# Patient Record
Sex: FEMALE | Race: WHITE | NOT HISPANIC OR LATINO | Employment: FULL TIME | URBAN - METROPOLITAN AREA
[De-identification: names, ages, dates, MRNs, and addresses within clinical notes are randomized per-mention and may not be internally consistent; named-entity substitution may affect disease eponyms.]

---

## 2020-08-29 RX ORDER — METFORMIN HYDROCHLORIDE 500 MG/1
500 TABLET, EXTENDED RELEASE ORAL 3 TIMES DAILY
COMMUNITY
Start: 2020-05-04

## 2020-08-29 RX ORDER — NORETHINDRONE ACETATE AND ETHINYL ESTRADIOL 1MG-20(21)
1 KIT ORAL
COMMUNITY
Start: 2020-05-08 | End: 2021-02-15 | Stop reason: SDUPTHER

## 2020-09-01 ENCOUNTER — TELEPHONE (OUTPATIENT)
Dept: INTERNAL MEDICINE CLINIC | Facility: CLINIC | Age: 30
End: 2020-09-01

## 2020-09-01 ENCOUNTER — OFFICE VISIT (OUTPATIENT)
Dept: INTERNAL MEDICINE CLINIC | Facility: CLINIC | Age: 30
End: 2020-09-01
Payer: COMMERCIAL

## 2020-09-01 VITALS
WEIGHT: 246 LBS | HEIGHT: 70 IN | TEMPERATURE: 98.5 F | HEART RATE: 72 BPM | OXYGEN SATURATION: 98 % | BODY MASS INDEX: 35.22 KG/M2 | DIASTOLIC BLOOD PRESSURE: 70 MMHG | SYSTOLIC BLOOD PRESSURE: 110 MMHG

## 2020-09-01 DIAGNOSIS — E28.2 PCOS (POLYCYSTIC OVARIAN SYNDROME): Primary | ICD-10-CM

## 2020-09-01 DIAGNOSIS — Z13.6 ENCOUNTER FOR LIPID SCREENING FOR CARDIOVASCULAR DISEASE: ICD-10-CM

## 2020-09-01 DIAGNOSIS — Z13.220 ENCOUNTER FOR LIPID SCREENING FOR CARDIOVASCULAR DISEASE: ICD-10-CM

## 2020-09-01 DIAGNOSIS — Z11.4 SCREENING FOR HIV WITHOUT PRESENCE OF RISK FACTORS: ICD-10-CM

## 2020-09-01 DIAGNOSIS — N89.8 VAGINAL SORE: ICD-10-CM

## 2020-09-01 PROCEDURE — 3725F SCREEN DEPRESSION PERFORMED: CPT | Performed by: INTERNAL MEDICINE

## 2020-09-01 PROCEDURE — 99203 OFFICE O/P NEW LOW 30 MIN: CPT | Performed by: INTERNAL MEDICINE

## 2020-09-01 NOTE — PROGRESS NOTES
BMI Counseling: Body mass index is 35 22 kg/m²  The BMI is above normal  Nutrition recommendations include reducing portion sizes and decreasing overall calorie intake  Exercise recommendations include exercising 3-5 times per week  Assessment/Plan:    Vaginal sore  Unclear etiology of sore  And she has had full testing done including HSV 1 and 2 both of which were negative however they were not scrapings of the lesion  Will use Silvadene topical cream   Follow-up with gyn    BMI 35 0-35 9,adult  Follow-up of physical exam next available              Diagnoses and all orders for this visit:    PCOS (polycystic ovarian syndrome)  -     CBC and differential; Future  -     Comprehensive metabolic panel; Future  -     Ambulatory referral to Weight Management; Future    Screening for HIV without presence of risk factors  -     HIV 1/2 Antigen/Antibody (4th Generation) w Reflex [facility]; Future    Encounter for lipid screening for cardiovascular disease  -     Lipid Panel with Direct LDL reflex; Future    BMI 35 0-35 9,adult  -     Ambulatory referral to Weight Management; Future    Vaginal sore  -     silver sulfadiazine (Silvadene) 1 % cream; Apply topically daily    Other orders  -     aluminum chloride (DRYSOL) 20 % external solution; Dab-O-matic Use as directed  -     metFORMIN (GLUCOPHAGE-XR) 500 mg 24 hr tablet; Start by taking 1 tablet by mouth with dinner  Increase after 1 week to 1 tablet twice daily with meals  -     norethindrone-ethinyl estradiol (JUNEL FE 1/20) 1-20 MG-MCG per tablet; Take 1 tablet by mouth          Subjective:     Chief Complaint   Patient presents with   [facility]      pt is here for establish care   Lesions     Pt has one spot in vagina  boyfried was postive for gential warts  Pt had screening bloodwork done but can't get into gyn for weeks  Results in chart  Lesion itches        Patient ID: Alisia Wilde is a 31 y o  female      Vaginal Itching   The patient's primary symptoms include genital lesions  The current episode started 1 to 4 weeks ago  The problem occurs intermittently  The problem has been unchanged  The patient is experiencing no pain  The problem affects the right side  She is here to establish care since she is new to the area  She has a sore on her labia which she would like to be evaluated  Her only other medical history is that of PCOS for which she has been on Glucophage XR and has lost 10 lb so far  The following portions of the patient's history were reviewed and updated as appropriate: past family history, past medical history, past social history, past surgical history and problem list     Review of Systems   Genitourinary: Positive for genital sores and menstrual problem  All other systems reviewed and are negative  Past Medical History:   Diagnosis Date    Arthritis     PCOS (polycystic ovarian syndrome)          Current Outpatient Medications:     aluminum chloride (DRYSOL) 20 % external solution, Dab-O-matic Use as directed, Disp: , Rfl:     metFORMIN (GLUCOPHAGE-XR) 500 mg 24 hr tablet, Start by taking 1 tablet by mouth with dinner   Increase after 1 week to 1 tablet twice daily with meals, Disp: , Rfl:     norethindrone-ethinyl estradiol (JUNEL FE 1/20) 1-20 MG-MCG per tablet, Take 1 tablet by mouth, Disp: , Rfl:     silver sulfadiazine (Silvadene) 1 % cream, Apply topically daily, Disp: 25 g, Rfl: 0    Allergies   Allergen Reactions    Bee Venom Swelling     Large local reaction    Erigeron Annuus     Nicotine Other (See Comments)     Pt had severe "night terrors" when using nicotine patch    Other      Nicotene patches  Fleas    Clarithromycin Rash       Social History   Past Surgical History:   Procedure Laterality Date    WISDOM TOOTH EXTRACTION  2007     Family History   Problem Relation Age of Onset    Hypertension Mother     Other Mother         vaginal tumors    Skin cancer Father     Heart Valve Disease Father         leaky valve    Diabetes Maternal Grandmother     Kidney failure Maternal Grandmother     Cervical cancer Paternal Grandmother     Lung cancer Paternal Grandfather        Objective:  /70 (BP Location: Left arm, Patient Position: Sitting, Cuff Size: Large)   Pulse 72   Temp 98 5 °F (36 9 °C) (Tympanic)   Ht 5' 10 08" (1 78 m)   Wt 112 kg (246 lb)   SpO2 98% Comment: ra  BMI 35 22 kg/m²     No results found for this or any previous visit (from the past 1344 hour(s))  Physical Exam  Genitourinary:     General: Normal vulva  Labia:         Right: Lesion present  Comments: Small punched-out open lesion on right labia approximately 5 mm in size, Nontender          Gen: healthy appearing, overweight  Heent: atraumatic, normocephalic, sclera is clear and PERRLA  Mouth: is moist  Neck: is supple, no JVD, no carotid bruits  Heart: is regular Normal s1, s2, no murmurs, rubs, clicks or gallops  Lungs: are clear to auscultation and percussion    Ext: no edema, distal pulses normal  Skin: no rashes, ulcers  Mood: normal affect  Neuro: AAOx3

## 2020-09-01 NOTE — ASSESSMENT & PLAN NOTE
Unclear etiology of sore  And she has had full testing done including HSV 1 and 2 both of which were negative however they were not scrapings of the lesion    Will use Silvadene topical cream   Follow-up with gyn

## 2020-09-01 NOTE — TELEPHONE ENCOUNTER
Patient seen today by Dr Jose Roberto Bonilla to establish care  Due for an annual physical   Patient needs to check schedule and will call back to schedule appointment

## 2020-09-21 ENCOUNTER — CONSULT (OUTPATIENT)
Dept: BARIATRICS | Facility: CLINIC | Age: 30
End: 2020-09-21
Payer: COMMERCIAL

## 2020-09-21 VITALS
HEIGHT: 68 IN | WEIGHT: 245.8 LBS | HEART RATE: 74 BPM | SYSTOLIC BLOOD PRESSURE: 102 MMHG | TEMPERATURE: 98 F | BODY MASS INDEX: 37.25 KG/M2 | DIASTOLIC BLOOD PRESSURE: 72 MMHG

## 2020-09-21 DIAGNOSIS — E28.2 PCOS (POLYCYSTIC OVARIAN SYNDROME): ICD-10-CM

## 2020-09-21 DIAGNOSIS — E66.01 SEVERE OBESITY WITH BODY MASS INDEX (BMI) OF 35.0 TO 39.9 WITH SERIOUS COMORBIDITY (HCC): Primary | ICD-10-CM

## 2020-09-21 DIAGNOSIS — F17.200 TOBACCO USE DISORDER: ICD-10-CM

## 2020-09-21 DIAGNOSIS — Z59.41 FOOD INSECURITY: ICD-10-CM

## 2020-09-21 DIAGNOSIS — R87.610 ATYPICAL SQUAMOUS CELL CHANGES OF UNDETERMINED SIGNIFICANCE (ASCUS) ON CERVICAL CYTOLOGY WITH NEGATIVE HIGH RISK HUMAN PAPILLOMA VIRUS (HPV) TEST RESULT: ICD-10-CM

## 2020-09-21 PROCEDURE — 99243 OFF/OP CNSLTJ NEW/EST LOW 30: CPT | Performed by: FAMILY MEDICINE

## 2020-09-21 SDOH — ECONOMIC STABILITY - FOOD INSECURITY: FOOD INSECURITY: Z59.41

## 2020-09-21 NOTE — PROGRESS NOTES
Assessment/Plan:    Diagnoses and all orders for this visit:    Severe obesity with body mass index (BMI) of 35 0 to 39 9 with serious comorbidity (HCC)    PCOS (polycystic ovarian syndrome)  -     Ambulatory referral to Weight Management    BMI 35 0-35 9,adult  -     Ambulatory referral to Weight Management    Atypical squamous cell changes of undetermined significance (ASCUS) on cervical cytology with negative high risk human papilloma virus (HPV) test result    Food insecurity    Tobacco use disorder      -Discussed options of [facility]-Intensive Lifestyle Intervention Program, Very Low Calorie Diet-VLCD and Conservative Program and the role of weight loss medications   -Initial weight loss goal of 5-10% weight loss for improved health  -Screening labs  -Patient is interested in pursuing Conservative Program + body comp  Contrave would be a good option for AOM especially with smoking could help with smoking cessation  No hx of seizures  Goals:  Food log (ie ) www myfitnesspal com,sparkpeople  com,loseit com,calorieking  com,etc  baritastic  No sugary beverages  At least 64oz of water daily  Increase physical activity by 10 minutes daily  Gradually increase physical activity to a goal of 5 days per week for 30 minutes of MODERATE intensity PLUS 2 days per week of FULL BODY resistance training  1500 calories per day    45 minute visit, >50% face-to-face time spent counseling patient on nonsurgical interventions for the treatment of excess weight  Discussed in detail nonsurgical options including intensive lifestyle intervention program, very low-calorie diet program and conservative program   Discussed the role of weight loss medications  Counseled patient on diet behavior and exercise modification for weight loss  Follow up in approximately 2 months with Non-Surgical Physician/Advanced Practitioner      Subjective:   Chief Complaint   Patient presents with    Consult     mwm consult       Patient ID: Alia Botello  is a 31 y o  female with excess weight/obesity here to pursue weight management  Past Medical History:   Diagnosis Date    Arthritis     PCOS (polycystic ovarian syndrome)        HPI:  Obesity/Excess Weight:  Severity: class 2  Onset:  most of her life  Modifiers: Diet and Exercise and Physician Supervised Weight Loss Program, low carb  Contributing factors: Poor Food Choices and PCOS  Associated symptoms: increased joint pain and inability to do certain activities    Nutrition: 1,500cals a day  Goals: 200lbs  Hydration:  Alcohol: every other weekend couple drinks   Smokin/2ppd  Exercise: 30 high intensity work out, 3 miles walks x times a week  Sleep: 7-8hrs  STOP ban/8    Social:  Works as teacher  Lives alone w cat    The following portions of the patient's history were reviewed and updated as appropriate: allergies, current medications, past family history, past medical history, past social history, past surgical history and problem list     Review of Systems   Constitutional: Negative for activity change and appetite change  Respiratory: Negative  Cardiovascular: Negative  Gastrointestinal: Negative  Genitourinary: Negative  Musculoskeletal: Negative for arthralgias  Skin: Negative for rash  Psychiatric/Behavioral: Negative  Objective:    /72 (BP Location: Left arm, Patient Position: Sitting, Cuff Size: Large)   Pulse 74   Temp 98 °F (36 7 °C)   Ht 5' 8" (1 727 m)   Wt 111 kg (245 lb 12 8 oz)   BMI 37 37 kg/m²     Physical Exam     Constitutional   General appearance: Abnormal   well developed and obese  Eyes No conjunctival pallor  Ears, Nose, Mouth, and Throat Oral mucosa moist    Pulmonary   Respiratory effort: No increased work of breathing or signs of respiratory distress  Auscultation of lungs: Clear to auscultation, equal breath sounds bilaterally, no wheezes, no rales, no rhonci      Cardiovascular   Auscultation of heart: Normal rate and rhythm, normal S1 and S2, without murmurs  Examination of extremities for edema and/or varicosities: Normal   no edema  Abdomen   Abdomen: Abnormal   The abdomen was obese  Bowel sounds were normal  The abdomen was soft and nontender     Musculoskeletal   Gait and station: Normal     Psychiatric   Orientation to person, place and time: Normal     Affect: appropriate

## 2020-10-05 ENCOUNTER — OFFICE VISIT (OUTPATIENT)
Dept: BARIATRICS | Facility: CLINIC | Age: 30
End: 2020-10-05

## 2020-10-05 VITALS — BODY MASS INDEX: 37.15 KG/M2 | HEIGHT: 68 IN | WEIGHT: 245.15 LBS

## 2020-10-05 DIAGNOSIS — R63.5 ABNORMAL WEIGHT GAIN: ICD-10-CM

## 2020-10-05 PROCEDURE — RECHECK

## 2020-10-05 PROCEDURE — WEIGHT

## 2020-10-09 ENCOUNTER — TELEMEDICINE (OUTPATIENT)
Dept: BARIATRICS | Facility: CLINIC | Age: 30
End: 2020-10-09
Payer: COMMERCIAL

## 2020-10-09 VITALS — WEIGHT: 245 LBS | HEIGHT: 68 IN | BODY MASS INDEX: 37.13 KG/M2

## 2020-10-09 DIAGNOSIS — F33.1 MODERATE EPISODE OF RECURRENT MAJOR DEPRESSIVE DISORDER (HCC): ICD-10-CM

## 2020-10-09 DIAGNOSIS — F17.200 TOBACCO USE DISORDER: ICD-10-CM

## 2020-10-09 DIAGNOSIS — E66.01 SEVERE OBESITY WITH BODY MASS INDEX (BMI) OF 35.0 TO 39.9 WITH SERIOUS COMORBIDITY (HCC): Primary | ICD-10-CM

## 2020-10-09 DIAGNOSIS — E28.2 PCOS (POLYCYSTIC OVARIAN SYNDROME): ICD-10-CM

## 2020-10-09 PROCEDURE — 99214 OFFICE O/P EST MOD 30 MIN: CPT | Performed by: FAMILY MEDICINE

## 2020-11-12 ENCOUNTER — TELEPHONE (OUTPATIENT)
Dept: BARIATRICS | Facility: CLINIC | Age: 30
End: 2020-11-12

## 2020-11-19 DIAGNOSIS — F33.1 MODERATE EPISODE OF RECURRENT MAJOR DEPRESSIVE DISORDER (HCC): Primary | ICD-10-CM

## 2020-11-19 DIAGNOSIS — E66.01 SEVERE OBESITY WITH BODY MASS INDEX (BMI) OF 35.0 TO 39.9 WITH SERIOUS COMORBIDITY (HCC): ICD-10-CM

## 2020-11-19 DIAGNOSIS — F17.200 TOBACCO USE DISORDER: ICD-10-CM

## 2020-11-19 RX ORDER — BUPROPION HYDROCHLORIDE 150 MG/1
150 TABLET, EXTENDED RELEASE ORAL 2 TIMES DAILY
Qty: 60 TABLET | Refills: 1 | Status: SHIPPED | OUTPATIENT
Start: 2020-11-19 | End: 2021-02-18 | Stop reason: SDUPTHER

## 2020-12-10 ENCOUNTER — HOSPITAL ENCOUNTER (EMERGENCY)
Facility: HOSPITAL | Age: 30
Discharge: HOME | End: 2020-12-10
Attending: EMERGENCY MEDICINE | Admitting: EMERGENCY MEDICINE
Payer: COMMERCIAL

## 2020-12-10 ENCOUNTER — APPOINTMENT (EMERGENCY)
Dept: RADIOLOGY | Facility: HOSPITAL | Age: 30
End: 2020-12-10
Payer: COMMERCIAL

## 2020-12-10 VITALS
WEIGHT: 250.38 LBS | TEMPERATURE: 98.4 F | DIASTOLIC BLOOD PRESSURE: 89 MMHG | HEART RATE: 89 BPM | SYSTOLIC BLOOD PRESSURE: 140 MMHG | OXYGEN SATURATION: 99 % | BODY MASS INDEX: 38.07 KG/M2 | RESPIRATION RATE: 16 BRPM

## 2020-12-10 DIAGNOSIS — R07.89 ATYPICAL CHEST PAIN: Primary | ICD-10-CM

## 2020-12-10 LAB
ALBUMIN SERPL BCP-MCNC: 4.3 G/DL (ref 3–5.2)
ALP SERPL-CCNC: 39 U/L (ref 43–122)
ALT SERPL W P-5'-P-CCNC: 26 U/L (ref 9–52)
ANION GAP SERPL CALCULATED.3IONS-SCNC: 7 MMOL/L (ref 5–14)
AST SERPL W P-5'-P-CCNC: 27 U/L (ref 14–36)
ATRIAL RATE: 62 BPM
ATRIAL RATE: 68 BPM
BASOPHILS # BLD AUTO: 0 THOUSANDS/ΜL (ref 0–0.1)
BASOPHILS NFR BLD AUTO: 0 % (ref 0–1)
BILIRUB SERPL-MCNC: 0.7 MG/DL
BUN SERPL-MCNC: 7 MG/DL (ref 5–25)
CALCIUM SERPL-MCNC: 8.6 MG/DL (ref 8.4–10.2)
CHLORIDE SERPL-SCNC: 107 MMOL/L (ref 97–108)
CO2 SERPL-SCNC: 23 MMOL/L (ref 22–30)
CREAT SERPL-MCNC: 0.57 MG/DL (ref 0.6–1.2)
D DIMER PPP FEU-MCNC: 0.39 UG/ML FEU
EOSINOPHIL # BLD AUTO: 0.1 THOUSAND/ΜL (ref 0–0.4)
EOSINOPHIL NFR BLD AUTO: 2 % (ref 0–6)
ERYTHROCYTE [DISTWIDTH] IN BLOOD BY AUTOMATED COUNT: 13.1 %
EXT PREG TEST URINE: NEGATIVE
EXT. CONTROL ED NAV: NORMAL
GFR SERPL CREATININE-BSD FRML MDRD: 124 ML/MIN/1.73SQ M
GLUCOSE SERPL-MCNC: 81 MG/DL (ref 70–99)
HCT VFR BLD AUTO: 44.3 % (ref 36–46)
HGB BLD-MCNC: 15.3 G/DL (ref 12–16)
LYMPHOCYTES # BLD AUTO: 2.4 THOUSANDS/ΜL (ref 0.5–4)
LYMPHOCYTES NFR BLD AUTO: 25 % (ref 25–45)
MCH RBC QN AUTO: 31.8 PG (ref 26–34)
MCHC RBC AUTO-ENTMCNC: 34.5 G/DL (ref 31–36)
MCV RBC AUTO: 92 FL (ref 80–100)
MONOCYTES # BLD AUTO: 0.7 THOUSAND/ΜL (ref 0.2–0.9)
MONOCYTES NFR BLD AUTO: 7 % (ref 1–10)
NEUTROPHILS # BLD AUTO: 6.4 THOUSANDS/ΜL (ref 1.8–7.8)
NEUTS SEG NFR BLD AUTO: 66 % (ref 45–65)
P AXIS: 39 DEGREES
P AXIS: 41 DEGREES
PLATELET # BLD AUTO: 199 THOUSANDS/UL (ref 150–450)
PMV BLD AUTO: 10 FL (ref 8.9–12.7)
POTASSIUM SERPL-SCNC: 4.1 MMOL/L (ref 3.6–5)
PR INTERVAL: 148 MS
PR INTERVAL: 150 MS
PROT SERPL-MCNC: 7.4 G/DL (ref 5.9–8.4)
QRS AXIS: 24 DEGREES
QRS AXIS: 27 DEGREES
QRSD INTERVAL: 92 MS
QRSD INTERVAL: 92 MS
QT INTERVAL: 434 MS
QT INTERVAL: 438 MS
QTC INTERVAL: 444 MS
QTC INTERVAL: 461 MS
RBC # BLD AUTO: 4.8 MILLION/UL (ref 4–5.2)
SODIUM SERPL-SCNC: 137 MMOL/L (ref 137–147)
T WAVE AXIS: 22 DEGREES
T WAVE AXIS: 24 DEGREES
TROPONIN I SERPL-MCNC: <0.01 NG/ML (ref 0–0.03)
TROPONIN I SERPL-MCNC: <0.01 NG/ML (ref 0–0.03)
VENTRICULAR RATE: 62 BPM
VENTRICULAR RATE: 68 BPM
WBC # BLD AUTO: 9.8 THOUSAND/UL (ref 4.5–11)

## 2020-12-10 PROCEDURE — 71046 X-RAY EXAM CHEST 2 VIEWS: CPT

## 2020-12-10 PROCEDURE — 93005 ELECTROCARDIOGRAM TRACING: CPT

## 2020-12-10 PROCEDURE — 93010 ELECTROCARDIOGRAM REPORT: CPT | Performed by: INTERNAL MEDICINE

## 2020-12-10 PROCEDURE — 96361 HYDRATE IV INFUSION ADD-ON: CPT

## 2020-12-10 PROCEDURE — 96374 THER/PROPH/DIAG INJ IV PUSH: CPT

## 2020-12-10 PROCEDURE — 36415 COLL VENOUS BLD VENIPUNCTURE: CPT | Performed by: EMERGENCY MEDICINE

## 2020-12-10 PROCEDURE — 84484 ASSAY OF TROPONIN QUANT: CPT | Performed by: EMERGENCY MEDICINE

## 2020-12-10 PROCEDURE — 80053 COMPREHEN METABOLIC PANEL: CPT | Performed by: EMERGENCY MEDICINE

## 2020-12-10 PROCEDURE — 81025 URINE PREGNANCY TEST: CPT | Performed by: EMERGENCY MEDICINE

## 2020-12-10 PROCEDURE — 99285 EMERGENCY DEPT VISIT HI MDM: CPT

## 2020-12-10 PROCEDURE — 85025 COMPLETE CBC W/AUTO DIFF WBC: CPT | Performed by: EMERGENCY MEDICINE

## 2020-12-10 PROCEDURE — 85379 FIBRIN DEGRADATION QUANT: CPT | Performed by: EMERGENCY MEDICINE

## 2020-12-10 PROCEDURE — 99285 EMERGENCY DEPT VISIT HI MDM: CPT | Performed by: EMERGENCY MEDICINE

## 2020-12-10 RX ORDER — KETOROLAC TROMETHAMINE 30 MG/ML
30 INJECTION, SOLUTION INTRAMUSCULAR; INTRAVENOUS ONCE
Status: COMPLETED | OUTPATIENT
Start: 2020-12-10 | End: 2020-12-10

## 2020-12-10 RX ADMIN — KETOROLAC TROMETHAMINE 30 MG: 30 INJECTION, SOLUTION INTRAMUSCULAR; INTRAVENOUS at 17:10

## 2020-12-10 RX ADMIN — SODIUM CHLORIDE 1000 ML: 0.9 INJECTION, SOLUTION INTRAVENOUS at 16:17

## 2021-01-04 ENCOUNTER — TELEPHONE (OUTPATIENT)
Dept: OBGYN CLINIC | Facility: CLINIC | Age: 31
End: 2021-01-04

## 2021-01-05 ENCOUNTER — TELEMEDICINE (OUTPATIENT)
Dept: INTERNAL MEDICINE CLINIC | Facility: CLINIC | Age: 31
End: 2021-01-05
Payer: COMMERCIAL

## 2021-01-05 DIAGNOSIS — E28.2 PCOS (POLYCYSTIC OVARIAN SYNDROME): Primary | ICD-10-CM

## 2021-01-05 DIAGNOSIS — F17.200 TOBACCO USE DISORDER: ICD-10-CM

## 2021-01-05 DIAGNOSIS — E66.01 SEVERE OBESITY WITH BODY MASS INDEX (BMI) OF 35.0 TO 39.9 WITH SERIOUS COMORBIDITY (HCC): ICD-10-CM

## 2021-01-05 DIAGNOSIS — K22.4 ESOPHAGEAL SPASM: ICD-10-CM

## 2021-01-05 DIAGNOSIS — L70.0 ACNE VULGARIS: ICD-10-CM

## 2021-01-05 DIAGNOSIS — F33.1 MODERATE EPISODE OF RECURRENT MAJOR DEPRESSIVE DISORDER (HCC): ICD-10-CM

## 2021-01-05 PROBLEM — F06.4 ANXIETY DISORDER DUE TO KNOWN PHYSIOLOGICAL CONDITION: Status: ACTIVE | Noted: 2021-01-05

## 2021-01-05 PROCEDURE — 99213 OFFICE O/P EST LOW 20 MIN: CPT | Performed by: INTERNAL MEDICINE

## 2021-01-05 PROCEDURE — 3725F SCREEN DEPRESSION PERFORMED: CPT | Performed by: INTERNAL MEDICINE

## 2021-01-05 RX ORDER — FAMOTIDINE 40 MG/1
40 TABLET, FILM COATED ORAL DAILY
Qty: 30 TABLET | Refills: 0 | Status: SHIPPED | OUTPATIENT
Start: 2021-01-05 | End: 2021-09-06 | Stop reason: SDUPTHER

## 2021-01-05 NOTE — ASSESSMENT & PLAN NOTE
Possibly secondary to Wellbutrin  However given present BMI possibility of a hiatal hernia    Will referred to GI for evaluation

## 2021-01-05 NOTE — PROGRESS NOTES
Assessment/Plan:    Tobacco use disorder  Continue with efforts for smoking cessation    Esophageal spasm  Possibly secondary to Wellbutrin  However given present BMI possibility of a hiatal hernia  Will referred to GI for evaluation    Anxiety disorder due to known physiological condition  Recommended to increase Wellbutrin to prescribed dosage  Diagnoses and all orders for this visit:    PCOS (polycystic ovarian syndrome)    Acne vulgaris    Moderate episode of recurrent major depressive disorder (HCC)    Severe obesity with body mass index (BMI) of 35 0 to 39 9 with serious comorbidity ([address] )    Tobacco use disorder    Esophageal spasm  -     Ambulatory referral to Gastroenterology; Future  -     famotidine (PEPCID) 40 MG tablet; Take 1 tablet (40 mg total) by mouth daily          Subjective:   Chief Complaint   Patient presents with    Chest Pain     Chest pain since about 3wks, has been improving  Pt went to ER on 12/10/2020  Patient ID: Mike Stone is a 31 y o  female  The patient was identified by name and date of birth  Mike Stone was informed that this is a telemedicine visit and that the visit is being conducted through [facility] and patient was informed that this is a secure, HIPAA-compliant platform  She agrees to proceed     My office door was closed  No one else was in the room  She acknowledged consent and understanding of privacy and security of the video platform  The patient has agreed to participate and understands they can discontinue the visit at any time  Patient is aware this is a billable service  Chest Pain   This is a new problem  The current episode started 1 to 4 weeks ago  The onset quality is gradual  The problem occurs intermittently  The problem has been gradually improving  The pain is present in the substernal region  The pain is severe  The quality of the pain is described as sharp and squeezing  The pain does not radiate  Pertinent negatives include no irregular heartbeat or near-syncope  she was evaluated in the ED with the ECG and blood work  EKG showed normal sinus rhythm blood work was normal and troponin was negative  She was recently started on Wellbutrin to help with smoking cessation  She has only been taking 1 a day in the morning  This helped with cigarette cessation during the day however towards the evening and next morning was when she was noticing the symptoms      The following portions of the patient's history were reviewed and updated as appropriate: past family history, past medical history, past social history, past surgical history and problem list     Review of Systems   Cardiovascular: Positive for chest pain  Negative for near-syncope  Psychiatric/Behavioral: The patient is nervous/anxious            Past Medical History:   Diagnosis Date    Arthritis     PCOS (polycystic ovarian syndrome)          Current Outpatient Medications:     aluminum chloride (DRYSOL) 20 % external solution, Dab-O-matic Use as directed, Disp: , Rfl:     buPROPion (WELLBUTRIN SR) 150 mg 12 hr tablet, Take 1 tablet (150 mg total) by mouth 2 (two) times a day (Patient taking differently: Take 150 mg by mouth daily ), Disp: 60 tablet, Rfl: 1    metFORMIN (GLUCOPHAGE-XR) 500 mg 24 hr tablet, Take 500 mg by mouth 2 (two) times a day with meals , Disp: , Rfl:     norethindrone-ethinyl estradiol (JUNEL FE 1/20) 1-20 MG-MCG per tablet, Take 1 tablet by mouth, Disp: , Rfl:     famotidine (PEPCID) 40 MG tablet, Take 1 tablet (40 mg total) by mouth daily, Disp: 30 tablet, Rfl: 0    Allergies   Allergen Reactions    Bee Venom Swelling     Large local reaction    Erigeron Annuus     Nicotine Other (See Comments)     Pt had severe "night terrors" when using nicotine patch    Other      Nicotene patches  Fleas    Clarithromycin Rash       Social History   Past Surgical History:   Procedure Laterality Date    WISDOM TOOTH EXTRACTION 2007     Family History   Problem Relation Age of Onset    Hypertension Mother     Other Mother         vaginal tumors    Heart disease Mother     Skin cancer Father     Heart Valve Disease Father         leaky valve    Diabetes Maternal Grandmother     Kidney failure Maternal Grandmother     Cervical cancer Paternal Grandmother     Lung cancer Paternal Grandfather        Objective: There were no vitals taken for this visit      Recent Results (from the past 1344 hour(s))   ECG 12 lead    Collection Time: 12/10/20  4:13 PM   Result Value Ref Range    Ventricular Rate 68 BPM    Atrial Rate 68 BPM    UT Interval 148 ms    QRSD Interval 92 ms    QT Interval 434 ms    QTC Interval 461 ms    P Axis 41 degrees    QRS Axis 27 degrees    T Wave Axis 24 degrees   ECG 12 lead    Collection Time: 12/10/20  4:14 PM   Result Value Ref Range    Ventricular Rate 62 BPM    Atrial Rate 62 BPM    UT Interval 150 ms    QRSD Interval 92 ms    QT Interval 438 ms    QTC Interval 444 ms    P Axis 39 degrees    QRS Axis 24 degrees    T Wave Axis 22 degrees   CBC and differential    Collection Time: 12/10/20  4:15 PM   Result Value Ref Range    WBC 9 80 4 50 - 11 00 Thousand/uL    RBC 4 80 4 00 - 5 20 Million/uL    Hemoglobin 15 3 12 0 - 16 0 g/dL    Hematocrit 44 3 36 0 - 46 0 %    MCV 92 80 - 100 fL    MCH 31 8 26 0 - 34 0 pg    MCHC 34 5 31 0 - 36 0 g/dL    RDW 13 1 <15 3 %    MPV 10 0 8 9 - 12 7 fL    Platelets 082 508 - 105 Thousands/uL    Neutrophils Relative 66 (H) 45 - 65 %    Lymphocytes Relative 25 25 - 45 %    Monocytes Relative 7 1 - 10 %    Eosinophils Relative 2 0 - 6 %    Basophils Relative 0 0 - 1 %    Neutrophils Absolute 6 40 1 80 - 7 80 Thousands/µL    Lymphocytes Absolute 2 40 0 50 - 4 00 Thousands/µL    Monocytes Absolute 0 70 0 20 - 0 90 Thousand/µL    Eosinophils Absolute 0 10 0 00 - 0 40 Thousand/µL    Basophils Absolute 0 00 0 00 - 0 10 Thousands/µL   Comprehensive metabolic panel    Collection Time: 12/10/20  4:15 PM   Result Value Ref Range    Sodium 137 137 - 147 mmol/L    Potassium 4 1 3 6 - 5 0 mmol/L    Chloride 107 97 - 108 mmol/L    CO2 23 22 - 30 mmol/L    ANION GAP 7 5 - 14 mmol/L    BUN 7 5 - 25 mg/dL    Creatinine 0 57 (L) 0 60 - 1 20 mg/dL    Glucose 81 70 - 99 mg/dL    Calcium 8 6 8 4 - 10 2 mg/dL    AST 27 14 - 36 U/L    ALT 26 9 - 52 U/L    Alkaline Phosphatase 39 (L) 43 - 122 U/L    Total Protein 7 4 5 9 - 8 4 g/dL    Albumin 4 3 3 0 - 5 2 g/dL    Total Bilirubin 0 70 <1 30 mg/dL    eGFR 124 >60 ml/min/1 73sq m   Troponin I    Collection Time: 12/10/20  4:15 PM   Result Value Ref Range    Troponin I <0 01 0 00 - 0 03 ng/mL   D-Dimer    Collection Time: 12/10/20  4:15 PM   Result Value Ref Range    D-Dimer, Quant 0 39 <0 50 ug/ml FEU   POCT pregnancy, urine    Collection Time: 12/10/20  5:00 PM   Result Value Ref Range    EXT PREG TEST UR (Ref: Negative) negative     Control valid    Troponin I    Collection Time: 12/10/20  7:25 PM   Result Value Ref Range    Troponin I <0 01 0 00 - 0 03 ng/mL        I spent 25 minutes directly with the patient during this visit  today in which greater than 50% of this time was spent in counseling/coordination of care regarding Diagnostic results, Prognosis, Risks and benefits of tx options, Intructions for management, Patient and family education, Importance of tx compliance, Risk factor reductions and Impressions  I did not need to refer her for COVID evaluation P             Physical Exam   Constitutional: She is oriented to person, place, and time  She appears well-developed and well-nourished  No distress  HENT:   Head: Normocephalic and atraumatic  Neck: Normal range of motion  Neck supple  Pulmonary/Chest: Effort normal  No respiratory distress  Musculoskeletal: Normal range of motion  Neurological: She is alert and oriented to person, place, and time  Skin: She is not diaphoretic  Psychiatric: She has a normal mood and affect   Her behavior is normal  Judgment and thought content normal

## 2021-01-07 ENCOUNTER — OFFICE VISIT (OUTPATIENT)
Dept: OBGYN CLINIC | Facility: CLINIC | Age: 31
End: 2021-01-07
Payer: COMMERCIAL

## 2021-01-07 VITALS
SYSTOLIC BLOOD PRESSURE: 136 MMHG | DIASTOLIC BLOOD PRESSURE: 68 MMHG | HEIGHT: 68 IN | WEIGHT: 243 LBS | BODY MASS INDEX: 36.83 KG/M2

## 2021-01-07 DIAGNOSIS — N93.0 POSTCOITAL BLEEDING: Primary | ICD-10-CM

## 2021-01-07 DIAGNOSIS — N93.9 ABNORMAL UTERINE BLEEDING: ICD-10-CM

## 2021-01-07 PROCEDURE — 99203 OFFICE O/P NEW LOW 30 MIN: CPT | Performed by: OBSTETRICS & GYNECOLOGY

## 2021-01-07 PROCEDURE — 87624 HPV HI-RISK TYP POOLED RSLT: CPT | Performed by: OBSTETRICS & GYNECOLOGY

## 2021-01-07 PROCEDURE — G0143 SCR C/V CYTO,THINLAYER,RESCR: HCPCS | Performed by: OBSTETRICS & GYNECOLOGY

## 2021-01-07 NOTE — PROGRESS NOTES
CC:  Abnormal bleeding    HPI: Tony Wallace presents for discussion and evaluation of abnormal uterine bleeding  This patient who states that she has been spotting every so often, despite taking the birth control pill correctly  The patient takes Junel 1/20 and has been on this for approximately 6 months  She has had a normal Pap smear in 2016 but is overdue for this  Apparently she was evaluated for polycystic ovarian disease with a normal ultrasound in 2018  The patient recently had an episode of postcoital spotting  She denies any pelvic pain, fever, discharge and has not missed any of her birth control pills  Past Medical History:  Past Medical History:   Diagnosis Date    Arthritis     PCOS (polycystic ovarian syndrome)        Past Surgical History:  Past Surgical History:   Procedure Laterality Date    WISDOM TOOTH EXTRACTION  2007       Past OB/Gyn History:  Menstrual cycles generally every 28 days lasting for few days in accordance with the birth control pill pack  ALLERGIES:   Allergies   Allergen Reactions    Bee Venom Swelling     Large local reaction    Erigeron Annuus     Nicotine Other (See Comments)     Pt had severe "night terrors" when using nicotine patch    Other      Nicotene patches  Fleas    Clarithromycin Rash       MEDS:   Current Outpatient Medications:     aluminum chloride (DRYSOL) 20 % external solution    buPROPion (WELLBUTRIN SR) 150 mg 12 hr tablet    famotidine (PEPCID) 40 MG tablet    metFORMIN (GLUCOPHAGE-XR) 500 mg 24 hr tablet    norethindrone-ethinyl estradiol (JUNEL FE 1/20) 1-20 MG-MCG per tablet    Review of Systems:  Skin: No rashes or discolorations of any concern  RESP: Denies SOB, no cough  CV: Denies chest pain or palpitations  Breasts: Denies masses, pain, skin changes and nipple discharge  GI: Denies abdominal pain, heartburn, nausea, vomiting, changes in bowel habits     : Denies dysuria, frequency, CVA tenderness, incontinence and hematuria  Genitalia: Denies abnormal vaginal discharge, external lesions, rashes, pelvic pain, pressure, abnormal bleeding  Rectal:  Denies pain, bleeding, hemorrhoids,    Physical Exam:  /68   Ht 5' 8" (1 727 m)   Wt 110 kg (243 lb)   LMP 01/01/2021   BMI 36 95 kg/m²    Gen: The patient was alert and oriented x3, pleasant well-appearing female in no acute distress  Abd:  Soft, nontender, nondistended, no masses or organomegaly  Back:  No CVA tenderness, no tenderness to palpation along spine  Pelvic  Normal appearing external female genitalia, no visible lesions, no rashes  Vagina is free of discharge, normal vaginal epithelium, no abnormal  lesions, no evidence of prolapse anteriorly or posteriorly  Normal appearing cervix, mobile and nontender  Pap smear is obtained at this time  Uterus is normal size, mobile and, nontender  No palpable adnexal masses or tenderness  No anoperineal lesions  Skin:  No concerning lesions  Extremeties: No edema      Assessment & Plan:   1  Postcoital spotting, await results of today's Pap smear result  2  Irregular bleeding on birth control pill, probably as a result of the low dosage  Patient will return for a pelvic ultrasound as a precaution

## 2021-01-09 LAB
HPV HR 12 DNA CVX QL NAA+PROBE: NEGATIVE
HPV16 DNA CVX QL NAA+PROBE: NEGATIVE
HPV18 DNA CVX QL NAA+PROBE: NEGATIVE

## 2021-01-11 LAB
LAB AP GYN PRIMARY INTERPRETATION: NORMAL
LAB AP LMP: NORMAL
Lab: NORMAL
PATH INTERP SPEC-IMP: NORMAL

## 2021-01-19 ENCOUNTER — OFFICE VISIT (OUTPATIENT)
Dept: OBGYN CLINIC | Facility: CLINIC | Age: 31
End: 2021-01-19
Payer: COMMERCIAL

## 2021-01-19 ENCOUNTER — ULTRASOUND (OUTPATIENT)
Dept: OBGYN CLINIC | Facility: CLINIC | Age: 31
End: 2021-01-19
Payer: COMMERCIAL

## 2021-01-19 VITALS
WEIGHT: 242 LBS | HEIGHT: 68 IN | SYSTOLIC BLOOD PRESSURE: 120 MMHG | BODY MASS INDEX: 36.68 KG/M2 | DIASTOLIC BLOOD PRESSURE: 70 MMHG

## 2021-01-19 DIAGNOSIS — N92.6 IRREGULAR BLEEDING: Primary | ICD-10-CM

## 2021-01-19 DIAGNOSIS — N93.9 ABNORMAL UTERINE BLEEDING (AUB): Primary | ICD-10-CM

## 2021-01-19 PROCEDURE — 3008F BODY MASS INDEX DOCD: CPT | Performed by: OBSTETRICS & GYNECOLOGY

## 2021-01-19 PROCEDURE — 99213 OFFICE O/P EST LOW 20 MIN: CPT | Performed by: OBSTETRICS & GYNECOLOGY

## 2021-01-19 PROCEDURE — 76830 TRANSVAGINAL US NON-OB: CPT | Performed by: OBSTETRICS & GYNECOLOGY

## 2021-01-19 NOTE — PROGRESS NOTES
AMB US Pelvic Non OB    Date/Time: 1/19/2021 7:05 AM  Performed by: Roger Gardner  Authorized by: Ksenia Zeng MD   Universal Protocol:  Patient identity confirmed: verbally with patient      Procedure details:     Technique:  Transvaginal US, Non-OB    Position: lithotomy exam    Uterine findings:     Length (cm): 8 29    Height (cm):  4 32    Width (cm):  5 72    Endometrial stripe: identified    Left ovary findings:     Left ovary:  Visualized    Length (cm): 3 7    Height (cm): 2 37    Width (cm): 2 84  Right ovary findings:     Right ovary:  Visualized    Length (cm): 3 29    Height (cm): 2 04    Width (cm): 2 51  Other findings:     Free pelvic fluid: not identified      Free peritoneal fluid: not identified    Post-Procedure Details:     Impression:  Retroverted uterus demonstrates a subseptate appearing uterus without other masses or abnormalities noted  The endometrium on the right measures 6 2mm and the left 5 3mm  The bilateral ovaries appear within normal limits  The left ovary contains a 2 4JE follicle  No free fluid  Tolerance: Tolerated well, no immediate complications    Complications: no complications    Additional Procedure Comments:      Electron Database F8 E8C-RS transvaginal transducer Serial # [id number] was used to perform the examination today and subsequently followed with high level disinfection utilizing Trophon EPR procedure  Ultrasound performed at:     [address] [address]  Phone:  [phone]  Fax:  [phone]

## 2021-01-19 NOTE — PROGRESS NOTES
Crofton Charley is seen today following an ultrasound due to the fact that she was having irregular bleeding  It was felt probably due to her birth control pills  Overall the ultrasound was within normal limits with the exception of noting the patient has a subseptate uterus  This was explained to the patient  She was informed this time nothing further should be done  Should her intermittent spotting and or bleeding worsen then she is to call the office and we will change her birth control pill

## 2021-02-15 DIAGNOSIS — N92.6 IRREGULAR BLEEDING: Primary | ICD-10-CM

## 2021-02-15 RX ORDER — NORETHINDRONE ACETATE AND ETHINYL ESTRADIOL 1MG-20(21)
1 KIT ORAL DAILY
Qty: 28 TABLET | Refills: 11 | Status: SHIPPED | OUTPATIENT
Start: 2021-02-15 | End: 2021-03-03

## 2021-02-18 DIAGNOSIS — E66.01 SEVERE OBESITY WITH BODY MASS INDEX (BMI) OF 35.0 TO 39.9 WITH SERIOUS COMORBIDITY (HCC): ICD-10-CM

## 2021-02-18 DIAGNOSIS — F33.1 MODERATE EPISODE OF RECURRENT MAJOR DEPRESSIVE DISORDER (HCC): ICD-10-CM

## 2021-02-18 DIAGNOSIS — F17.200 TOBACCO USE DISORDER: ICD-10-CM

## 2021-02-19 RX ORDER — BUPROPION HYDROCHLORIDE 150 MG/1
150 TABLET, EXTENDED RELEASE ORAL 2 TIMES DAILY
Qty: 60 TABLET | Refills: 0 | Status: SHIPPED | OUTPATIENT
Start: 2021-02-19 | End: 2021-03-17 | Stop reason: SDUPTHER

## 2021-02-24 ENCOUNTER — PATIENT MESSAGE (OUTPATIENT)
Dept: INTERNAL MEDICINE CLINIC | Facility: CLINIC | Age: 31
End: 2021-02-24

## 2021-03-03 DIAGNOSIS — N92.6 IRREGULAR BLEEDING: ICD-10-CM

## 2021-03-03 RX ORDER — NORETHINDRONE ACETATE AND ETHINYL ESTRADIOL 1MG-20(21)
1 KIT ORAL DAILY
Qty: 28 TABLET | Refills: 0 | Status: SHIPPED | OUTPATIENT
Start: 2021-03-03 | End: 2021-03-11 | Stop reason: SDUPTHER

## 2021-03-07 DIAGNOSIS — N92.6 IRREGULAR BLEEDING: ICD-10-CM

## 2021-03-08 RX ORDER — NORETHINDRONE ACETATE AND ETHINYL ESTRADIOL 1MG-20(21)
1 KIT ORAL DAILY
Qty: 28 TABLET | Refills: 0 | OUTPATIENT
Start: 2021-03-08

## 2021-03-10 DIAGNOSIS — Z23 ENCOUNTER FOR IMMUNIZATION: ICD-10-CM

## 2021-03-11 ENCOUNTER — ANNUAL EXAM (OUTPATIENT)
Dept: OBGYN CLINIC | Facility: CLINIC | Age: 31
End: 2021-03-11
Payer: COMMERCIAL

## 2021-03-11 VITALS
SYSTOLIC BLOOD PRESSURE: 120 MMHG | DIASTOLIC BLOOD PRESSURE: 70 MMHG | WEIGHT: 234 LBS | HEIGHT: 68 IN | BODY MASS INDEX: 35.46 KG/M2

## 2021-03-11 DIAGNOSIS — N92.6 IRREGULAR BLEEDING: ICD-10-CM

## 2021-03-11 DIAGNOSIS — Z30.41 ORAL CONTRACEPTIVE USE: ICD-10-CM

## 2021-03-11 DIAGNOSIS — Z01.419 ENCOUNTER FOR GYNECOLOGICAL EXAMINATION WITHOUT ABNORMAL FINDING: Primary | ICD-10-CM

## 2021-03-11 PROCEDURE — 99395 PREV VISIT EST AGE 18-39: CPT | Performed by: OBSTETRICS & GYNECOLOGY

## 2021-03-11 RX ORDER — NORETHINDRONE ACETATE AND ETHINYL ESTRADIOL 1MG-20(21)
1 KIT ORAL DAILY
Qty: 28 TABLET | Refills: 11 | Status: SHIPPED | OUTPATIENT
Start: 2021-03-11 | End: 2022-04-06 | Stop reason: SDUPTHER

## 2021-03-11 NOTE — PROGRESS NOTES
CC:  Annual exam    HPI: Nelson Marrero presents forRoutine gyn exam   The patient is doing well with no complaints and states that her menstrual cycles have become normal again  She is tolerating the pill well and expresses no effects and denies any adverse reactions  She wishes to continue  Past Medical History:  Past Medical History:   Diagnosis Date    Arthritis     PCOS (polycystic ovarian syndrome)        Past Surgical History:  Past Surgical History:   Procedure Laterality Date    WISDOM TOOTH EXTRACTION  2007       Past OB/Gyn History:  Menstrual cycles every 28 days, with 4 days of normal bleeding  Denies any history of sexually transmitted infection  No history of abnormal pap smears   Her last pap smear was  Normal     ALLERGIES:   Allergies   Allergen Reactions    Bee Venom Swelling     Large local reaction    Erigeron Annuus     Nicotine Other (See Comments)     Pt had severe "night terrors" when using nicotine patch    Other      Nicotene patches  Fleas    Clarithromycin Rash       MEDS:   Current Outpatient Medications:     aluminum chloride (DRYSOL) 20 % external solution    buPROPion (WELLBUTRIN SR) 150 mg 12 hr tablet    calcium carbonate (TUMS) 500 mg chewable tablet    famotidine (PEPCID) 40 MG tablet    metFORMIN (GLUCOPHAGE-XR) 500 mg 24 hr tablet    norethindrone-ethinyl estradiol (JUNEL FE 1/20) 1-20 MG-MCG per tablet    Family History:  Family History   Problem Relation Age of Onset    Hypertension Mother     Other Mother         vaginal tumors    Heart disease Mother     Skin cancer Father     Heart Valve Disease Father         leaky valve    Diabetes Maternal Grandmother     Kidney failure Maternal Grandmother     Ovarian cancer Paternal Grandmother     Lung cancer Paternal Grandfather     Other Paternal Aunt         Ovarian polys       Social History:  Social History     Socioeconomic History    Marital status: Single     Spouse name: Not on file   Bre Smith Number of children: Not on file    Years of education: Not on file    Highest education level: Not on file   Occupational History    Occupation: Teacher   Social Needs    Financial resource strain: Not on file    Food insecurity     Worry: Not on file     Inability: Not on file   [employer] needs     Medical: Not on file     Non-medical: Not on file   Tobacco Use    Smoking status: Current Every Day Smoker     Packs/day: 0 50     Types: Cigarettes    Smokeless tobacco: Never Used   Substance and Sexual Activity    Alcohol use: Never     Frequency: Never    Drug use: Never    Sexual activity: Yes   Lifestyle    Physical activity     Days per week: Not on file     Minutes per session: Not on file    Stress: Not on file   Relationships    Social connections     Talks on phone: Not on file     Gets together: Not on file     Attends Yarsani service: Not on file     Active member of club or organization: Not on file     Attends meetings of clubs or organizations: Not on file     Relationship status: Not on file    Intimate partner violence     Fear of current or ex partner: Not on file     Emotionally abused: Not on file     Physically abused: Not on file     Forced sexual activity: Not on file   Other Topics Concern    Not on file   Social History Narrative    Not on file         Review of Systems:  Gen:   Denies fatigue, chills, nausea, vomiting, fever  Skin: No rashes or discolorations of any concern  RESP: Denies SOB, no cough  CV: Denies chest pain or palpitations  Breasts: Denies masses, pain, skin changes and nipple discharge  GI: Denies abdominal pain, heartburn, nausea, vomiting, changes in bowel habits  : Denies dysuria, frequency, CVA tenderness, incontinence and hematuria  Genitalia: Denies abnormal vaginal discharge, external lesions, rashes, pelvic pain, pressure, abnormal bleeding    Rectal:  Denies pain, bleeding, hemorrhoids,    Physical Exam:  /70 (BP Location: Right arm, Patient Position: Sitting, Cuff Size: Standard)   Ht 5' 8" (1 727 m)   Wt 106 kg (234 lb)   LMP 03/06/2021   BMI 35 58 kg/m²    Gen: The patient was alert and oriented x3, pleasant well-appearing female in no acute distress  Neck:  Unremarkable, no lymphadenopathy, no thyromegaly, or tenderness  CV:  RRR, no murmurs  Resp:  Clear to auscultation bilaterally, no wheezing  Breasts: Symmetric  No dominant, discrete, fixed  or suspicious masses are noted  No skin or nipple changes  No palpable axillary nodes  No supraclavicular adenopathy  Abd:  Soft, nontender, nondistended, no masses or organomegaly  Back:  No CVA tenderness, no tenderness to palpation along spine  Pelvic:  Normal appearing external female genitalia, no visible lesions, no rashes  Vagina is free of discharge, normal vaginal epithelium, no abnormal  lesions, no evidence of prolapse anteriorly or posteriorly  Normal appearing cervix, mobile and nontender  A thin prep pap smear was not obtained  Uterus is normal size, mobile and, nontender  No palpable adnexal masses or tenderness  No anoperineal lesions  Skin:  No concerning lesions  Extremeties: No edema      Assessment & Plan:   1  Routine annual exam      RTO one year orPRN  2  Encounter for oral contraceptive pill screening  Patient doing well and may continue for an additional year

## 2021-03-17 DIAGNOSIS — F33.1 MODERATE EPISODE OF RECURRENT MAJOR DEPRESSIVE DISORDER (HCC): ICD-10-CM

## 2021-03-17 DIAGNOSIS — E66.01 SEVERE OBESITY WITH BODY MASS INDEX (BMI) OF 35.0 TO 39.9 WITH SERIOUS COMORBIDITY (HCC): ICD-10-CM

## 2021-03-17 DIAGNOSIS — F17.200 TOBACCO USE DISORDER: ICD-10-CM

## 2021-03-17 RX ORDER — BUPROPION HYDROCHLORIDE 150 MG/1
150 TABLET, EXTENDED RELEASE ORAL 2 TIMES DAILY
Qty: 60 TABLET | Refills: 0 | Status: SHIPPED | OUTPATIENT
Start: 2021-03-17 | End: 2021-03-23 | Stop reason: SDUPTHER

## 2021-03-22 NOTE — PROGRESS NOTES
Assessment/Plan:    No problem-specific Assessment & Plan notes found for this encounter  Diagnoses and all orders for this visit:    Severe obesity with body mass index (BMI) of 35 0 to 39 9 with serious comorbidity (HCC)  -     buPROPion (WELLBUTRIN SR) 150 mg 12 hr tablet; Take 1 tablet (150 mg total) by mouth 2 (two) times a day    BMI 35 0-35 9,adult    PCOS (polycystic ovarian syndrome)    Moderate episode of recurrent major depressive disorder (HCC)  -     buPROPion (WELLBUTRIN SR) 150 mg 12 hr tablet; Take 1 tablet (150 mg total) by mouth 2 (two) times a day    Tobacco use disorder  -     buPROPion (WELLBUTRIN SR) 150 mg 12 hr tablet; Take 1 tablet (150 mg total) by mouth 2 (two) times a day        -Patient is pursuing Conservative Program  -Initial weight loss goal of 5-10% weight loss for improved health  -Screening labs- 5/2020  Contraception: OCPs   - currently on metformin 500mg TID for PCOS  - on wellbutrin 150mg bid (contrave not covered by insurance)  Tolerating  Lost 13lbs  No hx of seizures       Initial: 245lbs  Current: 235lbs  (245lbs)  Change: -13lbs  Goal: 200lbs     Goals:  Food log (ie ) www myfitnesspal com,sparkpeople  com,loseit com,calorieking  com,etc  baritastic  No sugary beverages  At least 64oz of water daily  Increase physical activity by 10 minutes daily  Gradually increase physical activity to a goal of 5 days per week for 30 minutes of MODERATE intensity PLUS 2 days per week of FULL BODY resistance training  9579-7175 calories per day, 30% carbs     Follow up in approximately 2 months with Non-Surgical Physician/Advanced Practitioner  Subjective:   Chief Complaint   Patient presents with    Follow-up     2 month MWM follow up       Patient ID: Aparna Osorio  is a 31 y o  female with excess weight/obesity here to pursue weight management  Patient is pursuing Conservative Program      HPI    5 months follow up  Lost 13lbs since last visit   She was started on wellbutin 150mg bid, was already taking metformin 500mg bid for PCOS and 3 weeks ago increased to 500mg tid  Tolerating regimen well without side effects  Has noted improvement in portion control       Nutrition: 1300 cals rest days  Exercise: 1hr of cardio x 5 days a week     Hunger: no  Cravings: no      REE 1700 cals a day, high fat mass, high VAT:WC     Smokin/2 ppd still about the same  The following portions of the patient's history were reviewed and updated as appropriate: allergies, current medications, past family history, past medical history, past social history, past surgical history and problem list     Review of Systems   Constitutional: Negative for activity change and appetite change  Respiratory: Negative  Cardiovascular: Negative  Gastrointestinal: Negative  Genitourinary: Negative  Musculoskeletal: Negative for arthralgias  Skin: Negative for rash  Psychiatric/Behavioral: Negative  Objective:    /78 (BP Location: Left arm, Patient Position: Sitting, Cuff Size: Adult)   Pulse 74   Temp 97 5 °F (36 4 °C) (Tympanic)   Resp 14   Ht 5' 8" (1 727 m)   Wt 105 kg (232 lb 1 6 oz)   LMP 2021   BMI 35 29 kg/m²      Physical Exam    Constitutional   General appearance: Abnormal   well developed and obese  Eyes No conjunctival pallor     Ears, Nose, Mouth, and Throat Oral mucosa moist    Musculoskeletal   Gait and station: Normal     Psychiatric   Orientation to person, place and time: Normal     Affect: appropriate

## 2021-03-23 ENCOUNTER — OFFICE VISIT (OUTPATIENT)
Dept: BARIATRICS | Facility: CLINIC | Age: 31
End: 2021-03-23
Payer: COMMERCIAL

## 2021-03-23 VITALS
TEMPERATURE: 97.5 F | BODY MASS INDEX: 35.18 KG/M2 | HEART RATE: 74 BPM | RESPIRATION RATE: 14 BRPM | HEIGHT: 68 IN | DIASTOLIC BLOOD PRESSURE: 78 MMHG | SYSTOLIC BLOOD PRESSURE: 131 MMHG | WEIGHT: 232.1 LBS

## 2021-03-23 DIAGNOSIS — F17.200 TOBACCO USE DISORDER: ICD-10-CM

## 2021-03-23 DIAGNOSIS — E28.2 PCOS (POLYCYSTIC OVARIAN SYNDROME): ICD-10-CM

## 2021-03-23 DIAGNOSIS — F33.1 MODERATE EPISODE OF RECURRENT MAJOR DEPRESSIVE DISORDER (HCC): ICD-10-CM

## 2021-03-23 DIAGNOSIS — E66.01 SEVERE OBESITY WITH BODY MASS INDEX (BMI) OF 35.0 TO 39.9 WITH SERIOUS COMORBIDITY (HCC): Primary | ICD-10-CM

## 2021-03-23 PROCEDURE — 99214 OFFICE O/P EST MOD 30 MIN: CPT | Performed by: FAMILY MEDICINE

## 2021-03-23 PROCEDURE — 3008F BODY MASS INDEX DOCD: CPT | Performed by: FAMILY MEDICINE

## 2021-03-23 RX ORDER — BUPROPION HYDROCHLORIDE 150 MG/1
150 TABLET, EXTENDED RELEASE ORAL 2 TIMES DAILY
Qty: 180 TABLET | Refills: 1 | Status: SHIPPED | OUTPATIENT
Start: 2021-03-23 | End: 2021-04-17 | Stop reason: SDUPTHER

## 2021-04-09 DIAGNOSIS — N92.6 IRREGULAR BLEEDING: ICD-10-CM

## 2021-04-13 ENCOUNTER — TELEPHONE (OUTPATIENT)
Dept: OBGYN CLINIC | Facility: CLINIC | Age: 31
End: 2021-04-13

## 2021-04-13 RX ORDER — NORETHINDRONE ACETATE AND ETHINYL ESTRADIOL 1MG-20(21)
1 KIT ORAL DAILY
Qty: 28 TABLET | Refills: 0 | OUTPATIENT
Start: 2021-04-13

## 2021-04-13 NOTE — TELEPHONE ENCOUNTER
Patient called for BCP refill and instructions since she missed 2 days  Advised call [facility] since Dr Chucho Campbell gave enough for a year  Advised take 2 today and 2 tomorrow, use condoms as back up this cycle, may have some spotting

## 2021-04-17 DIAGNOSIS — E66.01 SEVERE OBESITY WITH BODY MASS INDEX (BMI) OF 35.0 TO 39.9 WITH SERIOUS COMORBIDITY (HCC): ICD-10-CM

## 2021-04-17 DIAGNOSIS — F33.1 MODERATE EPISODE OF RECURRENT MAJOR DEPRESSIVE DISORDER (HCC): ICD-10-CM

## 2021-04-17 DIAGNOSIS — F17.200 TOBACCO USE DISORDER: ICD-10-CM

## 2021-04-19 RX ORDER — BUPROPION HYDROCHLORIDE 150 MG/1
150 TABLET, EXTENDED RELEASE ORAL 2 TIMES DAILY
Qty: 180 TABLET | Refills: 0 | Status: SHIPPED | OUTPATIENT
Start: 2021-04-19 | End: 2021-05-27 | Stop reason: SDUPTHER

## 2021-04-26 ENCOUNTER — APPOINTMENT (OUTPATIENT)
Dept: LAB | Facility: HOSPITAL | Age: 31
End: 2021-04-26
Attending: INTERNAL MEDICINE
Payer: COMMERCIAL

## 2021-04-26 ENCOUNTER — HOSPITAL ENCOUNTER (OUTPATIENT)
Dept: NUCLEAR MEDICINE | Facility: HOSPITAL | Age: 31
Discharge: HOME | End: 2021-04-26
Payer: COMMERCIAL

## 2021-04-26 DIAGNOSIS — K31.84 GASTROPARESIS: ICD-10-CM

## 2021-04-26 DIAGNOSIS — E28.2 PCOS (POLYCYSTIC OVARIAN SYNDROME): ICD-10-CM

## 2021-04-26 DIAGNOSIS — Z13.6 ENCOUNTER FOR LIPID SCREENING FOR CARDIOVASCULAR DISEASE: ICD-10-CM

## 2021-04-26 DIAGNOSIS — R07.89 OTHER CHEST PAIN: ICD-10-CM

## 2021-04-26 DIAGNOSIS — K21.00 GASTROESOPHAGEAL REFLUX DISEASE WITH ESOPHAGITIS WITHOUT HEMORRHAGE: ICD-10-CM

## 2021-04-26 DIAGNOSIS — Z13.220 ENCOUNTER FOR LIPID SCREENING FOR CARDIOVASCULAR DISEASE: ICD-10-CM

## 2021-04-26 LAB
ALBUMIN SERPL BCP-MCNC: 3.5 G/DL (ref 3.5–5)
ALP SERPL-CCNC: 39 U/L (ref 46–116)
ALT SERPL W P-5'-P-CCNC: 33 U/L (ref 12–78)
ANION GAP SERPL CALCULATED.3IONS-SCNC: 7 MMOL/L (ref 4–13)
AST SERPL W P-5'-P-CCNC: 12 U/L (ref 5–45)
BASOPHILS # BLD AUTO: 0.03 THOUSANDS/ΜL (ref 0–0.1)
BASOPHILS NFR BLD AUTO: 1 % (ref 0–1)
BILIRUB SERPL-MCNC: 0.35 MG/DL (ref 0.2–1)
BUN SERPL-MCNC: 10 MG/DL (ref 5–25)
CALCIUM SERPL-MCNC: 7.9 MG/DL (ref 8.3–10.1)
CHLORIDE SERPL-SCNC: 107 MMOL/L (ref 100–108)
CHOLEST SERPL-MCNC: 195 MG/DL (ref 50–200)
CO2 SERPL-SCNC: 26 MMOL/L (ref 21–32)
CREAT SERPL-MCNC: 0.61 MG/DL (ref 0.6–1.3)
EOSINOPHIL # BLD AUTO: 0.09 THOUSAND/ΜL (ref 0–0.61)
EOSINOPHIL NFR BLD AUTO: 1 % (ref 0–6)
ERYTHROCYTE [DISTWIDTH] IN BLOOD BY AUTOMATED COUNT: 12.5 % (ref 11.6–15.1)
GFR SERPL CREATININE-BSD FRML MDRD: 121 ML/MIN/1.73SQ M
GLUCOSE SERPL-MCNC: 88 MG/DL (ref 65–140)
HCT VFR BLD AUTO: 40.9 % (ref 34.8–46.1)
HDLC SERPL-MCNC: 51 MG/DL
HGB BLD-MCNC: 13.8 G/DL (ref 11.5–15.4)
IMM GRANULOCYTES # BLD AUTO: 0.01 THOUSAND/UL (ref 0–0.2)
IMM GRANULOCYTES NFR BLD AUTO: 0 % (ref 0–2)
LDLC SERPL CALC-MCNC: 133 MG/DL (ref 0–100)
LYMPHOCYTES # BLD AUTO: 2.08 THOUSANDS/ΜL (ref 0.6–4.47)
LYMPHOCYTES NFR BLD AUTO: 32 % (ref 14–44)
MCH RBC QN AUTO: 31.6 PG (ref 26.8–34.3)
MCHC RBC AUTO-ENTMCNC: 33.7 G/DL (ref 31.4–37.4)
MCV RBC AUTO: 94 FL (ref 82–98)
MONOCYTES # BLD AUTO: 0.58 THOUSAND/ΜL (ref 0.17–1.22)
MONOCYTES NFR BLD AUTO: 9 % (ref 4–12)
NEUTROPHILS # BLD AUTO: 3.7 THOUSANDS/ΜL (ref 1.85–7.62)
NEUTS SEG NFR BLD AUTO: 57 % (ref 43–75)
NRBC BLD AUTO-RTO: 0 /100 WBCS
PLATELET # BLD AUTO: 189 THOUSANDS/UL (ref 149–390)
PMV BLD AUTO: 11.6 FL (ref 8.9–12.7)
POTASSIUM SERPL-SCNC: 4.1 MMOL/L (ref 3.5–5.3)
PROT SERPL-MCNC: 6.5 G/DL (ref 6.4–8.2)
RBC # BLD AUTO: 4.37 MILLION/UL (ref 3.81–5.12)
SODIUM SERPL-SCNC: 140 MMOL/L (ref 136–145)
TRIGL SERPL-MCNC: 54 MG/DL
WBC # BLD AUTO: 6.49 THOUSAND/UL (ref 4.31–10.16)

## 2021-04-26 PROCEDURE — G1004 CDSM NDSC: HCPCS

## 2021-04-26 PROCEDURE — 85025 COMPLETE CBC W/AUTO DIFF WBC: CPT

## 2021-04-26 PROCEDURE — 80061 LIPID PANEL: CPT

## 2021-04-26 PROCEDURE — 80053 COMPREHEN METABOLIC PANEL: CPT

## 2021-04-26 PROCEDURE — 36415 COLL VENOUS BLD VENIPUNCTURE: CPT

## 2021-04-26 PROCEDURE — A9541 TC99M SULFUR COLLOID: HCPCS

## 2021-04-26 PROCEDURE — 78264 GASTRIC EMPTYING IMG STUDY: CPT

## 2021-05-26 NOTE — PROGRESS NOTES
Assessment/Plan:    No problem-specific Assessment & Plan notes found for this encounter  Diagnoses and all orders for this visit:    PCOS (polycystic ovarian syndrome)    Obesity (BMI 30-39  9)    Moderate episode of recurrent major depressive disorder (HCC)  -     buPROPion (WELLBUTRIN SR) 150 mg 12 hr tablet; Take 1 tablet (150 mg total) by mouth 2 (two) times a day    Tobacco use disorder  -     buPROPion (WELLBUTRIN SR) 150 mg 12 hr tablet; Take 1 tablet (150 mg total) by mouth 2 (two) times a day    Severe obesity with body mass index (BMI) of 35 0 to 39 9 with serious comorbidity (HCC)  -     buPROPion (WELLBUTRIN SR) 150 mg 12 hr tablet; Take 1 tablet (150 mg total) by mouth 2 (two) times a day       -Patient is pursuing Conservative Program  -Initial weight loss goal of 5-10% weight loss for improved health  -Screening labs- 5/2020  Contraception: OCPs   - currently on metformin 500mg TID for PCOS  - on wellbutrin 150mg bid (contrave not covered by insurance)  Tolerating  Lost 13lbs  No hx of seizures      Doing great  Cont with regimen      Initial: 245lbs  Current: 235lbs  (245lbs)  Change: -13lbs  Goal: 200lbs     Goals:  Food log (ie ) www myfitnesspal com,sparkpeople  com,loseit com,calorieking  com,etc  baritastic  No sugary beverages  At least 64oz of water daily  Increase physical activity by 10 minutes daily  Gradually increase physical activity to a goal of 5 days per week for 30 minutes of MODERATE intensity PLUS 2 days per week of FULL BODY resistance training  1280-2326 calories per day, 30% carbs     Follow up in approximately 2 months with Non-Surgical Physician/Advanced Practitioner  Subjective:   Chief Complaint   Patient presents with    Follow-up     mwm 2 month follow up       Patient ID: Rosas Weber  is a 27 y o  female with excess weight/obesity here to pursue weight management    Patient is pursuing Conservative Program      HPI     2 months follow up       Lost -9 1lbs since last visit  Currently on wellbutin 150mg bid, and metformin 500mg tid for PCOS (from PCP)  Tolerating regimen well without side effects  Has noted improvement in portion control        Nutrition: 0524-3112 cals range  Exercise: 1hr of cardio x 5 days a week     Consistent    Hunger: no  Cravings: no     Smoking: decreased from 1/2 ppd to 1/3 ppd  The following portions of the patient's history were reviewed and updated as appropriate: allergies, current medications, past family history, past medical history, past social history, past surgical history and problem list     Review of Systems   Constitutional: Negative for activity change and appetite change  Respiratory: Negative  Cardiovascular: Negative  Gastrointestinal: Negative  Genitourinary: Negative  Musculoskeletal: Negative for arthralgias  Skin: Negative for rash  Psychiatric/Behavioral: Negative  Objective:    /82 (BP Location: Left arm, Patient Position: Sitting, Cuff Size: Large)   Pulse 70   Temp 98 5 °F (36 9 °C)   Ht 5' 8" (1 727 m)   Wt 101 kg (223 lb)   BMI 33 91 kg/m²      Physical Exam     Constitutional   General appearance: Abnormal   well developed and obese  Eyes No conjunctival pallor     Musculoskeletal   Gait and station: Normal     Psychiatric   Orientation to person, place and time: Normal     Affect: appropriate

## 2021-05-27 ENCOUNTER — OFFICE VISIT (OUTPATIENT)
Dept: BARIATRICS | Facility: CLINIC | Age: 31
End: 2021-05-27
Payer: COMMERCIAL

## 2021-05-27 VITALS
HEART RATE: 70 BPM | DIASTOLIC BLOOD PRESSURE: 82 MMHG | SYSTOLIC BLOOD PRESSURE: 110 MMHG | BODY MASS INDEX: 33.8 KG/M2 | TEMPERATURE: 98.5 F | WEIGHT: 223 LBS | HEIGHT: 68 IN

## 2021-05-27 DIAGNOSIS — F17.200 TOBACCO USE DISORDER: ICD-10-CM

## 2021-05-27 DIAGNOSIS — E66.9 OBESITY (BMI 30-39.9): ICD-10-CM

## 2021-05-27 DIAGNOSIS — E66.01 SEVERE OBESITY WITH BODY MASS INDEX (BMI) OF 35.0 TO 39.9 WITH SERIOUS COMORBIDITY (HCC): ICD-10-CM

## 2021-05-27 DIAGNOSIS — E28.2 PCOS (POLYCYSTIC OVARIAN SYNDROME): Primary | ICD-10-CM

## 2021-05-27 DIAGNOSIS — F33.1 MODERATE EPISODE OF RECURRENT MAJOR DEPRESSIVE DISORDER (HCC): ICD-10-CM

## 2021-05-27 PROCEDURE — 99214 OFFICE O/P EST MOD 30 MIN: CPT | Performed by: FAMILY MEDICINE

## 2021-05-27 RX ORDER — BUPROPION HYDROCHLORIDE 150 MG/1
150 TABLET, EXTENDED RELEASE ORAL 2 TIMES DAILY
Qty: 180 TABLET | Refills: 1 | Status: SHIPPED | OUTPATIENT
Start: 2021-05-27 | End: 2021-07-25 | Stop reason: SDUPTHER

## 2021-06-01 ENCOUNTER — PATIENT MESSAGE (OUTPATIENT)
Dept: INTERNAL MEDICINE CLINIC | Facility: CLINIC | Age: 31
End: 2021-06-01

## 2021-06-01 DIAGNOSIS — R21 RASH OF UNKNOWN ETIOLOGY: Primary | ICD-10-CM

## 2021-06-10 ENCOUNTER — RA CDI HCC (OUTPATIENT)
Dept: OTHER | Facility: HOSPITAL | Age: 31
End: 2021-06-10

## 2021-06-10 NOTE — PROGRESS NOTES
[facility]  coding opportunities          Chart reviewed, no opportunity found: CHART REVIEWED, NO OPPORTUNITY FOUND                     Patients insurance company: [employer] (Medicare Advantage and Commercial)

## 2021-06-11 NOTE — TELEPHONE ENCOUNTER
LM for the patient to call with information as to the derm provider that her would like to see  We will make contact next week to discuss

## 2021-06-16 ENCOUNTER — OFFICE VISIT (OUTPATIENT)
Dept: INTERNAL MEDICINE CLINIC | Facility: CLINIC | Age: 31
End: 2021-06-16
Payer: COMMERCIAL

## 2021-06-16 VITALS
WEIGHT: 222.2 LBS | SYSTOLIC BLOOD PRESSURE: 110 MMHG | TEMPERATURE: 98.1 F | HEIGHT: 68 IN | BODY MASS INDEX: 33.68 KG/M2 | HEART RATE: 81 BPM | OXYGEN SATURATION: 98 % | DIASTOLIC BLOOD PRESSURE: 76 MMHG

## 2021-06-16 DIAGNOSIS — E28.2 PCOS (POLYCYSTIC OVARIAN SYNDROME): Primary | ICD-10-CM

## 2021-06-16 DIAGNOSIS — Z13.1 SCREENING FOR DIABETES MELLITUS: ICD-10-CM

## 2021-06-16 DIAGNOSIS — E66.9 OBESITY (BMI 30-39.9): ICD-10-CM

## 2021-06-16 DIAGNOSIS — F06.4 ANXIETY DISORDER DUE TO KNOWN PHYSIOLOGICAL CONDITION: ICD-10-CM

## 2021-06-16 DIAGNOSIS — Z01.84 IMMUNITY STATUS TESTING: ICD-10-CM

## 2021-06-16 DIAGNOSIS — R61 EXCESSIVE SWEATING: ICD-10-CM

## 2021-06-16 DIAGNOSIS — R21 RASH: ICD-10-CM

## 2021-06-16 DIAGNOSIS — E78.5 DYSLIPIDEMIA, GOAL LDL BELOW 160: ICD-10-CM

## 2021-06-16 DIAGNOSIS — F33.1 MODERATE EPISODE OF RECURRENT MAJOR DEPRESSIVE DISORDER (HCC): ICD-10-CM

## 2021-06-16 PROCEDURE — 99214 OFFICE O/P EST MOD 30 MIN: CPT | Performed by: INTERNAL MEDICINE

## 2021-06-16 PROCEDURE — 3008F BODY MASS INDEX DOCD: CPT | Performed by: FAMILY MEDICINE

## 2021-06-16 RX ORDER — CLOTRIMAZOLE 1 G/ML
1 SOLUTION TOPICAL 2 TIMES DAILY
Qty: 60 ML | Refills: 0 | Status: SHIPPED | OUTPATIENT
Start: 2021-06-16 | End: 2021-06-30 | Stop reason: CLARIF

## 2021-06-16 NOTE — PATIENT INSTRUCTIONS
Blood work prior to next visit to look for need for hepatitis-B booster vaccination    Also recheck lipid panel and diabetes screen

## 2021-06-16 NOTE — PROGRESS NOTES
Assessment/Plan:    Tobacco use disorder  Continue Wellbutrin and weight loss efforts    Severe obesity with body mass index (BMI) of 35 0 to 39 9 with serious comorbidity (HCC)  Continue present efforts    PCOS (polycystic ovarian syndrome)  Continue Metformin    Esophageal spasm  Continue Pepcid 40 mg QOD due to persistent symptoms of throat clearing    Rash  Possible superficial fungal infection, continue antifungals                    Subjective:   Chief Complaint   Patient presents with    Follow-up     Follow up chronic conditions  labs done 4/26/21  She has skin rash on abdomen  Derm appt in August [facility]     Hep C screening due, ?#4 Hep B vaccine        Patient ID: Krista Bowman is a 27 y o  female  Past medical history significant for polycystic ovarian syndrome, severe obesity, tobacco use disorder was pursuing a conservative program for weight loss she is currently on metformin 500 mg 3 times a day for PCOS and Wellbutrin 150 mg twice a day which is tolerating well and continues to lose weight she has lost a total of 13 lb in the last 12 months  She is here today for follow-up and has symptoms of a rash on her back and some questions about gastroesophageal reflux symptoms      Heartburn  Throat usually following meals  No dysphagia no choking no chest pain no bit  The symptoms are aggravated by certain foods  She has tried an antacid for the symptoms  The treatment provided significant (Presently has been of the famotidine however review of pathology following endoscopy does show some inflammation of the cells in esophagus ) relief  Past procedures include an EGD  Rash  This is a recurrent problem  The current episode started more than 1 month ago  The problem is unchanged  The affected locations include the back  The rash is characterized by dryness, itchiness, peeling and scaling  It is unknown if there was an exposure to a precipitant   Past treatments include nothing  There is no history of allergies or eczema  The following portions of the patient's history were reviewed and updated as appropriate: past family history, past medical history, past social history, past surgical history and problem list     Review of Systems   Skin: Positive for rash  Allergic/Immunologic: Positive for environmental allergies  Psychiatric/Behavioral: Positive for dysphoric mood  All other systems reviewed and are negative  Past Medical History:   Diagnosis Date    Arthritis     PCOS (polycystic ovarian syndrome)          Current Outpatient Medications:     aluminum chloride (DRYSOL) 20 % external solution, Use daily as needed  , Disp: 35 mL, Rfl: 2    buPROPion (WELLBUTRIN SR) 150 mg 12 hr tablet, Take 1 tablet (150 mg total) by mouth 2 (two) times a day, Disp: 180 tablet, Rfl: 1    calcium carbonate (TUMS) 500 mg chewable tablet, Chew 1 tablet daily, Disp: , Rfl:     metFORMIN (GLUCOPHAGE-XR) 500 mg 24 hr tablet, Take 500 mg by mouth 3 (three) times a day , Disp: , Rfl:     norethindrone-ethinyl estradiol (JUNEL FE 1/20) 1-20 MG-MCG per tablet, Take 1 tablet by mouth daily, Disp: 28 tablet, Rfl: 11    clotrimazole 1 % external solution, Apply 1 application topically 2 (two) times a day, Disp: 60 mL, Rfl: 0    famotidine (PEPCID) 40 MG tablet, Take 1 tablet (40 mg total) by mouth daily (Patient not taking: Reported on 5/27/2021), Disp: 30 tablet, Rfl: 0    Allergies   Allergen Reactions    Bee Venom Swelling     Large local reaction    Nicotine Other (See Comments)     Pt had severe "night terrors" when using nicotine patch    Other      Nicotene patches  Fleas    Clarithromycin Rash    Erigeron Annuus Rash     Fleas   large hives       Social History   Past Surgical History:   Procedure Laterality Date    WISDOM TOOTH EXTRACTION  2007     Family History   Problem Relation Age of Onset    Hypertension Mother    Cyn Le Other Mother         vaginal tumors  Heart disease Mother     Skin cancer Father     Heart Valve Disease Father         leaky valve    Diabetes Maternal Grandmother     Kidney failure Maternal Grandmother     Ovarian cancer Paternal Grandmother     Lung cancer Paternal Grandfather     Other Paternal Aunt         Ovarian polys       Objective:  /76 (BP Location: Left arm, Patient Position: Sitting, Cuff Size: Large)   Pulse 81   Temp 98 1 °F (36 7 °C) (Tympanic)   Ht 5' 7 87" (1 724 m)   Wt 101 kg (222 lb 3 2 oz)   SpO2 98% Comment: Room air  BMI 33 91 kg/m²     Recent Results (from the past 1344 hour(s))   CBC and differential    Collection Time: 04/26/21 11:13 AM   Result Value Ref Range    WBC 6 49 4 31 - 10 16 Thousand/uL    RBC 4 37 3 81 - 5 12 Million/uL    Hemoglobin 13 8 11 5 - 15 4 g/dL    Hematocrit 40 9 34 8 - 46 1 %    MCV 94 82 - 98 fL    MCH 31 6 26 8 - 34 3 pg    MCHC 33 7 31 4 - 37 4 g/dL    RDW 12 5 11 6 - 15 1 %    MPV 11 6 8 9 - 12 7 fL    Platelets 940 361 - 897 Thousands/uL    nRBC 0 /100 WBCs    Neutrophils Relative 57 43 - 75 %    Immat GRANS % 0 0 - 2 %    Lymphocytes Relative 32 14 - 44 %    Monocytes Relative 9 4 - 12 %    Eosinophils Relative 1 0 - 6 %    Basophils Relative 1 0 - 1 %    Neutrophils Absolute 3 70 1 85 - 7 62 Thousands/µL    Immature Grans Absolute 0 01 0 00 - 0 20 Thousand/uL    Lymphocytes Absolute 2 08 0 60 - 4 47 Thousands/µL    Monocytes Absolute 0 58 0 17 - 1 22 Thousand/µL    Eosinophils Absolute 0 09 0 00 - 0 61 Thousand/µL    Basophils Absolute 0 03 0 00 - 0 10 Thousands/µL   Comprehensive metabolic panel    Collection Time: 04/26/21 11:13 AM   Result Value Ref Range    Sodium 140 136 - 145 mmol/L    Potassium 4 1 3 5 - 5 3 mmol/L    Chloride 107 100 - 108 mmol/L    CO2 26 21 - 32 mmol/L    ANION GAP 7 4 - 13 mmol/L    BUN 10 5 - 25 mg/dL    Creatinine 0 61 0 60 - 1 30 mg/dL    Glucose 88 65 - 140 mg/dL    Calcium 7 9 (L) 8 3 - 10 1 mg/dL    AST 12 5 - 45 U/L    ALT 33 12 - 78 U/L Alkaline Phosphatase 39 (L) 46 - 116 U/L    Total Protein 6 5 6 4 - 8 2 g/dL    Albumin 3 5 3 5 - 5 0 g/dL    Total Bilirubin 0 35 0 20 - 1 00 mg/dL    eGFR 121 ml/min/1 73sq m   Lipid Panel with Direct LDL reflex    Collection Time: 04/26/21 11:13 AM   Result Value Ref Range    Cholesterol 195 50 - 200 mg/dL    Triglycerides 54 <=150 mg/dL    HDL, Direct 51 >=40 mg/dL    LDL Calculated 133 (H) 0 - 100 mg/dL            Physical Exam      Gen: NAD  Heent: atraumatic, normocephalic  Mouth: is moist  Neck: is supple, no JVD, no carotid bruits  Heart: is regular Normal s1, s2,  Lungs: are clear to auscultation  Abd: soft, non tender, NABS  Ext: no edema, distal pulses normal  Skin: 3 irregular patches hypopigmented area over upper back    Mood: normal affect  Neuro: AAOx3

## 2021-06-28 ENCOUNTER — TELEPHONE (OUTPATIENT)
Dept: INTERNAL MEDICINE CLINIC | Facility: CLINIC | Age: 31
End: 2021-06-28

## 2021-06-28 DIAGNOSIS — R21 RASH: Primary | ICD-10-CM

## 2021-06-29 NOTE — TELEPHONE ENCOUNTER
[employer] denied coverage of Clotrimazole 1% solution for a rash - fungal skin infection on back  Is there another medication that patient can use?

## 2021-06-30 RX ORDER — CLOTRIMAZOLE 1 %
CREAM (GRAM) TOPICAL 2 TIMES DAILY
Qty: 30 G | Refills: 0 | Status: SHIPPED | OUTPATIENT
Start: 2021-06-30 | End: 2022-02-03 | Stop reason: ALTCHOICE

## 2021-07-21 ENCOUNTER — LAB (OUTPATIENT)
Dept: LAB | Facility: HOSPITAL | Age: 31
End: 2021-07-21
Attending: INTERNAL MEDICINE
Payer: COMMERCIAL

## 2021-07-21 DIAGNOSIS — E88.81 INSULIN RESISTANCE: ICD-10-CM

## 2021-07-21 DIAGNOSIS — E66.9 OBESITY, CLASS II, BMI 35-39.9, ISOLATED: ICD-10-CM

## 2021-07-21 DIAGNOSIS — E28.2 PCOS (POLYCYSTIC OVARIAN SYNDROME): ICD-10-CM

## 2021-07-21 DIAGNOSIS — E78.5 DYSLIPIDEMIA, GOAL LDL BELOW 160: ICD-10-CM

## 2021-07-21 DIAGNOSIS — E28.8 HYPERANDROGENISM: ICD-10-CM

## 2021-07-21 DIAGNOSIS — L68.0 ANDROGEN-DEPENDENT HIRSUTISM: ICD-10-CM

## 2021-07-21 DIAGNOSIS — Z01.84 IMMUNITY STATUS TESTING: ICD-10-CM

## 2021-07-21 DIAGNOSIS — Z13.1 SCREENING FOR DIABETES MELLITUS: ICD-10-CM

## 2021-07-21 LAB
CHOLEST SERPL-MCNC: 209 MG/DL (ref 50–200)
EST. AVERAGE GLUCOSE BLD GHB EST-MCNC: 103 MG/DL
GLUCOSE P FAST SERPL-MCNC: 86 MG/DL (ref 65–99)
HBA1C MFR BLD: 5.2 %
HBV SURFACE AB SER-ACNC: 50.29 MIU/ML
HCV AB SER QL: NORMAL
HDLC SERPL-MCNC: 61 MG/DL
LDLC SERPL CALC-MCNC: 141 MG/DL (ref 0–100)
TRIGL SERPL-MCNC: 33 MG/DL

## 2021-07-21 PROCEDURE — 36415 COLL VENOUS BLD VENIPUNCTURE: CPT

## 2021-07-21 PROCEDURE — 80061 LIPID PANEL: CPT

## 2021-07-21 PROCEDURE — 86803 HEPATITIS C AB TEST: CPT

## 2021-07-21 PROCEDURE — 83036 HEMOGLOBIN GLYCOSYLATED A1C: CPT

## 2021-07-21 PROCEDURE — 86706 HEP B SURFACE ANTIBODY: CPT

## 2021-07-21 PROCEDURE — 82947 ASSAY GLUCOSE BLOOD QUANT: CPT

## 2021-07-23 ENCOUNTER — TELEPHONE (OUTPATIENT)
Dept: INTERNAL MEDICINE CLINIC | Age: 31
End: 2021-07-23

## 2021-07-23 NOTE — TELEPHONE ENCOUNTER
----- Message from Amanda Pugh MD sent at 7/22/2021  9:15 PM EDT -----  LDL slightly elevated   Watch diet, cut saturated fat

## 2021-07-25 DIAGNOSIS — F17.200 TOBACCO USE DISORDER: ICD-10-CM

## 2021-07-25 DIAGNOSIS — F33.1 MODERATE EPISODE OF RECURRENT MAJOR DEPRESSIVE DISORDER (HCC): ICD-10-CM

## 2021-07-25 DIAGNOSIS — E66.01 SEVERE OBESITY WITH BODY MASS INDEX (BMI) OF 35.0 TO 39.9 WITH SERIOUS COMORBIDITY (HCC): ICD-10-CM

## 2021-07-26 RX ORDER — BUPROPION HYDROCHLORIDE 150 MG/1
150 TABLET, EXTENDED RELEASE ORAL 2 TIMES DAILY
Qty: 180 TABLET | Refills: 0 | Status: SHIPPED | OUTPATIENT
Start: 2021-07-26 | End: 2021-09-06 | Stop reason: SDUPTHER

## 2021-07-27 ENCOUNTER — OFFICE VISIT (OUTPATIENT)
Dept: BARIATRICS | Facility: CLINIC | Age: 31
End: 2021-07-27
Payer: COMMERCIAL

## 2021-07-27 VITALS
HEART RATE: 64 BPM | HEIGHT: 68 IN | BODY MASS INDEX: 33.56 KG/M2 | TEMPERATURE: 97.9 F | WEIGHT: 221.4 LBS | DIASTOLIC BLOOD PRESSURE: 68 MMHG | SYSTOLIC BLOOD PRESSURE: 106 MMHG | RESPIRATION RATE: 14 BRPM

## 2021-07-27 DIAGNOSIS — E78.5 DYSLIPIDEMIA, GOAL LDL BELOW 160: ICD-10-CM

## 2021-07-27 DIAGNOSIS — F17.200 TOBACCO USE DISORDER: ICD-10-CM

## 2021-07-27 DIAGNOSIS — F33.1 MODERATE EPISODE OF RECURRENT MAJOR DEPRESSIVE DISORDER (HCC): ICD-10-CM

## 2021-07-27 DIAGNOSIS — E66.01 SEVERE OBESITY WITH BODY MASS INDEX (BMI) OF 35.0 TO 39.9 WITH SERIOUS COMORBIDITY (HCC): Primary | ICD-10-CM

## 2021-07-27 DIAGNOSIS — E28.2 PCOS (POLYCYSTIC OVARIAN SYNDROME): ICD-10-CM

## 2021-07-27 PROCEDURE — 99214 OFFICE O/P EST MOD 30 MIN: CPT | Performed by: FAMILY MEDICINE

## 2021-07-27 PROCEDURE — 3008F BODY MASS INDEX DOCD: CPT | Performed by: FAMILY MEDICINE

## 2021-07-27 NOTE — PROGRESS NOTES
Assessment/Plan:    No problem-specific Assessment & Plan notes found for this encounter  Diagnoses and all orders for this visit:    Severe obesity with body mass index (BMI) of 35 0 to 39 9 with serious comorbidity (HCC)    PCOS (polycystic ovarian syndrome)    Tobacco use disorder    Moderate episode of recurrent major depressive disorder (HCC)    Dyslipidemia, goal LDL below 160        -Patient is pursuing Conservative Program  -Initial weight loss goal of 5-10% weight loss for improved health  -Screening labs- 5/2020  Contraception: OCPs   - currently on metformin 500mg TID for PCOS  - on wellbutrin 150mg bid (contrave not covered by insurance)  Tolerating  Lost 13lbs     No hx of seizures       Doing great  Cont with regimen       Initial: 245lbs  Current: 235lbs  (245lbs)  Change: -13lbs  Goal: 200lbs     Goals:  Food log (ie ) www myfitnesspal com,sparkpeople  com,loseit com,calorieking  com,etc  baritastic  No sugary beverages  At least 64oz of water daily  Increase physical activity by 10 minutes daily  Gradually increase physical activity to a goal of 5 days per week for 30 minutes of MODERATE intensity PLUS 2 days per week of FULL BODY resistance training  8344-2906 calories per day, 30% carbs    Follow up in approximately 2 months with Non-Surgical Physician/Advanced Practitioner  Subjective:   No chief complaint on file  Patient ID: Caty Ramsay  is a 27 y o  female with excess weight/obesity here to pursue weight management  Patient is pursuing Conservative Program      HPI    2 months follow up       Lost -9 1lbs since last visit  Currently on wellbutin 150mg bid, and metformin 500mg tid for PCOS (from PCP)  Tolerating regimen well without side effects     Has noted improvement in portion control        Nutrition: 2161-8694 cals range  Exercise: 1hr of cardio x 5 days a week     Consistent     Hunger: no  Cravings: no      Smoking: decreased from 1/2 ppd to 1/3 ppd      The following portions of the patient's history were reviewed and updated as appropriate: allergies, current medications, past family history, past medical history, past social history, past surgical history and problem list     Review of Systems   Constitutional: Negative for activity change and appetite change  Respiratory: Negative  Cardiovascular: Negative  Gastrointestinal: Negative  Genitourinary: Negative  Musculoskeletal: Negative for arthralgias  Skin: Negative for rash  Psychiatric/Behavioral: Negative  Objective:    /68 (BP Location: Left arm, Patient Position: Sitting, Cuff Size: Adult)   Pulse 64   Temp 97 9 °F (36 6 °C) (Tympanic)   Resp 14   Ht 5' 8" (1 727 m)   Wt 100 kg (221 lb 6 4 oz)   BMI 33 66 kg/m²      Physical Exam    Constitutional   General appearance: Abnormal   well developed and obese  Eyes No conjunctival pallor     Musculoskeletal   Gait and station: Normal     Psychiatric   Orientation to person, place and time: Normal     Affect: appropriate

## 2021-09-02 ENCOUNTER — PATIENT MESSAGE (OUTPATIENT)
Dept: INTERNAL MEDICINE CLINIC | Facility: CLINIC | Age: 31
End: 2021-09-02

## 2021-09-06 DIAGNOSIS — F17.200 TOBACCO USE DISORDER: ICD-10-CM

## 2021-09-06 DIAGNOSIS — E66.01 SEVERE OBESITY WITH BODY MASS INDEX (BMI) OF 35.0 TO 39.9 WITH SERIOUS COMORBIDITY (HCC): ICD-10-CM

## 2021-09-06 DIAGNOSIS — K22.4 ESOPHAGEAL SPASM: ICD-10-CM

## 2021-09-06 DIAGNOSIS — F33.1 MODERATE EPISODE OF RECURRENT MAJOR DEPRESSIVE DISORDER (HCC): ICD-10-CM

## 2021-09-07 ENCOUNTER — TELEPHONE (OUTPATIENT)
Dept: BARIATRICS | Facility: CLINIC | Age: 31
End: 2021-09-07

## 2021-09-07 RX ORDER — BUPROPION HYDROCHLORIDE 150 MG/1
150 TABLET, EXTENDED RELEASE ORAL 2 TIMES DAILY
Qty: 180 TABLET | Refills: 0 | Status: SHIPPED | OUTPATIENT
Start: 2021-09-07 | End: 2022-02-03

## 2021-09-07 RX ORDER — FAMOTIDINE 40 MG/1
40 TABLET, FILM COATED ORAL DAILY
Qty: 30 TABLET | Refills: 2 | Status: SHIPPED | OUTPATIENT
Start: 2021-09-07 | End: 2021-11-19 | Stop reason: SDUPTHER

## 2021-09-13 RX ORDER — BUPROPION HYDROCHLORIDE 150 MG/1
150 TABLET, EXTENDED RELEASE ORAL 2 TIMES DAILY
Qty: 180 TABLET | Refills: 0 | Status: SHIPPED | OUTPATIENT
Start: 2021-09-13 | End: 2022-02-03

## 2021-09-23 ENCOUNTER — TELEMEDICINE (OUTPATIENT)
Dept: INTERNAL MEDICINE CLINIC | Age: 31
End: 2021-09-23
Payer: COMMERCIAL

## 2021-09-23 DIAGNOSIS — Z20.822 EXPOSURE TO COVID-19 VIRUS: Primary | ICD-10-CM

## 2021-09-23 PROCEDURE — 99213 OFFICE O/P EST LOW 20 MIN: CPT | Performed by: STUDENT IN AN ORGANIZED HEALTH CARE EDUCATION/TRAINING PROGRAM

## 2021-09-23 NOTE — PROGRESS NOTES
Virtual Regular Visit    Verification of patient location:    Patient is located in the following state in which I hold an active license [place]      Assessment/Plan:    Problem List Items Addressed This Visit     None      Visit Diagnoses     Exposure to COVID-19 virus    -  Primary      Low concern for infection  No indication for testing at this time  Advised to contact office of develop symptoms or seek medical care in the ED if experience sever SOB          Reason for visit is   Chief Complaint   Patient presents with    COVID-19     exposed of friday friend she was around tested positive on monday want to know if she should be tested    Virtual Regular Visit        Encounter provider Lupe Burleson MD    Provider located at [address] [id number]      Recent Visits  No visits were found meeting these conditions  Showing recent visits within past 7 days and meeting all other requirements  Today's Visits  Date Type Provider Dept   09/23/21 [id number] Maik Bishop MD [facility]   Showing today's visits and meeting all other requirements  Future Appointments  No visits were found meeting these conditions  Showing future appointments within next 150 days and meeting all other requirements       The patient was identified by name and date of birth  Tiffanie Mckeon was informed that this is a telemedicine visit and that the visit is being conducted through [address] [place] [address] Now and patient was informed that this is a secure, HIPAA-compliant platform  She agrees to proceed     My office door was closed  No one else was in the room  She acknowledged consent and understanding of privacy and security of the video platform  The patient has agreed to participate and understands they can discontinue the visit at any time  Patient is aware this is a billable service       Subjective  Bliss Carl Jaramillo is a 27 y o  female     Telemedicine visit due to exposure to [employer]  She is fully vaccinated since May with JJ vaccine  Reports 5 days ago was exposed to coworker who tested positive 3 days ago  Reports she has been having some episodes of soft BM that are not unusual  Denies Fevers/chills, headaches, sore throat, loss of taste/smell, cough, SOB, n/v, abdominal pain  No skin discoloration  Past Medical History:   Diagnosis Date    Arthritis     High cholesterol     PCOS (polycystic ovarian syndrome)        Past Surgical History:   Procedure Laterality Date    WISDOM TOOTH EXTRACTION  2007       Current Outpatient Medications   Medication Sig Dispense Refill    aluminum chloride (DRYSOL) 20 % external solution Use daily as needed  35 mL 2    buPROPion (WELLBUTRIN SR) 150 mg 12 hr tablet Take 1 tablet (150 mg total) by mouth 2 (two) times a day 180 tablet 0    buPROPion (WELLBUTRIN SR) 150 mg 12 hr tablet Take 1 tablet (150 mg total) by mouth 2 (two) times a day 180 tablet 0    calcium carbonate (TUMS) 500 mg chewable tablet Chew 1 tablet daily      clotrimazole (LOTRIMIN) 1 % cream Apply topically 2 (two) times a day 30 g 0    famotidine (PEPCID) 40 MG tablet Take 1 tablet (40 mg total) by mouth daily 30 tablet 2    metFORMIN (GLUCOPHAGE-XR) 500 mg 24 hr tablet Take 500 mg by mouth 3 (three) times a day       norethindrone-ethinyl estradiol (JUNEL FE 1/20) 1-20 MG-MCG per tablet Take 1 tablet by mouth daily 28 tablet 11     No current facility-administered medications for this visit  Allergies   Allergen Reactions    Bee Venom Swelling     Large local reaction    Nicotine Other (See Comments)     Pt had severe "night terrors" when using nicotine patch    Other      Nicotene patches  Fleas    Clarithromycin Rash    Erigeron Annuus Rash     Fleas   large hives       Review of Systems   Constitutional: Negative for appetite change, chills, diaphoresis, fatigue and fever  HENT: Negative for congestion, sore throat, trouble swallowing and voice change  Eyes: Negative for visual disturbance  Respiratory: Negative for cough and shortness of breath  Cardiovascular: Negative for chest pain and palpitations  Gastrointestinal: Negative for abdominal pain, diarrhea, nausea and vomiting  Musculoskeletal: Negative for myalgias  Skin: Negative for rash  Neurological: Negative for dizziness, light-headedness and headaches  Video Exam    There were no vitals filed for this visit  Physical Exam   Physical Exam: limited, performed thorough video  GENERAL: NAD, well-developed, well-nourished  HEENT:  Grossly normal  CARDIAC:  Unable to assess  PULMONARY:  non-labored breathing  ABDOMEN:  Grossly normal  Extremities: Grossly normal, No edema, cyanosis, or clubbing  NEUROLOGIC:  Alert/oriented x3  No motor deficits  SKIN:  No rashes or erythema  I spent 30 minutes directly with the patient during this visit    VIRTUAL VISIT Devante 82 verbally agrees to participate in [employer]  Pt is aware that [employer] could be limited without vital signs or the ability to perform a full hands-on physical Shelba Victorina understands she or the provider may request at any time to terminate the video visit and request the patient to seek care or treatment in person

## 2021-09-29 ENCOUNTER — TELEPHONE (OUTPATIENT)
Dept: BARIATRICS | Facility: CLINIC | Age: 31
End: 2021-09-29

## 2021-09-29 NOTE — TELEPHONE ENCOUNTER
cassy for pt to call back and r/s 10/5/21 [facility]wm appt and to confirm if we are cancelling dr visit for that day as well

## 2021-11-19 ENCOUNTER — TELEMEDICINE (OUTPATIENT)
Dept: INTERNAL MEDICINE CLINIC | Facility: CLINIC | Age: 31
End: 2021-11-19
Payer: COMMERCIAL

## 2021-11-19 DIAGNOSIS — Z20.822 EXPOSURE TO COVID-19 VIRUS: ICD-10-CM

## 2021-11-19 DIAGNOSIS — K22.4 ESOPHAGEAL SPASM: ICD-10-CM

## 2021-11-19 DIAGNOSIS — B34.9 VIRAL INFECTION, UNSPECIFIED: ICD-10-CM

## 2021-11-19 PROCEDURE — U0003 INFECTIOUS AGENT DETECTION BY NUCLEIC ACID (DNA OR RNA); SEVERE ACUTE RESPIRATORY SYNDROME CORONAVIRUS 2 (SARS-COV-2) (CORONAVIRUS DISEASE [COVID-19]), AMPLIFIED PROBE TECHNIQUE, MAKING USE OF HIGH THROUGHPUT TECHNOLOGIES AS DESCRIBED BY CMS-2020-01-R: HCPCS | Performed by: NURSE PRACTITIONER

## 2021-11-19 PROCEDURE — U0005 INFEC AGEN DETEC AMPLI PROBE: HCPCS | Performed by: NURSE PRACTITIONER

## 2021-11-19 PROCEDURE — 99213 OFFICE O/P EST LOW 20 MIN: CPT | Performed by: NURSE PRACTITIONER

## 2021-11-19 RX ORDER — FAMOTIDINE 40 MG/1
40 TABLET, FILM COATED ORAL DAILY
Qty: 30 TABLET | Refills: 2 | Status: SHIPPED | OUTPATIENT
Start: 2021-11-19 | End: 2022-02-02 | Stop reason: SDUPTHER

## 2021-11-20 LAB — SARS-COV-2 RNA RESP QL NAA+PROBE: NEGATIVE

## 2021-11-30 ENCOUNTER — TELEPHONE (OUTPATIENT)
Dept: INTERNAL MEDICINE CLINIC | Facility: CLINIC | Age: 31
End: 2021-11-30

## 2021-11-30 NOTE — TELEPHONE ENCOUNTER
LMOM to patient for her missed appt on 11/30/2021 with Dr Anastasia Mera at [facility]

## 2021-12-04 ENCOUNTER — IMMUNIZATIONS (OUTPATIENT)
Dept: FAMILY MEDICINE CLINIC | Facility: HOSPITAL | Age: 31
End: 2021-12-04

## 2021-12-04 PROCEDURE — 91303 COVID-19 J&J (JANSSEN) VACCINE 0.5 ML IM: CPT

## 2021-12-04 PROCEDURE — 0031A COVID-19 J&J (JANSSEN) VACCINE 0.5 ML IM: CPT

## 2021-12-22 ENCOUNTER — OFFICE VISIT (OUTPATIENT)
Dept: URGENT CARE | Facility: MEDICAL CENTER | Age: 31
End: 2021-12-22
Payer: COMMERCIAL

## 2021-12-22 VITALS
HEART RATE: 72 BPM | SYSTOLIC BLOOD PRESSURE: 134 MMHG | OXYGEN SATURATION: 100 % | TEMPERATURE: 98 F | RESPIRATION RATE: 16 BRPM | DIASTOLIC BLOOD PRESSURE: 80 MMHG

## 2021-12-22 DIAGNOSIS — Z20.822 ENCOUNTER FOR LABORATORY TESTING FOR COVID-19 VIRUS: ICD-10-CM

## 2021-12-22 DIAGNOSIS — J06.9 ACUTE URI: Primary | ICD-10-CM

## 2021-12-22 PROCEDURE — U0005 INFEC AGEN DETEC AMPLI PROBE: HCPCS | Performed by: PHYSICIAN ASSISTANT

## 2021-12-22 PROCEDURE — S9083 URGENT CARE CENTER GLOBAL: HCPCS | Performed by: PHYSICIAN ASSISTANT

## 2021-12-22 PROCEDURE — G0382 LEV 3 HOSP TYPE B ED VISIT: HCPCS | Performed by: PHYSICIAN ASSISTANT

## 2021-12-22 PROCEDURE — U0003 INFECTIOUS AGENT DETECTION BY NUCLEIC ACID (DNA OR RNA); SEVERE ACUTE RESPIRATORY SYNDROME CORONAVIRUS 2 (SARS-COV-2) (CORONAVIRUS DISEASE [COVID-19]), AMPLIFIED PROBE TECHNIQUE, MAKING USE OF HIGH THROUGHPUT TECHNOLOGIES AS DESCRIBED BY CMS-2020-01-R: HCPCS | Performed by: PHYSICIAN ASSISTANT

## 2021-12-23 LAB — SARS-COV-2 RNA RESP QL NAA+PROBE: POSITIVE

## 2022-02-02 ENCOUNTER — OFFICE VISIT (OUTPATIENT)
Dept: INTERNAL MEDICINE CLINIC | Facility: CLINIC | Age: 32
End: 2022-02-02
Payer: COMMERCIAL

## 2022-02-02 VITALS
SYSTOLIC BLOOD PRESSURE: 118 MMHG | WEIGHT: 230 LBS | HEART RATE: 51 BPM | OXYGEN SATURATION: 98 % | BODY MASS INDEX: 34.86 KG/M2 | TEMPERATURE: 98.2 F | HEIGHT: 68 IN | DIASTOLIC BLOOD PRESSURE: 86 MMHG

## 2022-02-02 DIAGNOSIS — F33.1 MODERATE EPISODE OF RECURRENT MAJOR DEPRESSIVE DISORDER (HCC): ICD-10-CM

## 2022-02-02 DIAGNOSIS — Z13.1 SCREENING FOR DIABETES MELLITUS: ICD-10-CM

## 2022-02-02 DIAGNOSIS — F06.4 ANXIETY DISORDER DUE TO KNOWN PHYSIOLOGICAL CONDITION: ICD-10-CM

## 2022-02-02 DIAGNOSIS — E28.2 PCOS (POLYCYSTIC OVARIAN SYNDROME): ICD-10-CM

## 2022-02-02 DIAGNOSIS — K22.4 ESOPHAGEAL SPASM: ICD-10-CM

## 2022-02-02 DIAGNOSIS — E78.5 DYSLIPIDEMIA, GOAL LDL BELOW 160: ICD-10-CM

## 2022-02-02 DIAGNOSIS — K30 DELAYED GASTRIC EMPTYING: Primary | ICD-10-CM

## 2022-02-02 PROCEDURE — 3008F BODY MASS INDEX DOCD: CPT | Performed by: INTERNAL MEDICINE

## 2022-02-02 PROCEDURE — 99214 OFFICE O/P EST MOD 30 MIN: CPT | Performed by: INTERNAL MEDICINE

## 2022-02-02 RX ORDER — FAMOTIDINE 40 MG/1
20 TABLET, FILM COATED ORAL 2 TIMES DAILY
Qty: 90 TABLET | Refills: 1 | Status: SHIPPED | OUTPATIENT
Start: 2022-02-02

## 2022-02-02 NOTE — PROGRESS NOTES
Assessment/Plan:    Tobacco use disorder  Continue to work on smoking cessation  Obesity (BMI 30-39  9)  Continue to work on [employer]  Also cholesterol guidance was given lower LDL C below 100  PCOS (polycystic ovarian syndrome)  Pt is presently on Metformin 500 mg TID and is doing well on it  Will need to monitor HBA1C   Is exercising and continues to lose weight  Will need to see GYN  Would like an alternate referral as she would like to see an alternate provider  Moderate episode of recurrent major depressive disorder (HCC)  Presently on Wellbutrin 150 mg 2 x daily and is adequately satisfied with the results  Continue cardiovascular exercise and F/U with mental health professional              Diagnoses and all orders for this visit:    Delayed gastric emptying  -     Cancel: Ambulatory Referral to Gastroenterology; Future  -     Ambulatory Referral to Gastroenterology; Future  -     Comprehensive metabolic panel; Future    Esophageal spasm  -     famotidine (PEPCID) 40 MG tablet; Take 0 5 tablets (20 mg total) by mouth 2 (two) times a day    PCOS (polycystic ovarian syndrome)    Anxiety disorder due to known physiological condition  -     CBC and differential; Future  -     Comprehensive metabolic panel; Future  -     TSH, 3rd generation with Free T4 reflex; Future    Dyslipidemia, goal LDL below 160  -     Lipid Panel with Direct LDL reflex; Future    Screening for diabetes mellitus  -     Hemoglobin A1C; Future    Moderate episode of recurrent major depressive disorder (HCC)          Subjective:   Chief Complaint   Patient presents with    Follow-up     6 m f/u visit for chronic esophageal spasm, no recent labs  She has not taken the Famotidine in 3 months because RX was cancelled  She would like to restart  Patient ID: Eliza Lewis is a 27 y o  female  Heartburn  She complains of belching  This is a recurrent problem   The current episode started more than 1 year ago  The problem occurs frequently  The problem has been unchanged  The symptoms are aggravated by certain foods  Risk factors: Delayed gastric emptying  She has tried a PPI and a diet change (weight reduction) for the symptoms  The treatment provided mild relief  Past procedures include an EGD  Hyperlipidemia  This is a chronic problem  Lipid results: High LDL-C  Exacerbating diseases include obesity  Current antihyperlipidemic treatment includes diet change and exercise  The current treatment provides moderate improvement of lipids  Risk factors for coronary artery disease include dyslipidemia and obesity  Depression  This is a chronic problem  The problem has been gradually improving  The symptoms are aggravated by stress  The treatment provided significant relief  The following portions of the patient's history were reviewed and updated as appropriate: past family history, past medical history, past social history, past surgical history and problem list     Review of Systems   Psychiatric/Behavioral: Positive for depression  All other systems reviewed and are negative  Past Medical History:   Diagnosis Date    Arthritis     Atypical squamous cell changes of undetermined significance (ASCUS) on cervical cytology with negative high risk human papilloma virus (HPV) test result     High cholesterol     PCOS (polycystic ovarian syndrome)          Current Outpatient Medications:     aluminum chloride (DRYSOL) 20 % external solution, Use daily as needed  , Disp: 35 mL, Rfl: 2    calcium carbonate (TUMS) 500 mg chewable tablet, Chew 2 tablets daily  , Disp: , Rfl:     metFORMIN (GLUCOPHAGE-XR) 500 mg 24 hr tablet, Take 500 mg by mouth 3 (three) times a day , Disp: , Rfl:     norethindrone-ethinyl estradiol (JUNEL FE 1/20) 1-20 MG-MCG per tablet, Take 1 tablet by mouth daily, Disp: 28 tablet, Rfl: 11    famotidine (PEPCID) 40 MG tablet, Take 0 5 tablets (20 mg total) by mouth 2 (two) times a day, Disp: 90 tablet, Rfl: 1    Allergies   Allergen Reactions    Bee Venom Swelling     Large local reaction    Nicotine Other (See Comments)     Pt had severe "night terrors" when using nicotine patch    Other      Nicotene patches  Fleas    Clarithromycin Rash    Erigeron Annuus Rash     Fleas   large hives       Social History   Past Surgical History:   Procedure Laterality Date    WISDOM TOOTH EXTRACTION  2007     Family History   Problem Relation Age of Onset    Hypertension Mother     Other Mother         vaginal tumors    Heart disease Mother     Skin cancer Father     Heart Valve Disease Father         leaky valve    Diabetes Maternal Grandmother     Kidney failure Maternal Grandmother     Ovarian cancer Paternal Grandmother     Lung cancer Paternal Grandfather     Other Paternal Aunt         Ovarian polys       Objective:  /86 (BP Location: Left arm, Patient Position: Sitting, Cuff Size: Standard)   Pulse (!) 51   Temp 98 2 °F (36 8 °C) (Tympanic)   Ht 5' 7 52" (1 715 m)   Wt 104 kg (230 lb)   SpO2 98%   BMI 35 47 kg/m²     Recent Results (from the past 1344 hour(s))   COVID Only -Office Collect    Collection Time: 12/22/21 12:59 PM    Specimen: Nose; Nares   Result Value Ref Range    SARS-CoV-2 Positive (A) Negative            Physical Exam        Gen: NAD  Heent: atraumatic, normocephalic  Mouth: is moist  Neck: is supple, no JVD, no carotid bruits     Heart: is regular Normal s1, s2,  Lungs: are clear to auscultation  Abd: soft, non tender, NABS  Ext: no edema, distal pulses normal  Skin: no rashes, ulcers  Mood: normal affect  Neuro: AAOx3

## 2022-02-04 NOTE — ASSESSMENT & PLAN NOTE
Pt is presently on Metformin 500 mg TID and is doing well on it  Will need to monitor HBA1C   Is exercising and continues to lose weight  Will need to see GYN  Would like an alternate referral as she would like to see an alternate provider

## 2022-02-04 NOTE — ASSESSMENT & PLAN NOTE
Presently on Wellbutrin 150 mg 2 x daily and is adequately satisfied with the results    Continue cardiovascular exercise and F/U with mental health professional

## 2022-02-10 ENCOUNTER — TELEPHONE (OUTPATIENT)
Dept: INTERNAL MEDICINE CLINIC | Facility: CLINIC | Age: 32
End: 2022-02-10

## 2022-02-10 NOTE — TELEPHONE ENCOUNTER
Patients referrall from dr Christy Curiel office has been put on hold      Patient is in collections with this office and has reach out to her several times for payment

## 2022-02-11 ENCOUNTER — TELEPHONE (OUTPATIENT)
Dept: OTHER | Facility: OTHER | Age: 32
End: 2022-02-11

## 2022-02-16 ENCOUNTER — TELEPHONE (OUTPATIENT)
Dept: BARIATRICS | Facility: CLINIC | Age: 32
End: 2022-02-16

## 2022-02-16 NOTE — TELEPHONE ENCOUNTER
Tried calling pt got no answer and mailbox is full    ----- Message from Regulo Reed sent at 2/16/2022  7:00 AM EST -----  Regarding: FW: Medicine and appointment    ----- Message -----  From: Grady Doshi  Sent: 2/15/2022  10:15 PM EST  To: , #  Subject: Medicine and appointment                         Hello there,    This is Amira Acosta I am trying to refill my Wellbutrin but it no longer is listed under my prescriptions  Also, we have been playing phone tag trying to make an appointment  Could you call between 9-10 or after 3 to set up an appointment?      Thank you,    Amira Acosta    Thank y

## 2022-02-18 DIAGNOSIS — F06.4 ANXIETY DISORDER DUE TO KNOWN PHYSIOLOGICAL CONDITION: Primary | ICD-10-CM

## 2022-02-18 RX ORDER — BUPROPION HYDROCHLORIDE 150 MG/1
150 TABLET, EXTENDED RELEASE ORAL 2 TIMES DAILY
Qty: 110 TABLET | Refills: 0 | Status: SHIPPED | OUTPATIENT
Start: 2022-02-18 | End: 2022-04-15 | Stop reason: SDUPTHER

## 2022-02-18 NOTE — TELEPHONE ENCOUNTER
I discussed the situatin with Dr Roxanne Epps  I also called [facility] to clarify the last dose filled at the pharmacy  She was on 150 mg BID  Dr Roxanne Epps will approve enough until her next appt for Dr Jermaine Hedrick to address

## 2022-02-18 NOTE — TELEPHONE ENCOUNTER
[facility],    Patient called back and stated that the Weight management nurse called her back and stated that Dr Krupa Redding deleted it from her medication list and they will not refill the medication since it was her mistake on deleting it  She was told to contact us and have us refill the medication and make it active on her list again  Patient needs the medication today because she is all out of it and she is getting the run around and can't be off of this medication      If you want to contact the weight management and tell them that they have to fill it if we won't then let patient know     Please contact the patient on this matter today

## 2022-04-03 DIAGNOSIS — N92.6 IRREGULAR BLEEDING: ICD-10-CM

## 2022-04-04 ENCOUNTER — TELEPHONE (OUTPATIENT)
Dept: OBGYN CLINIC | Facility: CLINIC | Age: 32
End: 2022-04-04

## 2022-04-15 ENCOUNTER — OFFICE VISIT (OUTPATIENT)
Dept: BARIATRICS | Facility: CLINIC | Age: 32
End: 2022-04-15
Payer: COMMERCIAL

## 2022-04-15 VITALS
HEART RATE: 77 BPM | WEIGHT: 232.9 LBS | DIASTOLIC BLOOD PRESSURE: 78 MMHG | TEMPERATURE: 97.2 F | BODY MASS INDEX: 35.3 KG/M2 | SYSTOLIC BLOOD PRESSURE: 130 MMHG | HEIGHT: 68 IN

## 2022-04-15 DIAGNOSIS — E28.2 PCOS (POLYCYSTIC OVARIAN SYNDROME): Primary | ICD-10-CM

## 2022-04-15 DIAGNOSIS — F06.4 ANXIETY DISORDER DUE TO KNOWN PHYSIOLOGICAL CONDITION: ICD-10-CM

## 2022-04-15 DIAGNOSIS — E78.5 DYSLIPIDEMIA, GOAL LDL BELOW 160: ICD-10-CM

## 2022-04-15 DIAGNOSIS — F33.1 MODERATE EPISODE OF RECURRENT MAJOR DEPRESSIVE DISORDER (HCC): ICD-10-CM

## 2022-04-15 PROCEDURE — 99214 OFFICE O/P EST MOD 30 MIN: CPT | Performed by: PHYSICIAN ASSISTANT

## 2022-04-15 PROCEDURE — 3008F BODY MASS INDEX DOCD: CPT | Performed by: PHYSICIAN ASSISTANT

## 2022-04-15 RX ORDER — BUPROPION HYDROCHLORIDE 150 MG/1
150 TABLET, EXTENDED RELEASE ORAL 2 TIMES DAILY
Qty: 180 TABLET | Refills: 0 | Status: SHIPPED | OUTPATIENT
Start: 2022-04-15 | End: 2022-06-08 | Stop reason: SDUPTHER

## 2022-04-15 RX ORDER — NORETHINDRONE ACETATE AND ETHINYL ESTRADIOL 1MG-20(21)
KIT ORAL
Qty: 28 TABLET | Refills: 11 | Status: SHIPPED | OUTPATIENT
Start: 2022-04-15 | End: 2022-07-19 | Stop reason: CLARIF

## 2022-04-15 NOTE — ASSESSMENT & PLAN NOTE
-Patient is pursuing Conservative Program  -Initial weight loss goal of 5-10% weight loss for improved health  -Screening labs 4/26/21 and has new labs ordered from PCP    Initial:245 8   Current:232 9  Change:-12 9  Goal: under 200      -continue on metformin 1500mg a Day for PCOS  -continue welbutrin 150 SR BID  -Recommend 1 cup non-starchy vegetables, 1/2 cup carbohydrate( whole grains recommended), and 3-4 oz of protein (lean proteins recommended)  Resume food logging 1200 nonactive days, 1500 active days  Try to get 80 ounces of water a day

## 2022-04-15 NOTE — PATIENT INSTRUCTIONS
Recommend 1 cup non-starchy vegetables, 1/2 cup carbohydrate( whole grains recommended), and 3-4 oz of protein (lean proteins recommended)  1200 nonactive days, 1500 active days  Try to get 80 ounces of water a day

## 2022-04-15 NOTE — PROGRESS NOTES
Assessment/Plan:    PCOS (polycystic ovarian syndrome)  Continue on OCP and metformin 1500mg daily    Moderate episode of recurrent major depressive disorder ([address] )  Continue on wellbutrin 150 BID    Dyslipidemia, goal LDL below 160  -should improve with weight loss, dietary, and lifestyle changes      Obesity (BMI 30-39 9)  -Patient is pursuing Conservative Program  -Initial weight loss goal of 5-10% weight loss for improved health  -Screening labs 4/26/21 and has new labs ordered from PCP    Initial:245 8   Current:232 9  Change:-12 9  Goal: under 200      -continue on metformin 1500mg a Day for PCOS  -continue welbutrin 150 SR BID  -Recommend 1 cup non-starchy vegetables, 1/2 cup carbohydrate( whole grains recommended), and 3-4 oz of protein (lean proteins recommended)  Resume food logging 1200 nonactive days, 1500 active days  Try to get 80 ounces of water a day        Follow up in approximately 2 weeks with nurse for BP and   Diagnoses and all orders for this visit:    PCOS (polycystic ovarian syndrome)    Moderate episode of recurrent major depressive disorder (HCC)    Dyslipidemia, goal LDL below 160    Anxiety disorder due to known physiological condition  -     buPROPion (Wellbutrin SR) 150 mg 12 hr tablet; Take 1 tablet (150 mg total) by mouth 2 (two) times a day          Subjective:   Chief Complaint   Patient presents with    Follow-up     MWM OD F/U        Patient ID: Sagar Knight  is a 27 y o  female with excess weight/obesity here to pursue weight managment  Patient is pursuing Conservative Program   Milly Shankar is taking wellbutrin currently and it helps her mood/appetite  She has been down to 3 cigarettes a day also  She was losing about 2 pounds a month but over the winter had not followed diet/exercise  HPI    Food logging:not now was doing 1200 nonactive days, 1500 active days  Increased appetite/cravings:no  Exercise:none      Hydration:4 coffee day- sometimes a splash milk and 64 oucces water      B: mini bagel, yogurt , banana  S: 100 thai snacks multiple times in day  D: 300-400 calorie meal       Colonoscopy-Not applicable    The following portions of the patient's history were reviewed and updated as appropriate:   She  has a past medical history of Arthritis, Atypical squamous cell changes of undetermined significance (ASCUS) on cervical cytology with negative high risk human papilloma virus (HPV) test result, High cholesterol, and PCOS (polycystic ovarian syndrome)  She   Patient Active Problem List    Diagnosis Date Noted    Rash 06/16/2021    Esophageal spasm 01/05/2021    Anxiety disorder due to known physiological condition 01/05/2021    Vaginal sore 09/01/2020    PCOS (polycystic ovarian syndrome) 11/20/2018    Food insecurity 08/14/2018    Atypical squamous cell changes of undetermined significance (ASCUS) on cervical cytology with negative high risk human papilloma virus (HPV) test result 02/02/2018    Post-traumatic osteoarthritis of right foot 05/13/2016    Tobacco use disorder 11/21/2014    Dyslipidemia, goal LDL below 160 08/11/2010    Obesity (BMI 30-39 9) 03/25/2010    Moderate episode of recurrent major depressive disorder ([facility] [address] ) 01/28/2004    Acne vulgaris 06/19/2001     She  has a past surgical history that includes Ina tooth extraction (2007)  Her family history includes Cancer in her mother; Diabetes in her maternal grandmother; Heart Valve Disease in her father; Heart disease in her mother; Hypertension in her mother; Kidney failure in her maternal grandmother; Lung cancer in her paternal grandfather; Other in her mother and paternal aunt; Ovarian cancer in her paternal grandmother; Skin cancer in her father  She  reports that she has been smoking cigarettes  She has a 7 00 pack-year smoking history  She has never used smokeless tobacco  She reports that she does not drink alcohol and does not use drugs    Current Outpatient Medications   Medication Sig Dispense Refill    aluminum chloride (DRYSOL) 20 % external solution Use daily as needed  35 mL 2    buPROPion (Wellbutrin SR) 150 mg 12 hr tablet Take 1 tablet (150 mg total) by mouth 2 (two) times a day 180 tablet 0    calcium carbonate (TUMS) 500 mg chewable tablet Chew 2 tablets daily        famotidine (PEPCID) 40 MG tablet Take 0 5 tablets (20 mg total) by mouth 2 (two) times a day 90 tablet 1    metFORMIN (GLUCOPHAGE-XR) 500 mg 24 hr tablet Take 500 mg by mouth 3 (three) times a day       norethindrone-ethinyl estradiol (JUNEL FE 1/20) 1-20 MG-MCG per tablet Take 1 tablet by mouth daily 28 tablet 1    norethindrone-ethinyl estradiol (JUNEL FE 1/20) 1-20 MG-MCG per tablet take 1 tablet by mouth once daily 28 tablet 11     No current facility-administered medications for this visit  Current Outpatient Medications on File Prior to Visit   Medication Sig    aluminum chloride (DRYSOL) 20 % external solution Use daily as needed   calcium carbonate (TUMS) 500 mg chewable tablet Chew 2 tablets daily      famotidine (PEPCID) 40 MG tablet Take 0 5 tablets (20 mg total) by mouth 2 (two) times a day    metFORMIN (GLUCOPHAGE-XR) 500 mg 24 hr tablet Take 500 mg by mouth 3 (three) times a day     norethindrone-ethinyl estradiol (JUNEL FE 1/20) 1-20 MG-MCG per tablet Take 1 tablet by mouth daily    [DISCONTINUED] buPROPion (Wellbutrin SR) 150 mg 12 hr tablet Take 1 tablet (150 mg total) by mouth 2 (two) times a day    norethindrone-ethinyl estradiol (JUNEL FE 1/20) 1-20 MG-MCG per tablet take 1 tablet by mouth once daily     No current facility-administered medications on file prior to visit  She is allergic to bee venom, nicotine, other, clarithromycin, and erigeron annuus       Review of Systems   Constitutional: Negative for fever  Respiratory: Negative for cough and shortness of breath  Cardiovascular: Negative for chest pain and palpitations     Gastrointestinal: Negative for abdominal pain, constipation and vomiting  Genitourinary: Negative for difficulty urinating  Musculoskeletal: Negative for arthralgias and back pain  Skin: Negative for rash  Neurological: Negative for dizziness and headaches  Psychiatric/Behavioral: Negative for dysphoric mood  The patient is not nervous/anxious  All other systems reviewed and are negative  Objective:    /78 (BP Location: Right leg, Patient Position: Sitting, Cuff Size: Standard)   Pulse 77   Temp (!) 97 2 °F (36 2 °C) (Tympanic)   Ht 5' 8" (1 727 m)   Wt 106 kg (232 lb 14 4 oz)   BMI 35 41 kg/m²      Physical Exam  Vitals and nursing note reviewed  Constitutional:       General: She is not in acute distress  Appearance: She is well-developed  She is obese  HENT:      Head: Normocephalic and atraumatic  Eyes:      Conjunctiva/sclera: Conjunctivae normal    Neck:      Thyroid: No thyromegaly  Pulmonary:      Effort: Pulmonary effort is normal  No respiratory distress  Skin:     Findings: No rash (visible)  Neurological:      Mental Status: She is alert and oriented to person, place, and time     Psychiatric:         Behavior: Behavior normal

## 2022-04-28 DIAGNOSIS — N92.6 IRREGULAR BLEEDING: ICD-10-CM

## 2022-04-28 RX ORDER — NORETHINDRONE ACETATE AND ETHINYL ESTRADIOL 1MG-20(21)
1 KIT ORAL DAILY
Qty: 28 TABLET | Refills: 0 | Status: SHIPPED | OUTPATIENT
Start: 2022-04-28 | End: 2022-05-04 | Stop reason: SDUPTHER

## 2022-05-04 ENCOUNTER — OFFICE VISIT (OUTPATIENT)
Dept: OBGYN CLINIC | Facility: CLINIC | Age: 32
End: 2022-05-04
Payer: COMMERCIAL

## 2022-05-04 VITALS
BODY MASS INDEX: 35.71 KG/M2 | WEIGHT: 235.6 LBS | HEIGHT: 68 IN | DIASTOLIC BLOOD PRESSURE: 100 MMHG | SYSTOLIC BLOOD PRESSURE: 150 MMHG

## 2022-05-04 DIAGNOSIS — N92.6 IRREGULAR BLEEDING: ICD-10-CM

## 2022-05-04 DIAGNOSIS — E66.9 OBESITY (BMI 30-39.9): ICD-10-CM

## 2022-05-04 DIAGNOSIS — Z80.41 FAMILY HISTORY OF OVARIAN CANCER: ICD-10-CM

## 2022-05-04 DIAGNOSIS — B96.89 BV (BACTERIAL VAGINOSIS): ICD-10-CM

## 2022-05-04 DIAGNOSIS — N76.0 BV (BACTERIAL VAGINOSIS): ICD-10-CM

## 2022-05-04 DIAGNOSIS — Z01.419 WOMEN'S ANNUAL ROUTINE GYNECOLOGICAL EXAMINATION: ICD-10-CM

## 2022-05-04 DIAGNOSIS — Z87.42 HISTORY OF PCOS: ICD-10-CM

## 2022-05-04 DIAGNOSIS — Z30.41 ENCOUNTER FOR SURVEILLANCE OF CONTRACEPTIVE PILLS: ICD-10-CM

## 2022-05-04 PROCEDURE — 3008F BODY MASS INDEX DOCD: CPT | Performed by: PHYSICIAN ASSISTANT

## 2022-05-04 PROCEDURE — S0612 ANNUAL GYNECOLOGICAL EXAMINA: HCPCS | Performed by: OBSTETRICS & GYNECOLOGY

## 2022-05-04 RX ORDER — CLINDAMYCIN HYDROCHLORIDE 300 MG/1
300 CAPSULE ORAL 2 TIMES DAILY
Qty: 14 CAPSULE | Refills: 0 | Status: SHIPPED | OUTPATIENT
Start: 2022-05-04 | End: 2022-05-11

## 2022-05-04 RX ORDER — NORETHINDRONE ACETATE AND ETHINYL ESTRADIOL 1MG-20(21)
1 KIT ORAL DAILY
Qty: 84 TABLET | Refills: 3 | Status: SHIPPED | OUTPATIENT
Start: 2022-05-04 | End: 2022-08-03 | Stop reason: ALTCHOICE

## 2022-05-04 NOTE — PROGRESS NOTES
Assessment     Annual exam    Contraceptive management    Family history of ovarian cancer, paternal    History of PCOS    Obesity     BV        Plan     Renew OCPs    Metronidazole for BV infection    Pap smear deferred, normal Pap in 2021 next Pap due in 2024   All questions answered  Subjective  Here for annual exam     Ilah Soulier is a 27 y o  female who presents for annual exam  Periods are regular every 28-30 days, lasting 5 days  Dysmenorrhea:none  Cyclic symptoms include none  No intermenstrual bleeding, spotting, or discharge  The patient reports that there is not domestic violence in her life  Current contraception: OCP (estrogen/progesterone)  History of abnormal Pap smear: no  Family history of uterine or ovarian cancer: yes -  Ovarian cancer on father's side of family, patient request ultrasound  Regular self breast exam: yes  History of abnormal mammogram: no  Family history of breast cancer: no  History of abnormal lipids: no  Menstrual History:  OB History    No obstetric history on file  Menarche age: 14  Patient's last menstrual period was 04/28/2022 (exact date)  Review of Systems  Pertinent items are noted in HPI        Objective   No acute distress   /100   Ht 5' 8" (1 727 m)   Wt 107 kg (235 lb 9 6 oz)   LMP 04/28/2022 (Exact Date)   BMI 35 82 kg/m²     General:   alert and oriented, in no acute distress, alert, appears stated age and cooperative   Heart: regular rate and rhythm, S1, S2 normal, no murmur, click, rub or gallop   Lungs: clear to auscultation bilaterally   Abdomen: soft, non-tender, without masses or organomegaly   Vulva: normal, Bartholin's, Urethra, [place]'s normal, female escutcheon   Vagina: normal mucosa, normal discharge, no palpable nodules   Cervix: no cervical motion tenderness, no lesions and nulliparous appearance   Uterus: normal size, mobile, non-tender, normal shape and consistency   Adnexa: normal adnexa and no mass, fullness, tenderness   Bilateral breast exam in the sitting and supine position with chaperone present, no visible or palpable breast lesions identified  No breast masses noted  No supraclavicular or axillary lymphadenopathy noted  No nipple discharge  Reviewed self-breast exam techniques     Rectal exam,  deferred

## 2022-05-19 ENCOUNTER — OFFICE VISIT (OUTPATIENT)
Dept: GASTROENTEROLOGY | Facility: MEDICAL CENTER | Age: 32
End: 2022-05-19
Payer: COMMERCIAL

## 2022-05-19 VITALS
SYSTOLIC BLOOD PRESSURE: 123 MMHG | DIASTOLIC BLOOD PRESSURE: 68 MMHG | WEIGHT: 238.4 LBS | BODY MASS INDEX: 36.25 KG/M2 | TEMPERATURE: 98.3 F | HEART RATE: 70 BPM

## 2022-05-19 DIAGNOSIS — K30 DELAYED GASTRIC EMPTYING: ICD-10-CM

## 2022-05-19 PROCEDURE — 99203 OFFICE O/P NEW LOW 30 MIN: CPT | Performed by: PHYSICIAN ASSISTANT

## 2022-05-19 NOTE — PROGRESS NOTES
[facility] - Outpatient Consultation  Doug Loco 34 y o  female MRN: [id number]  Encounter: [id number]          ASSESSMENT AND PLAN:      1  Delayed gastric emptying: she has seen gastro neisha last year for chest pain  EGD 2/21 was normal other than some pills and retained food in the stomach  She then had GES study afterwards that was normal  She denies any symptoms today including nausea/vomiting, abdominal pain  Her chest pain has also resolved  She may have some mild delayed gastric emptying but without symptoms no plan for intervention at this time  Patient is on pepcid and not sure why  Denies reflux  Will request biopsies from EGD and relay to patient  She can stop pepcid with no acid reflux symptoms  - Ambulatory Referral to Gastroenterology  -f/u biopsy results from EGD 2/21  -follow up PRN    ______________________________________________________________________    HPI:  Charlotte Ye is a 35 yo female with pmh PCOS, hyperlipidemia, DM on metformin here as a new patient for clarification  She saw gastro associates in the past for chest pain  She underwent EGD with them 2/21 that was normal other than some undigested food particles and 2 pills retained in stomach  She then went on to have a GES completed which was normal  She denies any nausea, vomiting, abdominal pain, change in bowel habits  Her chest pain, which this work up was completed for, has also resolved  She denies any GI symptoms here today  I was unable to view the biopsy results from EGD 2/21, we will request the records for gastro associates  Patient did not want to make a follow up appt and she knows that if she does not hear from our office regarding these results to reach out  REVIEW OF SYSTEMS:    CONSTITUTIONAL: Denies any fever, chills, rigors, and weight loss  HEENT: No earache or tinnitus  Denies hearing loss or visual disturbances  CARDIOVASCULAR: No chest pain or palpitations  RESPIRATORY: Denies any cough, hemoptysis, shortness of breath or dyspnea on exertion  GASTROINTESTINAL: As noted in the History of Present Illness  GENITOURINARY: No problems with urination  Denies any hematuria or dysuria  NEUROLOGIC: No dizziness or vertigo, denies headaches  MUSCULOSKELETAL: Denies any muscle or joint pain  SKIN: Denies skin rashes or itching  ENDOCRINE: Denies excessive thirst  Denies intolerance to heat or cold  PSYCHOSOCIAL: Denies depression or anxiety  Denies any recent memory loss         Historical Information   Past Medical History:   Diagnosis Date    Arthritis     Atypical squamous cell changes of undetermined significance (ASCUS) on cervical cytology with negative high risk human papilloma virus (HPV) test result     High cholesterol     PCOS (polycystic ovarian syndrome)      Past Surgical History:   Procedure Laterality Date    WISDOM TOOTH EXTRACTION  2007     Social History   Social History     Substance and Sexual Activity   Alcohol Use Never     Social History     Substance and Sexual Activity   Drug Use Never     Social History     Tobacco Use   Smoking Status Current Every Day Smoker    Packs/day: 0 50    Years: 14 00    Pack years: 7 00    Types: Cigarettes   Smokeless Tobacco Never Used     Family History   Problem Relation Age of Onset    Hypertension Mother     Other Mother         vaginal tumors    Heart disease Mother     Cancer Mother     Skin cancer Father     Heart Valve Disease Father         leaky valve    Diabetes Maternal Grandmother     Kidney failure Maternal Grandmother     Ovarian cancer Paternal Grandmother     Lung cancer Paternal Grandfather     Other Paternal Aunt         Ovarian polys       Meds/Allergies       Current Outpatient Medications:     aluminum chloride (DRYSOL) 20 % external solution    buPROPion (Wellbutrin SR) 150 mg 12 hr tablet    calcium carbonate (TUMS) 500 mg chewable tablet    famotidine (PEPCID) 40 MG tablet    metFORMIN (GLUCOPHAGE-XR) 500 mg 24 hr tablet    norethindrone-ethinyl estradiol (JUNEL FE 1/20) 1-20 MG-MCG per tablet    norethindrone-ethinyl estradiol (JUNEL FE 1/20) 1-20 MG-MCG per tablet    Allergies   Allergen Reactions    Bee Venom Swelling     Large local reaction    Nicotine Other (See Comments)     Pt had severe "night terrors" when using nicotine patch    Other      Nicotene patches  Fleas    Clarithromycin Rash    Erigeron Annuus Rash     Fleas   large hives           Objective     Blood pressure 123/68, pulse 70, temperature 98 3 °F (36 8 °C), weight 108 kg (238 lb 6 4 oz), last menstrual period 04/28/2022, not currently breastfeeding  Body mass index is 36 25 kg/m²  PHYSICAL EXAM:      General Appearance:   Alert, cooperative, no distress   HEENT:   Normocephalic, atraumatic, anicteric      Neck:  Supple, symmetrical, trachea midline   Lungs:   Clear to auscultation bilaterally; no rales, rhonchi or wheezing; respirations unlabored    Heart[de-identified]   Regular rate and rhythm; no murmur, rub, or gallop  Abdomen:   Soft, non-tender, non-distended; normal bowel sounds; no masses, no organomegaly    Genitalia:   Deferred    Rectal:   Deferred    Extremities:  No cyanosis, clubbing or edema    Pulses:  2+ and symmetric    Skin:  No jaundice, rashes, or lesions    Lymph nodes:  No palpable cervical lymphadenopathy        Lab Results:   No visits with results within 1 Day(s) from this visit  Latest known visit with results is:   Office Visit on 12/22/2021   Component Date Value    SARS-CoV-2 12/22/2021 Positive (A)         Radiology Results:   No results found

## 2022-06-08 ENCOUNTER — OFFICE VISIT (OUTPATIENT)
Dept: OBGYN CLINIC | Facility: CLINIC | Age: 32
End: 2022-06-08
Payer: COMMERCIAL

## 2022-06-08 VITALS
SYSTOLIC BLOOD PRESSURE: 130 MMHG | HEIGHT: 68 IN | WEIGHT: 243.6 LBS | BODY MASS INDEX: 36.92 KG/M2 | DIASTOLIC BLOOD PRESSURE: 86 MMHG

## 2022-06-08 DIAGNOSIS — E66.9 OBESITY (BMI 35.0-39.9 WITHOUT COMORBIDITY): ICD-10-CM

## 2022-06-08 DIAGNOSIS — N89.8 VAGINAL ITCHING: ICD-10-CM

## 2022-06-08 DIAGNOSIS — B96.89 BV (BACTERIAL VAGINOSIS): ICD-10-CM

## 2022-06-08 DIAGNOSIS — N76.0 BV (BACTERIAL VAGINOSIS): ICD-10-CM

## 2022-06-08 PROCEDURE — 99213 OFFICE O/P EST LOW 20 MIN: CPT | Performed by: OBSTETRICS & GYNECOLOGY

## 2022-06-08 RX ORDER — CLOTRIMAZOLE AND BETAMETHASONE DIPROPIONATE 10; .64 MG/G; MG/G
CREAM TOPICAL 2 TIMES DAILY
Qty: 30 G | Refills: 0 | Status: SHIPPED | OUTPATIENT
Start: 2022-06-08

## 2022-06-08 RX ORDER — METRONIDAZOLE 500 MG/1
500 TABLET ORAL EVERY 12 HOURS SCHEDULED
Qty: 14 TABLET | Refills: 0 | Status: SHIPPED | OUTPATIENT
Start: 2022-06-08 | End: 2022-06-15

## 2022-06-08 NOTE — PROGRESS NOTES
Assessment/Plan:    Diagnoses and all orders for this visit:    BV (bacterial vaginosis)  -     metroNIDAZOLE (FLAGYL) 500 mg tablet; Take 1 tablet (500 mg total) by mouth every 12 (twelve) hours for 7 days    Vaginal itching  -     clotrimazole-betamethasone (LOTRISONE) 1-0 05 % cream; Apply topically 2 (two) times a day    Obesity (BMI 35 0-39 9 without comorbidity)        Subjective: vaginal discharge with itching     Patient ID: Michelle Carias is a 34 y o  female  HPI    58-year-old female, nulliparous recently here for annual exam in May was found to have bacterial vaginosis  She was initially treated with clindamycin  She did have some symptomatic improvement until recently when symptoms with discharge and irritation began to recur  Repeat Wet mount positive for recurrent bacterial vaginosis will treat with metronidazole 500 mg twice daily for 1 week  Will also treat vaginal itching with Lotrisone topical cream b i d     Review of Systems    Unchanged from prior    Objective: no acute distress  /86   Ht 5' 8" (1 727 m)   Wt 110 kg (243 lb 9 6 oz)   LMP 05/29/2022 (Approximate)   BMI 37 04 kg/m²      Physical Exam  Vitals reviewed  Constitutional:       Appearance: Normal appearance  She is obese  HENT:      Head: Normocephalic  Eyes:      Extraocular Movements: Extraocular movements intact  Pupils: Pupils are equal, round, and reactive to light  Pulmonary:      Effort: Pulmonary effort is normal    Genitourinary:     General: Normal vulva  Rectum: Normal       Comments: Normal external genitalia  Normal size uterus  Normal cervix  No CMT  No pelvic masses    Yellow vaginal discharge present, wet mount clue cells present, consistent with recurrent bacterial vaginosis  Musculoskeletal:         General: Normal range of motion  Cervical back: Normal range of motion  Skin:     General: Skin is warm and dry     Neurological:      Mental Status: She is alert and oriented to person, place, and time  Psychiatric:         Mood and Affect: Mood normal          Behavior: Behavior normal          Thought Content: Thought content normal          Judgment: Judgment normal         Please note    In addition to the time spent discussing the findings and results of today's visit and exam, I spent approximately 20 minutes of face-to-face time with the patient, greater than 50% of which was spent in counseling and coordination of care for this patient

## 2022-06-17 ENCOUNTER — OFFICE VISIT (OUTPATIENT)
Dept: BARIATRICS | Facility: CLINIC | Age: 32
End: 2022-06-17
Payer: COMMERCIAL

## 2022-06-17 VITALS
BODY MASS INDEX: 36.09 KG/M2 | RESPIRATION RATE: 16 BRPM | HEIGHT: 69 IN | SYSTOLIC BLOOD PRESSURE: 118 MMHG | HEART RATE: 74 BPM | DIASTOLIC BLOOD PRESSURE: 84 MMHG | WEIGHT: 243.7 LBS

## 2022-06-17 DIAGNOSIS — F06.4 ANXIETY DISORDER DUE TO KNOWN PHYSIOLOGICAL CONDITION: ICD-10-CM

## 2022-06-17 DIAGNOSIS — F33.1 MODERATE EPISODE OF RECURRENT MAJOR DEPRESSIVE DISORDER (HCC): ICD-10-CM

## 2022-06-17 DIAGNOSIS — E78.5 DYSLIPIDEMIA, GOAL LDL BELOW 160: ICD-10-CM

## 2022-06-17 DIAGNOSIS — E28.2 PCOS (POLYCYSTIC OVARIAN SYNDROME): ICD-10-CM

## 2022-06-17 DIAGNOSIS — E66.9 OBESITY (BMI 30-39.9): Primary | ICD-10-CM

## 2022-06-17 PROCEDURE — 99213 OFFICE O/P EST LOW 20 MIN: CPT | Performed by: NURSE PRACTITIONER

## 2022-06-17 RX ORDER — BUPROPION HYDROCHLORIDE 150 MG/1
150 TABLET, EXTENDED RELEASE ORAL 2 TIMES DAILY
Qty: 180 TABLET | Refills: 0 | Status: SHIPPED | OUTPATIENT
Start: 2022-06-17

## 2022-06-17 NOTE — PROGRESS NOTES
Assessment/Plan:     Obesity (BMI 30-39 9)  -Patient is pursuing Conservative Program  -Initial weight loss goal of 5-10% weight loss for improved health  -Screening labs: will be having new labs done - ordered from PCP    Initial:245 8 lbs  Current: 243 7 lbs   Change:-2 1 (+10 8 lbs since last visit in April 2022)  Goal: under 200      -continue on metformin XR 500mg three times a day for PCOS  Discussed changing to once a day to facilitate compliance, but patient declined  -continue welbutrin 150 SR BID  - Has been busy towards the end of school and has not been eating as well or as active  Plans on resuming better diet and exercise  Does not feel that any changes to medications needed for appetite at this time  -Recommend 1 cup non-starchy vegetables, 1/2 cup carbohydrate( whole grains recommended), and 3-4 oz of protein (lean proteins recommended)  Resume food logging, weighing and measuring 1200 nonactive days, 1500 active days  - Continue great water intake - at least 80 ounces of water a day  - Continue walking and add in HIIT training  PCOS (polycystic ovarian syndrome)  - Taking OCP and metformin 1500mg daily  Continue management with prescribing provider  Dyslipidemia, goal LDL below 160  -should improve with weight loss, dietary, and lifestyle changes      Moderate episode of recurrent major depressive disorder (HCC)  - Continue on wellbutrin  mg BID       Yecenia was seen today for follow-up  Diagnoses and all orders for this visit:    Obesity (BMI 30-39  9)    Anxiety disorder due to known physiological condition  -     buPROPion (Wellbutrin SR) 150 mg 12 hr tablet; Take 1 tablet (150 mg total) by mouth 2 (two) times a day    PCOS (polycystic ovarian syndrome)    Moderate episode of recurrent major depressive disorder ([address] )    Dyslipidemia, goal LDL below 160        Follow up in approximately September 2022 with Non-Surgical Physician/Advanced Practitioner      Subjective:   Chief Complaint   Patient presents with    Follow-up     MWM 2mth f/u       Patient ID: Gisela Oropeza  is a 34 y o  female with excess weight/obesity here to pursue weight management  Patient is pursuing Conservative Program    Most recent notes and records were reviewed  HPI      Wt Readings from Last 10 Encounters:   06/17/22 111 kg (243 lb 11 2 oz)   06/08/22 110 kg (243 lb 9 6 oz)   05/19/22 108 kg (238 lb 6 4 oz)   05/04/22 107 kg (235 lb 9 6 oz)   04/15/22 106 kg (232 lb 14 4 oz)   02/02/22 104 kg (230 lb)   07/27/21 100 kg (221 lb 6 4 oz)   06/16/21 101 kg (222 lb 3 2 oz)   05/27/21 101 kg (223 lb)   03/23/21 105 kg (232 lb 1 6 oz)       Has been busy as an  towards the end of the school year, so has not been making good food choices  Plans on getting back to healthy eating and more exercise since she is now off for the summer  Has not been food logging, weighing or measuring  Wellbutrin  mg twice a day  No negative side effects  Feels that it has been good for her mentally  Taking Metformin 500 mg twice a day, should be taking it 3 times a day, but tends to forget the nighttime dose  No negative side effects, feels that it has been helping with appetite  B- 2 eggs, 2 pieces 647 bread and turkey emmanuel and fruit smoothie  S- 100 calorie snack  L- 2 of the 100 calorie snack pack  S- snack pack 100 calorie  D- protein and veggies and potato or 100 calorie snacks  S- s'more and decaf coffee    Hydration- 80 oz water, 2 cups of coffee 1 cup decaf - typically black sometimes 2% milk  Alcohol- none  Exercise- walking 3-4 days 3-5 miles, wants to get back to HIIT training       Colonoscopy: N/A  Mammogram: N/A      The following portions of the patient's history were reviewed and updated as appropriate: allergies, current medications, past family history, past medical history, past social history, past surgical history, and problem list     Review of Systems   HENT: Negative for sore throat  Respiratory: Negative for cough and shortness of breath  Cardiovascular: Negative for chest pain and palpitations  Gastrointestinal: Negative for abdominal pain, constipation, diarrhea, nausea and vomiting  Denies GERD   Skin: Positive for rash (being treated)  Psychiatric/Behavioral: Negative for suicidal ideas (or HI)  + depression and anxiety controlled with medication       Objective:  /84   Pulse 74   Resp 16   Ht 5' 9" (1 753 m)   Wt 111 kg (243 lb 11 2 oz)   LMP 05/29/2022 (Approximate)   Breastfeeding No   BMI 35 99 kg/m²     Physical Exam  Vitals and nursing note reviewed  Constitutional   General appearance: Abnormal   well developed and obese  Eyes No conjunctival injection  Ears, Nose, Mouth, and Throat Oral mucosa moist    Pulmonary   Respiratory effort: No increased work of breathing or signs of respiratory distress  Cardiovascular     Examination of extremities for edema and/or varicosities: Normal   no edema  Abdomen   Abdomen: Abnormal   The abdomen was obese      Musculoskeletal   Gait and station: Normal     Psychiatric   Orientation to person, place and time: Normal     Affect: appropriate

## 2022-06-20 ENCOUNTER — OFFICE VISIT (OUTPATIENT)
Dept: OBGYN CLINIC | Facility: CLINIC | Age: 32
End: 2022-06-20
Payer: COMMERCIAL

## 2022-06-20 ENCOUNTER — ULTRASOUND (OUTPATIENT)
Dept: OBGYN CLINIC | Facility: CLINIC | Age: 32
End: 2022-06-20
Payer: COMMERCIAL

## 2022-06-20 VITALS
BODY MASS INDEX: 35.9 KG/M2 | WEIGHT: 242.4 LBS | DIASTOLIC BLOOD PRESSURE: 80 MMHG | SYSTOLIC BLOOD PRESSURE: 130 MMHG | HEIGHT: 69 IN

## 2022-06-20 DIAGNOSIS — Z87.42 HISTORY OF PCOS: ICD-10-CM

## 2022-06-20 DIAGNOSIS — N83.202 LEFT OVARIAN CYST: Primary | ICD-10-CM

## 2022-06-20 DIAGNOSIS — Z30.41 ENCOUNTER FOR SURVEILLANCE OF CONTRACEPTIVE PILLS: ICD-10-CM

## 2022-06-20 DIAGNOSIS — E66.9 OBESITY (BMI 30-39.9): ICD-10-CM

## 2022-06-20 DIAGNOSIS — E28.2 PCOS (POLYCYSTIC OVARIAN SYNDROME): Primary | ICD-10-CM

## 2022-06-20 PROCEDURE — 3008F BODY MASS INDEX DOCD: CPT | Performed by: OBSTETRICS & GYNECOLOGY

## 2022-06-20 PROCEDURE — 76856 US EXAM PELVIC COMPLETE: CPT | Performed by: OBSTETRICS & GYNECOLOGY

## 2022-06-20 PROCEDURE — 99213 OFFICE O/P EST LOW 20 MIN: CPT | Performed by: OBSTETRICS & GYNECOLOGY

## 2022-06-20 PROCEDURE — 76830 TRANSVAGINAL US NON-OB: CPT | Performed by: OBSTETRICS & GYNECOLOGY

## 2022-06-20 NOTE — PROGRESS NOTES
AMB US Pelvic Non OB    Date/Time: 6/20/2022 12:14 PM  Performed by: Compa Lemus  Authorized by: Farideh Rios DO   Universal Protocol:  Patient identity confirmed: verbally with patient      Procedure details:     Technique:  Transvaginal US, Non-OB    Position: lithotomy exam    Uterine findings:     Length (cm): 7 98    Height (cm):  4 33    Width (cm):  5 43    Endometrial stripe: identified      Endometrium thickness (mm):  5 2  Left ovary findings:     Left ovary:  Visualized    Length (cm): 3 47    Height (cm): 2 14    Width (cm): 2 8  Right ovary findings:     Right ovary:  Visualized    Length (cm): 7 97    Height (cm): 5 9    Width (cm): 6 43  Other findings:     Free peritoneal fluid: not identified    Post-Procedure Details:     Impression:  Multi-positional uterus and left ovary appear within normal limits  The right ovary demonstrates a 5 8cm simple cyst  No free fluid  Tolerance: Tolerated well, no immediate complications    Complications: no complications    Additional Procedure Comments:      Gifi F8 E8C-RS transvaginal transducer Serial # [id number] was used to perform the examination today and subsequently followed with high level disinfection utilizing C[employer]on[employer] EPR procedure  Ultrasound performed at:     [address] [address]  Phone:  [phone]  Fax:  [phone]    AMB US Pelvic Non OB    Date/Time: 6/20/2022 3:19 PM  Performed by: Compa Lemus  Authorized by: Farideh Rios DO     Procedure details:     Technique:  US Pelvic, Non-OB with complete exam    Position: supine exam    Post-Procedure Details:     Impression:  Transabdominal images were obtained to better visualize uterus and ovaries  Left ovary was seen better on transabdominal ultrasound  Tolerance:   Tolerated well, no immediate complications    Complications: no complications

## 2022-06-20 NOTE — PROGRESS NOTES
Assessment/Plan:    Diagnoses and all orders for this visit:    Left ovarian cyst  -     ; Future  -     US pelvis complete non OB; Future    History of PCOS  -     ; Future    Obesity (BMI 30-39  9)    Encounter for surveillance of contraceptive pills          History of irregular bleeding     Subjective: here for test results     Patient ID: Sagar Knight is a 34 y o  female  HPI    58-year-old female, nulliparous had a pelvic ultrasound today with for evaluation of PCOS and irregular menstrual bleeding  Pelvic ultrasound today reveals a normal-sized uterus measuring 7 9 x 4 3 x 5 4 cm  Endometrial thickness was normal at 5 2 mm  Left ovary was normal in size 3 4 x 2 1 x 2 8 cm  The right ovary was enlarged and contained a 5 8 cm simple cyst without free fluid  Patient reports minimal pelvic pain symptoms occasional dyspareunia symptoms moderate dysmenorrhea symptoms  She is on Junel for contraception  Recommend we continue this for now will repeat another ultrasound in about 4 weeks time  Risks and benefits reviewed, patient will call back with sudden change in her symptoms or increasing pain   also ordered    Review of Systems   Unchanged from previous visit    Objective: no acute distress  /80 (BP Location: Left arm, Patient Position: Sitting)   Ht 5' 9" (1 753 m)   Wt 110 kg (242 lb 6 4 oz)   LMP 05/29/2022 (Approximate)   BMI 35 80 kg/m²      Physical Exam  Vitals reviewed  Constitutional:       Appearance: Normal appearance  She is normal weight  HENT:      Head: Normocephalic  Eyes:      Pupils: Pupils are equal, round, and reactive to light  Pulmonary:      Effort: Pulmonary effort is normal    Genitourinary:     Comments:  Pelvic exam deferred  Musculoskeletal:         General: Normal range of motion  Neurological:      Mental Status: She is alert and oriented to person, place, and time     Psychiatric:         Mood and Affect: Mood normal  Behavior: Behavior normal          Thought Content: Thought content normal          Judgment: Judgment normal         Please note    In addition to the time spent discussing the findings and results of today's visit and exam, I spent approximately 20 minutes of face-to-face time with the patient, greater than 50% of which was spent in counseling and coordination of care for this patient

## 2022-06-21 ENCOUNTER — APPOINTMENT (OUTPATIENT)
Dept: LAB | Facility: HOSPITAL | Age: 32
End: 2022-06-21
Payer: COMMERCIAL

## 2022-06-21 DIAGNOSIS — F06.4 ANXIETY DISORDER DUE TO KNOWN PHYSIOLOGICAL CONDITION: ICD-10-CM

## 2022-06-21 DIAGNOSIS — N83.202 LEFT OVARIAN CYST: ICD-10-CM

## 2022-06-21 DIAGNOSIS — K30 DELAYED GASTRIC EMPTYING: ICD-10-CM

## 2022-06-21 DIAGNOSIS — Z13.1 SCREENING FOR DIABETES MELLITUS: ICD-10-CM

## 2022-06-21 DIAGNOSIS — Z87.42 HISTORY OF PCOS: ICD-10-CM

## 2022-06-21 DIAGNOSIS — E78.5 DYSLIPIDEMIA, GOAL LDL BELOW 160: ICD-10-CM

## 2022-06-21 LAB
ALBUMIN SERPL BCP-MCNC: 3.7 G/DL (ref 3.5–5)
ALP SERPL-CCNC: 37 U/L (ref 46–116)
ALT SERPL W P-5'-P-CCNC: 37 U/L (ref 12–78)
ANION GAP SERPL CALCULATED.3IONS-SCNC: 7 MMOL/L (ref 4–13)
AST SERPL W P-5'-P-CCNC: 15 U/L (ref 5–45)
BASOPHILS # BLD AUTO: 0.04 THOUSANDS/ΜL (ref 0–0.1)
BASOPHILS NFR BLD AUTO: 1 % (ref 0–1)
BILIRUB SERPL-MCNC: 0.47 MG/DL (ref 0.2–1)
BUN SERPL-MCNC: 9 MG/DL (ref 5–25)
CALCIUM SERPL-MCNC: 7.7 MG/DL (ref 8.3–10.1)
CANCER AG125 SERPL-ACNC: 6.1 U/ML (ref 0–30)
CHLORIDE SERPL-SCNC: 105 MMOL/L (ref 100–108)
CHOLEST SERPL-MCNC: 225 MG/DL
CO2 SERPL-SCNC: 25 MMOL/L (ref 21–32)
CREAT SERPL-MCNC: 0.66 MG/DL (ref 0.6–1.3)
EOSINOPHIL # BLD AUTO: 0.1 THOUSAND/ΜL (ref 0–0.61)
EOSINOPHIL NFR BLD AUTO: 2 % (ref 0–6)
ERYTHROCYTE [DISTWIDTH] IN BLOOD BY AUTOMATED COUNT: 12.4 % (ref 11.6–15.1)
EST. AVERAGE GLUCOSE BLD GHB EST-MCNC: 108 MG/DL
GFR SERPL CREATININE-BSD FRML MDRD: 116 ML/MIN/1.73SQ M
GLUCOSE P FAST SERPL-MCNC: 97 MG/DL (ref 65–99)
HBA1C MFR BLD: 5.4 %
HCT VFR BLD AUTO: 42.6 % (ref 34.8–46.1)
HDLC SERPL-MCNC: 70 MG/DL
HGB BLD-MCNC: 14.4 G/DL (ref 11.5–15.4)
IMM GRANULOCYTES # BLD AUTO: 0.02 THOUSAND/UL (ref 0–0.2)
IMM GRANULOCYTES NFR BLD AUTO: 0 % (ref 0–2)
LDLC SERPL CALC-MCNC: 148 MG/DL (ref 0–100)
LYMPHOCYTES # BLD AUTO: 1.43 THOUSANDS/ΜL (ref 0.6–4.47)
LYMPHOCYTES NFR BLD AUTO: 27 % (ref 14–44)
MCH RBC QN AUTO: 31.5 PG (ref 26.8–34.3)
MCHC RBC AUTO-ENTMCNC: 33.8 G/DL (ref 31.4–37.4)
MCV RBC AUTO: 93 FL (ref 82–98)
MONOCYTES # BLD AUTO: 0.42 THOUSAND/ΜL (ref 0.17–1.22)
MONOCYTES NFR BLD AUTO: 8 % (ref 4–12)
NEUTROPHILS # BLD AUTO: 3.3 THOUSANDS/ΜL (ref 1.85–7.62)
NEUTS SEG NFR BLD AUTO: 62 % (ref 43–75)
NRBC BLD AUTO-RTO: 0 /100 WBCS
PLATELET # BLD AUTO: 211 THOUSANDS/UL (ref 149–390)
PMV BLD AUTO: 11.3 FL (ref 8.9–12.7)
POTASSIUM SERPL-SCNC: 4.3 MMOL/L (ref 3.5–5.3)
PROT SERPL-MCNC: 6.8 G/DL (ref 6.4–8.2)
RBC # BLD AUTO: 4.57 MILLION/UL (ref 3.81–5.12)
SODIUM SERPL-SCNC: 137 MMOL/L (ref 136–145)
TRIGL SERPL-MCNC: 36 MG/DL
TSH SERPL DL<=0.05 MIU/L-ACNC: 1.43 UIU/ML (ref 0.45–4.5)
WBC # BLD AUTO: 5.31 THOUSAND/UL (ref 4.31–10.16)

## 2022-06-21 PROCEDURE — 85025 COMPLETE CBC W/AUTO DIFF WBC: CPT

## 2022-06-21 PROCEDURE — 86304 IMMUNOASSAY TUMOR CA 125: CPT

## 2022-06-21 PROCEDURE — 84443 ASSAY THYROID STIM HORMONE: CPT

## 2022-06-21 PROCEDURE — 83036 HEMOGLOBIN GLYCOSYLATED A1C: CPT

## 2022-06-21 PROCEDURE — 80053 COMPREHEN METABOLIC PANEL: CPT

## 2022-06-21 PROCEDURE — 36415 COLL VENOUS BLD VENIPUNCTURE: CPT

## 2022-06-21 PROCEDURE — 80061 LIPID PANEL: CPT

## 2022-07-19 ENCOUNTER — OFFICE VISIT (OUTPATIENT)
Dept: OBGYN CLINIC | Facility: CLINIC | Age: 32
End: 2022-07-19
Payer: COMMERCIAL

## 2022-07-19 ENCOUNTER — ULTRASOUND (OUTPATIENT)
Dept: OBGYN CLINIC | Facility: CLINIC | Age: 32
End: 2022-07-19
Payer: COMMERCIAL

## 2022-07-19 VITALS
BODY MASS INDEX: 36.02 KG/M2 | HEIGHT: 69 IN | DIASTOLIC BLOOD PRESSURE: 84 MMHG | SYSTOLIC BLOOD PRESSURE: 122 MMHG | WEIGHT: 243.2 LBS

## 2022-07-19 DIAGNOSIS — E66.9 OBESITY (BMI 30-39.9): ICD-10-CM

## 2022-07-19 DIAGNOSIS — N83.201 OVARIAN CYST, RIGHT: Primary | ICD-10-CM

## 2022-07-19 DIAGNOSIS — N83.201 CYST OF RIGHT OVARY: Primary | ICD-10-CM

## 2022-07-19 DIAGNOSIS — Z87.42 HISTORY OF PCOS: ICD-10-CM

## 2022-07-19 DIAGNOSIS — F41.9 ANXIETY AND DEPRESSION: ICD-10-CM

## 2022-07-19 DIAGNOSIS — F32.A ANXIETY AND DEPRESSION: ICD-10-CM

## 2022-07-19 PROCEDURE — 99213 OFFICE O/P EST LOW 20 MIN: CPT | Performed by: OBSTETRICS & GYNECOLOGY

## 2022-07-19 PROCEDURE — 76830 TRANSVAGINAL US NON-OB: CPT | Performed by: OBSTETRICS & GYNECOLOGY

## 2022-07-19 NOTE — PROGRESS NOTES
AMB US Pelvic Non OB    Date/Time: 7/19/2022 8:27 AM  Performed by: Kassandra Paz  Authorized by: Anita Cruz DO   Universal Protocol:  Patient identity confirmed: verbally with patient      Procedure details:     Indications: ovarian cysts      Technique:  Transvaginal US, Non-OB    Position: lithotomy exam    Uterine findings:     Length (cm): 8 2    Height (cm):  4 35    Width (cm):  5 11    Endometrial stripe: identified      Endometrium thickness (mm):  7 6  Left ovary findings:     Left ovary:  Visualized    Length (cm): 3 22    Height (cm): 2 71    Width (cm): 3 14  Right ovary findings:     Right ovary:  Visualized    Length (cm): 6 5    Height (cm): 2 8    Width (cm): 4 2  Other findings:     Free pelvic fluid: identified    Post-Procedure Details:     Impression:  Retroverted uterus is inhomogeneous throughout without fibroids  The right ovary demonstrates a 5 5cm cyst with a septation (previously 5 8cm and simple)  The left ovary demonstrates a 2 6cm simple cyst  There is a small amount of free fluid present in the cul de sac  Tolerance: Tolerated well, no immediate complications    Complications: no complications    Additional Procedure Comments:      Holaira F8 E8C-RS transvaginal transducer Serial # [id number] was used to perform the examination today and subsequently followed with high level disinfection utilizing [employer] EPR procedure  Ultrasound performed at:     [address] [address]  Phone:  [phone]  Fax:  [phone]

## 2022-07-19 NOTE — PROGRESS NOTES
Assessment/Plan:    Diagnoses and all orders for this visit:    Cyst of right ovary  -     US pelvis complete non OB; Future  -     norethindrone (AYGESTIN) 5 mg tablet; Take 1 tablet (5 mg total) by mouth daily    History of PCOS  -     US pelvis complete non OB; Future  -     norethindrone (AYGESTIN) 5 mg tablet; Take 1 tablet (5 mg total) by mouth daily    Obesity (BMI 30-39  9)    Anxiety and depression        Subjective: here for follow-up and pelvic ultrasound results     Patient ID: Sonam Asher is a 34 y o  female  HPI    62-year-old female, nulliparous here for follow-up pelvic ultrasound today with recent history of irregular menstrual bleeding  Pelvic ultrasound today reveals uterus of normal size 8 x 4 x 5 cm endometrial thickness is normal at 7 6 mm  Uterus is retroverted heterogenous changes throughout without fibroids  The right ovary demonstrates a 5 5 cm cyst with a septation previously 5 8 and was simple  The left ovary now also demonstrates a 2 6 simple ovarian cyst   There was a small amount of free fluid present within the cul-de-sac  I discussed reviewed these findings with the patient she is in she not having any pain symptoms  Does have history of dysmenorrhea occasionally pain with intercourse otherwise asymptomatic  Recommend we change her OCPs to norethindrone 5 mg 1 daily  Will repeat ultrasound the next 2-1/2 months  She is instructed to call if she notices any sudden change in her symptoms increasing pain or dyspareunia  Today is 1st day of her menstrual cycle she will start norethindrone  Within the next 2 days  All questions answered follow-up after her next ultrasound p r n       Review of Systems    Unchanged is previous visit    Objective: no acute distress  /84   Ht 5' 9" (1 753 m)   Wt 110 kg (243 lb 3 2 oz)   LMP 07/18/2022 (Exact Date)   BMI 35 91 kg/m²      Physical Exam  Vitals and nursing note reviewed     Constitutional: Appearance: Normal appearance  She is obese  HENT:      Head: Normocephalic  Eyes:      Pupils: Pupils are equal, round, and reactive to light  Pulmonary:      Effort: Pulmonary effort is normal    Genitourinary:     Comments:  Pelvic exam deferred  Musculoskeletal:         General: Normal range of motion  Neurological:      Mental Status: She is alert and oriented to person, place, and time  Psychiatric:         Mood and Affect: Mood normal          Behavior: Behavior normal          Thought Content: Thought content normal          Judgment: Judgment normal           Please note    In addition to the time spent discussing the findings and results of today's visit and exam, I spent approximately 20 minutes of face-to-face time with the patient, greater than 50% of which was spent in counseling and coordination of care for this patient

## 2022-07-29 ENCOUNTER — RA CDI HCC (OUTPATIENT)
Dept: OTHER | Facility: HOSPITAL | Age: 32
End: 2022-07-29

## 2022-07-29 NOTE — PROGRESS NOTES
[facility]  coding opportunities       Chart reviewed, no opportunity found: CHART REVIEWED, NO OPPORTUNITY FOUND        Patients Insurance        Commercial Insurance: [address] [place] [address]

## 2022-08-03 ENCOUNTER — OFFICE VISIT (OUTPATIENT)
Dept: INTERNAL MEDICINE CLINIC | Facility: CLINIC | Age: 32
End: 2022-08-03
Payer: COMMERCIAL

## 2022-08-03 VITALS
TEMPERATURE: 98.7 F | HEART RATE: 68 BPM | OXYGEN SATURATION: 98 % | WEIGHT: 243 LBS | HEIGHT: 68 IN | SYSTOLIC BLOOD PRESSURE: 120 MMHG | DIASTOLIC BLOOD PRESSURE: 94 MMHG | BODY MASS INDEX: 36.83 KG/M2

## 2022-08-03 DIAGNOSIS — I10 BENIGN ESSENTIAL HTN: Primary | ICD-10-CM

## 2022-08-03 PROCEDURE — 99213 OFFICE O/P EST LOW 20 MIN: CPT | Performed by: INTERNAL MEDICINE

## 2022-08-03 PROCEDURE — 3725F SCREEN DEPRESSION PERFORMED: CPT | Performed by: INTERNAL MEDICINE

## 2022-08-03 NOTE — PROGRESS NOTES
Assessment/Plan:    Recommend close follow up and aggressive management of HTN and HLD   Patient does not want to take meds at this time   She was advised close follow up  I stressed the importance of exercise and weight loss along with counseling    Benign essential HTN  She has a history of hypertension and hypercholesterolemia which was being treated without medications and with lifestyle modification  Unfortunately due to her change in activity she is here for blood work review which does show an elevation in her cholesterol and elevated blood pressure  I do agree with her that she should try to treat it without having to resort to medications  At this time she is unable to do the aggressive physical activities that she normally likes to do  It for now she has been okayed by me to do aggressive walking, uphill walking to were more calories without putting too much stress on her ovarian cysts  Swimming would also be a good activity however has been recommended to check with her gynecologist if that would be in keeping with what he thinks is appropriate  We will recheck her blood work in a few months to assure that her readings have better and also follow-up visit to see that her blood pressure is back in the normal range  In the meanwhile she has been advised to keep an eye on her symptoms if for she were to feel more fatigued than usual developed headaches or worsening shortness of breath  She has also been advised to monitor blood pressure on an outpatient basis whenever possible  PCOS (polycystic ovarian syndrome)  Presently on hormonal Rx for ovarian cysts    Recommend regular follow up due to high risk secondary to the comorbidities                Problem List Items Addressed This Visit        Cardiovascular and Mediastinum    Benign essential HTN - Primary     She has a history of hypertension and hypercholesterolemia which was being treated without medications and with lifestyle modification  Unfortunately due to her change in activity she is here for blood work review which does show an elevation in her cholesterol and elevated blood pressure  I do agree with her that she should try to treat it without having to resort to medications  At this time she is unable to do the aggressive physical activities that she normally likes to do  It for now she has been okayed by me to do aggressive walking, uphill walking to were more calories without putting too much stress on her ovarian cysts  Swimming would also be a good activity however has been recommended to check with her gynecologist if that would be in keeping with what he thinks is appropriate  We will recheck her blood work in a few months to assure that her readings have better and also follow-up visit to see that her blood pressure is back in the normal range  In the meanwhile she has been advised to keep an eye on her symptoms if for she were to feel more fatigued than usual developed headaches or worsening shortness of breath  She has also been advised to monitor blood pressure on an outpatient basis whenever possible  Subjective:   Chief Complaint   Patient presents with    Follow-up     Review lab work done 6/21 also would like to review medications   Health Screening     Depression screen      Patient ID: Eliza Lewis is a 34 y o  female  HPI     Patient is here today for follow-up visit  She has a history of polycystic ovarian disease and was just seen by her gynecologist were she has a  right-sided cyst which showing septation  Given the size of this cyst was recommended that she does not do any aggressive cardiovascular or any home other physical activity that would possibly res the abdominal pressure and cause the cyst to either rupture or torsion  Patient was previously doing well with her lifestyle, diet and exercise regimen        Of note also she has been placed on birth control for the last 2 weeks and has noticed a big change in her mental health  She is easily tearful and cries easily  The following portions of the patient's history were reviewed and updated as appropriate: past family history, past medical history, past social history, past surgical history and problem list     Review of Systems   Constitutional: Positive for activity change and unexpected weight change  Psychiatric/Behavioral: Positive for dysphoric mood  Tearful  All other systems reviewed and are negative  Past Medical History:   Diagnosis Date    Arthritis     Atypical squamous cell changes of undetermined significance (ASCUS) on cervical cytology with negative high risk human papilloma virus (HPV) test result     High cholesterol     Ovarian cyst 06/30/2022    bilateral    PCOS (polycystic ovarian syndrome)          Current Outpatient Medications:     aluminum chloride (DRYSOL) 20 % external solution, Use daily as needed  , Disp: 35 mL, Rfl: 2    buPROPion (Wellbutrin SR) 150 mg 12 hr tablet, Take 1 tablet (150 mg total) by mouth 2 (two) times a day, Disp: 180 tablet, Rfl: 0    calcium carbonate (TUMS) 500 mg chewable tablet, Chew 2 tablets daily  , Disp: , Rfl:     famotidine (PEPCID) 40 MG tablet, Take 0 5 tablets (20 mg total) by mouth 2 (two) times a day, Disp: 90 tablet, Rfl: 1    metFORMIN (GLUCOPHAGE-XR) 500 mg 24 hr tablet, Take 500 mg by mouth 3 (three) times a day , Disp: , Rfl:     norethindrone (AYGESTIN) 5 mg tablet, Take 1 tablet (5 mg total) by mouth daily, Disp: 30 tablet, Rfl: 6    clotrimazole-betamethasone (LOTRISONE) 1-0 05 % cream, Apply topically 2 (two) times a day (Patient not taking: No sig reported), Disp: 30 g, Rfl: 0    Allergies   Allergen Reactions    Bee Venom Swelling     Large local reaction    Nicotine Other (See Comments)     Pt had severe "night terrors" when using nicotine patch    Other      Nicotene patches  Fleas    Clarithromycin Rash    Erigeron Annuus Rash     Fleas   large hives       Social History   Past Surgical History:   Procedure Laterality Date    WISDOM TOOTH EXTRACTION  2007     Family History   Problem Relation Age of Onset    Hypertension Mother     Other Mother         vaginal tumors    Heart disease Mother     Cancer Mother     Skin cancer Father     Heart Valve Disease Father         leaky valve    Diabetes Maternal Grandmother     Kidney failure Maternal Grandmother     Ovarian cancer Paternal Grandmother     Lung cancer Paternal Grandfather     Other Paternal Aunt         Ovarian polys       Objective:  /94 (BP Location: Left arm, Patient Position: Sitting, Cuff Size: Large)   Pulse 68   Temp 98 7 °F (37 1 °C) (Temporal)   Ht 5' 8 23" (1 733 m)   Wt 110 kg (243 lb)   LMP 07/18/2022 (Exact Date)   SpO2 98%   BMI 36 70 kg/m²     Recent Results (from the past 1344 hour(s))   CBC and differential    Collection Time: 06/21/22 12:04 PM   Result Value Ref Range    WBC 5 31 4 31 - 10 16 Thousand/uL    RBC 4 57 3 81 - 5 12 Million/uL    Hemoglobin 14 4 11 5 - 15 4 g/dL    Hematocrit 42 6 34 8 - 46 1 %    MCV 93 82 - 98 fL    MCH 31 5 26 8 - 34 3 pg    MCHC 33 8 31 4 - 37 4 g/dL    RDW 12 4 11 6 - 15 1 %    MPV 11 3 8 9 - 12 7 fL    Platelets 909 429 - 295 Thousands/uL    nRBC 0 /100 WBCs    Neutrophils Relative 62 43 - 75 %    Immat GRANS % 0 0 - 2 %    Lymphocytes Relative 27 14 - 44 %    Monocytes Relative 8 4 - 12 %    Eosinophils Relative 2 0 - 6 %    Basophils Relative 1 0 - 1 %    Neutrophils Absolute 3 30 1 85 - 7 62 Thousands/µL    Immature Grans Absolute 0 02 0 00 - 0 20 Thousand/uL    Lymphocytes Absolute 1 43 0 60 - 4 47 Thousands/µL    Monocytes Absolute 0 42 0 17 - 1 22 Thousand/µL    Eosinophils Absolute 0 10 0 00 - 0 61 Thousand/µL    Basophils Absolute 0 04 0 00 - 0 10 Thousands/µL   Comprehensive metabolic panel    Collection Time: 06/21/22 12:04 PM   Result Value Ref Range    Sodium 137 136 - 145 mmol/L    Potassium 4 3 3 5 - 5 3 mmol/L    Chloride 105 100 - 108 mmol/L    CO2 25 21 - 32 mmol/L    ANION GAP 7 4 - 13 mmol/L    BUN 9 5 - 25 mg/dL    Creatinine 0 66 0 60 - 1 30 mg/dL    Glucose, Fasting 97 65 - 99 mg/dL    Calcium 7 7 (L) 8 3 - 10 1 mg/dL    AST 15 5 - 45 U/L    ALT 37 12 - 78 U/L    Alkaline Phosphatase 37 (L) 46 - 116 U/L    Total Protein 6 8 6 4 - 8 2 g/dL    Albumin 3 7 3 5 - 5 0 g/dL    Total Bilirubin 0 47 0 20 - 1 00 mg/dL    eGFR 116 ml/min/1 73sq m   Hemoglobin A1C    Collection Time: 06/21/22 12:04 PM   Result Value Ref Range    Hemoglobin A1C 5 4 Normal 3 8-5 6%; PreDiabetic 5 7-6 4%; Diabetic >=6 5%; Glycemic control for adults with diabetes <7 0% %     mg/dl   Lipid Panel with Direct LDL reflex    Collection Time: 06/21/22 12:04 PM   Result Value Ref Range    Cholesterol 225 (H) See Comment mg/dL    Triglycerides 36 See Comment mg/dL    HDL, Direct 70 >=50 mg/dL    LDL Calculated 148 (H) 0 - 100 mg/dL   TSH, 3rd generation with Free T4 reflex    Collection Time: 06/21/22 12:04 PM   Result Value Ref Range    TSH 3RD GENERATON 1 432 0 450 - 4 500 uIU/mL       Collection Time: 06/21/22 12:04 PM   Result Value Ref Range     6 1 0 0 - 30 0 U/mL            Physical Exam      Gen: NAD, emotional, tearful  Heent: atraumatic, normocephalic  Mouth: is moist  Neck: is supple, no JVD, no carotid bruits     Heart: is regular Normal s1, s2,  Lungs: are clear to auscultation  Abd: soft, non tender, NABS  Ext: no edema, distal pulses normal  Skin: no rashes, ulcers  Mood: normal affect  Neuro: AAOx3

## 2022-08-07 PROBLEM — I10 BENIGN ESSENTIAL HTN: Status: ACTIVE | Noted: 2022-08-07

## 2022-08-08 NOTE — ASSESSMENT & PLAN NOTE
Presently on hormonal Rx for ovarian cysts    Recommend regular follow up due to high risk secondary to the comorbidities

## 2022-08-08 NOTE — ASSESSMENT & PLAN NOTE
She has a history of hypertension and hypercholesterolemia which was being treated without medications and with lifestyle modification  Unfortunately due to her change in activity she is here for blood work review which does show an elevation in her cholesterol and elevated blood pressure  I do agree with her that she should try to treat it without having to resort to medications  At this time she is unable to do the aggressive physical activities that she normally likes to do  It for now she has been okayed by me to do aggressive walking, uphill walking to were more calories without putting too much stress on her ovarian cysts  Swimming would also be a good activity however has been recommended to check with her gynecologist if that would be in keeping with what he thinks is appropriate  We will recheck her blood work in a few months to assure that her readings have better and also follow-up visit to see that her blood pressure is back in the normal range  In the meanwhile she has been advised to keep an eye on her symptoms if for she were to feel more fatigued than usual developed headaches or worsening shortness of breath  She has also been advised to monitor blood pressure on an outpatient basis whenever possible

## 2022-08-25 ENCOUNTER — PATIENT MESSAGE (OUTPATIENT)
Dept: INTERNAL MEDICINE CLINIC | Facility: CLINIC | Age: 32
End: 2022-08-25

## 2022-08-25 NOTE — PATIENT COMMUNICATION
Yes, if you look in "mychart" it shows that you are due for the booster    I will forward this message to the staff so they can help you with scheduling it

## 2022-08-25 NOTE — TELEPHONE ENCOUNTER
Lang Garrett 8/25/2022 3:06 PM EDT      ----- Message -----  From: Ashley Dawn  Sent: 8/25/2022 1:59 PM EDT  To: [facility]  Subject: Booster     Good Afternoon,    I believe I am due for a j and j booster shot but I am not sure    will [facility] notify me?     Thanks,    Corry

## 2022-09-16 ENCOUNTER — OFFICE VISIT (OUTPATIENT)
Dept: BARIATRICS | Facility: CLINIC | Age: 32
End: 2022-09-16
Payer: COMMERCIAL

## 2022-09-16 VITALS
BODY MASS INDEX: 36.74 KG/M2 | HEART RATE: 69 BPM | HEIGHT: 68 IN | DIASTOLIC BLOOD PRESSURE: 86 MMHG | WEIGHT: 242.4 LBS | OXYGEN SATURATION: 98 % | SYSTOLIC BLOOD PRESSURE: 121 MMHG | RESPIRATION RATE: 16 BRPM

## 2022-09-16 DIAGNOSIS — K21.9 GERD (GASTROESOPHAGEAL REFLUX DISEASE): ICD-10-CM

## 2022-09-16 DIAGNOSIS — E66.9 OBESITY, CLASS II, BMI 35-39.9: Primary | ICD-10-CM

## 2022-09-16 DIAGNOSIS — Z87.42 HISTORY OF PCOS: ICD-10-CM

## 2022-09-16 DIAGNOSIS — F06.4 ANXIETY DISORDER DUE TO KNOWN PHYSIOLOGICAL CONDITION: ICD-10-CM

## 2022-09-16 PROCEDURE — 99214 OFFICE O/P EST MOD 30 MIN: CPT | Performed by: NURSE PRACTITIONER

## 2022-09-16 RX ORDER — BUPROPION HYDROCHLORIDE 150 MG/1
150 TABLET, EXTENDED RELEASE ORAL 2 TIMES DAILY
Qty: 180 TABLET | Refills: 0 | Status: SHIPPED | OUTPATIENT
Start: 2022-09-16

## 2022-09-16 NOTE — ASSESSMENT & PLAN NOTE
- Taking pepcid  May improve with weight loss and lifestyle modification  Continue management with prescribing provider  Has been having more difficulty with GERD  She was encouraged to follow-up with her primary care provider

## 2022-09-16 NOTE — ASSESSMENT & PLAN NOTE
- Patient is pursuing Conservative Program  - Initial weight loss goal of 5-10% weight loss for improved health  - Already on metformin through endocrinology for PCOS  - Continue Wellbutrin  mg twice a day  Tolerating well  - Since her OCP was changed (due to ovarian cysts), she is struggling with increased hunger and cravings  Discussed adding Naltrexone to Wellbutrin to mimic Contrave, but she would like to hold off to see if there is any change in hunger with more time on the current OCP  Also, she may need surgery for the cysts  - Dietician visits discussed and she would like to go ahead with that  Xiao Sandro discussed, but does not want an injectable  - Labs reviewed: TSH, lipid panel, A1C and CMP 6/21/2022  Chol and LDL elevated, which will likely improve with weight loss and lifestyle modification, otherwise labs within acceptable range  Initial: 245 8 lbs  Current: 242 4 lbs   Change: -3 4  Goal: under 200 lbs    Goals:   Continue food logging, weighing and measuring food  Keep up the great work with water intake  Keep up the great work with walking  Increase exercise as tolerated  Nutrition recommendations per dietician     Continue welbutrin 150 SR BID

## 2022-09-16 NOTE — PROGRESS NOTES
Assessment/Plan:     Obesity, Class II, BMI 35-39 9  - Patient is pursuing Conservative Program  - Initial weight loss goal of 5-10% weight loss for improved health  - Already on metformin through endocrinology for PCOS  - Continue Wellbutrin  mg twice a day  Tolerating well  - Since her OCP was changed (due to ovarian cysts), she is struggling with increased hunger and cravings  Discussed adding Naltrexone to Wellbutrin to mimic Contrave, but she would like to hold off to see if there is any change in hunger with more time on the current OCP  Also, she may need surgery for the cysts  - Dietician visits discussed and she would like to go ahead with that  Sue Micheldorian discussed, but does not want an injectable  - Labs reviewed: TSH, lipid panel, A1C and CMP 6/21/2022  Chol and LDL elevated, which will likely improve with weight loss and lifestyle modification, otherwise labs within acceptable range  Initial: 245 8 lbs  Current: 242 4 lbs   Change: -3 4  Goal: under 200 lbs    Goals:   Continue food logging, weighing and measuring food  Keep up the great work with water intake  Keep up the great work with walking  Increase exercise as tolerated  Nutrition recommendations per dietician  Continue welbutrin 150 SR BID      History of PCOS  - Taking OCP and metformin 1500mg daily  Continue management with prescribing provider  GERD (gastroesophageal reflux disease)  - Taking pepcid  May improve with weight loss and lifestyle modification  Continue management with prescribing provider  Has been having more difficulty with GERD  She was encouraged to follow-up with her primary care provider  Unknown Jackie was seen today for follow-up  Diagnoses and all orders for this visit:    Obesity, Class II, BMI 35-39 9  -     buPROPion (Wellbutrin SR) 150 mg 12 hr tablet;  Take 1 tablet (150 mg total) by mouth 2 (two) times a day    Anxiety disorder due to known physiological condition  - buPROPion (Wellbutrin SR) 150 mg 12 hr tablet; Take 1 tablet (150 mg total) by mouth 2 (two) times a day    History of PCOS    GERD (gastroesophageal reflux disease)          Follow up in approximately beginning of December 2022 with Non-Surgical Physician/Advanced Practitioner  Subjective:   Chief Complaint   Patient presents with    Follow-up     MWM 3 mth fu        Patient ID: Manohar Shelley  is a 34 y o  female with excess weight/obesity here to pursue weight management  Patient is pursuing Conservative Program    Most recent notes and records were reviewed  HPI    Wt Readings from Last 10 Encounters:   09/16/22 110 kg (242 lb 6 4 oz)   08/03/22 110 kg (243 lb)   07/19/22 110 kg (243 lb 3 2 oz)   06/20/22 110 kg (242 lb 6 4 oz)   06/17/22 111 kg (243 lb 11 2 oz)   06/08/22 110 kg (243 lb 9 6 oz)   05/19/22 108 kg (238 lb 6 4 oz)   05/04/22 107 kg (235 lb 9 6 oz)   04/15/22 106 kg (232 lb 14 4 oz)   02/02/22 104 kg (230 lb)        Has been food logging  Getting 7657-5240 calories  Struggling with maintaining a lower calorie count due to hunger  Craving sweet foods  Her OCP was recently changed secondary to ovarian cysts  Since the change, appetite has been increased, particularly in the evening  Denies binging  Had to change her exercise routine from HIIT workouts to walking due to the cysts  Wellbutrin  mg twice a day  No negative side effects  Feels that it has been good for her mentally  Feels that is was helping appetite, but not as helpful currently  Taking Metformin through endocrinology 500 mg 3 times a day  No negative side effects   Not helping as much with appetite       B- 250 thai breakfast sandwiches with egg   S- 200 calorie cheddar popcorn   L- leftovers portioned  S- none  D- protein and veggies and potato or 100 calorie snacks  S- nuts and pretzels and baked chips      Hydration- 80 oz water, 2 cups of coffee 1 cup decaf - typically black sometimes 2% milk  Alcohol- 2 drinks per week  Exercise- walking 4 days per week 3 miles      Colonoscopy: N/A  Mammogram: N/A      The following portions of the patient's history were reviewed and updated as appropriate: allergies, current medications, past family history, past medical history, past social history, past surgical history, and problem list     Family History   Problem Relation Age of Onset    Hypertension Mother     Other Mother         vaginal tumors    Heart disease Mother     Cancer Mother     Skin cancer Father     Heart Valve Disease Father         leaky valve    Diabetes Maternal Grandmother     Kidney failure Maternal Grandmother     Ovarian cancer Paternal Grandmother     Lung cancer Paternal Grandfather     Other Paternal Aunt         Ovarian polys        Review of Systems   HENT: Positive for sore throat (improving - getting over a cold)  Respiratory: Positive for cough (mild cough improving)  Negative for shortness of breath  Cardiovascular: Negative for chest pain and palpitations  Gastrointestinal: Negative for abdominal pain, constipation, diarrhea, nausea and vomiting         + GERD on medication   Skin: Negative for rash  Psychiatric/Behavioral: Negative for suicidal ideas (or HI)  Denies depression and anxiety       Objective:  /86 (BP Location: Right arm, Patient Position: Sitting, Cuff Size: Standard)   Pulse 69   Resp 16   Ht 5' 8 23" (1 733 m)   Wt 110 kg (242 lb 6 4 oz)   SpO2 98%   BMI 36 61 kg/m²     Physical Exam  Vitals and nursing note reviewed  Constitutional   General appearance: Abnormal   well developed and obese  Eyes No conjunctival injection  Ears, Nose, Mouth, and Throat Oral mucosa moist    Pulmonary   Respiratory effort: No increased work of breathing or signs of respiratory distress  Cardiovascular     Examination of extremities for edema and/or varicosities: Normal   no edema  Abdomen   Abdomen: Abnormal   The abdomen was obese  Musculoskeletal   Normal range of motion  Neurological   Gait and station: Normal     Psychiatric   Orientation to person, place and time: Normal     Affect: appropriate

## 2022-10-03 ENCOUNTER — ULTRASOUND (OUTPATIENT)
Dept: GYNECOLOGY | Facility: CLINIC | Age: 32
End: 2022-10-03
Payer: COMMERCIAL

## 2022-10-03 ENCOUNTER — OFFICE VISIT (OUTPATIENT)
Dept: GYNECOLOGY | Facility: CLINIC | Age: 32
End: 2022-10-03
Payer: COMMERCIAL

## 2022-10-03 VITALS — WEIGHT: 248 LBS | BODY MASS INDEX: 37.45 KG/M2 | SYSTOLIC BLOOD PRESSURE: 138 MMHG | DIASTOLIC BLOOD PRESSURE: 84 MMHG

## 2022-10-03 DIAGNOSIS — Z87.42 HISTORY OF PCOS: ICD-10-CM

## 2022-10-03 DIAGNOSIS — N83.202 LEFT OVARIAN CYST: Primary | ICD-10-CM

## 2022-10-03 DIAGNOSIS — N83.202 BILATERAL OVARIAN CYSTS: Primary | ICD-10-CM

## 2022-10-03 DIAGNOSIS — N83.201 CYST OF RIGHT OVARY: ICD-10-CM

## 2022-10-03 DIAGNOSIS — N83.201 BILATERAL OVARIAN CYSTS: Primary | ICD-10-CM

## 2022-10-03 DIAGNOSIS — E66.9 OBESITY, CLASS II, BMI 35-39.9: ICD-10-CM

## 2022-10-03 PROCEDURE — 99213 OFFICE O/P EST LOW 20 MIN: CPT | Performed by: OBSTETRICS & GYNECOLOGY

## 2022-10-03 PROCEDURE — 76830 TRANSVAGINAL US NON-OB: CPT | Performed by: OBSTETRICS & GYNECOLOGY

## 2022-10-03 NOTE — PROGRESS NOTES
AMB US Pelvic Non OB    Date/Time: 10/3/2022 1:06 PM  Performed by: Irene Knowles  Authorized by: Carin Jordan DO   Universal Protocol:  Patient identity confirmed: verbally with patient      Procedure details:     Technique:  Transvaginal US, Non-OB    Position: lithotomy exam    Uterine findings:     Length (cm): 7 81    Height (cm):  4 09    Width (cm):  5 33    Endometrial stripe: identified      Endometrium thickness (mm):  8 4  Left ovary findings:     Left ovary:  Visualized    Length (cm): 4 27    Height (cm): 2 69    Width (cm): 2 61  Right ovary findings:     Right ovary:  Visualized    Length (cm): 3 5    Height (cm): 2 02    Width (cm): 2 13  Other findings:     Free pelvic fluid: not identified      Free peritoneal fluid: not identified    Post-Procedure Details:     Impression:  Retroverted uterus and right ovary appear within normal limits  The left ovary demonstrates a complex cyst, 2 1cm, most likely hemorrhagic  No free fluid  Tolerance: Tolerated well, no immediate complications    Complications: no complications    Additional Procedure Comments:      PlayWith F8 E8C-RS transvaginal transducer Serial # [id number] was used to perform the examination today and subsequently followed with high level disinfection utilizing Trophon EPR procedure  Ultrasound performed at:     [id number]  [address] [facility]  Phone:  [phone]  Fax:  [phone]

## 2022-10-03 NOTE — PROGRESS NOTES
Assessment/Plan:    Diagnoses and all orders for this visit:    Left ovarian cyst  -     US pelvis complete non OB; Future    Cyst of right ovary  -     norethindrone (AYGESTIN) 5 mg tablet; Take 1 tablet (5 mg total) by mouth daily    History of PCOS  -     norethindrone (AYGESTIN) 5 mg tablet; Take 1 tablet (5 mg total) by mouth daily    Obesity, Class II, BMI 35-39 9        Subjective: here for follow-up pelvic ultrasound     Patient ID: Krista Bowman is a 34 y o  female  HPI     69-year-old female, nulliparous here for follow-up and repeat pelvic ultrasound  Patient was having history of PCOS with occasional irregular bleeding and pelvic pain symptoms  She was initially started on birth control pill we switched this to norethindrone 5 mg 1 daily in July  At that time she had a normal size uterus she had a right ovarian cyst measuring about 5 5 cm previously 5 8 cm again simple cyst   There was a small left ovarian cyst also described as simple at that time  She is here today for follow-up ultrasound and further management as indicated  Pelvic ultrasound today reveals normal size uterus measuring 7 8 x 4 09 x 5 33 cm, endometrial thickness measured at 8 4 mm  Previously identified right ovarian cyst is no longer present  Left ovary now demonstrates a small complex cyst measuring 2 1 cm likely hemorrhagic  She is not having any symptoms, denies any pain at rest denies pain with intercourse  She is currently on 5 mg of norethindrone daily is doing well she is not having regular cycles on this medication  She has gained approximately 5 lb since her last visit  She does have a vigorous exercise routine and tries to adhere to her diet as best she can  I did offer her to reduce the dose of norethindrone she declines this and would rather have no periods unless pain on this medication she will work on her diet and accelerate her exercise program somewhat       Recommend follow-up ultrasound in about 3 months time  She will be due for annual exam the as well  She is instructed to call if she has sudden change in her pain symptoms or unusual bleeding symptoms  All questions answered  Review of Systems    Unchanged from her previous visit    Objective: no acute distress  /84 (BP Location: Left arm, Patient Position: Sitting, Cuff Size: Large)   Wt 112 kg (248 lb)   BMI 37 45 kg/m²      Physical Exam  Vitals and nursing note reviewed  Constitutional:       Appearance: Normal appearance  She is obese  HENT:      Head: Normocephalic  Nose: Nose normal    Eyes:      Extraocular Movements: Extraocular movements intact  Pulmonary:      Effort: Pulmonary effort is normal    Genitourinary:     Comments:  Pelvic exam deferred today  Musculoskeletal:         General: Normal range of motion  Neurological:      Mental Status: She is alert and oriented to person, place, and time  Psychiatric:         Mood and Affect: Mood normal          Behavior: Behavior normal          Thought Content: Thought content normal          Judgment: Judgment normal         Please note    In addition to the time spent discussing the findings and results of today's visit and exam, I spent approximately 20 minutes of face-to-face time with the patient, greater than 50% of which was spent in counseling and coordination of care for this patient

## 2022-10-06 NOTE — PROGRESS NOTES
Weight Management Medical Nutrition Assessment  Yecenia presented for 1/3 RD meal planning session  Today's weight is 247  5#    Per dietary recall patient consumes excess calories from larger portions at meals and snacking on salty and sweet carbs between meals   Patient requested information on a low-cholesterol diet  Provided patient with written materials and she verbalized understanding- her current recall reflected high cholesterol chciies daily such as eggs, emmanuel, scrapple, red meat  Patient stated she is hungry on 1200 calories- recently switched her birth control pill and noticed increased hunger  Developed and reviewed a low calorie , low cholesterol meal plan          Patient seen by Medical Provider in past 6 months:  yes  Requested to schedule appointment with Medical Provider: No      Anthropometric Measurements  Start Weight (#):247 5#  Current Weight (#): n/a  TBW % Change from start weight:n/a  Ideal Body Weight (#):140#  Goal Weight (#):STG: under 200#      Weight Loss History  Previous weight loss attempts: Commercial Programs (Weight Watchers, Surjit Rojas, etc )  Counseling with  MD  Self Created Diets (Portion Control, Healthy Food Choices, etc )    Food and Nutrition Related History    Food Recall  Breakfast:2 eggs/ 2x  647 bread/ Fruit smoothie smoothie (fruit / greek yogurt )   Snack:skip  Lunch:100 calories snacks (4x)  Snack:salty / sweet carbs  Dinner:protein/ carb/ veggies ( larger portions)  Snack:skip      Beverages: water/ black coffee  Volume of beverage intake: 80oz     Weekends: Same  Cravings: salty / sweet carbs  Trouble area of day:between meals / portions at meals    Frequency of Eating out: biweekly  Food restrictions:none reported  Cooking: self   Food Shopping: self    Physical Activity Intake  Activity:Gym - cardio/ strength   Frequency:infrequently  Physical limitations/barriers to exercise: none    Estimated Needs  Energy  Bear Jose Daniel Energy Needs:  BMR : 1878 calories 1-2# loss weekly sedentary:  7761-2531 calories             1-2# loss weekly lightly active:1432-3516 calories   Maintenance calories for sedentary activity level: 2254 calories  Protein:77-96gm      (1 2-1 5g/kg IBW)  Fluid: 75oz     (35mL/kg IBW)    Nutrition Diagnosis  Yes; Overweight/obesity  related to Excess energy intake as evidenced by  BMI more than normative standard for age and sex (obesity-grade II 35-39  9)       Nutrition Intervention    Nutrition Prescription  Calories:1500 calories and flex to 1800 calories on cardio days  Protein:75-90gm  Fluid:75oz    Meal Plan (Mir/Pro/Carb)  Breakfast: 200-300/20/30  Snack:  Lunch: 300-400/30/30-45  Snack: 150-200/>5/20  Dinner: 300-400/30/30-45  Snack: 150-200/>5/20    Nutrition Education:    Healthy Core Manual  Calorie controlled menu  Lean protein food choices  Healthy snack options  Food journaling tips      Nutrition Counseling:  Strategies: meal planning, portion sizes, healthy snack choices, hydration, fiber intake, protein intake, exercise, food journal      Monitoring and Evaluation:  Evaluation criteria:  Energy Intake  Meet protein needs  Maintain adequate hydration  Monitor weekly weight  Meal planning/preparation  Food journal   Decreased portions at mealtimes and snacks  Physical activity     Barriers to learning:none  Readiness to change: Preparation:  (Getting ready to change)   Comprehension: good  Expected Compliance: good

## 2022-10-10 ENCOUNTER — OFFICE VISIT (OUTPATIENT)
Dept: BARIATRICS | Facility: CLINIC | Age: 32
End: 2022-10-10

## 2022-10-10 VITALS — BODY MASS INDEX: 37.5 KG/M2 | HEIGHT: 68 IN | WEIGHT: 247.4 LBS

## 2022-10-10 DIAGNOSIS — R63.5 ABNORMAL WEIGHT GAIN: ICD-10-CM

## 2022-10-10 PROCEDURE — RECHECK: Performed by: DIETITIAN, REGISTERED

## 2022-10-10 PROCEDURE — DB3PK: Performed by: DIETITIAN, REGISTERED

## 2022-10-12 ENCOUNTER — OFFICE VISIT (OUTPATIENT)
Dept: INTERNAL MEDICINE CLINIC | Facility: CLINIC | Age: 32
End: 2022-10-12
Payer: COMMERCIAL

## 2022-10-12 VITALS
HEART RATE: 83 BPM | DIASTOLIC BLOOD PRESSURE: 90 MMHG | TEMPERATURE: 98.3 F | BODY MASS INDEX: 36.46 KG/M2 | OXYGEN SATURATION: 97 % | WEIGHT: 246.2 LBS | HEIGHT: 69 IN | SYSTOLIC BLOOD PRESSURE: 118 MMHG

## 2022-10-12 DIAGNOSIS — F41.9 ANXIETY AND DEPRESSION: ICD-10-CM

## 2022-10-12 DIAGNOSIS — E66.9 OBESITY, CLASS II, BMI 35-39.9: ICD-10-CM

## 2022-10-12 DIAGNOSIS — F32.A ANXIETY AND DEPRESSION: ICD-10-CM

## 2022-10-12 DIAGNOSIS — F33.1 MODERATE EPISODE OF RECURRENT MAJOR DEPRESSIVE DISORDER (HCC): ICD-10-CM

## 2022-10-12 DIAGNOSIS — F17.200 TOBACCO USE DISORDER: ICD-10-CM

## 2022-10-12 DIAGNOSIS — I10 BENIGN ESSENTIAL HTN: ICD-10-CM

## 2022-10-12 DIAGNOSIS — E83.51 HYPOCALCEMIA: Primary | ICD-10-CM

## 2022-10-12 DIAGNOSIS — K22.4 ESOPHAGEAL SPASM: ICD-10-CM

## 2022-10-12 DIAGNOSIS — Z87.42 HISTORY OF PCOS: ICD-10-CM

## 2022-10-12 DIAGNOSIS — K21.9 GASTROESOPHAGEAL REFLUX DISEASE, UNSPECIFIED WHETHER ESOPHAGITIS PRESENT: ICD-10-CM

## 2022-10-12 PROCEDURE — 99214 OFFICE O/P EST MOD 30 MIN: CPT | Performed by: INTERNAL MEDICINE

## 2022-10-12 RX ORDER — CARVEDILOL 12.5 MG/1
12.5 TABLET ORAL 2 TIMES DAILY WITH MEALS
Qty: 60 TABLET | Refills: 5 | Status: SHIPPED | OUTPATIENT
Start: 2022-10-12

## 2022-10-12 RX ORDER — FAMOTIDINE 40 MG/1
40 TABLET, FILM COATED ORAL DAILY
Qty: 90 TABLET | Refills: 1 | Status: SHIPPED | OUTPATIENT
Start: 2022-10-12

## 2022-10-12 NOTE — PATIENT INSTRUCTIONS
Continue with present diet plan and continue to work with nutritionist   Continue metformin 500 mg 3 times a day  Start carvedilol 12 5 mg twice a day  Continue Pepcid 40 mg once a day  Continue with present diet plan at 1500 calories a day  Consideration should be given for [facility] given multiple risk factors as follows:  Hypertension, glucose intolerance, PCOS, GERD  Mild hypocalcemia:  Recommend urinary calcium for 24 hours  Further discussion with endocrinologist to see if any further testing is necessary at this time

## 2022-10-12 NOTE — PROGRESS NOTES
Name: Krista Bowman      : 1990      MRN: [id number]  Encounter Provider: Jermain Jones MD  Encounter Date: 10/12/2022   Encounter department: [address] [place] [address]     1  Hypocalcemia  -     Calcium, 24 Hour Urine (w/ Creatinine)    2  Gastroesophageal reflux disease, unspecified whether esophagitis present    3  Benign essential HTN  Assessment & Plan:    Multiple R/F including hyperglycemia, elevated BMI, hypercholesterolemia  Start small dose of Carvedilol for HTN control  Monitor BP    Orders:  -     carvedilol (COREG) 12 5 mg tablet; Take 1 tablet (12 5 mg total) by mouth 2 (two) times a day with meals    4  Moderate episode of recurrent major depressive disorder ([address] )    5  Tobacco use disorder    6  Obesity, Class II, BMI 35-39 9    7  Anxiety and depression    8  Esophageal spasm  -     famotidine (PEPCID) 40 MG tablet; Take 1 tablet (40 mg total) by mouth in the morning    9  History of PCOS           Subjective       Chief Complaint   Patient presents with   • Follow-up     2 m f/u visit, no recent labs  Hypertension  Associated symptoms include anxiety  Anxiety  Symptoms include nervous/anxious behavior  Patient has been trying very hard to lose weight  Has been on BCPs which has made her very emotional, which she was at the last visit  Gyn has been working with getting her estrogen in the birth control down at the lowest possible  Her Endocrinologist would also like her to go on [place]  so that she is able to comply on a 1200 calorie diet to get an effective weight loss  Review of Systems   Constitutional: Positive for unexpected weight change  Genitourinary: Positive for pelvic pain  Psychiatric/Behavioral: The patient is nervous/anxious  All other systems reviewed and are negative        Past Medical History:   Diagnosis Date   • Arthritis    • Atypical squamous cell changes of undetermined significance (ASCUS) on cervical cytology with negative high risk human papilloma virus (HPV) test result    • High cholesterol    • Ovarian cyst 06/30/2022    bilateral   • PCOS (polycystic ovarian syndrome)      Past Surgical History:   Procedure Laterality Date   • WISDOM TOOTH EXTRACTION  2007     Family History   Problem Relation Age of Onset   • Hypertension Mother    • Other Mother         vaginal tumors   • Heart disease Mother    • Cancer Mother    • Skin cancer Father    • Heart Valve Disease Father         leaky valve   • Diabetes Maternal Grandmother    • Kidney failure Maternal Grandmother    • Ovarian cancer Paternal Grandmother    • Lung cancer Paternal Grandfather    • Other Paternal Aunt         Ovarian polys     Social History     Socioeconomic History   • Marital status: Single     Spouse name: None   • Number of children: None   • Years of education: None   • Highest education level: None   Occupational History   • Occupation: Teacher   Tobacco Use   • Smoking status: Current Every Day Smoker     Years: 14 00     Types: Cigarettes   • Smokeless tobacco: Never Used   • Tobacco comment: 3 cigarettes daily   Vaping Use   • Vaping Use: Never used   Substance and Sexual Activity   • Alcohol use: Never   • Drug use: Never   • Sexual activity: Yes   Other Topics Concern   • None   Social History Narrative   • None     Social Determinants of Health     Financial Resource Strain: Not on file   Food Insecurity: Not on file   Transportation Needs: Not on file   Physical Activity: Not on file   Stress: Not on file   Social Connections: Not on file   Intimate Partner Violence: Not on file   Housing Stability: Not on file     Current Outpatient Medications on File Prior to Visit   Medication Sig   • aluminum chloride (DRYSOL) 20 % external solution Use daily as needed     • buPROPion (Wellbutrin SR) 150 mg 12 hr tablet Take 1 tablet (150 mg total) by mouth 2 (two) times a day   • calcium carbonate (TUMS) 500 mg chewable tablet Chew 2 tablets daily     • clotrimazole-betamethasone (LOTRISONE) 1-0 05 % cream Apply topically 2 (two) times a day   • metFORMIN (GLUCOPHAGE-XR) 500 mg 24 hr tablet Take 500 mg by mouth 3 (three) times a day    • norethindrone (AYGESTIN) 5 mg tablet Take 1 tablet (5 mg total) by mouth daily     Allergies   Allergen Reactions   • Bee Venom Swelling     Large local reaction   • Nicotine Other (See Comments)     Pt had severe "night terrors" when using nicotine patch   • Other      Nicotene patches  Fleas   • Clarithromycin Rash   • Erigeron Annuus Rash     Fleas   large hives     Immunization History   Administered Date(s) Administered   • COVID-19 J&J (eCareer) vaccine 0 5 mL 03/21/2021, 12/04/2021   • DTP 1990, 1990, 1990, 12/04/1990, 05/24/1994   • HPV Quadrivalent 10/25/2013, 11/22/2013, 04/01/2014   • Hep B, adult 01/28/1994, 05/24/1994, 06/27/1994   • Hepatitis B 01/28/1994, 05/24/1994, 06/27/1994   • HiB 08/29/1990   • INFLUENZA 10/30/1996, 12/05/1996, 10/25/2013, 11/21/2014, 10/01/2017, 10/15/2020, 11/05/2021   • Influenza Quadrivalent 3 years and older 10/30/1996, 12/05/1996   • Influenza, injectable, quadrivalent, preservative free 0 5 mL 11/05/2020   • Influenza, seasonal, injectable 10/25/2013, 11/21/2014, 12/05/2015, 10/01/2017   • MMR 08/29/1990, 10/24/1996   • Meningococcal Conjugate (MCV4O) 07/28/2008   • Meningococcal MCV4P 07/28/2008   • OPV 1989, 1989, 12/04/1990, 05/24/1994   • Pneumococcal Polysaccharide PPV23 01/22/2018   • Td (adult), adsorbed 05/09/2000   • Tdap 10/25/2013   • Tuberculin Skin Test 06/12/1990, 04/29/1993, 05/24/1994   • Tuberculin Skin Test-PPD Intradermal 06/12/1990, 04/29/1993, 05/24/1994, 02/24/1998, 04/20/2000, 07/28/2008, 02/06/2012, 03/26/2019   • Varicella 06/19/2001, 07/29/2009       Objective     /90 (BP Location: Left arm, Patient Position: Sitting, Cuff Size: Large)   Pulse 83   Temp 98 3 °F (36 8 °C) (Tympanic)   Ht 5' 9 17" (1 757 m)   Wt 112 kg (246 lb 3 2 oz)   SpO2 97%   BMI 36 18 kg/m²         Physical Exam     Gen: NAD   Heent: atraumatic, normocephalic  Mouth: is moist  Neck: is supple, no JVD, no carotid bruits     Heart: is regular Normal s1, s2,  Lungs: are clear to auscultation  Abd: soft, non tender, NABS  Ext: no edema, distal pulses normal  Skin: no rashes, ulcers  Mood: normal affect  Neuro: Gardenia Nagy MD

## 2022-10-13 NOTE — ASSESSMENT & PLAN NOTE
Multiple R/F including hyperglycemia, elevated BMI, hypercholesterolemia  Start small dose of Carvedilol for HTN control    Monitor BP

## 2022-12-13 ENCOUNTER — OFFICE VISIT (OUTPATIENT)
Dept: BARIATRICS | Facility: CLINIC | Age: 32
End: 2022-12-13

## 2022-12-13 VITALS
HEART RATE: 61 BPM | DIASTOLIC BLOOD PRESSURE: 80 MMHG | HEIGHT: 69 IN | SYSTOLIC BLOOD PRESSURE: 126 MMHG | OXYGEN SATURATION: 99 % | BODY MASS INDEX: 37.95 KG/M2 | RESPIRATION RATE: 14 BRPM | WEIGHT: 256.2 LBS

## 2022-12-13 DIAGNOSIS — E28.2 PCOS (POLYCYSTIC OVARIAN SYNDROME): ICD-10-CM

## 2022-12-13 DIAGNOSIS — R63.5 ABNORMAL WEIGHT GAIN: ICD-10-CM

## 2022-12-13 DIAGNOSIS — F06.4 ANXIETY DISORDER DUE TO KNOWN PHYSIOLOGICAL CONDITION: ICD-10-CM

## 2022-12-13 DIAGNOSIS — E66.9 OBESITY, CLASS II, BMI 35-39.9: Primary | ICD-10-CM

## 2022-12-13 DIAGNOSIS — I10 BENIGN ESSENTIAL HTN: ICD-10-CM

## 2022-12-13 DIAGNOSIS — Z87.42 HISTORY OF PCOS: ICD-10-CM

## 2022-12-13 DIAGNOSIS — K21.9 GASTROESOPHAGEAL REFLUX DISEASE, UNSPECIFIED WHETHER ESOPHAGITIS PRESENT: ICD-10-CM

## 2022-12-13 RX ORDER — BUPROPION HYDROCHLORIDE 150 MG/1
150 TABLET, EXTENDED RELEASE ORAL 2 TIMES DAILY
Qty: 180 TABLET | Refills: 0 | Status: SHIPPED | OUTPATIENT
Start: 2022-12-13

## 2022-12-13 NOTE — PATIENT INSTRUCTIONS
Common side effects of Saxenda may include: increased heart rate (pulse), headache, low blood sugar, GI/abdominal upset, heartburn, fatigue, and dizziness      VISIT SAXENDA  COM  INJECT SAXENDA DAILY SUBCUTANEOUSLY  -WEEK 1: 0 6mg daily  -if tolerated WEEK 2: 1 2mg daily  -if tolerated WEEK 3: 1 8mg daily  -if tolerated WEEK 4: 2 4mg daily  -if tolerated WEEK 5: 3mg daily  -IF NAUSEA/VOMITING DEVELOP STOP MEDICATION FOR A FEW DAYS AND DECREASE TO PREVIOUSLY TOLERATED DOSE  STAY HYDRATED  -IF YOU DEVELOP SEVERE ABDOMINAL PAIN WHICH MAY RADIATE TO THE BACK, SOMETIMES ASSOCIATED WITH FEVER, AND VOMITING, STOP MEDICATION AND SEEK URGENT CARE AS THIS MAY BE A SIGN OF PANCREATITIS  Please make sure that you are cleaning the area with alcohol wipes before each use, changing the needles before each use, and rotating the injection site  Please inject at a 90 degree angle  You can also have someone inject the [address] for you in the back of the upper arm  Please watch this link on how to use the pen        [email or web address]

## 2022-12-13 NOTE — PROGRESS NOTES
Assessment/Plan:     Obesity, Class II, BMI 35-39 9  - Patient is pursuing Conservative Program with bundles with the dietician   - Initial weight loss goal of 5-10% weight loss for improved health  - Already on metformin through endocrinology for PCOS  - Wellbutrin started in November 2020 at weight of 245 lbs  She was able to get down to 222 lbs, but has been regaining weight  She is struggling with appetite and cravings in the setting of need to change her OCP due to ovarian cysts  Will continue Wellbutrin  mg twice a day for now, but will try to get Saxenda approved for weight loss  - Patient denies personal history of pancreatitis  Patient also denies personal and family history of medullary thyroid cancer and multiple endocrine neoplasia type 2 (MEN 2 tumor)  - She made an informed decision to start [address]  Side effects discussed  - Since her OCP was changed (due to ovarian cysts), she is struggling with increased hunger and cravings  She continues to follow with gynecology  Initial: 245 8 lbs  Current: 256 1 lbs BMI 37 83   Change: +10 3 lbs  Goal: under 200 lbs    Goals:   Continue food logging, weighing and measuring food  Keep up the great work with water intake  Keep up the great work with exercise  Nutrition recommendations per dietician  Continue welbutrin 150 SR BID  Start Saxenda  History of PCOS  - Taking OCP and metformin 1500mg daily  Continue management with prescribing provider  Benign essential HTN  - Taking Coreg  May improve with weight loss and lifestyle modification  Continue management with prescribing provider  GERD (gastroesophageal reflux disease)  - Taking pepcid and tums  May improve with weight loss and lifestyle modification  Continue management with prescribing provider  Venus Longoria was seen today for follow-up      Diagnoses and all orders for this visit:    Obesity, Class II, BMI 35-39 9  -     buPROPion (Wellbutrin SR) 150 mg 12 hr tablet; Take 1 tablet (150 mg total) by mouth 2 (two) times a day  -     liraglutide (SAXENDA) injection; Inject 0 6 mg subcutaneously once per day for the first week  Increase in weekly intervals by 0 6 mg until a dose of 3 mg is reached  -     Insulin Pen Needle 32G X 4 MM MISC; Use in the morning    Abnormal weight gain  -     liraglutide (SAXENDA) injection; Inject 0 6 mg subcutaneously once per day for the first week  Increase in weekly intervals by 0 6 mg until a dose of 3 mg is reached  -     Insulin Pen Needle 32G X 4 MM MISC; Use in the morning    Anxiety disorder due to known physiological condition  -     buPROPion (Wellbutrin SR) 150 mg 12 hr tablet; Take 1 tablet (150 mg total) by mouth 2 (two) times a day    History of PCOS    PCOS (polycystic ovarian syndrome)  -     liraglutide (SAXENDA) injection; Inject 0 6 mg subcutaneously once per day for the first week  Increase in weekly intervals by 0 6 mg until a dose of 3 mg is reached  -     Insulin Pen Needle 32G X 4 MM MISC; Use in the morning    Benign essential HTN  -     liraglutide (SAXENDA) injection; Inject 0 6 mg subcutaneously once per day for the first week  Increase in weekly intervals by 0 6 mg until a dose of 3 mg is reached  -     Insulin Pen Needle 32G X 4 MM MISC; Use in the morning    Gastroesophageal reflux disease, unspecified whether esophagitis present            Follow up in approximately 2 months with medical weight management provider  Subjective:   Chief Complaint   Patient presents with   • Follow-up     MWM 3 mth fu, waist 41 25       Patient ID: Omar Cooper  is a 34 y o  female with excess weight/obesity here to pursue weight management  Patient is pursuing Conservative Program with bundles with the dietician  Most recent notes and records were reviewed      HPI    Wt Readings from Last 10 Encounters:   12/13/22 116 kg (256 lb 3 2 oz)   10/12/22 112 kg (246 lb 3 2 oz)   10/10/22 112 kg (247 lb 6 4 oz) 10/03/22 112 kg (248 lb)   09/16/22 110 kg (242 lb 6 4 oz)   08/03/22 110 kg (243 lb)   07/19/22 110 kg (243 lb 3 2 oz)   06/20/22 110 kg (242 lb 6 4 oz)   06/17/22 111 kg (243 lb 11 2 oz)   06/08/22 110 kg (243 lb 9 6 oz)       Having difficulty with appetite and cravings  Frustrated with weight gain  Was recently started on Coreg for hypertension  Was food logging, but stopped about 3 weeks ago  Was getting around 2000 calories per day  Taking Wellbutrin  mg twice a day  No negative side effects  Was previously helping with appetite and lost weight while on it, but not helping as much now with appetite  Taking Metformin through endocrinology 500 mg 3 times a day  No negative side effects  Not helping as much with appetite       B- 225 thai breakfast sandwiches with egg beaters   S- nuts   L- oatmeal and greek yogurt  S- nuts  D- cereal with milk  S- chips or something salty     Hydration- 80 oz water, 3 cups of coffee 1 cup decaf - typically black sometimes 2% milk  Alcohol- 2 drinks per week  Exercise- gym 5 days per week 1 5-2 hours- HIIT     Colonoscopy: N/A  Mammogram: N/A             The following portions of the patient's history were reviewed and updated as appropriate: allergies, current medications, past family history, past medical history, past social history, past surgical history, and problem list     Family History   Problem Relation Age of Onset   • Hypertension Mother    • Other Mother         vaginal tumors   • Heart disease Mother    • Cancer Mother    • Skin cancer Father    • Heart Valve Disease Father         leaky valve   • Diabetes Maternal Grandmother    • Kidney failure Maternal Grandmother    • Ovarian cancer Paternal Grandmother    • Lung cancer Paternal Grandfather    • Other Paternal Aunt         Ovarian polys        Review of Systems   HENT: Negative for sore throat  Respiratory: Negative for cough and shortness of breath      Cardiovascular: Negative for chest pain and palpitations  Gastrointestinal: Negative for abdominal pain, constipation, diarrhea, nausea and vomiting  Denies GERD   Skin: Negative for rash  Psychiatric/Behavioral: Negative for suicidal ideas (or HI)  + depression and anxiety situational        Objective:  /80   Pulse 61   Resp 14   Ht 5' 9" (1 753 m)   Wt 116 kg (256 lb 3 2 oz)   SpO2 99%   BMI 37 83 kg/m²     Physical Exam  Vitals and nursing note reviewed  Constitutional   General appearance: Abnormal   well developed and obese  Eyes No conjunctival injection  Ears, Nose, Mouth, and Throat Oral mucosa moist    Pulmonary   Respiratory effort: No increased work of breathing or signs of respiratory distress  Cardiovascular     Examination of extremities for edema and/or varicosities: Normal   no edema  Abdomen   Abdomen: Abnormal   The abdomen was obese      Musculoskeletal   Normal range of motion  Neurological   Gait and station: Normal     Psychiatric   Orientation to person, place and time: Normal     Affect: appropriate

## 2022-12-14 NOTE — ASSESSMENT & PLAN NOTE
- Taking Coreg  May improve with weight loss and lifestyle modification  Continue management with prescribing provider

## 2022-12-14 NOTE — ASSESSMENT & PLAN NOTE
- Taking pepcid and tums  May improve with weight loss and lifestyle modification  Continue management with prescribing provider

## 2022-12-14 NOTE — ASSESSMENT & PLAN NOTE
- Patient is pursuing Conservative Program with bundles with the dietician   - Initial weight loss goal of 5-10% weight loss for improved health  - Already on metformin through endocrinology for PCOS  - Wellbutrin started in November 2020 at weight of 245 lbs  She was able to get down to 222 lbs, but has been regaining weight  She is struggling with appetite and cravings in the setting of need to change her OCP due to ovarian cysts  Will continue Wellbutrin  mg twice a day for now, but will try to get Saxenda approved for weight loss  - Patient denies personal history of pancreatitis  Patient also denies personal and family history of medullary thyroid cancer and multiple endocrine neoplasia type 2 (MEN 2 tumor)  - She made an informed decision to start [address] [place] [address]  Side effects discussed  - Since her OCP was changed (due to ovarian cysts), she is struggling with increased hunger and cravings  She continues to follow with gynecology  Initial: 245 8 lbs  Current: 256 1 lbs BMI 37 83   Change: +10 3 lbs  Goal: under 200 lbs    Goals:   Continue food logging, weighing and measuring food  Keep up the great work with water intake  Keep up the great work with exercise  Nutrition recommendations per dietician  Continue welbutrin 150 SR BID  Start Saxenda

## 2023-01-11 ENCOUNTER — RA CDI HCC (OUTPATIENT)
Dept: OTHER | Facility: HOSPITAL | Age: 33
End: 2023-01-11

## 2023-01-11 NOTE — PROGRESS NOTES
Moderate episode of recurrent major depressive disorder (HCC)Noted 1/28/2004  [F33 1]      [facility]  coding opportunities          Chart Reviewed number of suggestions sent to Provider: 1     Patients Insurance        Commercial Insurance: [address] [place] [address]

## 2023-01-17 ENCOUNTER — ULTRASOUND (OUTPATIENT)
Dept: GYNECOLOGY | Facility: CLINIC | Age: 33
End: 2023-01-17

## 2023-01-17 ENCOUNTER — ANNUAL EXAM (OUTPATIENT)
Dept: GYNECOLOGY | Facility: CLINIC | Age: 33
End: 2023-01-17

## 2023-01-17 VITALS
HEIGHT: 69 IN | SYSTOLIC BLOOD PRESSURE: 132 MMHG | DIASTOLIC BLOOD PRESSURE: 96 MMHG | WEIGHT: 259 LBS | BODY MASS INDEX: 38.36 KG/M2

## 2023-01-17 DIAGNOSIS — Z87.42 HISTORY OF PCOS: ICD-10-CM

## 2023-01-17 DIAGNOSIS — E78.5 DYSLIPIDEMIA, GOAL LDL BELOW 160: ICD-10-CM

## 2023-01-17 DIAGNOSIS — B35.4 TINEA CORPORIS: ICD-10-CM

## 2023-01-17 DIAGNOSIS — Z30.41 ENCOUNTER FOR SURVEILLANCE OF CONTRACEPTIVE PILLS: ICD-10-CM

## 2023-01-17 DIAGNOSIS — I10 PRIMARY HYPERTENSION: ICD-10-CM

## 2023-01-17 DIAGNOSIS — Z01.419 WOMEN'S ANNUAL ROUTINE GYNECOLOGICAL EXAMINATION: ICD-10-CM

## 2023-01-17 DIAGNOSIS — Z87.42 HISTORY OF OVARIAN CYST: ICD-10-CM

## 2023-01-17 DIAGNOSIS — N83.202 LEFT OVARIAN CYST: Primary | ICD-10-CM

## 2023-01-17 RX ORDER — CLOTRIMAZOLE AND BETAMETHASONE DIPROPIONATE 10; .64 MG/G; MG/G
CREAM TOPICAL 2 TIMES DAILY
Qty: 30 G | Refills: 1 | Status: SHIPPED | OUTPATIENT
Start: 2023-01-17

## 2023-01-17 RX ORDER — NORETHINDRONE ACETATE AND ETHINYL ESTRADIOL AND FERROUS FUMARATE 1MG-20(24)
1 KIT ORAL DAILY
Qty: 84 TABLET | Refills: 3 | Status: SHIPPED | OUTPATIENT
Start: 2023-01-17 | End: 2023-04-11

## 2023-01-17 NOTE — PROGRESS NOTES
AMB US Pelvic Non OB    Date/Time: 1/17/2023 7:29 AM  Performed by: Les Victor  Authorized by: Kiera Park DO   Universal Protocol:  Patient identity confirmed: verbally with patient      Procedure details:     Indications: ovarian cysts      Technique:  Transvaginal US, Non-OB    Position: lithotomy exam    Uterine findings:     Length (cm): 6 97    Height (cm):  3 79    Width (cm):  5 87    Endometrial stripe: identified      Endometrium thickness (mm):  4 4  Left ovary findings:     Left ovary:  Visualized    Length (cm): 2 94    Height (cm): 1 79    Width (cm): 1 85  Right ovary findings:     Right ovary:  Visualized    Length (cm): 3 26    Height (cm): 1 98    Width (cm): 1 7  Other findings:     Free pelvic fluid: not identified      Free peritoneal fluid: not identified    Post-Procedure Details:     Impression:  Multi-positional uterus is inhomogeneous throughout and demonstrates an anterior subserosal lower uterine segment fibroid, 1 7cm, not previously seen  The bilateral ovaries appear within normal limits  No free fluid  Tolerance: Tolerated well, no immediate complications    Complications: no complications    Additional Procedure Comments:      Georama F8 E8C-RS transvaginal transducer Serial # [id number] was used to perform the examination today and subsequently followed with high level disinfection utilizing Trophon EPR procedure  Ultrasound performed at:     [id number] [address]  Phone:  [phone]  Fax:  [phone]

## 2023-01-17 NOTE — PROGRESS NOTES
Assessment     Annual well-woman exam    History of ovarian cyst    Contraceptive management     Medical management for ongoing weight Reduction     Obesity  BMI 38 2     Secondary amenorrhea        Plan    Stop Norethindrone,  New start Junel    Follow-up in 3 months p r n  All questions answered  Await pap smear results  Subjective      Bliss Jaime Patricia is a 34 y o  female who presents for annual exam  Periods are rare, lasting 1 days  Dysmenorrhea:none  Cyclic symptoms include weight gain  No intermenstrual bleeding, spotting, or discharge  Patient would like to switch back to previous birth control  States she is unable to lose any weight on the norethindrone  Also she does not like the fact that she does not have a regular menstrual cycle while on that pill  She will decrease her current dose by 1/2 through Saturday and start a new OCPs on Sunday  The patient reports that there is not domestic violence in her life  Current contraception: OCP (estrogen/progesterone)  History of abnormal Pap smear: no  Family history of uterine or ovarian cancer: no  Regular self breast exam: yes  History of abnormal mammogram: no  Family history of breast cancer: no  History of abnormal lipids: no  Menstrual History:  OB History    No obstetric history on file  Menarche age: 14  No LMP recorded  Review of Systems  Pertinent items are noted in HPI        Objective   No acute distress   /96 (BP Location: Right arm, Patient Position: Sitting, Cuff Size: Large)   Ht 5' 9" (1 753 m)   Wt 117 kg (259 lb)   BMI 38 25 kg/m²     General:   alert and oriented, in no acute distress, alert, appears stated age and cooperative   Heart: regular rate and rhythm, S1, S2 normal, no murmur, click, rub or gallop   Lungs: clear to auscultation bilaterally   Abdomen: soft, non-tender, without masses or organomegaly   Vulva: normal, Bartholin's, Urethra, Smackover's normal, female escutcheon   Vagina: normal mucosa, normal discharge, no palpable nodules   Cervix: no cervical motion tenderness, no lesions and nulliparous appearance   Uterus: normal size, mobile, non-tender, normal shape and consistency   Adnexa: normal adnexa and no mass, fullness, tenderness   Bilateral breast exam in the sitting and supine position with chaperone present, no visible or palpable breast lesions identified  No breast masses noted  No supraclavicular or axillary lymphadenopathy noted  No nipple discharge  Reviewed self-breast exam techniques     Rectal exam,  deferred

## 2023-01-18 ENCOUNTER — OFFICE VISIT (OUTPATIENT)
Dept: INTERNAL MEDICINE CLINIC | Facility: CLINIC | Age: 33
End: 2023-01-18

## 2023-01-18 VITALS
WEIGHT: 254.8 LBS | HEIGHT: 68 IN | HEART RATE: 72 BPM | DIASTOLIC BLOOD PRESSURE: 74 MMHG | BODY MASS INDEX: 38.62 KG/M2 | TEMPERATURE: 97.9 F | OXYGEN SATURATION: 98 % | SYSTOLIC BLOOD PRESSURE: 110 MMHG

## 2023-01-18 DIAGNOSIS — F41.9 ANXIETY AND DEPRESSION: ICD-10-CM

## 2023-01-18 DIAGNOSIS — E28.2 PCOS (POLYCYSTIC OVARIAN SYNDROME): ICD-10-CM

## 2023-01-18 DIAGNOSIS — I10 BENIGN ESSENTIAL HTN: ICD-10-CM

## 2023-01-18 DIAGNOSIS — K13.79 MOUTH SORES: ICD-10-CM

## 2023-01-18 DIAGNOSIS — E66.9 OBESITY, CLASS II, BMI 35-39.9: ICD-10-CM

## 2023-01-18 DIAGNOSIS — K21.9 GASTROESOPHAGEAL REFLUX DISEASE, UNSPECIFIED WHETHER ESOPHAGITIS PRESENT: ICD-10-CM

## 2023-01-18 DIAGNOSIS — F33.1 MODERATE EPISODE OF RECURRENT MAJOR DEPRESSIVE DISORDER (HCC): Primary | ICD-10-CM

## 2023-01-18 DIAGNOSIS — E78.00 HYPERCHOLESTEREMIA: ICD-10-CM

## 2023-01-18 DIAGNOSIS — Z87.42 HISTORY OF PCOS: ICD-10-CM

## 2023-01-18 DIAGNOSIS — Z23 NEED FOR INFLUENZA VACCINATION: ICD-10-CM

## 2023-01-18 DIAGNOSIS — F32.A ANXIETY AND DEPRESSION: ICD-10-CM

## 2023-01-18 DIAGNOSIS — F17.200 TOBACCO USE DISORDER: ICD-10-CM

## 2023-01-18 RX ORDER — CARVEDILOL 25 MG/1
25 TABLET ORAL 2 TIMES DAILY WITH MEALS
Qty: 180 TABLET | Refills: 1 | Status: SHIPPED | OUTPATIENT
Start: 2023-01-18

## 2023-01-18 NOTE — ASSESSMENT & PLAN NOTE
Patient is back on the previous control pill that she was on prior to being changed over at a higher dosage for history of PCOS    She believes that she may be able to achieve weight loss on this medication because she has been successful in the past

## 2023-01-18 NOTE — ASSESSMENT & PLAN NOTE
For recent ultrasound the ovaries no longer had cysts as per patient report  H  she does have a subserosal fibroid in the anterior aspect of the uterus as per report

## 2023-01-18 NOTE — PROGRESS NOTES
Name: Yan Harris      : 1990      MRN: [id number]  Encounter Provider: Lexa Huber MD  Encounter Date: 2023   Encounter department: [address] [place] [address]     1  Moderate episode of recurrent major depressive disorder [facility])  Assessment & Plan:  Feels like she is adequately controlled on the present dose of medication      2  Obesity, Class II, BMI 35-39 9  Assessment & Plan:  Patient is back on the previous control pill that she was on prior to being changed over at a higher dosage for history of PCOS  She believes that she may be able to achieve weight loss on this medication because she has been successful in the past      3  Tobacco use disorder  Assessment & Plan:  Continue to work on cessation      4  History of PCOS  -     Lipid Panel with Direct LDL reflex; Future  -     Hemoglobin A1C; Future  -     Comprehensive metabolic panel; Future    5  Anxiety and depression  Assessment & Plan:  Continue present dose of medication  Recommend counseling      6  Benign essential HTN  Assessment & Plan:  Continue present dose of carvedilol  Continue lifestyle, dietary modification    Orders:  -     carvedilol (COREG) 25 mg tablet; Take 1 tablet (25 mg total) by mouth 2 (two) times a day with meals  -     Comprehensive metabolic panel; Future    7  Gastroesophageal reflux disease, unspecified whether esophagitis present    8  PCOS (polycystic ovarian syndrome)  Assessment & Plan: For recent ultrasound the ovaries no longer had cysts as per patient report  H  she does have a subserosal fibroid in the anterior aspect of the uterus as per report    Orders:  -     Hemoglobin A1C; Future    9  Hypercholesteremia  -     Lipid Panel with Direct LDL reflex; Future  -     Lipid Panel with Direct LDL reflex; Future    10   Need for influenza vaccination  -     influenza vaccine, quadrivalent, 0 5 mL, preservative-free, for adult and pediatric patients 6 mos+ (AFLURIA, FLUARIX, FLULAVAL, FLUZONE)    11  Mouth sores  Assessment & Plan:  Mucositis  Have dental eval done if they persist             Subjective     Chief Complaint   Patient presents with   • Follow-up     3 m f/u visit, she tried to have Calcium urine test 2 times but the lab did not have the container  She c/o a sore on the roof of her mouth x 3 days  HPI     Patient frustrated with her efforts at weight loss  On positive note she will now be stopping her birth control pills as of this week and she is hopeful that once she is off the birth control she will be able to lose weight on her present regimen of regular exercise and diet  She has appealed for [employer] but so far has been rejected because of a normal hemoglobin A1c despite elevated sugar levels  She has had side effects on the carvedilol and it has improved her blood pressure readings although not overly normal as per her calibration  Her weight gain seemed to have been secondary to the birth control pill which has been discontinued and she is hopeful that she she will be able to lose weight more effectively now    Review of Systems   Allergic/Immunologic: Positive for environmental allergies  Psychiatric/Behavioral: Positive for decreased concentration, dysphoric mood and sleep disturbance  The patient is nervous/anxious          Past Medical History:   Diagnosis Date   • Arthritis    • Atypical squamous cell changes of undetermined significance (ASCUS) on cervical cytology with negative high risk human papilloma virus (HPV) test result    • High cholesterol    • Ovarian cyst 06/30/2022    bilateral   • PCOS (polycystic ovarian syndrome)      Past Surgical History:   Procedure Laterality Date   • WISDOM TOOTH EXTRACTION  2007     Family History   Problem Relation Age of Onset   • Hypertension Mother    • Other Mother         vaginal tumors   • Heart disease Mother    • Cancer Mother    • Skin cancer Father    • Heart Valve Disease Father         leaky valve   • Diabetes Maternal Grandmother    • Kidney failure Maternal Grandmother    • Ovarian cancer Paternal Grandmother    • Lung cancer Paternal Grandfather    • Other Paternal Aunt         Ovarian polys     Social History     Socioeconomic History   • Marital status: Single     Spouse name: None   • Number of children: None   • Years of education: None   • Highest education level: None   Occupational History   • Occupation: Teacher   Tobacco Use   • Smoking status: Every Day     Years: 14 00     Types: Cigarettes     Start date: 2004   • Smokeless tobacco: Never   • Tobacco comments:     3 cigarettes daily   Vaping Use   • Vaping Use: Never used   Substance and Sexual Activity   • Alcohol use: Never   • Drug use: Never   • Sexual activity: Yes   Other Topics Concern   • None   Social History Narrative   • None     Social Determinants of Health     Financial Resource Strain: Not on file   Food Insecurity: Not on file   Transportation Needs: Not on file   Physical Activity: Not on file   Stress: Not on file   Social Connections: Not on file   Intimate Partner Violence: Not on file   Housing Stability: Not on file     Current Outpatient Medications on File Prior to Visit   Medication Sig   • aluminum chloride (DRYSOL) 20 % external solution Use daily as needed  • buPROPion (Wellbutrin SR) 150 mg 12 hr tablet Take 1 tablet (150 mg total) by mouth 2 (two) times a day   • calcium carbonate (TUMS) 500 mg chewable tablet Chew 2 tablets daily     • clotrimazole-betamethasone (LOTRISONE) 1-0 05 % cream Apply topically 2 (two) times a day   • famotidine (PEPCID) 40 MG tablet Take 1 tablet (40 mg total) by mouth in the morning   • Insulin Pen Needle 32G X 4 MM MISC Use in the morning   • liraglutide (SAXENDA) injection Inject 0 6 mg subcutaneously once per day for the first week   Increase in weekly intervals by 0 6 mg until a dose of 3 mg is reached   • metFORMIN (GLUCOPHAGE-XR) 500 mg 24 hr tablet Take 500 mg by mouth 3 (three) times a day    • norethindrone-ethinyl estradiol-ferrous fumarate (Junel Fe 24) 1-20 MG-MCG(24) per tablet Take 1 tablet by mouth daily Start Sunday   • [DISCONTINUED] carvedilol (COREG) 12 5 mg tablet Take 1 tablet (12 5 mg total) by mouth 2 (two) times a day with meals   • [DISCONTINUED] clotrimazole-betamethasone (LOTRISONE) 1-0 05 % cream Apply topically 2 (two) times a day     Allergies   Allergen Reactions   • Bee Venom Swelling     Large local reaction   • Nicotine Other (See Comments)     Pt had severe "night terrors" when using nicotine patch   • Other      Nicotene patches  Fleas   • Clarithromycin Rash   • Erigeron Annuus Rash     Fleas   large hives     Immunization History   Administered Date(s) Administered   • COVID-19 J&J ([employer]) vaccine 0 5 mL 03/21/2021, 12/04/2021   • DTP 1990, 1990, 1990, 12/04/1990, 05/24/1994   • HPV Quadrivalent 10/25/2013, 11/22/2013, 04/01/2014   • Hep B, adult 01/28/1994, 05/24/1994, 06/27/1994   • Hepatitis B 01/28/1994, 05/24/1994, 06/27/1994   • HiB 08/29/1990   • INFLUENZA 10/30/1996, 12/05/1996, 10/25/2013, 11/21/2014, 10/01/2017, 10/15/2020, 11/05/2021   • Influenza Quadrivalent 3 years and older 10/30/1996, 12/05/1996   • Influenza, injectable, quadrivalent, preservative free 0 5 mL 11/05/2020, 01/18/2023   • Influenza, seasonal, injectable 10/25/2013, 11/21/2014, 12/05/2015, 10/01/2017   • MMR 08/29/1990, 10/24/1996   • Meningococcal Conjugate (MCV4O) 07/28/2008   • Meningococcal MCV4P 07/28/2008   • OPV 1990, 1989, 12/04/1990, 05/24/1994   • Pneumococcal Polysaccharide PPV23 01/22/2018   • Td (adult), adsorbed 05/09/2000   • Tdap 10/25/2013   • Tuberculin Skin Test 06/12/1990, 04/29/1993, 05/24/1994   • Tuberculin Skin Test-PPD Intradermal 06/12/1990, 04/29/1993, 05/24/1994, 02/24/1998, 04/20/2000, 07/28/2008, 02/06/2012, 03/26/2019   • Varicella 06/19/2001, 07/29/2009       Objective     /74 (BP Location: Left arm, Patient Position: Sitting, Cuff Size: Large)   Pulse 72   Temp 97 9 °F (36 6 °C) (Tympanic)   Ht 5' 8" (1 727 m)   Wt 116 kg (254 lb 12 8 oz)   SpO2 98%   BMI 38 74 kg/m²     Physical Exam     BP was 124/84  Gen: NAD  Heent: atraumatic, normocephalic  Mouth: is moist  Neck: is supple, no JVD, no carotid bruits     Heart: is regular Normal s1, s2,  Lungs: are clear to auscultation  Abd: soft, non tender, NABS  Ext: no edema, distal pulses normal  Skin: no rashes, ulcers  Mood: normal affect  Neuro: Ginny Rios MD

## 2023-02-15 ENCOUNTER — OFFICE VISIT (OUTPATIENT)
Dept: BARIATRICS | Facility: CLINIC | Age: 33
End: 2023-02-15

## 2023-02-15 VITALS
HEIGHT: 69 IN | WEIGHT: 255.2 LBS | HEART RATE: 84 BPM | SYSTOLIC BLOOD PRESSURE: 142 MMHG | OXYGEN SATURATION: 98 % | BODY MASS INDEX: 37.8 KG/M2 | DIASTOLIC BLOOD PRESSURE: 86 MMHG

## 2023-02-15 DIAGNOSIS — F06.4 ANXIETY DISORDER DUE TO KNOWN PHYSIOLOGICAL CONDITION: ICD-10-CM

## 2023-02-15 DIAGNOSIS — Z87.42 HISTORY OF PCOS: ICD-10-CM

## 2023-02-15 DIAGNOSIS — E66.9 OBESITY, CLASS II, BMI 35-39.9: Primary | ICD-10-CM

## 2023-02-15 DIAGNOSIS — K21.9 GASTROESOPHAGEAL REFLUX DISEASE, UNSPECIFIED WHETHER ESOPHAGITIS PRESENT: ICD-10-CM

## 2023-02-15 DIAGNOSIS — I10 BENIGN ESSENTIAL HTN: ICD-10-CM

## 2023-02-15 RX ORDER — CHLORHEXIDINE GLUCONATE 0.12 MG/ML
RINSE ORAL
COMMUNITY
Start: 2023-02-07

## 2023-02-15 RX ORDER — BUPROPION HYDROCHLORIDE 150 MG/1
150 TABLET, EXTENDED RELEASE ORAL 2 TIMES DAILY
Qty: 180 TABLET | Refills: 0 | Status: SHIPPED | OUTPATIENT
Start: 2023-02-15

## 2023-02-15 RX ORDER — NALTREXONE HYDROCHLORIDE 50 MG/1
50 TABLET, FILM COATED ORAL DAILY
Qty: 30 TABLET | Refills: 2 | Status: SHIPPED | OUTPATIENT
Start: 2023-02-15

## 2023-02-15 NOTE — PROGRESS NOTES
Assessment/Plan:     Obesity, Class II, BMI 35-39 9  - Patient is pursuing Conservative Program with bundles with the dietician   - Initial weight loss goal of 5-10% weight loss for improved health  - Already on metformin through endocrinology for PCOS  - Wellbutrin started in November 2020 at weight of 245 lbs  She was able to get down to 222 lbs, but has been regaining weight  - Patient denies personal history of pancreatitis  Patient also denies personal and family history of medullary thyroid cancer and multiple endocrine neoplasia type 2 (MEN 2 tumor)  - Madeline Cabrera was denied by her insurance  - Feeling better since OCP adjusted, but still having some hunger and cravings  - Continue Wellbutrin  mg twice a day  Feels that it is helping with appetite and tolerating well  Discussed adding Naltrexone to help with cravings and she was agreeable  - Start Naltrexone 50 mg, take 1/2 tab daily for one month and if tolerated increase to 1 tab daily    - Side effects of naltrexone discussed: nausea, vomiting, diarrhea, constipation, headache, anxiety, and confusion  Advised not to consume alcohol with naltrexone  Must be discontinued prior to surgery  Initial: 245 8 lbs  Last visit: 256 1 lbs BMI 37 83   Current: 255 1 lbs BMI 37 69  Change: +9 3 lbs (-1 lbs since last office visit)  Goal: under 200 lbs    Goals:   Continue food logging, weighing and measuring food  Keep up the great work with water intake  Keep up the great work with exercise  Nutrition recommendations per dietician  Continue welbutrin 150 SR BID  Start Naltrexone  History of PCOS  - Taking OCP and metformin 1500mg daily  Continue management with prescribing provider  GERD (gastroesophageal reflux disease)  - Taking pepcid and tums  May improve with weight loss and lifestyle modification  Continue management with prescribing provider  Benign essential HTN  - Taking Coreg   May improve with weight loss and lifestyle modification  Continue management with prescribing provider  Kavitha Vitor was seen today for follow-up  Diagnoses and all orders for this visit:    Obesity, Class II, BMI 35-39 9  -     buPROPion (Wellbutrin SR) 150 mg 12 hr tablet; Take 1 tablet (150 mg total) by mouth 2 (two) times a day  -     naltrexone (REVIA) 50 mg tablet; Take 1 tablet (50 mg total) by mouth daily    Anxiety disorder due to known physiological condition  -     buPROPion (Wellbutrin SR) 150 mg 12 hr tablet; Take 1 tablet (150 mg total) by mouth 2 (two) times a day    History of PCOS    Gastroesophageal reflux disease, unspecified whether esophagitis present    Benign essential HTN            Follow up in approximately 2 months with medical weight management provider  Subjective:   Chief Complaint   Patient presents with   • Follow-up     2 mo MWM follow-up       Patient ID: Bolivar Ellis  is a 34 y o  female with excess weight/obesity here to pursue weight management  Patient is pursuing Conservative Program with bundles with the dietician  Most recent notes and records were reviewed  HPI    Wt Readings from Last 10 Encounters:   02/15/23 116 kg (255 lb 3 2 oz)   01/18/23 116 kg (254 lb 12 8 oz)   01/17/23 117 kg (259 lb)   12/13/22 116 kg (256 lb 3 2 oz)   10/12/22 112 kg (246 lb 3 2 oz)   10/10/22 112 kg (247 lb 6 4 oz)   10/03/22 112 kg (248 lb)   09/16/22 110 kg (242 lb 6 4 oz)   08/03/22 110 kg (243 lb)   07/19/22 110 kg (243 lb 3 2 oz)     OCP adjusted and feeling better since that adjusted  Jordan submitted last office visit to help with appetite and cravings, but was denied by insurance  Has been food logging, getting about [id number]  Taking Wellbutrin  mg twice a day  No negative side effects  Helping with appetite  Taking Metformin through endocrinology 500 mg 3 times a day  No negative side effects   Helping with appetite         Hydration- 80 oz water, 3 cups of coffee 1 cup decaf - typically black sometimes 2% milk  Alcohol- 2 drinks per week  Exercise- gym 5 days per week 1 5-2 hours- low impact cardio and weights   Sleep- 8 hours     Colonoscopy: N/A  Mammogram: N/A          The following portions of the patient's history were reviewed and updated as appropriate: allergies, current medications, past family history, past medical history, past social history, past surgical history, and problem list     Family History   Problem Relation Age of Onset   • Hypertension Mother    • Other Mother         vaginal tumors   • Heart disease Mother    • Cancer Mother    • Skin cancer Father    • Heart Valve Disease Father         leaky valve   • Diabetes Maternal Grandmother    • Kidney failure Maternal Grandmother    • Ovarian cancer Paternal Grandmother    • Lung cancer Paternal Grandfather    • Other Paternal Aunt         Ovarian polys        Review of Systems   HENT: Positive for sore throat (reports slight cold)  Respiratory: Positive for cough (mild)  Negative for shortness of breath  Cardiovascular: Negative for chest pain and palpitations  Gastrointestinal: Negative for abdominal pain, constipation, diarrhea, nausea and vomiting         + GERD controlled with medication   Musculoskeletal: Negative for arthralgias and back pain  Skin: Negative for rash  Psychiatric/Behavioral: Negative for suicidal ideas (or HI)  Denies depression and anxiety        Objective:  /86 (BP Location: Left arm, Patient Position: Sitting, Cuff Size: Large)   Pulse 84   Ht 5' 9" (1 753 m)   Wt 116 kg (255 lb 3 2 oz)   SpO2 98%   BMI 37 69 kg/m²     Physical Exam  Vitals and nursing note reviewed  Constitutional   General appearance: Abnormal   well developed and obese  Eyes No conjunctival injection  Ears, Nose, Mouth, and Throat Oral mucosa moist    Pulmonary   Respiratory effort: No increased work of breathing or signs of respiratory distress  Cardiovascular     Examination of extremities for edema and/or varicosities: Normal   no edema  Abdomen   Abdomen: Abnormal   The abdomen was obese      Musculoskeletal   Normal range of motion  Neurological   Gait and station: Normal     Psychiatric   Orientation to person, place and time: Normal     Affect: appropriate

## 2023-02-15 NOTE — PATIENT INSTRUCTIONS
Start Naltrexone 50 mg take 1/2 tab in the morning daily for one month then increase to 1 tab daily      - Side effects of naltrexone: nausea, vomiting, diarrhea, constipation, headache, anxiety, and confusion  Advised not to consume alcohol with naltrexone  Must be discontinued prior to surgery

## 2023-02-15 NOTE — ASSESSMENT & PLAN NOTE
- Patient is pursuing Conservative Program with bundles with the dietician   - Initial weight loss goal of 5-10% weight loss for improved health  - Already on metformin through endocrinology for PCOS  - Wellbutrin started in November 2020 at weight of 245 lbs  She was able to get down to 222 lbs, but has been regaining weight  - Patient denies personal history of pancreatitis  Patient also denies personal and family history of medullary thyroid cancer and multiple endocrine neoplasia type 2 (MEN 2 tumor)  - Lida Boston was denied by her insurance  - Feeling better since OCP adjusted, but still having some hunger and cravings  - Continue Wellbutrin  mg twice a day  Feels that it is helping with appetite and tolerating well  Discussed adding Naltrexone to help with cravings and she was agreeable  - Start Naltrexone 50 mg, take 1/2 tab daily for one month and if tolerated increase to 1 tab daily    - Side effects of naltrexone discussed: nausea, vomiting, diarrhea, constipation, headache, anxiety, and confusion  Advised not to consume alcohol with naltrexone  Must be discontinued prior to surgery  Initial: 245 8 lbs  Last visit: 256 1 lbs BMI 37 83   Current: 255 1 lbs BMI 37 69  Change: +9 3 lbs (-1 lbs since last office visit)  Goal: under 200 lbs    Goals:   Continue food logging, weighing and measuring food  Keep up the great work with water intake  Keep up the great work with exercise  Nutrition recommendations per dietician  Continue welbutrin 150 SR BID  Start Naltrexone

## 2023-02-20 NOTE — PROGRESS NOTES
Weight Management Medical Nutrition Assessment  Yecenia presented for 2/3 RD meal planning session  Today's weight is 254 4#  [facility] reports switching to a new birth control pill and hunger is controlled  She is consuming 3671-8739 calories daily  We reviewed estimated needs on non-active days (1500 calories) and active days (1700 calories)  She is interested in information related to PCOS and hormone balance  Encouraged patient to speak with endocrinologist  Miguel Belle provided PCOS resources created by dietitian specializing in PCOS  She started on Contrave  No side effects reports  She plans to repeat cholesterol labs  Reviewed saturated fat intake        Patient seen by Medical Provider in past 6 months:  yes  Requested to schedule appointment with Medical Provider: No      Anthropometric Measurements  Start Weight (#):247 5#  Current Weight (#): 254 4#  TBW % Change from start weight:  Ideal Body Weight (#):140#  Goal Weight (#):STG: under 200#      Weight Loss History  Previous weight loss attempts: Commercial Programs (Weight Watchers, [employer] Radio Pages, etc )  Counseling with  MD  Self Created Diets (Portion Control, Healthy Food Choices, etc )    Food and Nutrition Related History    Food Recall   Breakfast: Cheondoism oatmeal, 5oz [employer]ik[employer] Greek yogurt  Snack: apple  Lunch: Luncheable and pretzels (less than 400 calories)  Snack: skip OR vegetable OR fruit  Dinner: (not as hungry at dinner) bowl of cereal with skim milk and banana OR Hello Fresh meal (protein, carb, veggie)  Snack: Smores and decaf coffee    Beverages: at least 80oz water, 4 cups caffeinated/decaf coffee  Volume of beverage intake: at least 80oz     Weekends: Same  Cravings: salty / sweet carbs  Trouble area of day:between meals / portions at meals    Frequency of Eating out: biweekly  Food restrictions:none reported  Cooking: self   Food Shopping: self    Physical Activity Intake  Activity: low impact exercises  Frequency: 4-5x/week  Physical limitations/barriers to exercise: none    Estimated Needs  Energy  Bear Jose Daniel Energy Needs: BMR : 1,923 calories    1-2# loss weekly sedentary:  1,308-1,808 calories             1-2# loss weekly lightly active:1,645-2,145 calories   Maintenance calories for sedentary activity level: 2,308 calories  Protein:77-96gm      (1 2-1 5g/kg IBW)  Fluid: 75oz     (35mL/kg IBW)    Nutrition Diagnosis  Yes; Overweight/obesity  related to Excess energy intake as evidenced by  BMI more than normative standard for age and sex (obesity-grade II 35-39  9)       Nutrition Intervention    Nutrition Prescription  Calories:1500 calories and flex to 1700 calories on cardio days  Protein:75-90gm  Fluid:75oz    Meal Plan (Mir/Pro/Carb)  Breakfast: 200-300/20/30  Snack:  Lunch: 300-400/30/30-45  Snack: 150-200/>5/20  Dinner: 300-400/30/30-45  Snack: 150-200/>5/20    Nutrition Education:    Calorie controlled menu  Lean protein food choices  Healthy snack options  Food journaling tips      Nutrition Counseling:  Strategies: meal planning, portion sizes, healthy snack choices, hydration, fiber intake, protein intake, exercise, food journal      Monitoring and Evaluation:  Evaluation criteria:  Energy Intake  Meet protein needs  Maintain adequate hydration  Monitor weekly weight  Meal planning/preparation  Food journal   Decreased portions at mealtimes and snacks  Physical activity     Barriers to learning:none  Readiness to change: Action:  (Changing behavior)  Comprehension: good  Expected Compliance: good

## 2023-03-01 ENCOUNTER — OFFICE VISIT (OUTPATIENT)
Dept: BARIATRICS | Facility: CLINIC | Age: 33
End: 2023-03-01

## 2023-03-01 VITALS — HEIGHT: 69 IN | BODY MASS INDEX: 37.68 KG/M2 | WEIGHT: 254.4 LBS

## 2023-03-01 DIAGNOSIS — R63.5 ABNORMAL WEIGHT GAIN: Primary | ICD-10-CM

## 2023-04-24 DIAGNOSIS — K22.4 ESOPHAGEAL SPASM: ICD-10-CM

## 2023-04-25 RX ORDER — FAMOTIDINE 40 MG/1
40 TABLET, FILM COATED ORAL DAILY
Qty: 90 TABLET | Refills: 1 | Status: SHIPPED | OUTPATIENT
Start: 2023-04-25

## 2023-05-22 DIAGNOSIS — E66.9 OBESITY, CLASS II, BMI 35-39.9: ICD-10-CM

## 2023-05-22 RX ORDER — NALTREXONE HYDROCHLORIDE 50 MG/1
50 TABLET, FILM COATED ORAL DAILY
Qty: 30 TABLET | Refills: 0 | Status: SHIPPED | OUTPATIENT
Start: 2023-05-22 | End: 2023-05-26 | Stop reason: ALTCHOICE

## 2023-05-26 ENCOUNTER — OFFICE VISIT (OUTPATIENT)
Dept: BARIATRICS | Facility: CLINIC | Age: 33
End: 2023-05-26

## 2023-05-26 VITALS
HEIGHT: 69 IN | RESPIRATION RATE: 16 BRPM | SYSTOLIC BLOOD PRESSURE: 124 MMHG | HEART RATE: 80 BPM | WEIGHT: 257.6 LBS | DIASTOLIC BLOOD PRESSURE: 82 MMHG | BODY MASS INDEX: 38.16 KG/M2

## 2023-05-26 DIAGNOSIS — Z87.42 HISTORY OF PCOS: ICD-10-CM

## 2023-05-26 DIAGNOSIS — K21.9 GASTROESOPHAGEAL REFLUX DISEASE, UNSPECIFIED WHETHER ESOPHAGITIS PRESENT: ICD-10-CM

## 2023-05-26 DIAGNOSIS — F06.4 ANXIETY DISORDER DUE TO KNOWN PHYSIOLOGICAL CONDITION: ICD-10-CM

## 2023-05-26 DIAGNOSIS — E66.9 OBESITY, CLASS II, BMI 35-39.9: Primary | ICD-10-CM

## 2023-05-26 DIAGNOSIS — I10 BENIGN ESSENTIAL HTN: ICD-10-CM

## 2023-05-26 RX ORDER — BUPROPION HYDROCHLORIDE 150 MG/1
150 TABLET, EXTENDED RELEASE ORAL 2 TIMES DAILY
Qty: 60 TABLET | Refills: 3 | Status: SHIPPED | OUTPATIENT
Start: 2023-05-26

## 2023-05-26 NOTE — PROGRESS NOTES
Assessment/Plan:     Obesity, Class II, BMI 35-39 9  - Patient is pursuing Conservative Program with bundles with the dietician   - Initial weight loss goal of 5-10% weight loss for improved health  - On OCP  - Already on metformin through endocrinology for PCOS  - Wellbutrin started in November 2020 at weight of 245 lbs  She was able to get down to 222 lbs, but has been regaining weight  - Patient denies personal history of pancreatitis  Patient also denies personal and family history of medullary thyroid cancer and multiple endocrine neoplasia type 2 (MEN 2 tumor)  - Arcadio Mo was denied by her insurance  - Continue Wellbutrin  mg twice a day  Feels that it is helping with appetite and mood and tolerating well  - Naltrexone added last visit, but not making any difference with appetite, and therefore will discontinue  - Will be starting an exercise and diet program for PCOS  - Will be getting CMP, A1C, and lipid panel through PCP  Initial: 245 8 lbs  Last visit: 255 1 lbs BMI 37 69  Current: 257 6 lbs BMI 38 04   Change: +11 8 lbs (+2 5 lbs since last office visit)  Goal: under 200 lbs    Goals:   Restart food logging  Keep up the great work with water intake, at least 64 oz daily  Keep up the great work with exercise  Nutrition recommendations per dietician  Continue welbutrin 150 SR BID  Stop naltrexone  Continue metformin through endocrinology  History of PCOS  - Taking OCP and metformin 1500mg daily  Continue management with prescribing provider  GERD (gastroesophageal reflux disease)  - Taking pepcid and tums  May improve with weight loss and lifestyle modification  Continue management with prescribing provider  Benign essential HTN  - Taking Coreg  May improve with weight loss and lifestyle modification  Continue management with prescribing provider  Luis Jimenez was seen today for follow-up      Diagnoses and all orders for this visit:    Obesity, Class II, BMI 35-39 9  -     buPROPion (Wellbutrin SR) 150 mg 12 hr tablet; Take 1 tablet (150 mg total) by mouth 2 (two) times a day    Anxiety disorder due to known physiological condition  -     buPROPion (Wellbutrin SR) 150 mg 12 hr tablet; Take 1 tablet (150 mg total) by mouth 2 (two) times a day    History of PCOS    Gastroesophageal reflux disease, unspecified whether esophagitis present    Benign essential HTN          Follow up in approximately 3 months with Non-Surgical Physician/Advanced Practitioner  Subjective:   Chief Complaint   Patient presents with   • Follow-up     MWM 2mth f/u; waist 40in       Patient ID: Teddy Oropeza  is a 28 y o  female with excess weight/obesity here to pursue weight management  Patient is pursuing Conservative Program with visits with the dietician  Most recent notes and records were reviewed  HPI    Wt Readings from Last 10 Encounters:   05/26/23 117 kg (257 lb 9 6 oz)   04/17/23 116 kg (256 lb 3 2 oz)   03/01/23 115 kg (254 lb 6 4 oz)   02/15/23 116 kg (255 lb 3 2 oz)   01/18/23 116 kg (254 lb 12 8 oz)   01/17/23 117 kg (259 lb)   12/13/22 116 kg (256 lb 3 2 oz)   10/12/22 112 kg (246 lb 3 2 oz)   10/10/22 112 kg (247 lb 6 4 oz)   10/03/22 112 kg (248 lb)        Not food logging lately, but was previously food logging  Having cravings for foods, but under more stress  Will be starting a diet and exercise program for PCOS  Wellbutrin  mg twice a day  No negative side effects  Feels that it has been good for her mentally  Taking Naltrexone 50 mg daily  No negative side effects, but not helping with appetite  Taking Metformin through endocrinology, 500 mg 3 times a day  No negative side effects   Helping with appetite          Hydration- 80 oz water, 2 cups of coffee 1 cup decaf - typically black sometimes 2% milk  Alcohol- 2 drinks per week  Exercise- walking 4 days per week 3 miles      Colonoscopy: N/A  Mammogram: N/A      The following "portions of the patient's history were reviewed and updated as appropriate: allergies, current medications, past family history, past medical history, past social history, past surgical history, and problem list     Family History   Problem Relation Age of Onset   • Hypertension Mother    • Other Mother         vaginal tumors   • Heart disease Mother    • Cancer Mother    • Skin cancer Father    • Heart Valve Disease Father         leaky valve   • Diabetes Maternal Grandmother    • Kidney failure Maternal Grandmother    • Ovarian cancer Paternal Grandmother    • Lung cancer Paternal Grandfather    • Other Paternal Aunt         Ovarian polys        Review of Systems   HENT: Negative for sore throat  Respiratory: Negative for cough and shortness of breath  Cardiovascular: Negative for chest pain and palpitations  Gastrointestinal: Negative for abdominal pain, constipation, diarrhea, nausea and vomiting         + GERD on medication   Musculoskeletal: Negative for arthralgias and back pain  Skin: Negative for rash  Psychiatric/Behavioral: Negative for suicidal ideas (or HI)  Denies depression and anxiety       Objective:  /82   Pulse 80   Resp 16   Ht 5' 9\" (1 753 m)   Wt 117 kg (257 lb 9 6 oz)   BMI 38 04 kg/m²     Physical Exam  Vitals and nursing note reviewed  Constitutional   General appearance: Abnormal   well developed and obese  Eyes No conjunctival injection  Ears, Nose, Mouth, and Throat Oral mucosa moist    Pulmonary   Respiratory effort: No increased work of breathing or signs of respiratory distress  Cardiovascular     Examination of extremities for edema and/or varicosities: Normal   no edema  Abdomen   Abdomen: Abnormal   The abdomen was obese      Musculoskeletal   Normal range of motion  Neurological   Gait and station: Normal     Psychiatric   Orientation to person, place and time: Normal     Affect: appropriate        "

## 2023-05-27 NOTE — ASSESSMENT & PLAN NOTE
- Patient is pursuing Conservative Program with bundles with the dietician   - Initial weight loss goal of 5-10% weight loss for improved health  - On OCP  - Already on metformin through endocrinology for PCOS  - Wellbutrin started in November 2020 at weight of 245 lbs  She was able to get down to 222 lbs, but has been regaining weight  - Patient denies personal history of pancreatitis  Patient also denies personal and family history of medullary thyroid cancer and multiple endocrine neoplasia type 2 (MEN 2 tumor)  - Irena Law was denied by her insurance  - Continue Wellbutrin  mg twice a day  Feels that it is helping with appetite and mood and tolerating well  - Naltrexone added last visit, but not making any difference with appetite, and therefore will discontinue  - Will be starting an exercise and diet program for PCOS  - Will be getting CMP, A1C, and lipid panel through PCP  Initial: 245 8 lbs  Last visit: 255 1 lbs BMI 37 69  Current: 257 6 lbs BMI 38 04   Change: +11 8 lbs (+2 5 lbs since last office visit)  Goal: under 200 lbs    Goals:   Restart food logging  Keep up the great work with water intake, at least 64 oz daily  Keep up the great work with exercise  Nutrition recommendations per dietician  Continue welbutrin 150 SR BID  Stop naltrexone  Continue metformin through endocrinology

## 2023-06-30 ENCOUNTER — APPOINTMENT (OUTPATIENT)
Dept: LAB | Facility: HOSPITAL | Age: 33
End: 2023-06-30
Payer: COMMERCIAL

## 2023-06-30 DIAGNOSIS — E83.51 HYPOCALCEMIA: Primary | ICD-10-CM

## 2023-06-30 LAB
CALCIUM 24H UR-MCNC: 125.88 MG/24 HRS (ref 42–353)
CREAT 24H UR-MRATE: 1.7 G/24HR (ref 0.6–1.8)
SPECIMEN VOL UR: 2375 ML
SPECIMEN VOL UR: 2375 ML

## 2023-06-30 PROCEDURE — 82340 ASSAY OF CALCIUM IN URINE: CPT

## 2023-06-30 PROCEDURE — 82570 ASSAY OF URINE CREATININE: CPT

## 2023-07-24 ENCOUNTER — OFFICE VISIT (OUTPATIENT)
Dept: GYNECOLOGY | Facility: CLINIC | Age: 33
End: 2023-07-24
Payer: COMMERCIAL

## 2023-07-24 ENCOUNTER — ULTRASOUND (OUTPATIENT)
Dept: GYNECOLOGY | Facility: CLINIC | Age: 33
End: 2023-07-24
Payer: COMMERCIAL

## 2023-07-24 ENCOUNTER — APPOINTMENT (OUTPATIENT)
Dept: LAB | Facility: HOSPITAL | Age: 33
End: 2023-07-24
Payer: COMMERCIAL

## 2023-07-24 VITALS
DIASTOLIC BLOOD PRESSURE: 64 MMHG | HEIGHT: 69 IN | SYSTOLIC BLOOD PRESSURE: 122 MMHG | WEIGHT: 260.6 LBS | BODY MASS INDEX: 38.6 KG/M2

## 2023-07-24 DIAGNOSIS — N83.202 LEFT OVARIAN CYST: Primary | ICD-10-CM

## 2023-07-24 DIAGNOSIS — F32.A ANXIETY AND DEPRESSION: ICD-10-CM

## 2023-07-24 DIAGNOSIS — D21.9 FIBROID: ICD-10-CM

## 2023-07-24 DIAGNOSIS — E66.9 OBESITY, CLASS II, BMI 35-39.9: ICD-10-CM

## 2023-07-24 DIAGNOSIS — Z87.42 HISTORY OF PCOS: ICD-10-CM

## 2023-07-24 DIAGNOSIS — F41.9 ANXIETY AND DEPRESSION: ICD-10-CM

## 2023-07-24 DIAGNOSIS — Z87.42 HISTORY OF OVARIAN CYST: ICD-10-CM

## 2023-07-24 DIAGNOSIS — D25.2 FIBROIDS, SUBSEROUS: ICD-10-CM

## 2023-07-24 DIAGNOSIS — E78.00 HYPERCHOLESTEREMIA: ICD-10-CM

## 2023-07-24 DIAGNOSIS — Z30.41 ENCOUNTER FOR SURVEILLANCE OF CONTRACEPTIVE PILLS: ICD-10-CM

## 2023-07-24 PROCEDURE — 76830 TRANSVAGINAL US NON-OB: CPT | Performed by: OBSTETRICS & GYNECOLOGY

## 2023-07-24 PROCEDURE — 99213 OFFICE O/P EST LOW 20 MIN: CPT | Performed by: OBSTETRICS & GYNECOLOGY

## 2023-07-24 NOTE — PROGRESS NOTES
CANDIDO US Pelvic Non OB    Date/Time: 7/24/2023 1:20 PM    Performed by: Jeniffer Barrett  Authorized by: Jovanny Langley DO

## 2023-07-24 NOTE — PROGRESS NOTES
Assessment/Plan:    Diagnoses and all orders for this visit:    Left ovarian cyst  -     US pelvis complete non OB; Future    Fibroid  -     Cancel: AMB US Pelvic Non OB  -     US pelvis complete non OB; Future    Anxiety and depression    History of PCOS    Obesity, Class II, BMI 35-39.9    Encounter for surveillance of contraceptive pills        Subjective: Follow-up visit with pelvic ultrasound     Patient ID: Romero Hearn is a 28 y.o. female. HPI   21-year-old female, nulliparous no history of PCOS, no history of recurrent ovarian cysts. Currently on Junel to regulate her cycles. Previously was on norethindrone 5 mg daily. She did not like this because she was not having regular menstrual cycles and she was gaining weight. Pelvic ultrasound uterus measures 8.76 x 5.46 x 5.86 cm. Endometrial thickness is 9.8 mm  Multi positional uterus heterogenous changes throughout and demonstrates an anterior subserosal uterine fibroid currently 2.6 cm previously 1.7 in January. Right ovary appears to be normal the left ovary demonstrates a 4.7 cm cyst with septation no free fluid is noted  Patient does have intermittent pelvic pain symptoms mild dysmenorrhea mild dyspareunia. She has had a cyst on both ovaries over time. Recommend follow-up pelvic ultrasound about 6 weeks time. Continue current OCPs at this time discussed. Patient refuses to restart norethindrone. She did not like the way it made her feel. She returns sooner if her pain symptoms get worse. Review of Systems   Respiratory: Negative. Cardiovascular: Negative. Gastrointestinal: Negative. Genitourinary: Positive for menstrual problem and pelvic pain. Neurological: Negative. Psychiatric/Behavioral: Negative. All other systems reviewed and are negative.       Objective: No acute distress  /64 (BP Location: Left arm, Patient Position: Sitting, Cuff Size: Large)   Ht 5' 9" (1.753 m)   Wt 118 kg (260 lb 9.6 oz) BMI 38.48 kg/m²      Physical Exam  Vitals and nursing note reviewed. Constitutional:       Appearance: Normal appearance. She is obese. HENT:      Head: Normocephalic. Mouth/Throat:      Mouth: Mucous membranes are moist.   Eyes:      Extraocular Movements: Extraocular movements intact. Pupils: Pupils are equal, round, and reactive to light. Pulmonary:      Effort: Pulmonary effort is normal.   Genitourinary:     Comments: Pelvic exam deferred  Musculoskeletal:         General: Normal range of motion. Cervical back: Rigidity present. Neurological:      Mental Status: She is alert and oriented to person, place, and time. Psychiatric:         Mood and Affect: Mood normal.         Behavior: Behavior normal.         Thought Content: Thought content normal.         Judgment: Judgment normal.        Please note    In addition to the time spent discussing the findings and results of today's visit and exam, I spent approximately 20 minutes of face-to-face time with the patient, greater than 50% of which was spent in counseling and coordination of care for this patient.

## 2023-07-24 NOTE — PROGRESS NOTES
AMB US Pelvic Non OB    Date/Time: 7/24/2023 1:00 PM    Performed by: Denilson Alexis  Authorized by: Gloria Sellers DO    Procedure details:     Indications: leiomyoma      Technique:  Transvaginal US, Non-OB    Position: lithotomy exam    Uterine findings:     Length (cm): 8.76    Height (cm):  5.46    Width (cm):  5.86    Endometrial stripe: identified      Endometrium thickness (mm):  9.8  Left ovary findings:     Left ovary:  Visualized    Length (cm): 5.06    Height (cm): 3.53    Width (cm): 4.42  Right ovary findings:     Right ovary:  Visualized    Length (cm): 2.82    Height (cm): 2.02    Width (cm): 2.06  Other findings:     Free pelvic fluid: not identified      Free peritoneal fluid: not identified    Post-Procedure Details:     Impression:  Multi-positional uterus is inhomogeneous throughout and demonstrates an anterior subserosal lower uterine segment fibroid, 2.6cm (previously 1.7cm), not previously seen. The right ovary appears within normal limits. The left ovary demonstrates a 4.7cm cyst with septation. No free fluid. Tolerance: Tolerated well, no immediate complications    Complications: no complications    Additional Procedure Comments:      [employer] F8 E8C-RS transvaginal transducer Serial # [id number] was used to perform the examination today and subsequently followed with high level disinfection utilizing Trophon EPR procedure. Ultrasound performed at:     [facility] [place]  [address], [address] [facility]  Phone:  [phone]  Fax:  [phone]

## 2023-07-25 ENCOUNTER — APPOINTMENT (OUTPATIENT)
Dept: LAB | Facility: HOSPITAL | Age: 33
End: 2023-07-25
Payer: COMMERCIAL

## 2023-07-25 ENCOUNTER — OFFICE VISIT (OUTPATIENT)
Dept: INTERNAL MEDICINE CLINIC | Facility: CLINIC | Age: 33
End: 2023-07-25
Payer: COMMERCIAL

## 2023-07-25 VITALS
HEART RATE: 70 BPM | BODY MASS INDEX: 39.4 KG/M2 | SYSTOLIC BLOOD PRESSURE: 128 MMHG | TEMPERATURE: 98.7 F | DIASTOLIC BLOOD PRESSURE: 80 MMHG | OXYGEN SATURATION: 98 % | WEIGHT: 260 LBS | HEIGHT: 68 IN

## 2023-07-25 DIAGNOSIS — I10 BENIGN ESSENTIAL HTN: Primary | ICD-10-CM

## 2023-07-25 DIAGNOSIS — E28.2 PCOS (POLYCYSTIC OVARIAN SYNDROME): ICD-10-CM

## 2023-07-25 DIAGNOSIS — E78.00 HYPERCHOLESTEREMIA: ICD-10-CM

## 2023-07-25 DIAGNOSIS — Z82.49 FAMILY HISTORY OF HEART MURMUR: ICD-10-CM

## 2023-07-25 DIAGNOSIS — F17.200 TOBACCO USE DISORDER: ICD-10-CM

## 2023-07-25 DIAGNOSIS — I10 BENIGN ESSENTIAL HTN: ICD-10-CM

## 2023-07-25 DIAGNOSIS — F33.1 MAJOR DEPRESSIVE DISORDER, RECURRENT EPISODE, MODERATE (HCC): ICD-10-CM

## 2023-07-25 DIAGNOSIS — K21.9 GASTROESOPHAGEAL REFLUX DISEASE, UNSPECIFIED WHETHER ESOPHAGITIS PRESENT: ICD-10-CM

## 2023-07-25 DIAGNOSIS — F41.9 ANXIETY AND DEPRESSION: ICD-10-CM

## 2023-07-25 DIAGNOSIS — F32.A ANXIETY AND DEPRESSION: ICD-10-CM

## 2023-07-25 DIAGNOSIS — E78.5 DYSLIPIDEMIA, GOAL LDL BELOW 160: ICD-10-CM

## 2023-07-25 LAB
ALBUMIN SERPL BCP-MCNC: 3.9 G/DL (ref 3.5–5)
ALP SERPL-CCNC: 32 U/L (ref 34–104)
ALT SERPL W P-5'-P-CCNC: 16 U/L (ref 7–52)
ANION GAP SERPL CALCULATED.3IONS-SCNC: 4 MMOL/L
AST SERPL W P-5'-P-CCNC: 12 U/L (ref 13–39)
BILIRUB SERPL-MCNC: 0.48 MG/DL (ref 0.2–1)
BUN SERPL-MCNC: 6 MG/DL (ref 5–25)
CALCIUM SERPL-MCNC: 8.1 MG/DL (ref 8.4–10.2)
CHLORIDE SERPL-SCNC: 109 MMOL/L (ref 96–108)
CHOLEST SERPL-MCNC: 196 MG/DL
CO2 SERPL-SCNC: 23 MMOL/L (ref 21–32)
CREAT SERPL-MCNC: 0.66 MG/DL (ref 0.6–1.3)
EST. AVERAGE GLUCOSE BLD GHB EST-MCNC: 114 MG/DL
GFR SERPL CREATININE-BSD FRML MDRD: 115 ML/MIN/1.73SQ M
GLUCOSE P FAST SERPL-MCNC: 87 MG/DL (ref 65–99)
HBA1C MFR BLD: 5.6 %
HDLC SERPL-MCNC: 46 MG/DL
LDLC SERPL CALC-MCNC: 128 MG/DL (ref 0–100)
POTASSIUM SERPL-SCNC: 3.7 MMOL/L (ref 3.5–5.3)
PROT SERPL-MCNC: 6 G/DL (ref 6.4–8.4)
SODIUM SERPL-SCNC: 136 MMOL/L (ref 135–147)
TRIGL SERPL-MCNC: 112 MG/DL

## 2023-07-25 PROCEDURE — 83036 HEMOGLOBIN GLYCOSYLATED A1C: CPT

## 2023-07-25 PROCEDURE — 99214 OFFICE O/P EST MOD 30 MIN: CPT | Performed by: INTERNAL MEDICINE

## 2023-07-25 PROCEDURE — 80061 LIPID PANEL: CPT

## 2023-07-25 PROCEDURE — 80053 COMPREHEN METABOLIC PANEL: CPT

## 2023-07-25 PROCEDURE — 36415 COLL VENOUS BLD VENIPUNCTURE: CPT

## 2023-07-25 RX ORDER — HYDROCHLOROTHIAZIDE 12.5 MG/1
12.5 TABLET ORAL DAILY
Qty: 90 TABLET | Refills: 1 | Status: SHIPPED | OUTPATIENT
Start: 2023-07-25

## 2023-07-25 NOTE — ASSESSMENT & PLAN NOTE
Symptoms are well controlled with Pepcid 40 mg daily recommend continued follow-up with gastroenterology

## 2023-07-25 NOTE — ASSESSMENT & PLAN NOTE
Mild elevation in blood pressure possibly secondary to the hormonal treatment that she was given for her ovarian cyst.  We will add a small dose of Dyazide to be taken when the blood pressure is higher than 120/80.   Patient has a blood pressure cuff at home and can monitor it

## 2023-07-25 NOTE — ASSESSMENT & PLAN NOTE
Strong recommendation for smoking cessation with multiple risk factors.   Every day smoker for last 14 years

## 2023-09-11 ENCOUNTER — OFFICE VISIT (OUTPATIENT)
Dept: GYNECOLOGY | Facility: CLINIC | Age: 33
End: 2023-09-11
Payer: COMMERCIAL

## 2023-09-11 ENCOUNTER — ULTRASOUND (OUTPATIENT)
Dept: GYNECOLOGY | Facility: CLINIC | Age: 33
End: 2023-09-11
Payer: COMMERCIAL

## 2023-09-11 VITALS
BODY MASS INDEX: 39.19 KG/M2 | DIASTOLIC BLOOD PRESSURE: 72 MMHG | WEIGHT: 258.6 LBS | HEIGHT: 68 IN | SYSTOLIC BLOOD PRESSURE: 116 MMHG

## 2023-09-11 DIAGNOSIS — Z87.42 HISTORY OF PCOS: ICD-10-CM

## 2023-09-11 DIAGNOSIS — D21.9 FIBROID: ICD-10-CM

## 2023-09-11 DIAGNOSIS — N83.202 LEFT OVARIAN CYST: ICD-10-CM

## 2023-09-11 DIAGNOSIS — D25.1 FIBROIDS, INTRAMURAL: Primary | ICD-10-CM

## 2023-09-11 DIAGNOSIS — F41.9 ANXIETY AND DEPRESSION: ICD-10-CM

## 2023-09-11 DIAGNOSIS — F32.A ANXIETY AND DEPRESSION: ICD-10-CM

## 2023-09-11 DIAGNOSIS — Z30.41 ENCOUNTER FOR SURVEILLANCE OF CONTRACEPTIVE PILLS: ICD-10-CM

## 2023-09-11 DIAGNOSIS — Z87.42 HISTORY OF OVARIAN CYST: ICD-10-CM

## 2023-09-11 DIAGNOSIS — E66.9 OBESITY, CLASS II, BMI 35-39.9: ICD-10-CM

## 2023-09-11 PROCEDURE — 99213 OFFICE O/P EST LOW 20 MIN: CPT | Performed by: OBSTETRICS & GYNECOLOGY

## 2023-09-11 PROCEDURE — 76830 TRANSVAGINAL US NON-OB: CPT | Performed by: OBSTETRICS & GYNECOLOGY

## 2023-09-11 NOTE — PROGRESS NOTES
Assessment/Plan:    Diagnoses and all orders for this visit:    Fibroids, intramural  -     US pelvis complete non OB; Future    History of PCOS    Encounter for surveillance of contraceptive pills    History of ovarian cyst    Anxiety and depression    Obesity, Class II, BMI 35-39.9        Subjective: Here for follow-up pelvic ultrasound     Patient ID: Jumana Jarrell is a 28 y.o. female. HPI  69-year-old female, nulliparous, history of PCOS history of recurrent ovarian cyst.  Currently she is on Junel to regulate her menstrual cycles. Previously she was on norethindrone 5 mg daily. She did not like this pill because she was not having regular menstrual cycles and she was gaining weight. She is doing better with the Junel and would like to continue this. Pelvic ultrasound normal size uterus measures 8.47 x 4.54 x 5.03 cm. Normal endometrial thickness at 4 mm retroverted uterus demonstrates a 2.1 cm anterior lower uterine segment intramural fibroid which was previously 2.6 cm. Both ovaries appear to be within normal limits there is a 1.2 cm follicle on the left ovary. Previously noted ovarian cyst with septation is no longer identified. I did discuss reviewed the findings of the ultrasound today with the patient. Recommend follow-up ultrasound in about 3 months time. She is due for annual exam in January of this upcoming year. We also did discuss diet and exercise activities which she is consistent with this at least 3 times weekly and walks 3 times a week as well. In spite of all activity and watching her diet and calories her weight remains stable. All questions answered. Review of Systems  Unchanged    Objective: No acute distress  /72 (BP Location: Left arm, Patient Position: Sitting)   Ht 5' 8" (1.727 m)   Wt 117 kg (258 lb 9.6 oz)   BMI 39.32 kg/m²      Physical Exam  Vitals and nursing note reviewed. Constitutional:       Appearance: Normal appearance. She is obese.    HENT: Head: Normocephalic. Pulmonary:      Effort: Pulmonary effort is normal.   Genitourinary:     Comments: Pelvic exam deferred  Musculoskeletal:         General: Normal range of motion. Cervical back: Normal range of motion. Skin:     General: Skin is warm and dry. Neurological:      Mental Status: She is alert and oriented to person, place, and time. Psychiatric:         Mood and Affect: Mood normal.         Behavior: Behavior normal.         Thought Content: Thought content normal.         Judgment: Judgment normal.        Please note    In addition to the time spent discussing the findings and results of today's visit and exam, I spent approximately 20 minutes of face-to-face time with the patient, greater than 50% of which was spent in counseling and coordination of care for this patient.

## 2023-09-11 NOTE — PROGRESS NOTES
AMB US Pelvic Non OB    Date/Time: 9/11/2023 3:00 PM    Performed by: Martinez Chakraborty  Authorized by: Mariah Barry DO  Universal Protocol:  Consent given by: patient  Patient identity confirmed: verbally with patient      Procedure details:     Indications: ovarian cysts      Technique:  Transvaginal US, Non-OB    Position: lithotomy exam    Uterine findings:     Length (cm): 8.47    Height (cm):  4.54    Width (cm):  5.03    Endometrial stripe: identified      Endometrium thickness (mm):  4  Left ovary findings:     Left ovary:  Visualized    Length (cm): 3.42    Height (cm): 2.07    Width (cm): 2.53  Right ovary findings:     Right ovary:  Visualized    Length (cm): 3.31    Height (cm): 2.07    Width (cm): 2.15  Other findings:     Free pelvic fluid: not identified      Free peritoneal fluid: not identified    Post-Procedure Details:     Impression:  Retroverted uterus demonstrates a 2.1cm anterior lower uterine segment intramural fibroid (previously 2.6cm). The bilateral ovaries appear within normal limits. There is a 7.3AQ follicle on the left ovary. Previously noted cyst with septation is not identified. No free fluid. Tolerance: Tolerated well, no immediate complications    Complications: no complications    Additional Procedure Comments:      [employer] F8 E8C-RS transvaginal transducer Serial # [id number] was used to perform the examination today and subsequently followed with high level disinfection utilizing Trophon EPR procedure. Ultrasound performed at:     [facility] [place]  [address], [facility]  Phone:  [phone]  Fax:  [phone]

## 2023-09-12 NOTE — PROGRESS NOTES
Weight Management Medical Nutrition Assessment  Yecenia presented for 3/3 RD meal planning session. Today's weight is  #. She has lbs x ~3 1/2 months when last seen with overall gain of lbs since September 2020. Currently on metformin, wellbutrin, naltrexone. Dx PCOS. Barbara Rojas reports switching to a new birth control pill and hunger is controlled. She is consuming 1857-3716 calories daily. We reviewed estimated needs on non-active days (1500 calories) and active days (1700 calories). She is interested in information related to PCOS and hormone balance. Encouraged patient to speak with endocrinologist. Jen Dumont provided PCOS resources created by dietitian specializing in PCOS. She started on Contrave. No side effects reports. She plans to repeat cholesterol labs. Reviewed saturated fat intake.       Patient seen by Medical Provider in past 6 months:  yes  Requested to schedule appointment with Medical Provider: No      Anthropometric Measurements  Start Weight (#):245.8# 9/21/20  Current Weight (#):    TBW % Change from start weight:  Ideal Body Weight (#):140#  Goal Weight (#):STG: under 200#      Weight Loss History  Previous weight loss attempts: Commercial Programs (Weight Watchers, [employer], etc.)  Counseling with  MD  Self Created Diets (Portion Control, Healthy Food Choices, etc.)    Food and Nutrition Related History    Food Recall   Breakfast: [employer] oatmeal, 5oz [employer] Greek yogurt  Snack: apple  Lunch: Luncheable and pretzels (less than 400 calories)  Snack: skip OR vegetable OR fruit  Dinner: (not as hungry at dinner) bowl of cereal with skim milk and banana OR Hello Fresh meal (protein, carb, veggie)  Snack: Smores and decaf coffee    Beverages: at least 80oz water, 4 cups caffeinated/decaf coffee  Volume of beverage intake: at least 80oz     Weekends: Same  Cravings: salty / sweet carbs  Trouble area of day:between meals / portions at meals    Frequency of Eating out: biweekly  Food restrictions:none reported  Cooking: self   Food Shopping: self    Physical Activity Intake  Activity: low impact exercises  Frequency: 4-5x/week  Physical limitations/barriers to exercise: none    Estimated Needs  Energy  Bear Jose Daniel Energy Needs: BMR : 1,923 calories    1-2# loss weekly sedentary:  1,308-1,808 calories             1-2# loss weekly lightly active:1,645-2,145 calories   Maintenance calories for sedentary activity level: 2,308 calories  Protein:77-96gm      (1.2-1.5g/kg IBW)  Fluid: 75oz     (35mL/kg IBW)    Nutrition Diagnosis  Yes; Overweight/obesity  related to Excess energy intake as evidenced by  BMI more than normative standard for age and sex (obesity-grade II 35-39. 9)       Nutrition Intervention    Nutrition Prescription  Calories:1500 calories and flex to 1700 calories on cardio days  Protein:75-90gm  Fluid:75oz    Meal Plan (Mir/Pro/Carb)  Breakfast: 200-300/20/30  Snack:  Lunch: 300-400/30/30-45  Snack: 150-200/>5/20  Dinner: 300-400/30/30-45  Snack: 150-200/>5/20    Nutrition Education:    Calorie controlled menu  Lean protein food choices  Healthy snack options  Food journaling tips      Nutrition Counseling:  Strategies: meal planning, portion sizes, healthy snack choices, hydration, fiber intake, protein intake, exercise, food journal      Monitoring and Evaluation:  Evaluation criteria:  Energy Intake  Meet protein needs  Maintain adequate hydration  Monitor weekly weight  Meal planning/preparation  Food journal   Decreased portions at mealtimes and snacks  Physical activity     Barriers to learning:none  Readiness to change: Action:  (Changing behavior)  Comprehension: good  Expected Compliance: good

## 2023-09-13 ENCOUNTER — OFFICE VISIT (OUTPATIENT)
Dept: BARIATRICS | Facility: CLINIC | Age: 33
End: 2023-09-13

## 2023-09-13 DIAGNOSIS — R63.5 ABNORMAL WEIGHT GAIN: Primary | ICD-10-CM

## 2023-09-13 PROCEDURE — RECHECK

## 2023-09-13 NOTE — PROGRESS NOTES
Weight Management Medical Nutrition Assessment  Yecenia presented for 3/3 RD meal planning session. Today's weight is 257.8#. She has maintained weight since provider visit in May 2023. Reports she was in a weight loss and exercise intensive program. Reports 2# muscle gain during program. She is currently working with PCOS dietitian. Reports dietitian is recommending 120g protein and 30% of calories from carbohydrates. She is currently meal prepping with macro friendly meals. Discussed goal of 30%-40% of calories from carbohydrate sources. RD reviewed estimated needs. Recommended adjustment in calories to aid in weight loss. RD recommended 1,400 calories. If patient recognizes hunger recommended increase to 1,500 calories on exercise day. Emphasized patient needs more calories on active days. Reviewed additional calories on active days. Goal of at least 1,600 calories. Discussed exercise routine. Currently on metformin. Scheduled body composition.     Patient seen by Medical Provider in past 6 months:  yes  Requested to schedule appointment with Medical Provider: No      Anthropometric Measurements  Start Weight (#): 245.8# 9/21/20  Current Weight (#): 257.8#  TBW % Change from start weight: +1.04%  Ideal Body Weight (#):140#  Goal Weight (#):STG: under 200#      Weight Loss History  Previous weight loss attempts: Commercial Programs (Weight Watchers, [employer], etc.)  Counseling with  MD  Self Created Diets (Portion Control, Healthy Food Choices, etc.)    Food and Nutrition Related History    Food Recall   Breakfast: 151 english muffin, egg whites, slice of cheese, turkey sausage or turkey emmanuel (275 calories)  Snack: apple  calorie chex mix  Lunch: 400 calorie meal- sausage, low carb bread, gravy, mashed potato and mixed cauliflower mashed potato OR mix of noodles and zucchini noodles, protein, sauce  Snack: skip  Dinner: 500 calorie or less HelloFresh meal  Snack: 150 calorie dessert (protein pudding or Smores) and decaf coffee    Beverages: at least 80oz water, 4 cups caffeinated/decaf coffee  Volume of beverage intake: at least 80oz     Weekends: Same  Cravings: salty / sweet carbs  Trouble area of day:between meals / portions at meals    Frequency of Eating out: biweekly  Food restrictions:none reported  Cooking: self   Food Shopping: self    Physical Activity Intake  Activity: Exercise classes (cycling, pilates, swimming)  Frequency: 3-4 times per week  Physical limitations/barriers to exercise: none    Estimated Needs  Energy  Bear Jose Daniel Energy Needs: BMR : 1,918 calories    1-2# loss weekly sedentary:  1,301-1,801 calories             1-2# loss weekly lightly active:1,637-2,137 calories   Maintenance calories for sedentary activity level: 2,301 calories  Protein: 77-96gm      (1.2-1.5g/kg IBW)  Fluid: 75oz     (35mL/kg IBW)    Nutrition Diagnosis  Yes; Overweight/obesity  related to Excess energy intake as evidenced by  BMI more than normative standard for age and sex (obesity-grade II 35-39. 9)       Nutrition Intervention    Nutrition Prescription  Calories: 1,400 calories and flex up to 1,600 calories on active days  Protein: 77-96gm  Fluid: 75oz    Meal Plan (Mir/Pro/Carb)  Breakfast: 300/20/30  Snack: 100/5/15-20  Lunch: 400/30/30  Snack:   Dinner: 400/30/30-45  Snack: 200/5/15-20    Nutrition Education:    Food journaling tips  Carbohydrates      Nutrition Counseling:  Strategies: meal planning, portion sizes, healthy snack choices, hydration, fiber intake, protein intake, exercise, food journal      Monitoring and Evaluation:  Evaluation criteria:  Energy Intake  Meet protein needs  Maintain adequate hydration  Monitor weekly weight  Meal planning/preparation  Food journal   Decreased portions at mealtimes and snacks  Physical activity     Barriers to learning:none  Readiness to change: Action:  (Changing behavior)  Comprehension: good  Expected Compliance: good

## 2023-09-26 ENCOUNTER — OFFICE VISIT (OUTPATIENT)
Dept: BARIATRICS | Facility: CLINIC | Age: 33
End: 2023-09-26
Payer: COMMERCIAL

## 2023-09-26 VITALS
DIASTOLIC BLOOD PRESSURE: 82 MMHG | HEART RATE: 60 BPM | SYSTOLIC BLOOD PRESSURE: 124 MMHG | HEIGHT: 68 IN | BODY MASS INDEX: 38.92 KG/M2 | WEIGHT: 256.8 LBS

## 2023-09-26 DIAGNOSIS — E66.9 OBESITY, CLASS II, BMI 35-39.9: Primary | ICD-10-CM

## 2023-09-26 DIAGNOSIS — Z87.42 HISTORY OF PCOS: ICD-10-CM

## 2023-09-26 DIAGNOSIS — K21.9 GASTROESOPHAGEAL REFLUX DISEASE, UNSPECIFIED WHETHER ESOPHAGITIS PRESENT: ICD-10-CM

## 2023-09-26 DIAGNOSIS — I10 BENIGN ESSENTIAL HTN: ICD-10-CM

## 2023-09-26 PROCEDURE — 99214 OFFICE O/P EST MOD 30 MIN: CPT | Performed by: NURSE PRACTITIONER

## 2023-09-26 NOTE — ASSESSMENT & PLAN NOTE
- Patient is pursuing Conservative Program with bundles with the dietician.  - Not interested in weight loss surgery. - Initial weight loss goal of 5-10% weight loss for improved health  - On OCP  - Already on metformin through endocrinology for PCOS. - Wellbutrin started in November 2020 at weight of 245 lbs. She was able to get down to 222 lbs, but has been regaining weight.   - Continue Wellbutrin  mg twice a day, tolerating well and helping with appetite and mood. - Patient denies personal history of pancreatitis. Patient also denies personal and family history of medullary thyroid cancer and multiple endocrine neoplasia type 2 (MEN 2 tumor). - Eveline Lopeseds was denied by her insurance. - Naltrexone previously added, but not making any difference with appetite, and therefore discontinued. -Denies history of seizures, kidney stones, or glaucoma.  -Discussed Qsymia and phentermine, as well as topiramate off label for help with weight loss, but she is concerned about potential side effects from both medications.  -She is getting  in July and plans on trying to conceive right after that. Discussed with her that it is recommended to be off of all weight loss medications for at least 3 months prior to trying to conceive.  -Will check fasting insulin, and if elevated, her insurance might be willing to cover Ozempic.  -Will check metabolic testing as she has been struggling to lose further weight despite following with the dietitian and exercising regularly.  -Labs reviewed: A1c, lipid panel, and CMP completed July 25, 2023. HDL low and LDL elevated, which will likely improve with weight loss. Remainder of the blood work was within acceptable range.  -Check fasting insulin.          Initial: 245.8 lbs  Last visit: 257.6 lbs BMI 38.04   Current: 256.8 pounds BMI 39.05  Change: +11 lbs (-1 lbs since last office visit)  Goal: under 200 lbs    Goals:   Restart food logging  Keep up the great work of getting protein with each meal.  Keep up the great work with water intake, at least 64 oz daily. Keep up the great work with exercise. Nutrition recommendations per dietician. Continue welbutrin 150 SR BID  Continue metformin through endocrinology.

## 2023-09-26 NOTE — ASSESSMENT & PLAN NOTE
- Taking Coreg. May improve with weight loss and lifestyle modification. Continue management with prescribing provider.

## 2023-09-26 NOTE — PROGRESS NOTES
Assessment/Plan:     Obesity, Class II, BMI 35-39.9  - Patient is pursuing Conservative Program with bundles with the dietician.  - Not interested in weight loss surgery. - Initial weight loss goal of 5-10% weight loss for improved health  - On OCP  - Already on metformin through endocrinology for PCOS. - Wellbutrin started in November 2020 at weight of 245 lbs. She was able to get down to 222 lbs, but has been regaining weight.   - Continue Wellbutrin  mg twice a day, tolerating well and helping with appetite and mood. - Patient denies personal history of pancreatitis. Patient also denies personal and family history of medullary thyroid cancer and multiple endocrine neoplasia type 2 (MEN 2 tumor). - Leonard Mina was denied by her insurance. - Naltrexone previously added, but not making any difference with appetite, and therefore discontinued. -Denies history of seizures, kidney stones, or glaucoma.  -Discussed Qsymia and phentermine, as well as topiramate off label for help with weight loss, but she is concerned about potential side effects from both medications.  -She is getting  in July and plans on trying to conceive right after that. Discussed with her that it is recommended to be off of all weight loss medications for at least 3 months prior to trying to conceive.  -Will check fasting insulin, and if elevated, her insurance might be willing to cover Ozempic.  -Will check metabolic testing as she has been struggling to lose further weight despite following with the dietitian and exercising regularly.  -Labs reviewed: A1c, lipid panel, and CMP completed July 25, 2023. HDL low and LDL elevated, which will likely improve with weight loss. Remainder of the blood work was within acceptable range.  -Check fasting insulin.          Initial: 245.8 lbs  Last visit: 257.6 lbs BMI 38.04   Current: 256.8 pounds BMI 39.05  Change: +11 lbs (-1 lbs since last office visit)  Goal: under 200 lbs    Goals: Restart food logging  Keep up the great work of getting protein with each meal.  Keep up the great work with water intake, at least 64 oz daily. Keep up the great work with exercise. Nutrition recommendations per dietician. Continue welbutrin 150 SR BID  Continue metformin through endocrinology. History of PCOS  - Taking OCP and metformin 1500mg daily. Continue management with prescribing provider. Benign essential HTN  - Taking Coreg. May improve with weight loss and lifestyle modification. Continue management with prescribing provider. GERD (gastroesophageal reflux disease)  - Taking pepcid and tums. May improve with weight loss and lifestyle modification. Continue management with prescribing provider. Kerwin Quiros was seen today for follow-up. Diagnoses and all orders for this visit:    Obesity, Class II, BMI 35-39.9    History of PCOS  -     Insulin, fasting; Future    Benign essential HTN    Gastroesophageal reflux disease, unspecified whether esophagitis present          Follow up in approximately 5 months with Non-Surgical Physician/Advanced Practitioner. Subjective:   Chief Complaint   Patient presents with   • Follow-up     MWM  4 month F/U  waist 43 inch. Patient ID: Joseph Jackson  is a 28 y.o. female with excess weight/obesity here to pursue weight management. Patient is pursuing Conservative Program with visits with the dietician. Most recent notes and records were reviewed. HPI    Wt Readings from Last 10 Encounters:   09/26/23 116 kg (256 lb 12.8 oz)   09/11/23 117 kg (258 lb 9.6 oz)   07/25/23 118 kg (260 lb)   07/24/23 118 kg (260 lb 9.6 oz)   05/26/23 117 kg (257 lb 9.6 oz)   04/17/23 116 kg (256 lb 3.2 oz)   03/01/23 115 kg (254 lb 6.4 oz)   02/15/23 116 kg (255 lb 3.2 oz)   01/18/23 116 kg (254 lb 12.8 oz)   01/17/23 117 kg (259 lb)        Seeing a PCOS dietician. Wellbutrin  mg twice a day. No negative side effects.  Feels that it is helping with appetite and has been good for her mentally. Taking Metformin through endocrinology, 500 mg 3 times a day. No negative side effects. Helping with appetite. Has been food logging. Getting 1500 calories per day. Cravings at times. Working on getting enough protein. B: sandwiches 647 bread eggs whites and cheese or emmanuel or turkey sausage  S: 100 calorie snack - chickpeas or edamame  L: 300-400 calories - protein and veggie and starch   S: 100 calorie snack   D: Hello Fresh meal or protein cereal and milk   S: jerky       Hydration- 128 oz water, 2 cups of coffee 1 cup decaf - typically black sometimes 2% milk  Alcohol- 2 drinks per week  Exercise- 4 days per week class at [employer] [employer] [facility] [employer]      Colonoscopy: N/A  Mammogram: N/A      The following portions of the patient's history were reviewed and updated as appropriate: allergies, current medications, past family history, past medical history, past social history, past surgical history, and problem list.    Family History   Problem Relation Age of Onset   • Hypertension Mother    • Other Mother         vaginal tumors   • Heart disease Mother    • Cancer Mother    • Skin cancer Father    • Heart Valve Disease Father         leaky valve   • Diabetes Maternal Grandmother    • Kidney failure Maternal Grandmother    • Ovarian cancer Paternal Grandmother    • Lung cancer Paternal Grandfather    • Heart attack Paternal Aunt    • Other Paternal Aunt         Ovarian polys        Review of Systems   HENT: Negative for sore throat. Respiratory: Negative for cough and shortness of breath. Cardiovascular: Negative for chest pain and palpitations. Gastrointestinal: Negative for abdominal pain, constipation, diarrhea, nausea and vomiting.        + GERD on medication   Musculoskeletal: Negative for arthralgias and back pain. Skin: Negative for rash. Psychiatric/Behavioral: Negative for suicidal ideas (or HI).         Denies depression and anxiety Objective:  /82   Pulse 60   Ht 5' 8" (1.727 m)   Wt 116 kg (256 lb 12.8 oz)   BMI 39.05 kg/m²     Physical Exam  Vitals and nursing note reviewed. Constitutional   General appearance: Abnormal.  well developed and obese. Eyes No conjunctival injection. Ears, Nose, Mouth, and Throat Oral mucosa moist.   Pulmonary   Respiratory effort: No increased work of breathing or signs of respiratory distress. Cardiovascular     Examination of extremities for edema and/or varicosities: Normal.  no edema. Abdomen   Abdomen: Abnormal.  The abdomen was obese.     Musculoskeletal   Normal range of motion  Neurological   Gait and station: Normal.    Psychiatric   Orientation to person, place and time: Normal.    Affect: appropriate

## 2023-09-26 NOTE — ASSESSMENT & PLAN NOTE
- Taking pepcid and tums. May improve with weight loss and lifestyle modification. Continue management with prescribing provider.

## 2023-10-03 ENCOUNTER — TELEPHONE (OUTPATIENT)
Dept: BARIATRICS | Facility: CLINIC | Age: 33
End: 2023-10-03

## 2023-12-01 DIAGNOSIS — I10 BENIGN ESSENTIAL HTN: ICD-10-CM

## 2023-12-01 RX ORDER — CARVEDILOL 25 MG/1
25 TABLET ORAL 2 TIMES DAILY WITH MEALS
Qty: 180 TABLET | Refills: 1 | Status: SHIPPED | OUTPATIENT
Start: 2023-12-01

## 2023-12-04 DIAGNOSIS — K22.4 ESOPHAGEAL SPASM: ICD-10-CM

## 2023-12-04 RX ORDER — FAMOTIDINE 40 MG/1
40 TABLET, FILM COATED ORAL DAILY
Qty: 90 TABLET | Refills: 1 | Status: SHIPPED | OUTPATIENT
Start: 2023-12-04

## 2023-12-28 DIAGNOSIS — Z01.419 WOMEN'S ANNUAL ROUTINE GYNECOLOGICAL EXAMINATION: ICD-10-CM

## 2023-12-28 DIAGNOSIS — Z87.42 HISTORY OF OVARIAN CYST: ICD-10-CM

## 2023-12-28 RX ORDER — NORETHINDRONE ACETATE AND ETHINYL ESTRADIOL AND FERROUS FUMARATE 1MG-20(24)
1 KIT ORAL DAILY
Qty: 84 TABLET | Refills: 0 | Status: SHIPPED | OUTPATIENT
Start: 2023-12-28 | End: 2024-03-21

## 2024-01-22 ENCOUNTER — RA CDI HCC (OUTPATIENT)
Dept: OTHER | Facility: HOSPITAL | Age: 34
End: 2024-01-22

## 2024-01-23 ENCOUNTER — OFFICE VISIT (OUTPATIENT)
Age: 34
End: 2024-01-23
Payer: COMMERCIAL

## 2024-01-23 VITALS
BODY MASS INDEX: 38.8 KG/M2 | TEMPERATURE: 98.6 F | DIASTOLIC BLOOD PRESSURE: 84 MMHG | HEART RATE: 71 BPM | HEIGHT: 68 IN | WEIGHT: 256 LBS | OXYGEN SATURATION: 98 % | SYSTOLIC BLOOD PRESSURE: 126 MMHG

## 2024-01-23 DIAGNOSIS — E66.9 OBESITY, CLASS II, BMI 35-39.9: ICD-10-CM

## 2024-01-23 DIAGNOSIS — R73.03 PREDIABETES: Primary | ICD-10-CM

## 2024-01-23 DIAGNOSIS — I10 BENIGN ESSENTIAL HTN: ICD-10-CM

## 2024-01-23 DIAGNOSIS — E28.2 PCOS (POLYCYSTIC OVARIAN SYNDROME): ICD-10-CM

## 2024-01-23 DIAGNOSIS — F06.4 ANXIETY DISORDER DUE TO KNOWN PHYSIOLOGICAL CONDITION: ICD-10-CM

## 2024-01-23 PROCEDURE — 99214 OFFICE O/P EST MOD 30 MIN: CPT | Performed by: INTERNAL MEDICINE

## 2024-01-23 RX ORDER — BUPROPION HYDROCHLORIDE 150 MG/1
150 TABLET, EXTENDED RELEASE ORAL 2 TIMES DAILY
Qty: 60 TABLET | Refills: 3 | Status: SHIPPED | OUTPATIENT
Start: 2024-01-23

## 2024-01-23 RX ORDER — CARVEDILOL 25 MG/1
25 TABLET ORAL 2 TIMES DAILY WITH MEALS
Start: 2024-01-23

## 2024-01-23 NOTE — PROGRESS NOTES
Name: Yecenia Rodriguez      : 1990      MRN: [id number]  Encounter Provider: Mahogany Encarnacion MD  Encounter Date: 2024   Encounter department: [facility] [place]    Assessment & Plan     1. Prediabetes  -     Hemoglobin A1C; Future  -     Ambulatory Referral to Endocrinology; Future    2. Anxiety disorder due to known physiological condition  -     buPROPion (Wellbutrin SR) 150 mg 12 hr tablet; Take 1 tablet (150 mg total) by mouth 2 (two) times a day    3. Obesity, Class II, BMI 35-39.9  -     buPROPion (Wellbutrin SR) 150 mg 12 hr tablet; Take 1 tablet (150 mg total) by mouth 2 (two) times a day    4. Benign essential HTN  Assessment & Plan:  Readings are still mildly elevated at a diastolic of 84.  Systolic is 126.  Recommend additional half tablet at night in addition to the 25 mg which she takes in the day.  If however you feel poorly when you take the additional half tablet at night you can discontinue the additional half tablet at night and just take the 25 mg in the day    Orders:  -     carvedilol (COREG) 25 mg tablet; Take 1 tablet (25 mg total) by mouth 2 (two) times a day with meals Take 25 mg in the morning and half tablet, 12.5 mg at night  -     Basic metabolic panel; Future    5. PCOS (polycystic ovarian syndrome)  -     Ambulatory Referral to Endocrinology; Future           Subjective     Chief Complaint   Patient presents with   • Follow-up     6 month follow up   Last labs done on 2023     • Hypertension   • GERD   • Polycystic Ovary Syndrome   • HM     Pap over due       Here today for follow-up.  Overall feels well.  Has established now with a new nutritionist who she does like and is excited about getting back on her regimen.  Is trying to get on the We[employer] to help with the weight loss, has not yet been approved.  She is fairly discouraged with her situation but is hopeful that she is able the approval for it.  She is prediabetic as well  has a history of PCOS  Her hypertension is fairly well-controlled on carvedilol 25 mg daily although her readings for her systolic blood pressure can be as high as 135 and a diastolic number off between 84 and 86  .  Today was 126/84          Hypertension  Associated symptoms include anxiety. Risk factors for coronary artery disease include dyslipidemia and obesity. Past treatments include beta blockers. The current treatment provides significant improvement.   Anxiety  Symptoms include nervous/anxious behavior.       Hyperlipidemia    Heartburn      Review of Systems   Constitutional:  Positive for unexpected weight change.   Genitourinary:         PCOS   Psychiatric/Behavioral:  Positive for dysphoric mood. The patient is nervous/anxious.    All other systems reviewed and are negative.      Past Medical History:   Diagnosis Date   • Arthritis    • Atypical squamous cell changes of undetermined significance (ASCUS) on cervical cytology with negative high risk human papilloma virus (HPV) test result    • High cholesterol    • Ovarian cyst 06/30/2022    bilateral   • PCOS (polycystic ovarian syndrome)      Past Surgical History:   Procedure Laterality Date   • WISDOM TOOTH EXTRACTION  2007     Family History   Problem Relation Age of Onset   • Hypertension Mother    • Other Mother         vaginal tumors   • Heart disease Mother    • Cancer Mother    • Skin cancer Father    • Heart Valve Disease Father         leaky valve   • Diabetes Maternal Grandmother    • Kidney failure Maternal Grandmother    • Ovarian cancer Paternal Grandmother    • Lung cancer Paternal Grandfather    • Heart attack Paternal Aunt    • Other Paternal Aunt         Ovarian polys     Social History     Socioeconomic History   • Marital status: Single     Spouse name: None   • Number of children: None   • Years of education: None   • Highest education level: None   Occupational History   • Occupation: Teacher   Tobacco Use   • Smoking status: Every  Day     Current packs/day: 0.25     Average packs/day: 0.3 packs/day for 20.1 years (5.0 ttl pk-yrs)     Types: Cigarettes     Start date: 2004   • Smokeless tobacco: Never   • Tobacco comments:     3 cigarettes daily   Vaping Use   • Vaping status: Never Used   Substance and Sexual Activity   • Alcohol use: Never   • Drug use: Never   • Sexual activity: Yes     Partners: Male   Other Topics Concern   • None   Social History Narrative   • None     Social Determinants of Health     Financial Resource Strain: Not on file   Food Insecurity: Not on file   Transportation Needs: Not on file   Physical Activity: Not on file   Stress: Not on file   Social Connections: Not on file   Intimate Partner Violence: Not on file   Housing Stability: Not on file     Current Outpatient Medications on File Prior to Visit   Medication Sig   • aluminum chloride (DRYSOL) 20 % external solution Use daily as needed.   • calcium carbonate (TUMS) 500 mg chewable tablet Chew 2 tablets daily     • famotidine (PEPCID) 40 MG tablet Take 1 tablet (40 mg total) by mouth in the morning   • hydrochlorothiazide (HYDRODIURIL) 12.5 mg tablet Take 1 tablet (12.5 mg total) by mouth daily As needed based on blood pressure. Target is 120/80 mmHg   • metFORMIN (GLUCOPHAGE-XR) 500 mg 24 hr tablet Take 500 mg by mouth 3 (three) times a day    • norethindrone-ethinyl estradiol-ferrous fumarate (Junel Fe 24) 1-20 MG-MCG(24) per tablet Take 1 tablet by mouth daily Start Sunday   • [DISCONTINUED] buPROPion (Wellbutrin SR) 150 mg 12 hr tablet Take 1 tablet (150 mg total) by mouth 2 (two) times a day   • [DISCONTINUED] carvedilol (COREG) 25 mg tablet Take 1 tablet (25 mg total) by mouth 2 (two) times a day with meals (Patient taking differently: Take 25 mg by mouth in the morning)   • chlorhexidine (PERIDEX) 0.12 % solution  (Patient not taking: Reported on 1/23/2024)     Allergies   Allergen Reactions   • Bee Venom Swelling     Large local reaction   • Nicotine  "Other (See Comments)     Pt had severe \"night terrors\" when using nicotine patch   • Other      Nicotene patches  Fleas   • Clarithromycin Rash   • Clindamycin Rash   • Erigeron Annuus Rash     Fleas...large hives     Immunization History   Administered Date(s) Administered   • COVID-19 J&J (Health Plotter) vaccine 0.5 mL 03/21/2021, 12/04/2021   • DTP 1990, 1990, 1990, 12/04/1990, 05/24/1994   • HPV Quadrivalent 10/25/2013, 11/22/2013, 04/01/2014   • Hep B, adult 01/28/1994, 05/24/1994, 06/27/1994   • Hepatitis B 01/28/1994, 05/24/1994, 06/27/1994   • HiB 08/29/1990   • INFLUENZA 10/30/1996, 12/05/1996, 10/25/2013, 11/21/2014, 10/01/2017, 10/15/2020, 11/05/2021, 01/18/2023   • Influenza Quadrivalent 3 years and older 10/30/1996, 12/05/1996   • Influenza, injectable, quadrivalent, preservative free 0.5 mL 11/05/2020, 01/18/2023   • Influenza, seasonal, injectable 10/25/2013, 11/21/2014, 12/05/2015, 10/01/2017   • MMR 08/29/1990, 10/24/1996   • Meningococcal Conjugate (MCV4O) 07/28/2008   • Meningococcal MCV4P 07/28/2008   • OPV 1990, 1989, 12/04/1990, 05/24/1994   • Pneumococcal Polysaccharide PPV23 01/22/2018   • Td (adult), adsorbed 05/09/2000   • Tdap 10/25/2013   • Tuberculin Skin Test 06/12/1990, 04/29/1993, 05/24/1994   • Tuberculin Skin Test-PPD Intradermal 06/12/1990, 04/29/1993, 05/24/1994, 02/24/1998, 04/20/2000, 07/28/2008, 02/06/2012, 03/26/2019   • Varicella 06/19/2001, 07/29/2009       Objective     /84 (BP Location: Left arm, Patient Position: Sitting, Cuff Size: Large)   Pulse 71   Temp 98.6 °F (37 °C)   Ht 5' 8\" (1.727 m)   Wt 116 kg (256 lb)   SpO2 98%   BMI 38.92 kg/m²     Physical Exam    Gen: NAD  Heent: atraumatic, normocephalic  Mouth: is moist  Neck: is supple, no JVD, no carotid bruits.   Heart: is regular Normal s1, s2,  Lungs: are clear to auscultation  Ext: no edema, distal pulses normal  Skin: no rashes, ulcers  Mood: normal affect  Neuro: AAOx3 "     Mahogany Encarnacion MD

## 2024-01-23 NOTE — ASSESSMENT & PLAN NOTE
Readings are still mildly elevated at a diastolic of 84.  Systolic is 126.  Recommend additional half tablet at night in addition to the 25 mg which she takes in the day.  If however you feel poorly when you take the additional half tablet at night you can discontinue the additional half tablet at night and just take the 25 mg in the day

## 2024-01-25 ENCOUNTER — TELEPHONE (OUTPATIENT)
Dept: ENDOCRINOLOGY | Facility: CLINIC | Age: 34
End: 2024-01-25

## 2024-02-06 ENCOUNTER — APPOINTMENT (OUTPATIENT)
Dept: LAB | Facility: HOSPITAL | Age: 34
End: 2024-02-06
Payer: COMMERCIAL

## 2024-02-06 ENCOUNTER — ANNUAL EXAM (OUTPATIENT)
Dept: GYNECOLOGY | Facility: CLINIC | Age: 34
End: 2024-02-06
Payer: COMMERCIAL

## 2024-02-06 VITALS — SYSTOLIC BLOOD PRESSURE: 130 MMHG | WEIGHT: 260 LBS | BODY MASS INDEX: 39.53 KG/M2 | DIASTOLIC BLOOD PRESSURE: 80 MMHG

## 2024-02-06 DIAGNOSIS — Z01.419 WOMEN'S ANNUAL ROUTINE GYNECOLOGICAL EXAMINATION: Primary | ICD-10-CM

## 2024-02-06 DIAGNOSIS — Z30.41 ENCOUNTER FOR SURVEILLANCE OF CONTRACEPTIVE PILLS: ICD-10-CM

## 2024-02-06 DIAGNOSIS — F32.A ANXIETY AND DEPRESSION: ICD-10-CM

## 2024-02-06 DIAGNOSIS — Z87.42 HISTORY OF OVARIAN CYST: ICD-10-CM

## 2024-02-06 DIAGNOSIS — F41.9 ANXIETY AND DEPRESSION: ICD-10-CM

## 2024-02-06 DIAGNOSIS — E66.9 OBESITY, CLASS II, BMI 35-39.9: ICD-10-CM

## 2024-02-06 DIAGNOSIS — Z80.41 FAMILY HISTORY OF OVARIAN CANCER: ICD-10-CM

## 2024-02-06 PROCEDURE — G0124 SCREEN C/V THIN LAYER BY MD: HCPCS | Performed by: PATHOLOGY

## 2024-02-06 PROCEDURE — G0145 SCR C/V CYTO,THINLAYER,RESCR: HCPCS | Performed by: PATHOLOGY

## 2024-02-06 PROCEDURE — S0612 ANNUAL GYNECOLOGICAL EXAMINA: HCPCS | Performed by: OBSTETRICS & GYNECOLOGY

## 2024-02-06 RX ORDER — NORETHINDRONE ACETATE AND ETHINYL ESTRADIOL AND FERROUS FUMARATE 1MG-20(24)
1 KIT ORAL DAILY
Qty: 84 TABLET | Refills: 3 | Status: SHIPPED | OUTPATIENT
Start: 2024-02-06 | End: 2025-01-07

## 2024-02-06 NOTE — PROGRESS NOTES
Assessment   Annual well woman exam  Obesity BMI 39.5  Family history of ovarian cancer  Anxiety and depression  Essential hypertension  Diabetes, type II  Contraceptive management        Plan   Renew OCPs  Continue weight loss management program   All questions answered.  Await pap smear results.  Breast self exam technique reviewed and patient encouraged to perform self-exam monthly.  Diagnosis explained in detail, including differential.  Discussed healthy lifestyle modifications.     Subjective here for annual exam     Yecenia Rodriguez is a 33 y.o. female who presents for annual exam. Periods are regular every 28-30 days, lasting 5 days. Dysmenorrhea:none. Cyclic symptoms include none. No intermenstrual bleeding, spotting, or discharge. The patient reports that there is not domestic violence in her life.     Current contraception: OCP (estrogen/progesterone)  History of abnormal Pap smear: no  Family history of uterine or ovarian cancer: yes -maternal grandmother  Regular self breast exam: yes  History of abnormal mammogram: no  Family history of breast cancer: no  History of abnormal lipids: no  Menstrual History:  OB History    No obstetric history on file.        Menarche age: 11  No LMP recorded.     Review of Systems  Pertinent items are noted in HPI.      Objective no acute distress   /80   Wt 118 kg (260 lb)   BMI 39.53 kg/m²     General:   alert and oriented, in no acute distress, alert, appears stated age, and cooperative   Heart: regular rate and rhythm, S1, S2 normal, no murmur, click, rub or gallop   Lungs: clear to auscultation bilaterally   Abdomen: soft, non-tender, without masses or organomegaly   Vulva: normal, Bartholin's, Urethra, [place]'s normal, female escutcheon   Vagina: normal mucosa, normal discharge, no palpable nodules   Cervix: anteverted, no bleeding following Pap, no cervical motion tenderness, no lesions, and nulliparous appearance   Uterus: normal size, anteverted,  mobile, non-tender, normal shape and consistency   Adnexa: normal adnexa and no mass, fullness, tenderness   Bilateral breast exam in the sitting and supine position with chaperone present, no visible or palpable breast lesions identified.  No breast masses noted.    No supraclavicular or axillary lymphadenopathy noted.  No nipple discharge.   Reviewed self-breast exam techniques.   Rectal exam,  deferred

## 2024-02-07 PROCEDURE — G0124 SCREEN C/V THIN LAYER BY MD: HCPCS | Performed by: PATHOLOGY

## 2024-02-07 PROCEDURE — G0145 SCR C/V CYTO,THINLAYER,RESCR: HCPCS | Performed by: PATHOLOGY

## 2024-02-15 LAB
LAB AP GYN PRIMARY INTERPRETATION: ABNORMAL
Lab: ABNORMAL
PATH INTERP SPEC-IMP: ABNORMAL

## 2024-02-16 ENCOUNTER — TELEPHONE (OUTPATIENT)
Dept: GYNECOLOGY | Facility: CLINIC | Age: 34
End: 2024-02-16

## 2024-02-16 NOTE — TELEPHONE ENCOUNTER
Patient very upset, called regarding ASCUS papsmear.  Reassured her chance of cervical cancer is very low with an Ascus pap Negative HPV and something as little as irritation could cause this result.  Patient would like to know if she should get a repap or wait until next annual

## 2024-02-17 NOTE — TELEPHONE ENCOUNTER
Current management guidelines ASCUS Pap with negative HPV testing recommend repeat in 1 year.  I recommend repeat in the next 3 to 6 months for patient reassurance.

## 2024-03-05 ENCOUNTER — OFFICE VISIT (OUTPATIENT)
Dept: BARIATRICS | Facility: CLINIC | Age: 34
End: 2024-03-05
Payer: COMMERCIAL

## 2024-03-05 ENCOUNTER — CLINICAL SUPPORT (OUTPATIENT)
Dept: BARIATRICS | Facility: CLINIC | Age: 34
End: 2024-03-05

## 2024-03-05 VITALS
HEART RATE: 71 BPM | SYSTOLIC BLOOD PRESSURE: 125 MMHG | WEIGHT: 270.2 LBS | HEIGHT: 68 IN | BODY MASS INDEX: 40.95 KG/M2 | TEMPERATURE: 98.1 F | DIASTOLIC BLOOD PRESSURE: 80 MMHG | RESPIRATION RATE: 16 BRPM

## 2024-03-05 VITALS — HEIGHT: 68 IN | BODY MASS INDEX: 40.95 KG/M2 | WEIGHT: 270.2 LBS

## 2024-03-05 DIAGNOSIS — K21.9 GASTROESOPHAGEAL REFLUX DISEASE, UNSPECIFIED WHETHER ESOPHAGITIS PRESENT: ICD-10-CM

## 2024-03-05 DIAGNOSIS — I10 BENIGN ESSENTIAL HTN: ICD-10-CM

## 2024-03-05 DIAGNOSIS — E66.01 CLASS 3 SEVERE OBESITY WITH BODY MASS INDEX (BMI) OF 40.0 TO 44.9 IN ADULT (HCC): Primary | ICD-10-CM

## 2024-03-05 DIAGNOSIS — R63.5 ABNORMAL WEIGHT GAIN: Primary | ICD-10-CM

## 2024-03-05 DIAGNOSIS — E28.2 PCOS (POLYCYSTIC OVARIAN SYNDROME): ICD-10-CM

## 2024-03-05 PROCEDURE — RECHECK

## 2024-03-05 PROCEDURE — 99213 OFFICE O/P EST LOW 20 MIN: CPT | Performed by: NURSE PRACTITIONER

## 2024-03-05 NOTE — PROGRESS NOTES
Assessment/Plan:     Class 3 severe obesity with body mass index (BMI) of 40.0 to 44.9 in adult ([facility])  - Patient is pursuing Conservative Program.  - Not interested in weight loss surgery.  - Initial weight loss goal of 5-10% weight loss for improved health  - On OCP  - Already on metformin through endocrinology for PCOS.  - Wellbutrin started in November 2020 at weight of 245 lbs. She was able to get down to 222 lbs, but started regaining. Primary care has taken this over.    - Patient denies personal history of pancreatitis. Patient also denies personal and family history of medullary thyroid cancer and multiple endocrine neoplasia type 2 (MEN 2 tumor).   - Saxenda was denied by her insurance.    - Naltrexone previously added, but no difference with appetite, and therefore discontinued.  -Denies history of seizures, kidney stones, or glaucoma.  -She is getting  in July and plans on trying to conceive right after that, therefore will hold off on starting any other weight loss medications.  - Got off track, but has the knowledge to get back on track. Feeling defeated and discussed  visit to help with strategies to stay on track.   - Getting blood work through primary care and fasting insulin previously placed.         Initial: 245.8 lbs  Last visit: 256.8 pounds BMI 39.05  Current: 270.1 lbs BMI 41.08  Change: +24 lbs (+13 lbs since last office visit)  Goal: under 200 lbs    Goals:   Continue food logging  Keep up the great work with water intake, at least 64 oz daily.  Keep up the great work with exercise.   Nutrition recommendations per dietician.   Continue welbutrin 150 SR BID through primary care  Continue metformin through endocrinology.    PCOS (polycystic ovarian syndrome)  - Taking OCP and metformin 1500mg daily. Continue management with prescribing provider.       GERD (gastroesophageal reflux disease)  - Taking pepcid. May improve with weight loss and lifestyle modification. Continue  management with prescribing provider. Advised to follow-up with primary care as she has been having an increase in symptoms.        Benign essential HTN  - Taking Coreg. May improve with weight loss and lifestyle modification. Continue management with prescribing provider.            Yecenia was seen today for follow-up.    Diagnoses and all orders for this visit:    Class 3 severe obesity with body mass index (BMI) of 40.0 to 44.9 in adult (HCC)    PCOS (polycystic ovarian syndrome)    Gastroesophageal reflux disease, unspecified whether esophagitis present    Benign essential HTN            Follow up in approximately  4 months  with Non-Surgical Physician/Advanced Practitioner.    Subjective:   Chief Complaint   Patient presents with    Follow-up     MWM 6M F/u       Patient ID: Yecenia Rodriguez  is a 33 y.o. female with excess weight/obesity here to pursue weight management.  Patient is pursuing Conservative Program with visits with the dietician.   Most recent notes and records were reviewed.    HPI    Wt Readings from Last 10 Encounters:   03/05/24 123 kg (270 lb 3.2 oz)   03/05/24 123 kg (270 lb 3.2 oz)   02/06/24 118 kg (260 lb)   01/23/24 116 kg (256 lb)   09/26/23 116 kg (256 lb 12.8 oz)   09/11/23 117 kg (258 lb 9.6 oz)   07/25/23 118 kg (260 lb)   07/24/23 118 kg (260 lb 9.6 oz)   05/26/23 117 kg (257 lb 9.6 oz)   04/17/23 116 kg (256 lb 3.2 oz)        Seeing a PCOS dietician.      Wellbutrin  mg twice a day. No negative side effects. Feels that it is helping with appetite.     Taking Metformin through endocrinology, 500 mg 3 times a day. No negative side effects. Helping with appetite.      Has been food logging. Getting 1500 calories per day. Some cravings for sweets.     Recently got off track with diet, but working on getting back on track.        Hydration- 15 glasses water, 2 cups of coffee 2 cup decaf - typically black sometimes 2% milk, occ diet coke  Alcohol- 2 drinks per  "week  Exercise- 4 days per week class at [employer] once a week      Colonoscopy: N/A  Mammogram: N/A      The following portions of the patient's history were reviewed and updated as appropriate: allergies, current medications, past family history, past medical history, past social history, past surgical history, and problem list.    Family History   Problem Relation Age of Onset    Hypertension Mother     Other Mother         vaginal tumors    Heart disease Mother     Cancer Mother     Skin cancer Father     Heart Valve Disease Father         leaky valve    Diabetes Maternal Grandmother     Kidney failure Maternal Grandmother     Ovarian cancer Paternal Grandmother     Lung cancer Paternal Grandfather     Heart attack Paternal Aunt     Other Paternal Aunt         Ovarian polys        Review of Systems   HENT:  Negative for sore throat.    Respiratory:  Negative for cough and shortness of breath.    Cardiovascular:  Negative for chest pain and palpitations.   Gastrointestinal:  Negative for abdominal pain, constipation, diarrhea, nausea and vomiting.        + GERD taking medication, advised to follow-up with PCP.    Musculoskeletal:  Negative for arthralgias and back pain.   Skin:  Negative for rash.   Psychiatric/Behavioral:  Negative for suicidal ideas (or HI).         Denies depression and anxiety       Objective:  /80   Pulse 71   Temp 98.1 °F (36.7 °C)   Resp 16   Ht 5' 8\" (1.727 m)   Wt 123 kg (270 lb 3.2 oz)   BMI 41.08 kg/m²     Physical Exam  Vitals and nursing note reviewed.        Constitutional   General appearance: Abnormal.  well developed and morbidly obese.   Eyes No conjunctival injection.   Ears, Nose, Mouth, and Throat Oral mucosa moist.   Pulmonary   Respiratory effort: No increased work of breathing or signs of respiratory distress.    Cardiovascular    Examination of extremities for edema and/or varicosities: Normal.  no edema.   Abdomen   Abdomen: Abnormal.  The " abdomen was obese.    Musculoskeletal   Normal range of motion  Neurological   Gait and station: Normal.   Psychiatric   Orientation to person, place and time: Normal.    Affect: appropriate

## 2024-03-05 NOTE — ASSESSMENT & PLAN NOTE
- Patient is pursuing Conservative Program.  - Not interested in weight loss surgery.  - Initial weight loss goal of 5-10% weight loss for improved health  - On OCP  - Already on metformin through endocrinology for PCOS.  - Wellbutrin started in November 2020 at weight of 245 lbs. She was able to get down to 222 lbs, but started regaining. Primary care has taken this over.    - Patient denies personal history of pancreatitis. Patient also denies personal and family history of medullary thyroid cancer and multiple endocrine neoplasia type 2 (MEN 2 tumor).   - Saxenda was denied by her insurance.    - Naltrexone previously added, but no difference with appetite, and therefore discontinued.  -Denies history of seizures, kidney stones, or glaucoma.  -She is getting  in July and plans on trying to conceive right after that, therefore will hold off on starting any other weight loss medications.  - Got off track, but has the knowledge to get back on track. Feeling defeated and discussed  visit to help with strategies to stay on track.   - Getting blood work through primary care and fasting insulin previously placed.         Initial: 245.8 lbs  Last visit: 256.8 pounds BMI 39.05  Current: 270.1 lbs BMI 41.08  Change: +24 lbs (+13 lbs since last office visit)  Goal: under 200 lbs    Goals:   Continue food logging  Keep up the great work with water intake, at least 64 oz daily.  Keep up the great work with exercise.   Nutrition recommendations per dietician.   Continue welbutrin 150 SR BID through primary care  Continue metformin through endocrinology.

## 2024-03-05 NOTE — ASSESSMENT & PLAN NOTE
- Taking pepcid. May improve with weight loss and lifestyle modification. Continue management with prescribing provider. Advised to follow-up with primary care as she has been having an increase in symptoms.

## 2024-03-18 ENCOUNTER — CLINICAL SUPPORT (OUTPATIENT)
Dept: BARIATRICS | Facility: CLINIC | Age: 34
End: 2024-03-18

## 2024-03-18 VITALS — BODY MASS INDEX: 41.05 KG/M2 | WEIGHT: 270 LBS

## 2024-03-18 DIAGNOSIS — R63.5 ABNORMAL WEIGHT GAIN: Primary | ICD-10-CM

## 2024-03-18 PROCEDURE — RECHECK

## 2024-03-18 NOTE — PROGRESS NOTES
MWM: Tess  Future wedding in July. Plans to start a family    Has been food logging. Getting 1500 calories per day. Some cravings for sweets.    Anxiety/ Depression in chart with Rx    History of Slim Fast and Nutrisystem in the 90's.  History of following a nutrition provider and .   History of weight loss with a 1200 calorie plan.  Encouraged to think of food as a form of self care instead of a diet.

## 2024-03-21 ENCOUNTER — CONSULT (OUTPATIENT)
Dept: CARDIOLOGY CLINIC | Facility: CLINIC | Age: 34
End: 2024-03-21
Payer: COMMERCIAL

## 2024-03-21 ENCOUNTER — APPOINTMENT (OUTPATIENT)
Dept: LAB | Facility: HOSPITAL | Age: 34
End: 2024-03-21
Payer: COMMERCIAL

## 2024-03-21 VITALS
SYSTOLIC BLOOD PRESSURE: 122 MMHG | HEART RATE: 60 BPM | BODY MASS INDEX: 40.13 KG/M2 | DIASTOLIC BLOOD PRESSURE: 78 MMHG | HEIGHT: 68 IN | WEIGHT: 264.8 LBS

## 2024-03-21 DIAGNOSIS — E66.01 CLASS 3 SEVERE OBESITY WITH BODY MASS INDEX (BMI) OF 40.0 TO 44.9 IN ADULT, UNSPECIFIED OBESITY TYPE, UNSPECIFIED WHETHER SERIOUS COMORBIDITY PRESENT (HCC): ICD-10-CM

## 2024-03-21 DIAGNOSIS — I10 BENIGN ESSENTIAL HTN: ICD-10-CM

## 2024-03-21 DIAGNOSIS — E78.5 DYSLIPIDEMIA, GOAL LDL BELOW 160: ICD-10-CM

## 2024-03-21 DIAGNOSIS — R73.03 PREDIABETES: ICD-10-CM

## 2024-03-21 DIAGNOSIS — F06.4 ANXIETY DISORDER DUE TO KNOWN PHYSIOLOGICAL CONDITION: ICD-10-CM

## 2024-03-21 DIAGNOSIS — E28.2 PCOS (POLYCYSTIC OVARIAN SYNDROME): ICD-10-CM

## 2024-03-21 DIAGNOSIS — Z87.42 HISTORY OF PCOS: ICD-10-CM

## 2024-03-21 DIAGNOSIS — Z82.49 FAMILY HISTORY OF HEART MURMUR: ICD-10-CM

## 2024-03-21 DIAGNOSIS — I10 BENIGN ESSENTIAL HTN: Primary | ICD-10-CM

## 2024-03-21 LAB
ANION GAP SERPL CALCULATED.3IONS-SCNC: 6 MMOL/L (ref 4–13)
BUN SERPL-MCNC: 14 MG/DL (ref 5–25)
CALCIUM SERPL-MCNC: 8.5 MG/DL (ref 8.4–10.2)
CHLORIDE SERPL-SCNC: 107 MMOL/L (ref 96–108)
CO2 SERPL-SCNC: 25 MMOL/L (ref 21–32)
CREAT SERPL-MCNC: 0.74 MG/DL (ref 0.6–1.3)
EST. AVERAGE GLUCOSE BLD GHB EST-MCNC: 123 MG/DL
GFR SERPL CREATININE-BSD FRML MDRD: 106 ML/MIN/1.73SQ M
GLUCOSE P FAST SERPL-MCNC: 100 MG/DL (ref 65–99)
HBA1C MFR BLD: 5.9 %
INSULIN SERPL-ACNC: 7.04 UIU/ML (ref 1.9–23)
POTASSIUM SERPL-SCNC: 4.3 MMOL/L (ref 3.5–5.3)
SODIUM SERPL-SCNC: 138 MMOL/L (ref 135–147)

## 2024-03-21 PROCEDURE — 36415 COLL VENOUS BLD VENIPUNCTURE: CPT

## 2024-03-21 PROCEDURE — 83525 ASSAY OF INSULIN: CPT

## 2024-03-21 PROCEDURE — 83036 HEMOGLOBIN GLYCOSYLATED A1C: CPT

## 2024-03-21 PROCEDURE — 80048 BASIC METABOLIC PNL TOTAL CA: CPT

## 2024-03-21 PROCEDURE — 99244 OFF/OP CNSLTJ NEW/EST MOD 40: CPT

## 2024-03-21 PROCEDURE — 93000 ELECTROCARDIOGRAM COMPLETE: CPT

## 2024-03-21 NOTE — PROGRESS NOTES
Cardiology Consultation   MD Lanre Kenny MD, FACC  Gunnar Saunders DO, [facility] FACP Ather Mansoor, MD Rujul Patel, DO, [facility]  Momo Buchanan DO, [facility]  -------------------------------------------------------------------  [facility]  [address] [place] [address]  Phone: [phone]  Fax: [phone]  24  Yecenia Rodriguez  YOB: 1990   MRN: [id number]      Referring Physician: Mahogany Encarnacion MD  [address] [place] [address]     HPI:  I am seeing this patient in cardiology consultation for:  HTN, family hx of heart disease    Yecenia Rodriguez is a 33 y.o. female with:   PCOS  HTN  GERD  Anxiety/ MDD  Dyslipidemia  Obesity    Tobacco: smokes currently about 1 pack per week  Alcohol: socially  Drugs: marijuana occasionally    Family History:     Father with valvular heart disease - not sure as to what type but was told that someday will need to be replaced.    Two other family member on mothers side  of CHF in 50s    She presents for evaluation with cardiology for HTN and a family hx of valvular heart disease in her father. Today she feels well, no SOB or CP. Sometimes gets palpitations but just for a second or two. No syncope.    Review of Systems   Constitutional:  Negative for chills and fever.   HENT:  Negative for facial swelling and sore throat.    Eyes:  Negative for visual disturbance.   Respiratory:  Negative for cough, chest tightness, shortness of breath and wheezing.    Cardiovascular:  Positive for palpitations. Negative for chest pain and leg swelling.   Gastrointestinal:  Negative for abdominal pain, blood in stool, constipation, diarrhea, nausea and vomiting.   Endocrine: Negative for cold intolerance and heat intolerance.   Genitourinary:  Negative for decreased urine volume, difficulty urinating, dysuria and hematuria.   Musculoskeletal:  Negative for arthralgias, back pain and  myalgias.   Skin:  Negative for rash.   Neurological:  Negative for dizziness, syncope, weakness and numbness.   Psychiatric/Behavioral:  Negative for agitation, behavioral problems and confusion. The patient is not nervous/anxious.         OBJECTIVE  Vitals:    03/21/24 1027   BP: 122/78   Pulse: 60       Physical Exam   Constitutional: awake, alert and oriented, in no acute distress, no obvious deformities, obese female  Head: Normocephalic, without obvious abnormality, atraumatic  Eyes: conjunctivae clear and moist. Sclera anicteric. No xanthelasmas. Pupils equal bilaterally. Extraocular motions are full.  Ear nose mouth and throat: ears are symmetrical bilaterally, hearing appears to be equal bilaterally, no nasal discharge or epistaxis, oropharynx is clear with moist mucous membranes  Neck: Trachea is midline, neck is supple, no thyromegaly or significant lymphadenopathy, there is full range of motion.  Lungs: clear to auscultation bilaterally, no wheezes, no rales, no rhonchi, no accessory muscle use, breathing is nonlabored  Heart: Regular rhythm with a Normal heart rate, S1, S2 normal, No Murmur, no click, rub or gallop, No lower extremity edema  Abdomen: Obese, soft, non-tender; bowel sounds normal; no masses, no organomegaly  Psychiatric: Patient is oriented to time, place, person, mood/affect is negative for depression, anxiety, agitation, appears to have appropriate insight  Skin: Skin is warm, dry, intact. No obvious rashes or lesions on exposed extremities. Nail beds are pink with no cyanosis or clubbing.      EKG:  Results for orders placed or performed in visit on 03/21/24   POCT ECG    Impression    Normal sinus rhythm, normal ECG        The ASCVD Risk score (Latonia DK, et al., 2019) failed to calculate for the following reasons:    The 2019 ASCVD risk score is only valid for ages 40 to 79    IMPRESSION:  HTN  Family hx of valvular heart disease in her father  GERD  Anxiety/  MDD  Dyslipidemia  Obesity  PCOS    DISCUSSION/RECOMMENDATIONS:  She presents today for evaluation with cardiology for hypertension and family history of valvular heart disease in her father.  Not sure as to exactly the specific type of valvular heart disease but her father was diagnosed with this in his early to mid 40s and was told that eventually he will need valve replacement.  I suspect he may have a bicuspid aortic valve, statistically this is the most likely valvular heart disease to be diagnosed in this timeframe.  She also has hypertension, was started on several medications by her PCP which is now controlling her blood pressure very well. She is on coreg 25 BID, and she also has a prescription for hydrochlorothiazide at 12.5 mg dose that she is supposed to take as needed if her blood pressure is high but she has not had to take this in several months.  Her blood pressure was controlled today.  Given her history of hypertension as well as family history of valvular heart disease in a first-degree relative, will investigate further with a echocardiogram to rule out underlying hypertensive heart disease and inherited cardiac valvulopathy.  Will keep medications at the same doses without alteration today  Electrocardiogram was normal.  I will give her a call with the results of the echocardiogram when completed.    Momo Buchanan DO, [facility], ANNEL  --------------------------------------------------------------------------------  TREADMILL STRESS  No results found for this or any previous visit.     ----------------------------------------------------------------------------------------------  NUCLEAR STRESS TEST: No results found for this or any previous visit.    No results found for this or any previous visit.      --------------------------------------------------------------------------------  CATH:  No results found for this or any previous  visit.    --------------------------------------------------------------------------------  ECHO:   No results found for this or any previous visit.    No results found for this or any previous visit.    --------------------------------------------------------------------------------  HOLTER  No results found for this or any previous visit.    --------------------------------------------------------------------------------  CAROTIDS  No results found for this or any previous visit.       Diagnoses and all orders for this visit:    Benign essential HTN  -     POCT ECG  -     Echo complete w/ contrast if indicated; Future    Family history of heart murmur  -     Ambulatory Referral to Cardiology  -     POCT ECG  -     Echo complete w/ contrast if indicated; Future    Class 3 severe obesity with body mass index (BMI) of 40.0 to 44.9 in adult, unspecified obesity type, unspecified whether serious comorbidity present (HCC)  -     Echo complete w/ contrast if indicated; Future    Anxiety disorder due to known physiological condition    PCOS (polycystic ovarian syndrome)    Dyslipidemia, goal LDL below 160       ======================================================    Past Medical History:   Diagnosis Date   • Arthritis    • Atypical squamous cell changes of undetermined significance (ASCUS) on cervical cytology with negative high risk human papilloma virus (HPV) test result    • High cholesterol    • Ovarian cyst 06/30/2022    bilateral   • PCOS (polycystic ovarian syndrome)      Past Surgical History:   Procedure Laterality Date   • WISDOM TOOTH EXTRACTION  2007         Medications  Current Outpatient Medications   Medication Sig Dispense Refill   • aluminum chloride (DRYSOL) 20 % external solution Use daily as needed. 35 mL 2   • buPROPion (Wellbutrin SR) 150 mg 12 hr tablet Take 1 tablet (150 mg total) by mouth 2 (two) times a day 60 tablet 3   • calcium carbonate (TUMS) 500 mg chewable tablet Chew 2 tablets daily      "  • carvedilol (COREG) 25 mg tablet Take 1 tablet (25 mg total) by mouth 2 (two) times a day with meals Take 25 mg in the morning and half tablet, 12.5 mg at night     • famotidine (PEPCID) 40 MG tablet Take 1 tablet (40 mg total) by mouth in the morning 90 tablet 1   • hydrochlorothiazide (HYDRODIURIL) 12.5 mg tablet Take 1 tablet (12.5 mg total) by mouth daily As needed based on blood pressure. Target is 120/80 mmHg 90 tablet 1   • metFORMIN (GLUCOPHAGE-XR) 500 mg 24 hr tablet Take 500 mg by mouth 3 (three) times a day      • norethindrone-ethinyl estradiol-ferrous fumarate (Junel Fe 24) 1-20 MG-MCG(24) per tablet Take 1 tablet by mouth daily Start Sunday 84 tablet 3   • chlorhexidine (PERIDEX) 0.12 % solution  (Patient not taking: Reported on 1/23/2024)       No current facility-administered medications for this visit.        Allergies   Allergen Reactions   • Bee Venom Swelling     Large local reaction   • Nicotine Other (See Comments)     Pt had severe \"night terrors\" when using nicotine patch   • Other      Nicotene patches  Fleas   • Clarithromycin Rash   • Clindamycin Rash   • Erigeron Annuus Rash     Fleas...large hives       Social History     Socioeconomic History   • Marital status: Single     Spouse name: Not on file   • Number of children: Not on file   • Years of education: Not on file   • Highest education level: Not on file   Occupational History   • Occupation: Teacher   Tobacco Use   • Smoking status: Every Day     Current packs/day: 0.25     Average packs/day: 0.3 packs/day for 20.2 years (5.1 ttl pk-yrs)     Types: Cigarettes     Start date: 2004   • Smokeless tobacco: Never   • Tobacco comments:     3 cigarettes daily   Vaping Use   • Vaping status: Never Used   Substance and Sexual Activity   • Alcohol use: Never   • Drug use: Never   • Sexual activity: Yes     Partners: Male   Other Topics Concern   • Not on file   Social History Narrative   • Not on file     Social Determinants of Health " "    Financial Resource Strain: Not on file   Food Insecurity: Not on file   Transportation Needs: Not on file   Physical Activity: Not on file   Stress: Not on file   Social Connections: Not on file   Intimate Partner Violence: Not on file   Housing Stability: Not on file        Family History   Problem Relation Age of Onset   • Hypertension Mother    • Other Mother         vaginal tumors   • Heart disease Mother    • Cancer Mother    • Skin cancer Father    • Heart Valve Disease Father         leaky valve   • Diabetes Maternal Grandmother    • Kidney failure Maternal Grandmother    • Ovarian cancer Paternal Grandmother    • Lung cancer Paternal Grandfather    • Heart attack Paternal Aunt    • Other Paternal Aunt         Ovarian polys       Lab Results   Component Value Date    WBC 5.31 06/21/2022    HGB 14.4 06/21/2022    HCT 42.6 06/21/2022    MCV 93 06/21/2022     06/21/2022      Lab Results   Component Value Date    SODIUM 136 07/25/2023    K 3.7 07/25/2023     (H) 07/25/2023    CO2 23 07/25/2023    BUN 6 07/25/2023    CREATININE 0.66 07/25/2023    GLUC 88 04/26/2021    CALCIUM 8.1 (L) 07/25/2023      Lab Results   Component Value Date    HGBA1C 5.6 07/25/2023      No results found for: \"CHOL\"  Lab Results   Component Value Date    HDL 46 (L) 07/25/2023    HDL 70 06/21/2022    HDL 61 07/21/2021     Lab Results   Component Value Date    LDLCALC 128 (H) 07/25/2023    LDLCALC 148 (H) 06/21/2022    LDLCALC 141 (H) 07/21/2021     Lab Results   Component Value Date    TRIG 112 07/25/2023    TRIG 36 06/21/2022    TRIG 33 07/21/2021     No results found for: \"CHOLHDL\"   No results found for: \"INR\", \"PROTIME\"       Patient Active Problem List    Diagnosis Date Noted   • Mouth sores 01/18/2023   • GERD (gastroesophageal reflux disease) 09/16/2022   • Benign essential HTN 08/07/2022   • Cyst of right ovary 07/19/2022   • History of PCOS 07/19/2022   • Anxiety and depression 07/19/2022   • Rash 06/16/2021   • " "Esophageal spasm 01/05/2021   • Anxiety disorder due to known physiological condition 01/05/2021   • Major depressive disorder, recurrent episode, moderate (HCC)    • Vaginal sore 09/01/2020   • PCOS (polycystic ovarian syndrome) 11/20/2018   • Food insecurity 08/14/2018   • Atypical squamous cell changes of undetermined significance (ASCUS) on cervical cytology with negative high risk human papilloma virus (HPV) test result 02/02/2018   • Post-traumatic osteoarthritis of right foot 05/13/2016   • Tobacco use disorder 11/21/2014   • Dyslipidemia, goal LDL below 160 08/11/2010   • Class 3 severe obesity with body mass index (BMI) of 40.0 to 44.9 in adult (HCC) 03/25/2010   • Acne vulgaris 06/19/2001       Portions of the record may have been created with voice recognition software. Occasional wrong word or \"sound a like\" substitutions may have occurred due to the inherent limitations of voice recognition software. Read the chart carefully and recognize, using context, where substitutions have occurred.    Momo Buchanan DO, FACC  3/21/2024 10:58 AM          "

## 2024-03-22 ENCOUNTER — TELEPHONE (OUTPATIENT)
Dept: BARIATRICS | Facility: CLINIC | Age: 34
End: 2024-03-22

## 2024-03-22 NOTE — TELEPHONE ENCOUNTER
----- Message from CRESCENCIO Samaniego sent at 3/21/2024  5:25 PM EDT -----  Please let the patient know I reviewed her fasting insulin and it was within acceptable range.

## 2024-03-25 ENCOUNTER — PATIENT MESSAGE (OUTPATIENT)
Dept: BARIATRICS | Facility: CLINIC | Age: 34
End: 2024-03-25

## 2024-03-27 ENCOUNTER — TELEPHONE (OUTPATIENT)
Dept: BARIATRICS | Facility: CLINIC | Age: 34
End: 2024-03-27

## 2024-03-27 ENCOUNTER — PATIENT MESSAGE (OUTPATIENT)
Dept: BARIATRICS | Facility: CLINIC | Age: 34
End: 2024-03-27

## 2024-03-27 DIAGNOSIS — E66.01 CLASS 3 SEVERE OBESITY WITH BODY MASS INDEX (BMI) OF 40.0 TO 44.9 IN ADULT (HCC): Primary | ICD-10-CM

## 2024-03-27 DIAGNOSIS — R73.03 PREDIABETES: ICD-10-CM

## 2024-03-27 DIAGNOSIS — E28.2 PCOS (POLYCYSTIC OVARIAN SYNDROME): ICD-10-CM

## 2024-04-02 NOTE — TELEPHONE ENCOUNTER
PA for wegovy    Submitted via    []CMM-KEY    [x]Jonirianu-Case ID #    []Faxed to plan   []Other website    []Phone call Case ID #       Office notes sent, clinical questions answered. Awaiting determination    Turnaround time for your insurance to make a decision on your Prior Authorization can take 7-21 business days.

## 2024-04-04 ENCOUNTER — TELEPHONE (OUTPATIENT)
Age: 34
End: 2024-04-04

## 2024-04-04 NOTE — TELEPHONE ENCOUNTER
----- Message from C William Riedel, DO sent at 4/4/2024 11:05 AM EDT -----  Regarding: FW: testing  Contact: [phone]  Recommend she schedule follow-up appointment at her earliest convenience for further evaluation      ----- Message -----  From: Rhiannon Rangel RN  Sent: 4/4/2024   9:09 AM EDT  To: C William Riedel, DO  Subject: FW: testing                                      Please advise  ----- Message -----  From: Yecenia Rodriguez  Sent: 4/4/2024   8:43 AM EDT  To: [facility]  Subject: testing                                          Hello,    I have had extreme itching for weeks. It feels like it's getting worse. Recently, it hurts when I pee but that just began three days ago. I have been using clotrimazole cream but it has not been helping at all. One week ago I used Monistat but it also did not make a difference.    Thank you for your help in this matter,     Yecenia Rodriguez

## 2024-04-06 NOTE — TELEPHONE ENCOUNTER
PA for Wegovy  0.25mg Inj Denied    Reason:(Screenshot if applicable)        Message sent to office clinical pool Yes    Denial letter scanned into Media Yes    Appeal started No Waiting for providers response.

## 2024-04-09 ENCOUNTER — OFFICE VISIT (OUTPATIENT)
Dept: GYNECOLOGY | Facility: CLINIC | Age: 34
End: 2024-04-09
Payer: COMMERCIAL

## 2024-04-09 VITALS
WEIGHT: 272.2 LBS | DIASTOLIC BLOOD PRESSURE: 84 MMHG | HEIGHT: 68 IN | BODY MASS INDEX: 41.25 KG/M2 | SYSTOLIC BLOOD PRESSURE: 124 MMHG

## 2024-04-09 DIAGNOSIS — F41.9 ANXIETY AND DEPRESSION: ICD-10-CM

## 2024-04-09 DIAGNOSIS — I10 ESSENTIAL HYPERTENSION: ICD-10-CM

## 2024-04-09 DIAGNOSIS — E13.9 DIABETES 1.5, MANAGED AS TYPE 2 (HCC): ICD-10-CM

## 2024-04-09 DIAGNOSIS — B96.89 BV (BACTERIAL VAGINOSIS): Primary | ICD-10-CM

## 2024-04-09 DIAGNOSIS — R30.0 DYSURIA: ICD-10-CM

## 2024-04-09 DIAGNOSIS — R87.610 ATYPICAL SQUAMOUS CELL CHANGES OF UNDETERMINED SIGNIFICANCE (ASCUS) ON CERVICAL CYTOLOGY WITH NEGATIVE HIGH RISK HUMAN PAPILLOMA VIRUS (HPV) TEST RESULT: ICD-10-CM

## 2024-04-09 DIAGNOSIS — N89.8 VAGINAL DISCHARGE: ICD-10-CM

## 2024-04-09 DIAGNOSIS — Z30.41 ENCOUNTER FOR SURVEILLANCE OF CONTRACEPTIVE PILLS: ICD-10-CM

## 2024-04-09 DIAGNOSIS — E66.01 MORBID OBESITY WITH BMI OF 40.0-44.9, ADULT (HCC): ICD-10-CM

## 2024-04-09 DIAGNOSIS — F32.A ANXIETY AND DEPRESSION: ICD-10-CM

## 2024-04-09 DIAGNOSIS — N76.0 BV (BACTERIAL VAGINOSIS): Primary | ICD-10-CM

## 2024-04-09 LAB
LAB AP GYN PRIMARY INTERPRETATION: ABNORMAL
Lab: ABNORMAL
PATH INTERP SPEC-IMP: ABNORMAL
SL AMB  POCT GLUCOSE, UA: NORMAL
SL AMB LEUKOCYTE ESTERASE,UA: NORMAL
SL AMB POCT BILIRUBIN,UA: NORMAL
SL AMB POCT BLOOD,UA: NORMAL
SL AMB POCT CLARITY,UA: CLEAR
SL AMB POCT COLOR,UA: YELLOW
SL AMB POCT KETONES,UA: NORMAL
SL AMB POCT NITRITE,UA: NORMAL
SL AMB POCT PH,UA: 7.5
SL AMB POCT SPECIFIC GRAVITY,UA: 1.01
SL AMB POCT URINE PROTEIN: 15
SL AMB POCT UROBILINOGEN: 0.2

## 2024-04-09 PROCEDURE — 99214 OFFICE O/P EST MOD 30 MIN: CPT | Performed by: OBSTETRICS & GYNECOLOGY

## 2024-04-09 PROCEDURE — 87660 TRICHOMONAS VAGIN DIR PROBE: CPT | Performed by: OBSTETRICS & GYNECOLOGY

## 2024-04-09 PROCEDURE — 87480 CANDIDA DNA DIR PROBE: CPT | Performed by: OBSTETRICS & GYNECOLOGY

## 2024-04-09 PROCEDURE — 87510 GARDNER VAG DNA DIR PROBE: CPT | Performed by: OBSTETRICS & GYNECOLOGY

## 2024-04-09 PROCEDURE — 81002 URINALYSIS NONAUTO W/O SCOPE: CPT | Performed by: OBSTETRICS & GYNECOLOGY

## 2024-04-09 RX ORDER — METRONIDAZOLE 500 MG/1
500 TABLET ORAL EVERY 12 HOURS SCHEDULED
Qty: 14 TABLET | Refills: 0 | Status: SHIPPED | OUTPATIENT
Start: 2024-04-09 | End: 2024-04-16

## 2024-04-09 NOTE — PROGRESS NOTES
"Assessment/Plan:    Diagnoses and all orders for this visit:    BV (bacterial vaginosis)  -     metroNIDAZOLE (FLAGYL) 500 mg tablet; Take 1 tablet (500 mg total) by mouth every 12 (twelve) hours for 7 days    Vaginal discharge  -     VAGINOSIS DNA PROBE    Dysuria  -     POCT urine dip    Encounter for surveillance of contraceptive pills    Morbid obesity with BMI of 40.0-44.9, adult (HCC)    Diabetes 1.5, managed as type 2 ([facility])    Essential hypertension    Anxiety and depression    Atypical squamous cell changes of undetermined significance (ASCUS) on cervical cytology with negative high risk human papilloma virus (HPV) test result        Subjective:Vaginal discharge     Patient ID: Yecenia Rodriguez is a 33 y.o. female.    HPI  33-year-old female, nulliparous, type 2 diabetes, morbid obesity.  Complains today of yellow vaginal discharge with irritation.  Wet mount exam reveals cells consistent with bacterial vaginosis.  Will treat with metronidazole 1 pill twice daily for 1 week.  Urine culture and sensitivity sent for further analysis.  She continues to gain weight in spite of exercising weekly.  She recently started a weight management program at her gym.  Her most recent A1c was 5.9 previously 5.6.  She does not consider herself a diabetic however her blood work shows otherwise.  She is on metformin 500 mg once daily also on oral contraceptives.  She has had several attempts getting Wegovy, however her insurance would not cover.  I encouraged her to continue diet loss exercises.  Recommend she monitor her blood sugars a little more effectively.  She will contact her PCP about getting a glucometer to monitor her blood sugars.    Review of Systems unchanged      Objective: No acute distress  /84 (BP Location: Left arm, Patient Position: Sitting, Cuff Size: Standard)   Ht 5' 8\" (1.727 m)   Wt 123 kg (272 lb 3.2 oz)   BMI 41.39 kg/m²      Physical Exam  Vitals and nursing note reviewed. Exam " conducted with a chaperone present.   Constitutional:       Appearance: Normal appearance. She is obese.   HENT:      Head: Normocephalic.   Eyes:      Pupils: Pupils are equal, round, and reactive to light.   Pulmonary:      Effort: Pulmonary effort is normal.   Abdominal:      General: Abdomen is flat.      Palpations: Abdomen is soft.   Genitourinary:     General: Normal vulva.   Musculoskeletal:         General: Normal range of motion.   Skin:     General: Skin is warm and dry.   Neurological:      Mental Status: She is alert and oriented to person, place, and time.   Psychiatric:         Mood and Affect: Mood normal.         Behavior: Behavior normal.         Thought Content: Thought content normal.         Judgment: Judgment normal.        Please note    In addition to the time spent discussing the findings and results of today's visit and exam, I spent approximately 30 minutes of face-to-face time with the patient, greater than 50% of which was spent in counseling and coordination of care for this patient.

## 2024-04-10 ENCOUNTER — TELEPHONE (OUTPATIENT)
Dept: BARIATRICS | Facility: CLINIC | Age: 34
End: 2024-04-10

## 2024-04-10 DIAGNOSIS — R73.03 PREDIABETES: Primary | ICD-10-CM

## 2024-04-10 LAB
CANDIDA RRNA VAG QL PROBE: NOT DETECTED
G VAGINALIS RRNA GENITAL QL PROBE: DETECTED
T VAGINALIS RRNA GENITAL QL PROBE: NOT DETECTED

## 2024-04-10 NOTE — TELEPHONE ENCOUNTER
----- Message from Yecenia Rodriguez sent at 4/10/2024  9:01 AM EDT -----  Regarding: Wegovy denial  Contact: [phone]  Ozempic will be fine can we please try it?

## 2024-04-10 NOTE — TELEPHONE ENCOUNTER
PA for Ozempic  EXCLUDED from plan       Reason    Close reason: Product not covered by this plan. Prior Authorization not available.  Payer: [employer] Case ID: [id number]    [phone]  Note from payer: Product not covered for this health plan/disease state/medication as submitted. Product not covered by this plan for this diagnosis. For a FDA label approved diagnosis, please resubmit with an ICD 10 code or submit via other methods. - Prescriber details have been updated to match the prescriber directory.  View History        Message sent to office clinical pool Yes

## 2024-04-15 ENCOUNTER — HOSPITAL ENCOUNTER (OUTPATIENT)
Dept: NON INVASIVE DIAGNOSTICS | Facility: HOSPITAL | Age: 34
Discharge: HOME | End: 2024-04-15
Payer: COMMERCIAL

## 2024-04-15 VITALS
WEIGHT: 272 LBS | DIASTOLIC BLOOD PRESSURE: 84 MMHG | HEIGHT: 68 IN | HEART RATE: 60 BPM | SYSTOLIC BLOOD PRESSURE: 124 MMHG | BODY MASS INDEX: 41.22 KG/M2

## 2024-04-15 DIAGNOSIS — I10 BENIGN ESSENTIAL HTN: ICD-10-CM

## 2024-04-15 DIAGNOSIS — Z82.49 FAMILY HISTORY OF HEART MURMUR: ICD-10-CM

## 2024-04-15 DIAGNOSIS — E66.01 CLASS 3 SEVERE OBESITY WITH BODY MASS INDEX (BMI) OF 40.0 TO 44.9 IN ADULT, UNSPECIFIED OBESITY TYPE, UNSPECIFIED WHETHER SERIOUS COMORBIDITY PRESENT (HCC): ICD-10-CM

## 2024-04-15 PROCEDURE — 93306 TTE W/DOPPLER COMPLETE: CPT

## 2024-04-16 LAB
AORTIC ROOT: 2.8 CM
APICAL FOUR CHAMBER EJECTION FRACTION: 58 %
BSA FOR ECHO PROCEDURE: 2.33 M2
E WAVE DECELERATION TIME: 192 MS
E/A RATIO: 1.46
FRACTIONAL SHORTENING: 44 (ref 28–44)
INTERVENTRICULAR SEPTUM IN DIASTOLE (PARASTERNAL SHORT AXIS VIEW): 1 CM
INTERVENTRICULAR SEPTUM: 1 CM (ref 0.6–1.1)
LAAS-AP2: 18.4 CM2
LAAS-AP4: 16.8 CM2
LEFT ATRIUM SIZE: 4 CM
LEFT ATRIUM VOLUME (MOD BIPLANE): 49 ML
LEFT ATRIUM VOLUME INDEX (MOD BIPLANE): 21 ML/M2
LEFT INTERNAL DIMENSION IN SYSTOLE: 3 CM (ref 2.1–4)
LEFT VENTRICULAR INTERNAL DIMENSION IN DIASTOLE: 5.4 CM (ref 3.5–6)
LEFT VENTRICULAR POSTERIOR WALL IN END DIASTOLE: 0.9 CM
LEFT VENTRICULAR STROKE VOLUME: 104 ML
LVSV (TEICH): 104 ML
MV E'TISSUE VEL-SEP: 15 CM/S
MV PEAK A VEL: 0.72 M/S
MV PEAK E VEL: 105 CM/S
MV STENOSIS PRESSURE HALF TIME: 56 MS
MV VALVE AREA P 1/2 METHOD: 3.93
RA PRESSURE ESTIMATED: 3 MMHG
RIGHT ATRIAL 2D VOLUME: 31 ML
RIGHT ATRIUM AREA SYSTOLE A4C: 13.3 CM2
RIGHT VENTRICLE ID DIMENSION: 3.5 CM
SL CV LEFT ATRIUM LENGTH A2C: 5.5 CM
SL CV LV EF: 55
SL CV PED ECHO LEFT VENTRICLE DIASTOLIC VOLUME (MOD BIPLANE) 2D: 141 ML
SL CV PED ECHO LEFT VENTRICLE SYSTOLIC VOLUME (MOD BIPLANE) 2D: 36 ML
TRICUSPID ANNULAR PLANE SYSTOLIC EXCURSION: 2.4 CM

## 2024-04-17 DIAGNOSIS — I10 BENIGN ESSENTIAL HTN: ICD-10-CM

## 2024-04-18 ENCOUNTER — TELEPHONE (OUTPATIENT)
Age: 34
End: 2024-04-18

## 2024-04-18 DIAGNOSIS — I10 BENIGN ESSENTIAL HTN: ICD-10-CM

## 2024-04-18 DIAGNOSIS — B37.31 VAGINAL YEAST INFECTION: Primary | ICD-10-CM

## 2024-04-18 RX ORDER — CARVEDILOL 25 MG/1
25 TABLET ORAL 2 TIMES DAILY WITH MEALS
Qty: 180 TABLET | Refills: 0 | Status: SHIPPED | OUTPATIENT
Start: 2024-04-18

## 2024-04-18 RX ORDER — FLUCONAZOLE 150 MG/1
150 TABLET ORAL ONCE
Qty: 1 TABLET | Refills: 1 | Status: SHIPPED | OUTPATIENT
Start: 2024-04-18 | End: 2024-04-18

## 2024-04-18 RX ORDER — CARVEDILOL 25 MG/1
25 TABLET ORAL 2 TIMES DAILY WITH MEALS
Qty: 180 TABLET | Refills: 1 | Status: SHIPPED | OUTPATIENT
Start: 2024-04-18 | End: 2024-04-18

## 2024-04-18 NOTE — TELEPHONE ENCOUNTER
Received call from [facility] pharmacy, requesting a new script to be sent with correction made on the directions written on rx carvedilol (COREG) 25 mg tablet

## 2024-04-18 NOTE — TELEPHONE ENCOUNTER
Called patient to check.  Patient states she finished antibiotics.  Now has vulvar itching.  Has menses now and is unable to tell if there is vaginal discharge. Patient thinks she has a yeast infection.  She had on office visit on 4/9/24. She requests an Rx be sent to [facility] on [address]

## 2024-04-18 NOTE — TELEPHONE ENCOUNTER
----- Message from Yecenia Rodriguez sent at 4/17/2024  7:41 PM EDT -----  Regarding: testing  Contact: [phone]  It is still itchy...it did not itch for a few days but on Monday the itching came back. :-(

## 2024-04-23 DIAGNOSIS — R61 EXCESSIVE SWEATING: ICD-10-CM

## 2024-04-30 DIAGNOSIS — E28.2 PCOS (POLYCYSTIC OVARIAN SYNDROME): Primary | ICD-10-CM

## 2024-04-30 DIAGNOSIS — B96.89 BV (BACTERIAL VAGINOSIS): Primary | ICD-10-CM

## 2024-04-30 DIAGNOSIS — N76.0 BV (BACTERIAL VAGINOSIS): Primary | ICD-10-CM

## 2024-04-30 RX ORDER — METFORMIN HYDROCHLORIDE 500 MG/1
500 TABLET, EXTENDED RELEASE ORAL 3 TIMES DAILY
Refills: 0 | Status: CANCELLED | OUTPATIENT
Start: 2024-04-30

## 2024-04-30 RX ORDER — METRONIDAZOLE 500 MG/1
500 TABLET ORAL EVERY 12 HOURS SCHEDULED
Qty: 20 TABLET | Refills: 0 | Status: SHIPPED | OUTPATIENT
Start: 2024-04-30 | End: 2024-05-10

## 2024-04-30 RX ORDER — METFORMIN HYDROCHLORIDE 500 MG/1
500 TABLET, EXTENDED RELEASE ORAL 3 TIMES DAILY
Qty: 90 TABLET | Refills: 1 | Status: SHIPPED | OUTPATIENT
Start: 2024-04-30

## 2024-04-30 NOTE — TELEPHONE ENCOUNTER
Patient called following up on her Metformin. She understands that Dr Encarnacion was not the original prescriber. However, her previous endocrinologist has relocated and patient is not able to get in to see another Endocrinologist until July. Patient has been completely out of medication for over 24 hours and is asking if anything further may be done to help get her medication until she can be seen by her new Endocrinologist. Patient would like a call back     # [phone]

## 2024-04-30 NOTE — TELEPHONE ENCOUNTER
Reason for call:   [x] Refill   [] Prior Auth  [x] Other: Returned pt's call. Pt is upset that pcp is not filling her metformin and she has no medication left. Told pt that we can not step over pcp and she needs to speak to office. Pt will call the office. He endo left ([facility]) and she no longer see's endo.

## 2024-04-30 NOTE — TELEPHONE ENCOUNTER
I am not the one prescibing this medication.  Please inform patient that Dr. Mendoza is the one prescribing it

## 2024-05-13 ENCOUNTER — TELEPHONE (OUTPATIENT)
Dept: CARDIOLOGY CLINIC | Facility: CLINIC | Age: 34
End: 2024-05-13

## 2024-05-24 DIAGNOSIS — F06.4 ANXIETY DISORDER DUE TO KNOWN PHYSIOLOGICAL CONDITION: ICD-10-CM

## 2024-05-24 DIAGNOSIS — E66.9 OBESITY, CLASS II, BMI 35-39.9: ICD-10-CM

## 2024-05-24 RX ORDER — BUPROPION HYDROCHLORIDE 150 MG/1
150 TABLET, EXTENDED RELEASE ORAL 2 TIMES DAILY
Qty: 60 TABLET | Refills: 5 | Status: SHIPPED | OUTPATIENT
Start: 2024-05-24

## 2024-06-11 ENCOUNTER — OFFICE VISIT (OUTPATIENT)
Dept: GYNECOLOGY | Facility: CLINIC | Age: 34
End: 2024-06-11
Payer: COMMERCIAL

## 2024-06-11 VITALS
HEIGHT: 68 IN | BODY MASS INDEX: 40.25 KG/M2 | DIASTOLIC BLOOD PRESSURE: 80 MMHG | SYSTOLIC BLOOD PRESSURE: 126 MMHG | WEIGHT: 265.6 LBS

## 2024-06-11 DIAGNOSIS — Z30.41 ENCOUNTER FOR SURVEILLANCE OF CONTRACEPTIVE PILLS: ICD-10-CM

## 2024-06-11 DIAGNOSIS — F32.A ANXIETY AND DEPRESSION: ICD-10-CM

## 2024-06-11 DIAGNOSIS — F41.9 ANXIETY AND DEPRESSION: ICD-10-CM

## 2024-06-11 DIAGNOSIS — R87.610 ATYPICAL SQUAMOUS CELL CHANGES OF UNDETERMINED SIGNIFICANCE (ASCUS) ON CERVICAL CYTOLOGY WITH NEGATIVE HIGH RISK HUMAN PAPILLOMA VIRUS (HPV) TEST RESULT: Primary | ICD-10-CM

## 2024-06-11 PROCEDURE — 99214 OFFICE O/P EST MOD 30 MIN: CPT | Performed by: OBSTETRICS & GYNECOLOGY

## 2024-06-11 PROCEDURE — 88175 CYTOPATH C/V AUTO FLUID REDO: CPT | Performed by: OBSTETRICS & GYNECOLOGY

## 2024-06-11 NOTE — PROGRESS NOTES
"Assessment/Plan:    Diagnoses and all orders for this visit:    Atypical squamous cell changes of undetermined significance (ASCUS) on cervical cytology with negative high risk human papilloma virus (HPV) test result  -     Liquid-based pap, diagnostic    Anxiety and depression    Encounter for surveillance of contraceptive pills        Subjective: Here for repeat Pap     Patient ID: Yecenia Rodriguez is a 34 y.o. female.    HPI  34-year-old female, nulliparous recently here for annual exam 3 months ago.  ASCUS Pap with negative HPV.  Repeat Pap was collected today results pending.  No other new GYN concerns or complaints.  Menstrual cycles are regular on the birth control pill.  No pelvic pain symptoms.  Continue OCPs.  She is getting  in July.  All questions answered.    Review of Systems unchanged    Objective: No acute distress  /80 (BP Location: Left arm, Patient Position: Sitting, Cuff Size: Standard)   Ht 5' 8\" (1.727 m)   Wt 120 kg (265 lb 9.6 oz)   BMI 40.38 kg/m²      Physical Exam  Vitals and nursing note reviewed. Exam conducted with a chaperone present.   Constitutional:       Appearance: Normal appearance. She is obese.   HENT:      Head: Normocephalic.   Eyes:      Pupils: Pupils are equal, round, and reactive to light.   Pulmonary:      Effort: Pulmonary effort is normal.   Abdominal:      General: Abdomen is flat.      Palpations: Abdomen is soft.   Genitourinary:     General: Normal vulva.      Comments: Normal external genitalia  Normal size uterus  Normal cervix, repeat Pap collected  No CMT  No pelvic masses  Normal vaginal discharge  Musculoskeletal:         General: Normal range of motion.      Cervical back: Normal range of motion.   Skin:     General: Skin is warm and dry.   Neurological:      Mental Status: She is alert and oriented to person, place, and time.   Psychiatric:         Mood and Affect: Mood normal.         Behavior: Behavior normal.         Thought " Content: Thought content normal.         Judgment: Judgment normal.        Please note    In addition to the time spent discussing the findings and results of today's visit and exam, I spent approximately 30 minutes of face-to-face time with the patient, greater than 50% of which was spent in counseling and coordination of care for this patient.

## 2024-06-18 LAB
LAB AP GYN PRIMARY INTERPRETATION: NORMAL
Lab: NORMAL

## 2024-06-19 ENCOUNTER — TELEPHONE (OUTPATIENT)
Dept: GYNECOLOGY | Facility: CLINIC | Age: 34
End: 2024-06-19

## 2024-06-19 NOTE — TELEPHONE ENCOUNTER
Patient was notified regarding normal pap results, [facility] message was also sent to patient----- Message from C Riedel, DO sent at 6/18/2024 11:16 AM EDT -----  Pap test was normal

## 2024-06-26 DIAGNOSIS — E28.2 PCOS (POLYCYSTIC OVARIAN SYNDROME): ICD-10-CM

## 2024-06-27 RX ORDER — METFORMIN HYDROCHLORIDE 500 MG/1
500 TABLET, EXTENDED RELEASE ORAL 3 TIMES DAILY
Qty: 90 TABLET | Refills: 5 | Status: SHIPPED | OUTPATIENT
Start: 2024-06-27

## 2024-07-16 ENCOUNTER — OFFICE VISIT (OUTPATIENT)
Dept: BARIATRICS | Facility: CLINIC | Age: 34
End: 2024-07-16
Payer: COMMERCIAL

## 2024-07-16 VITALS
DIASTOLIC BLOOD PRESSURE: 78 MMHG | BODY MASS INDEX: 40.24 KG/M2 | WEIGHT: 265.5 LBS | HEIGHT: 68 IN | TEMPERATURE: 98.4 F | RESPIRATION RATE: 16 BRPM | SYSTOLIC BLOOD PRESSURE: 112 MMHG | HEART RATE: 62 BPM

## 2024-07-16 DIAGNOSIS — I10 BENIGN ESSENTIAL HTN: ICD-10-CM

## 2024-07-16 DIAGNOSIS — E66.01 OBESITY, CLASS III, BMI 40-49.9 (MORBID OBESITY) (HCC): Primary | ICD-10-CM

## 2024-07-16 DIAGNOSIS — K21.9 GASTROESOPHAGEAL REFLUX DISEASE, UNSPECIFIED WHETHER ESOPHAGITIS PRESENT: ICD-10-CM

## 2024-07-16 PROCEDURE — 99214 OFFICE O/P EST MOD 30 MIN: CPT | Performed by: INTERNAL MEDICINE

## 2024-07-16 RX ORDER — PHENTERMINE HYDROCHLORIDE 15 MG/1
15 CAPSULE ORAL EVERY MORNING
Qty: 30 CAPSULE | Refills: 0 | Status: SHIPPED | OUTPATIENT
Start: 2024-07-16

## 2024-07-16 NOTE — PATIENT INSTRUCTIONS
- Weight not at goal  - Patient is interested in Conservative Program  - Labs reviewed: As below.    General Recommendations:  Nutrition:  Eat breakfast daily.  Do not skip meals.     Food log (ie.) www.myfitnesspal.com, sparkpeople.com, loseit.com, calorieking.com, etc.    Practice mindful eating.  Be sure to set aside time to eat, eat slowly, and savor your food.    Hydration:    At least 64oz of water daily.  No sugar sweetened beverages.  No juice (eat the fruit instead).    Exercise:  Studies have shown that the ideal exercise goal is somewhere between 150 to 300 minutes of moderate intensity exercise a week.  Start with exercising 10 minutes every other day and gradually increase physical activity with a goal of at least 150 minutes of moderate intensity exercise a week, divided over at least 3 days a week.  An example of this would be exercising 30 minutes a day, 5 days a week.  Resistance training can increase muscle mass and increase our resting metabolic rate.   FULL BODY resistance training is recommended 2-3 times a week.  Do not do this on consecutive days to allow for muscle recovery.    Aim for a bare minimum 5000 steps, even on days you do not exercise.    Monitoring:   Weigh yourself daily.  If this causes undue stress, then just weigh yourself once a week.  Weigh yourself the same time of the day with the same amount of clothing on.  Preferably this should be done after waking up, before you eat, and with no clothing or minimal clothing on.    Specific Goals:  Food log (ie.) www.myfitnesspal.com,sparkpeople.com,loseit.com,[employer].com,etc.     No sugary beverages. At least 64oz of water daily.    Gradually increase physical activity to a goal of 5 days per week for 30 minutes of MODERATE intensity PLUS 2 days per week of FULL BODY resistance training    START phentermine 15mg daily.  Nurse visit in 2 weeks and again in 6 weeks.    Potential side effects of Phentermine: increased heart rate,  increased blood pressure, palpitations, headache, dizziness, insomnia, altered mood, abdominal upset, and dry mouth. Notify the provider with change in mood. Please go to the emergency room if you develop thoughts of harming yourself or others. Phentermine should be stopped 2 weeks prior to surgery. Notify the provider with any changes in vision.     After starting or increasing dose of phentermine it is advised that you check your resting blood pressure and pulse at least 3 times per week for a month.  Vary the time of day and record these results.  If you have any resting blood pressures above 140/90 or heart rate above 100 beats per minute, then you must notify your weight loss specialist and primary care doctor.    Discuss increasing metformin to 1000 mg twice a day with your endocrinologist.    Consideration to increase bupropion at the next appointment.

## 2024-07-16 NOTE — PROGRESS NOTES
Assessment/Plan:  Yecenia was seen today for follow-up.    Diagnoses and all orders for this visit:    Obesity, Class III, BMI 40-49.9 (morbid obesity) (HCC)  -     phentermine 15 MG capsule; Take 1 capsule (15 mg total) by mouth every morning    Benign essential HTN    Gastroesophageal reflux disease, unspecified whether esophagitis present       - Weight not at goal  - Patient is interested in Conservative Program  - Labs reviewed: As below.  -Patient is open for a more short-term pharmacotherapy to help her lose weight prior to attempts to get pregnant later this year.  Discussing the various pharmacotherapy available we decided that it is most appropriate to start phentermine.  The goal is for 3 months of treatment.  She will then stop the medication and start attempts to get pregnant the following month.    General Recommendations:  Nutrition:  Eat breakfast daily.  Do not skip meals.     Food log (ie.) www.[employer].com, sparkpeople.com, loseit.com, [employer]itel.com, etc.    Practice mindful eating.  Be sure to set aside time to eat, eat slowly, and savor your food.    Hydration:    At least 64oz of water daily.  No sugar sweetened beverages.  No juice (eat the fruit instead).    Exercise:  Studies have shown that the ideal exercise goal is somewhere between 150 to 300 minutes of moderate intensity exercise a week.  Start with exercising 10 minutes every other day and gradually increase physical activity with a goal of at least 150 minutes of moderate intensity exercise a week, divided over at least 3 days a week.  An example of this would be exercising 30 minutes a day, 5 days a week.  Resistance training can increase muscle mass and increase our resting metabolic rate.   FULL BODY resistance training is recommended 2-3 times a week.  Do not do this on consecutive days to allow for muscle recovery.    Aim for a bare minimum 5000 steps, even on days you do not exercise.    Monitoring:   Weigh yourself daily.   If this causes undue stress, then just weigh yourself once a week.  Weigh yourself the same time of the day with the same amount of clothing on.  Preferably this should be done after waking up, before you eat, and with no clothing or minimal clothing on.    Specific Goals:  Food log (ie.) www.[employer].com,[employer].com,loseit.com,Rally.org.com,etc.     No sugary beverages. At least 64oz of water daily.    Gradually increase physical activity to a goal of 5 days per week for 30 minutes of MODERATE intensity PLUS 2 days per week of FULL BODY resistance training    START phentermine 15mg daily.  Nurse visit in 2 weeks and again in 6weeks.    Potential side effects of Phentermine: increased heart rate, increased blood pressure, palpitations, headache, dizziness, insomnia, altered mood, abdominal upset, and dry mouth. Notify the provider with change in mood. Please go to the emergency room if you develop thoughts of harming yourself or others. Phentermine should be stopped 2 weeks prior to surgery. Notify the provider with any changes in vision.     After starting or increasing dose of phentermine it is advised that you check your resting blood pressure and pulse at least 3 times per week for a month.  Vary the time of day and record these results.  If you have any resting blood pressures above 140/90 or heart rate above 100 beats per minute, then you must notify your weight loss specialist and primary care doctor.     Total time spent reviewing chart, interviewing patient, examining patient, discussing plan, answering all questions, and documentin min.       ______________________________________________________________________    Subjective:   Chief Complaint   Patient presents with    Follow-up     MWM 4M F/u; Waist 43.5in     Patient here to discuss weight associated problems and nutrition goals  HPI: Yecenia Rodriguez  is a 34 y.o. female with history of excess weight.  Weight loss plan:   Conservative Program.   Most recent notes and records were reviewed.    Wt Readings from Last 10 Encounters:   07/16/24 120 kg (265 lb 8 oz)   06/11/24 120 kg (265 lb 9.6 oz)   04/15/24 123 kg (272 lb)   04/09/24 123 kg (272 lb 3.2 oz)   03/21/24 120 kg (264 lb 12.8 oz)   03/18/24 122 kg (270 lb)   03/05/24 123 kg (270 lb 3.2 oz)   03/05/24 123 kg (270 lb 3.2 oz)   02/06/24 118 kg (260 lb)   01/23/24 116 kg (256 lb)     Patient of Tess Ramirez who was last seen by her on 3/5/2024.  Patient was started on Wellbutrin November 2020 at a weight of 245.  She was able to reduce her weight to 222 but began regaining the weight.  Primary care doctor kept her bupropion.  Troxidone had been added to this without benefit.  Previously attempted to get her covered for Saxenda which was denied by her insurance as was Wegovy and Ozempic.      Initial: 245.8 lbs  Last visit: 256.8 pounds BMI 39.05  Current: 270.1 lbs BMI 41.08  Change: +24 lbs (+13 lbs since last office visit)  Goal: under 200 lbs    Hunger/Cravings:  Dining out:  Hydration:  Alcohol:  Exercise:  Sleep:  Weight Monitoring:      Patient denies personal and family history of  pancreatitis, thyroid cancer, MEN-2 tumors.  Denies any hx of glaucoma, seizures, kidney stones, gallstones.  Denies Hx of CAD, PAD, palpitations, arrhythmia.   Denies uncontrolled anxiety or depression, suicidal ideation or behavior, insomnia or sleep disturbance.     The following portions of the patient's history were reviewed and updated as appropriate: allergies, current medications, past family history, past medical history, past social history, past surgical history, and problem list.    Review Of Systems:  Review of Systems   Constitutional:  Negative for activity change, appetite change, fatigue and fever.   Respiratory:  Negative for cough and shortness of breath.    Cardiovascular:  Negative for chest pain, palpitations and leg swelling.   Gastrointestinal:  Negative for abdominal pain,  "constipation, diarrhea, nausea and vomiting.   Endocrine: Negative for cold intolerance and heat intolerance.   Genitourinary:  Negative for difficulty urinating and dysuria.   Musculoskeletal:  Negative for arthralgias, back pain and gait problem.   Skin:  Negative for pallor and rash.   Neurological:  Negative for headaches.   Psychiatric/Behavioral:  Negative for dysphoric mood, sleep disturbance and suicidal ideas (or HI). The patient is not nervous/anxious.        Objective:  /78   Pulse 62   Temp 98.4 °F (36.9 °C)   Resp 16   Ht 5' 8\" (1.727 m)   Wt 120 kg (265 lb 8 oz)   BMI 40.37 kg/m²   Physical Exam  Vitals and nursing note reviewed.   Constitutional:       General: She is not in acute distress.     Appearance: Normal appearance. She is not ill-appearing or diaphoretic.   Eyes:      General: No scleral icterus.  Cardiovascular:      Rate and Rhythm: Normal rate and regular rhythm.      Pulses: Normal pulses.      Heart sounds: No murmur heard.  Pulmonary:      Effort: Pulmonary effort is normal. No respiratory distress.      Breath sounds: Normal breath sounds. No wheezing or rhonchi.   Abdominal:      General: Bowel sounds are normal. There is no distension.      Palpations: Abdomen is soft. There is no mass.      Tenderness: There is no abdominal tenderness.   Musculoskeletal:      Cervical back: Neck supple.      Right lower leg: No edema.      Left lower leg: No edema.   Lymphadenopathy:      Cervical: No cervical adenopathy.   Skin:     Capillary Refill: Capillary refill takes less than 2 seconds.      Findings: No lesion or rash.   Neurological:      Mental Status: She is alert and oriented to person, place, and time.      Gait: Gait normal.   Psychiatric:         Mood and Affect: Mood normal.         Behavior: Behavior normal.         Labs and Imaging  Recent labs and imaging have been personally reviewed.  Lab Results   Component Value Date    WBC 5.31 06/21/2022    HGB 14.4 06/21/2022 " "   HCT 42.6 2022    MCV 93 2022     2022     Lab Results   Component Value Date    SODIUM 138 2024    K 4.3 2024     2024    CO2 25 2024    AGAP 6 2024    BUN 14 2024    CREATININE 0.74 2024    GLUC 88 2021    GLUF 100 (H) 2024    CALCIUM 8.5 2024    AST 12 (L) 2023    ALT 16 2023    ALKPHOS 32 (L) 2023    TP 6.0 (L) 2023    TBILI 0.48 2023    EGFR 106 2024     Lab Results   Component Value Date    HGBA1C 5.9 (H) 2024     Lab Results   Component Value Date    JGM0BUHZTBRO 1.432 2022    TSH 1.27 2020     Lab Results   Component Value Date    CHOLESTEROL 196 2023     Lab Results   Component Value Date    HDL 46 (L) 2023     Lab Results   Component Value Date    TRIG 112 2023     Lab Results   Component Value Date    LDLCALC 128 (H) 2023     POCT ECG  Status: Edited Result - FINAL     POCT ECG  Order: [id number]   Status: Edited Result - FINAL       Visible to patient: Yes (seen)       Next appt: 2024 at 02:00 PM in Endocrinology (Dona Rivera MD)       Dx: Benign essential HTN; Family history ...    0 Result Notes        Impression    Normal sinus rhythm, normal ECG      Specimen Collected: 24 10:35 AM Last Resulte           [facility]  [address] [facility] [address]  [phone]  Name: Yecenia Rodriguez                       : 1990  MRN: [id number]                       Age: 34 y.o.  Patient Status: Outpatient          Gender: female  Echo complete    Height: 5' 8\" (1.727 m)   Weight: 123 kg (272 lb)   BSA: 2.33 m²   Blood Pressure: 124/84    Date of Study: 4/15/24   Ordering Provider: Momo Buchanan DO   Clinical Indications: Family history of heart murmur [Z82.49 (ICD-10-CM)], Class 3 severe obesity with body mass index (BMI) of 40.0 to 44.9 in adult, unspecified obesity type, " "unspecified whether serious comorbidity present (HCC) [E66.01, Z68.41 (ICD-10-CM)], Benign essential HTN [I10 (ICD-10-CM)]       Reading Physicians  Performing Staff   Cardiology: Momo Buchanan DO    Tech: Ree Moore, DANILO        Vitals    Height Weight BSA (Calculated - m2) BP Pulse   5' 8\" (1.727 m) 123 kg (272 lb) 2.33 sq meters 124/84 60     PACS Images     Show images for Echo complete w/ contrast if indicated  Study Details    This transthoracic echocardiogram was performed in the echo lab. This was a routine, outpatient study. Study quality was adequate. A complete 2D, color flow Doppler, spectral Doppler, 2D, color flow Doppler and spectral Doppler transthoracic echocardiogram was performed.  The apical, parasternal, subcostal and suprasternal views were obtained.     History    Dyslipidemia, HTN     Interpretation Summary  Show Result Comparison     Left Ventricle: Left ventricular cavity size is normal. Wall thickness is normal. The left ventricular ejection fraction is 55%. Systolic function is normal. Wall motion is normal. Diastolic function is normal.     Findings    Left Ventricle Left ventricular cavity size is normal. Wall thickness is normal. The left ventricular ejection fraction is 55%. Systolic function is normal. Wall motion is normal. Diastolic function is normal.   Right Ventricle Right ventricular cavity size is normal. Systolic function is normal. Normal tricuspid annular plane systolic excursion (TAPSE) > 1.7 cm. Wall thickness is normal.   Left Atrium The atrium is normal in size.   Right Atrium The atrium is normal in size.   Aortic Valve The aortic valve is trileaflet. The leaflets are not thickened. The leaflets are not calcified. The leaflets exhibit normal mobility. There is no evidence of regurgitation. The aortic valve has no significant stenosis.   Mitral Valve Mitral valve structure is normal.  There is trace regurgitation. There is no evidence of stenosis.   Tricuspid Valve " Tricuspid valve structure is normal. There is no evidence of regurgitation. There is no evidence of stenosis.   Pulmonic Valve Pulmonic valve structure is normal. There is no evidence of regurgitation. There is no evidence of stenosis.   Ascending Aorta The aortic root is normal in size.   IVC/SVC The right atrial pressure is estimated at 3.0 mmHg. The inferior vena cava is normal in size. Respirophasic changes were normal.   Pericardium There is no pericardial effusion. The pericardium is normal in appearance.     Left Ventricle Measurements    Function/Volumes   A4C EF 58 %         Left ventricular stroke volume (2D) 104 mL         Dimensions   LVIDd 5.4 cm         LVIDS 3 cm         IVSd 1 cm         LVPWd 0.9 cm         FS 44         Diastolic Filling   MV E' Tissue Velocity Septal 15 cm/s         LA Volume Index (BP) 21 mL/m2         E/A ratio 1.46         E wave deceleration time 192 ms         MV Peak E Issa 105 cm/s         MV Peak A Issa 0.72 m/s               Right Ventricle Measurements    Dimensions   RVID d 3.5 cm         Tricuspid annular plane systolic excursion 2.4 cm               Left Atrium Measurements    Dimensions   LA size 4 cm         LA length (A2C) 5.5 cm         Volumes   LA volume (BP) 49 mL         LA Volume Index (BP) 21 mL/m2               Right Atrium Measurements    Dimensions   RA 2D Volume 31 mL         RAA A4C 13.3 cm2               Mitral Valve Measurements    Stenosis   MV stenosis pressure 1/2 time 56 ms         MV valve area p 1/2 method 3.93               Tricuspid Valve Measurements    RVSP Parameters   Est. RA pres 3 mmHg               Aorta Measurements    Aortic Dimensions   Ao root 2.8 cm               IVC/SVC Measurements    IVC/SVC   Est. RA pres

## 2024-07-17 ENCOUNTER — NURSE TRIAGE (OUTPATIENT)
Age: 34
End: 2024-07-17

## 2024-07-17 NOTE — TELEPHONE ENCOUNTER
"Reji from [facility] pharmacy calling in to confirm Dr. MASTERS is aware of drug interactions found.   Prescribed phentermine- pt is currently also taking carvedilol.     Please advise and call back [phone], pharmacy is not going to fill the script until they hear back.   Additional Information   Medication questions    Answer Assessment - Initial Assessment Questions  1. REASON FOR CALL or QUESTION: \"What is your reason for calling today?\" or \"How can I best help you?\" or \"What question do you have that I can help answer?\"      Pharmacy asking about drug interactions.    Protocols used: Information Only Call - No Triage-ADULT-    "

## 2024-07-18 NOTE — TELEPHONE ENCOUNTER
Called pharmacy and informed pharmacist of Dr MASTERS's message regarding drug interactions. Also informed him that Dr MASTERS advised she monitor her BP and HR closely and to stop medication once they fall within the values listed below.

## 2024-07-29 ENCOUNTER — OFFICE VISIT (OUTPATIENT)
Dept: ENDOCRINOLOGY | Facility: CLINIC | Age: 34
End: 2024-07-29
Payer: COMMERCIAL

## 2024-07-29 VITALS
HEART RATE: 64 BPM | BODY MASS INDEX: 40.22 KG/M2 | DIASTOLIC BLOOD PRESSURE: 80 MMHG | OXYGEN SATURATION: 99 % | HEIGHT: 68 IN | WEIGHT: 265.4 LBS | SYSTOLIC BLOOD PRESSURE: 110 MMHG

## 2024-07-29 DIAGNOSIS — E28.2 PCOS (POLYCYSTIC OVARIAN SYNDROME): Primary | ICD-10-CM

## 2024-07-29 PROCEDURE — 99204 OFFICE O/P NEW MOD 45 MIN: CPT | Performed by: INTERNAL MEDICINE

## 2024-07-29 NOTE — PROGRESS NOTES
Ambulatory Visit  Name: Yecenia Rodriguez      : 1990      MRN: [id number]  Encounter Provider: Dona Rivera MD  Encounter Date: 2024   Encounter department: [facility] [place]    Assessment & Plan   This is a 33yo female who presents for management of PCOS.     Polycystic Ovarian Syndrome  - diagnosed >5 years ago. Previous imaging shows multiple ovarian cysts.  - current regimen: Norethindrone-ethinyl estradiol-ferrous fumarate (C-OCP) and Metformin 500 TID.   - she denies any issues with hyperandrogenic features.   - previous A1c show progressive increase (5.9 from 5.4)  - her goal is to pursue pregnancy  - discussed with her lifestyle changes, metabolic abnormalities, and cardiovascular disease. Encouraged her to continue exercise and diet. Discussed discontinuation of phentermine in setting of high blood pressure and holding of GLP-1 while pursuing pregnancy.   - discussed ovulation induction and fertility evaluation with reproductive specialist as well as multidisciplinary approach to pregnancy with MFM specialist.   Orders:  - increase Metformin to 1000 BID  - referral to Fertility specialist (Dr. Tara Budinetz)  - follow up in 6 months    History of Present Illness   Yecenia Rodriguez is a 34 y.o. female with PMH of HTN, Pre-diabetes, GERD, MDD, and PCOS who presents to the clinic for the management of PCOS.  She states she was diagnosed with it more than 5 years ago. She has been on Combined OC and Metformin 500 TID. She has been on diets and exercise plans for weight loss, with variable results. She has seen person trainers and nutritionists. She is currently on a 1500 calorie diet and keeps track of her macro nutrients. She has been unable to start any GLP-1 medications due to insurance coverage.   She denies acne and hair loss. She gets her lasered. She admits to having some anxiety about potential top of her head hair loss, but she  "states it has not gotten worse and is managing it with scalp care.   She gets her period every month, sometimes she gets some spotting in between periods.   Her current goal for management of her PCOS is to get pregnant. She is recently  and wants to try.   She has a gynecologist who supports her coming off of OCP. She quit smoking several months ago.   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 She walks 3 miles everyday and does weights at home.     FH: obesity, DM Type 2      Review of Systems   Constitutional:  Negative for activity change, appetite change, chills, fatigue and fever.   HENT:  Negative for dental problem, ear pain and trouble swallowing.    Eyes:  Negative for pain and visual disturbance.   Respiratory:  Negative for cough, chest tightness and shortness of breath.    Cardiovascular:  Negative for chest pain, palpitations and leg swelling.   Gastrointestinal:  Negative for abdominal pain, constipation, diarrhea, nausea and vomiting.   Endocrine: Negative for cold intolerance, heat intolerance, polydipsia, polyphagia and polyuria.   Genitourinary:  Negative for difficulty urinating, dysuria, flank pain, frequency and urgency.   Musculoskeletal:  Negative for arthralgias, back pain and myalgias.   Skin:  Negative for color change and rash.   Neurological:  Negative for dizziness, tremors, syncope, weakness, light-headedness, numbness and headaches.   Hematological:  Does not bruise/bleed easily.   Psychiatric/Behavioral:  Negative for decreased concentration. The patient is not nervous/anxious.      Objective   /80   Pulse 64   Ht 5' 8\" (1.727 m)   Wt 120 kg (265 lb 6.4 oz)   SpO2 99%   BMI 40.35 kg/m²     Physical Exam  Constitutional:       Appearance: Normal appearance. She is obese.   HENT:      Head: Normocephalic and atraumatic.      Nose: Nose normal.      Mouth/Throat:      Mouth: Mucous membranes are moist.      Pharynx: Oropharynx is clear. "   Eyes:      Extraocular Movements: Extraocular movements intact.   Neck:      Thyroid: No thyroid mass, thyromegaly or thyroid tenderness.   Cardiovascular:      Rate and Rhythm: Normal rate and regular rhythm.      Pulses: Normal pulses.      Heart sounds: Normal heart sounds.   Pulmonary:      Effort: Pulmonary effort is normal.      Breath sounds: Normal breath sounds.   Abdominal:      General: Abdomen is flat. There is no distension.      Palpations: Abdomen is soft.      Tenderness: There is no abdominal tenderness.   Musculoskeletal:         General: No swelling or tenderness. Normal range of motion.      Cervical back: Normal range of motion and neck supple.   Skin:     General: Skin is warm and dry.      Coloration: Skin is not jaundiced or pale.      Findings: No lesion.   Neurological:      Mental Status: She is alert and oriented to person, place, and time.   Psychiatric:         Mood and Affect: Mood normal.         Behavior: Behavior normal.         Thought Content: Thought content normal.         Judgment: Judgment normal.       Administrative Statements

## 2024-07-30 ENCOUNTER — CLINICAL SUPPORT (OUTPATIENT)
Dept: BARIATRICS | Facility: CLINIC | Age: 34
End: 2024-07-30

## 2024-07-30 VITALS
HEART RATE: 76 BPM | SYSTOLIC BLOOD PRESSURE: 116 MMHG | RESPIRATION RATE: 17 BRPM | BODY MASS INDEX: 40.32 KG/M2 | TEMPERATURE: 98.4 F | WEIGHT: 266 LBS | DIASTOLIC BLOOD PRESSURE: 82 MMHG | HEIGHT: 68 IN

## 2024-07-30 VITALS — HEIGHT: 68 IN | BODY MASS INDEX: 40.32 KG/M2 | WEIGHT: 266 LBS

## 2024-07-30 DIAGNOSIS — R63.5 ABNORMAL WEIGHT GAIN: Primary | ICD-10-CM

## 2024-07-30 PROCEDURE — WMREE PR REE WEIGHT MANAGEMENT

## 2024-07-30 PROCEDURE — RECHECK

## 2024-07-30 NOTE — PROGRESS NOTES
Reevue indirect calorimter revealed REE is normal (-7%)  compared to the predictive normal for someone her same age, height, and gender.  Reevue Indirect Calorimeter REE: 1814           Weight loss without exercise:  5639-4925          Weight loss with exercise:  2887-8142                    Maintenance:  2514-0443          
NA

## 2024-07-30 NOTE — PROGRESS NOTES
Patient last visit weight: 265 lbs   Patient current visit weight: 266.0 lbs     If you are taking phentermine or other oral weight loss medications, are you experiencing any of the following symptoms:  Headache:   Blurred Vision:   Chest Pain:   Palpitations:  Insomnia:   SPECIFY ORAL MEDICATION AND DOSAGE:     If you are taking an injectable medication,  are you experiencing any of the following symptoms:  Bloating:   Nausea:  Vomiting:   Constipation:   Diarrhea:  SPECIFY INJECTABLE MEDICATION AND CURRENT DOSAGE:    Pt states she is currently taking metformin and wellbutrin prescribed by her PCP for weight loss. Pt stated she planned on taking Phentermine but decided not to due to trying to get pregnant in the near future.       Vitals:    Is BP less than 100/60? NO   Is BP greater than 140/90? NO   Is HR greater than 100? NO   **If yes to any of the above, have patient relax and repeat in 5-10 minutes**    Repeat values:    Is BP less than 100/60?  Is BP greater than 140/90?  Is HR greater than 100?  **If values remain outside of ranges above, please consult provider for next steps**

## 2024-08-01 DIAGNOSIS — I10 BENIGN ESSENTIAL HTN: ICD-10-CM

## 2024-08-01 RX ORDER — CARVEDILOL 25 MG/1
25 TABLET ORAL 2 TIMES DAILY WITH MEALS
Qty: 200 TABLET | Refills: 1 | Status: SHIPPED | OUTPATIENT
Start: 2024-08-01

## 2024-08-07 ENCOUNTER — OFFICE VISIT (OUTPATIENT)
Age: 34
End: 2024-08-07
Payer: COMMERCIAL

## 2024-08-07 VITALS
BODY MASS INDEX: 40.01 KG/M2 | HEART RATE: 68 BPM | HEIGHT: 68 IN | OXYGEN SATURATION: 98 % | DIASTOLIC BLOOD PRESSURE: 80 MMHG | SYSTOLIC BLOOD PRESSURE: 106 MMHG | WEIGHT: 264 LBS | TEMPERATURE: 98.4 F

## 2024-08-07 DIAGNOSIS — E78.5 DYSLIPIDEMIA, GOAL LDL BELOW 160: ICD-10-CM

## 2024-08-07 DIAGNOSIS — I10 BENIGN ESSENTIAL HTN: ICD-10-CM

## 2024-08-07 DIAGNOSIS — Z00.01 ANNUAL VISIT FOR GENERAL ADULT MEDICAL EXAMINATION WITH ABNORMAL FINDINGS: Primary | ICD-10-CM

## 2024-08-07 DIAGNOSIS — F06.4 ANXIETY DISORDER DUE TO KNOWN PHYSIOLOGICAL CONDITION: ICD-10-CM

## 2024-08-07 DIAGNOSIS — K21.9 GASTROESOPHAGEAL REFLUX DISEASE, UNSPECIFIED WHETHER ESOPHAGITIS PRESENT: ICD-10-CM

## 2024-08-07 DIAGNOSIS — K22.4 ESOPHAGEAL SPASM: ICD-10-CM

## 2024-08-07 DIAGNOSIS — F17.200 TOBACCO USE DISORDER: ICD-10-CM

## 2024-08-07 DIAGNOSIS — R73.03 PREDIABETES: ICD-10-CM

## 2024-08-07 PROCEDURE — 99395 PREV VISIT EST AGE 18-39: CPT | Performed by: INTERNAL MEDICINE

## 2024-08-07 PROCEDURE — 99214 OFFICE O/P EST MOD 30 MIN: CPT | Performed by: INTERNAL MEDICINE

## 2024-08-07 RX ORDER — FAMOTIDINE 40 MG/1
40 TABLET, FILM COATED ORAL DAILY
Qty: 90 TABLET | Refills: 1 | Status: SHIPPED | OUTPATIENT
Start: 2024-08-07

## 2024-08-07 RX ORDER — LABETALOL 100 MG/1
100 TABLET, FILM COATED ORAL 2 TIMES DAILY
Qty: 100 TABLET | Refills: 1 | Status: SHIPPED | OUTPATIENT
Start: 2024-08-07

## 2024-08-07 NOTE — ASSESSMENT & PLAN NOTE
Currently on coreg 25mg bid and HCTZ 12.5mg daily and HCTZ prn, BP controlled recently without need for HCTZ. As pt trying to conceive soon will switch coreg to labetalol 100mg bid, can increase if BP elevated on this dose.

## 2024-08-07 NOTE — PROGRESS NOTES
[facility]  INTERNAL MEDICINE   PABLO LEMON      NAME: Yecenia Rordiguez  AGE: 34 y.o. SEX: female    DATE OF ENCOUNTER: 8/7/2024    Assessment and Plan     1. Annual visit for general adult medical examination with abnormal findings  2. Dyslipidemia, goal LDL below 160  3. Tobacco use disorder  Assessment & Plan:  Quit smoking in last 2 months. Now only occasionally smokes hemp cigars which do not contain THC or nicotine.  4. Benign essential HTN  Assessment & Plan:  Currently on coreg 25mg bid and HCTZ 12.5mg daily and HCTZ prn, BP controlled recently without need for HCTZ. As pt trying to conceive soon will switch coreg to labetalol 100mg bid, can increase if BP elevated on this dose.   Orders:  -     labetalol (NORMODYNE) 100 mg tablet; Take 1 tablet (100 mg total) by mouth 2 (two) times a day  5. Gastroesophageal reflux disease, unspecified whether esophagitis present  Assessment & Plan:  Symptoms controlled on pepcid.  6. Anxiety disorder due to known physiological condition  Assessment & Plan:  Pt recently  and wants to start trying to conceive soon. Instructed her on tapering off wellbutrin and will see psychiatry to find replacement medication. She previously experienced SI on Zoloft, does not believe she has tried any other medications.   Orders:  -     Ambulatory referral to Psych Services; Future  7. Prediabetes  8. Esophageal spasm  -     famotidine (PEPCID) 40 MG tablet; Take 1 tablet (40 mg total) by mouth in the morning       Orders Placed This Encounter   Procedures    Ambulatory referral to Psych Services       - Counseling Documentation: patient was counseled regarding: diagnostic results, instructions for management, risk factor reductions, prognosis, patient and family education, impressions, risks and benefits of treatment options, and importance of compliance with treatment  - Counseling Time: counseling time more than 50% of visit: 30 minutes      BMI  Counseling: Body mass index is 40.24 kg/m². The BMI is above normal. Nutrition recommendations include reducing portion sizes, decreasing overall calorie intake, and 3-5 servings of fruits/vegetables daily. Exercise recommendations include exercising 3-5 times per week. Pharmacotherapy was ordered for patient to aid in weight loss. Patient referred to nutritionist due to patient being obese. Patient referred to weight management due to patient being obese.     HPI     Patient here for a comprehensive physical exam. The patient reports no problems. She was recently  and she and her  are planning on trying to conceive in the next few months.     Diet and Physical Activity  Diet/Nutrition: well balanced diet.   Exercise: 5-7 times a week on average.      Depression Screening  PHQ-9 Depression Screening    PHQ-9:    Frequency of the following problems over the past two weeks:            General Health  Sleep: sleeps well.   Hearing: normal - bilateral.  Vision: no vision problems.   Dental: regular dental visits.       /GYN Health  Patient is: premenopausal  Contraceptive method: oral contraceptives      Review of Systems     Review of Systems   All other systems reviewed and are negative.       All ROS negative unless otherwise stated in the [facility]    Active Problem List     Patient Active Problem List   Diagnosis    Vaginal sore    Atypical squamous cell changes of undetermined significance (ASCUS) on cervical cytology with negative high risk human papilloma virus (HPV) test result    Acne vulgaris    Dyslipidemia, goal LDL below 160    Food insecurity    Major depressive disorder, recurrent episode, moderate ([facility])    Class 3 severe obesity with body mass index (BMI) of 40.0 to 44.9 in adult ([facility])    PCOS (polycystic ovarian syndrome)    Post-traumatic osteoarthritis of right foot    Tobacco use disorder    Esophageal spasm    Anxiety disorder due to known physiological condition    Rash    Cyst of right  "ovary    History of PCOS    Anxiety and depression    Benign essential HTN    GERD (gastroesophageal reflux disease)    Mouth sores    Prediabetes    Annual visit for general adult medical examination with abnormal findings       Objective     /80 (BP Location: Left arm, Patient Position: Sitting, Cuff Size: Large)   Pulse 68   Temp 98.4 °F (36.9 °C) (Temporal)   Ht 5' 7.91\" (1.725 m)   Wt 120 kg (264 lb)   SpO2 98%   BMI 40.24 kg/m²     Physical Exam  Constitutional:       General: She is not in acute distress.     Appearance: She is obese. She is not ill-appearing.   HENT:      Head: Normocephalic and atraumatic.      Right Ear: External ear normal.      Left Ear: External ear normal.      Nose: Nose normal.      Mouth/Throat:      Pharynx: Oropharynx is clear.   Eyes:      Extraocular Movements: Extraocular movements intact.      Conjunctiva/sclera: Conjunctivae normal.   Cardiovascular:      Rate and Rhythm: Normal rate and regular rhythm.      Heart sounds: Normal heart sounds.   Pulmonary:      Effort: Pulmonary effort is normal. No respiratory distress.      Breath sounds: Normal breath sounds.   Abdominal:      Palpations: Abdomen is soft.   Musculoskeletal:         General: No swelling or deformity.      Right lower leg: No edema.      Left lower leg: No edema.   Skin:     General: Skin is warm and dry.      Findings: No rash.   Neurological:      Mental Status: She is alert and oriented to person, place, and time. Mental status is at baseline.      Cranial Nerves: No cranial nerve deficit.   Psychiatric:         Mood and Affect: Mood normal.         Behavior: Behavior normal.            Current Medications     Current Outpatient Medications:     aluminum chloride (DRYSOL) 20 % external solution, Use daily as needed., Disp: 35 mL, Rfl: 0    buPROPion (WELLBUTRIN SR) 150 mg 12 hr tablet, Take 1 tablet (150 mg total) by mouth 2 (two) times a day, Disp: 60 tablet, Rfl: 5    calcium carbonate (TUMS) " 500 mg chewable tablet, Chew 2 tablets if needed, Disp: , Rfl:     carvedilol (COREG) 25 mg tablet, Take 1 tablet (25 mg total) by mouth 2 (two) times a day with meals, Disp: 200 tablet, Rfl: 1    famotidine (PEPCID) 40 MG tablet, Take 1 tablet (40 mg total) by mouth in the morning, Disp: 90 tablet, Rfl: 1    labetalol (NORMODYNE) 100 mg tablet, Take 1 tablet (100 mg total) by mouth 2 (two) times a day, Disp: 100 tablet, Rfl: 1    metFORMIN (GLUCOPHAGE) 1000 MG tablet, Take 1 tablet (1,000 mg total) by mouth 2 (two) times a day with meals, Disp: 120 tablet, Rfl: 3    norethindrone-ethinyl estradiol-ferrous fumarate (Junel Fe 24) 1-20 MG-MCG(24) per tablet, Take 1 tablet by mouth daily Start Sunday, Disp: 84 tablet, Rfl: 3    patient supplied medication, Hemp (CBD) cigarettes for smoking cessation (previous cigarette smoker) approximately 6 cigarettes weekly as needed, Disp: , Rfl:     Health Maintenance     Health Maintenance   Topic Date Due    HIV Screening  Never done    Pneumococcal Vaccine: Pediatrics (0 to 5 Years) and At-Risk Patients (6 to 64 Years) (2 of 2 - PCV) 01/22/2019    COVID-19 Vaccine (3 - 2023-24 season) 09/01/2023    DTaP,Tdap,and Td Vaccines (7 - Td or Tdap) 10/25/2023    Annual Physical  04/17/2024    Influenza Vaccine (1) 09/01/2024    Depression Screening  08/07/2025    Cervical Cancer Screening  06/11/2027    Zoster Vaccine (1 of 2) 05/09/2039    RSV Vaccine Age 60+ Years (1 - 1-dose 60+ series) 05/09/2049    Hepatitis C Screening  Completed    HIB Vaccine  Completed    IPV Vaccine  Completed    HPV Vaccine  Completed    RSV Vaccine age 0-20 Months  Aged Out    Hepatitis A Vaccine  Aged Out    Meningococcal ACWY Vaccine  Aged Out     Immunization History   Administered Date(s) Administered    COVID-19 J&J ([employer]) vaccine 0.5 mL 03/21/2021, 12/04/2021    DTP 1989, 1989, 1989, 12/04/1990, 05/24/1994    HPV Quadrivalent 10/25/2013, 11/22/2013, 04/01/2014    Hep B, adult  01/28/1994, 05/24/1994, 06/27/1994    Hepatitis B 01/28/1994, 05/24/1994, 06/27/1994    HiB 08/29/1990    INFLUENZA 10/30/1996, 12/05/1996, 10/25/2013, 11/21/2014, 10/01/2017, 10/15/2020, 11/05/2021, 01/18/2023    Influenza Quadrivalent 3 years and older 10/30/1996, 12/05/1996    Influenza, injectable, quadrivalent, preservative free 0.5 mL 11/05/2020, 01/18/2023    Influenza, seasonal, injectable 10/25/2013, 11/21/2014, 12/05/2015, 10/01/2017    MMR 08/29/1990, 10/24/1996    Meningococcal Conjugate (MCV4O) 07/28/2008    Meningococcal MCV4P 07/28/2008    OPV 1989, 1989, 12/04/1990, 05/24/1994    Pneumococcal Polysaccharide PPV23 01/22/2018    Td (adult), adsorbed 05/09/2000    Tdap 10/25/2013    Tuberculin Skin Test 06/12/1990, 04/29/1993, 05/24/1994    Tuberculin Skin Test-PPD Intradermal 06/12/1990, 04/29/1993, 05/24/1994, 02/24/1998, 04/20/2000, 07/28/2008, 02/06/2012, 03/26/2019    Varicella 06/19/2001, 07/29/2009         ----------------------------------------------------  Edita Melchor MD, PGY-2  Internal Medicine Residency   [facility] - [place], [place]

## 2024-08-07 NOTE — PROGRESS NOTES
Ambulatory Visit  Name: Yecenia Rodriguez      : 1990      MRN: [id number]  Encounter Provider: Mahogany Encarnacion MD  Encounter Date: 2024   Encounter department: [facility] [place]    Assessment & Plan   1. Annual visit for general adult medical examination with abnormal findings  2. Dyslipidemia, goal LDL below 160  3. Tobacco use disorder  Assessment & Plan:  Quit smoking in last 2 months.   4. Benign essential HTN  Assessment & Plan:  Currently on coreg 25mg bid and HCTZ 12.5mg daily, BP controlled recently, continue.   Orders:  -     labetalol (NORMODYNE) 100 mg tablet; Take 1 tablet (100 mg total) by mouth 2 (two) times a day  5. Gastroesophageal reflux disease, unspecified whether esophagitis present  6. Anxiety disorder due to known physiological condition  -     Ambulatory referral to Psych Services; Future  7. Prediabetes  8. Esophageal spasm  -     famotidine (PEPCID) 40 MG tablet; Take 1 tablet (40 mg total) by mouth in the morning         Chief Complaint   Patient presents with   • Follow-up     6 m f/u visit for hypertension.        History of Present Illness     HPI  Review of Systems  Past Medical History:   Diagnosis Date   • Arthritis    • Atypical squamous cell changes of undetermined significance (ASCUS) on cervical cytology with negative high risk human papilloma virus (HPV) test result    • High cholesterol    • Ovarian cyst 2022    bilateral   • PCOS (polycystic ovarian syndrome)      Past Surgical History:   Procedure Laterality Date   • WISDOM TOOTH EXTRACTION       Family History   Problem Relation Age of Onset   • Hypertension Mother    • Other Mother         vaginal tumors   • Heart disease Mother    • Cancer Mother    • Skin cancer Father    • Heart Valve Disease Father         leaky valve   • Diabetes Maternal Grandmother    • Kidney failure Maternal Grandmother    • Ovarian cancer Paternal Grandmother    • Lung cancer Paternal  Grandfather    • Heart attack Paternal Aunt    • Other Paternal Aunt         Ovarian polys     Social History     Tobacco Use   • Smoking status: Former     Current packs/day: 0.00     Average packs/day: 0.3 packs/day for 20.4 years (5.1 ttl pk-yrs)     Types: Cigarettes     Start date:      Quit date: 2024     Years since quittin.1   • Smokeless tobacco: Never   Vaping Use   • Vaping status: Never Used   Substance and Sexual Activity   • Alcohol use: Yes     Alcohol/week: 3.0 standard drinks of alcohol     Types: 3 Standard drinks or equivalent per week   • Drug use: Never   • Sexual activity: Yes     Partners: Male     Current Outpatient Medications on File Prior to Visit   Medication Sig   • aluminum chloride (DRYSOL) 20 % external solution Use daily as needed.   • buPROPion (WELLBUTRIN SR) 150 mg 12 hr tablet Take 1 tablet (150 mg total) by mouth 2 (two) times a day   • calcium carbonate (TUMS) 500 mg chewable tablet Chew 2 tablets if needed   • carvedilol (COREG) 25 mg tablet Take 1 tablet (25 mg total) by mouth 2 (two) times a day with meals   • metFORMIN (GLUCOPHAGE) 1000 MG tablet Take 1 tablet (1,000 mg total) by mouth 2 (two) times a day with meals   • norethindrone-ethinyl estradiol-ferrous fumarate (Junel Fe 24) 1-20 MG-MCG(24) per tablet Take 1 tablet by mouth daily Start    • patient supplied medication Hemp (CBD) cigarettes for smoking cessation (previous cigarette smoker) approximately 6 cigarettes weekly as needed   • [DISCONTINUED] famotidine (PEPCID) 40 MG tablet Take 1 tablet (40 mg total) by mouth in the morning   • [DISCONTINUED] hydrochlorothiazide (HYDRODIURIL) 12.5 mg tablet Take 1 tablet (12.5 mg total) by mouth daily As needed based on blood pressure. Target is 120/80 mmHg (Patient taking differently: Take 12.5 mg by mouth if needed As needed based on blood pressure. Target is 120/80 mmHg)     Allergies   Allergen Reactions   • Bee Venom Swelling     Large local reaction  "  • Nicotine Other (See Comments)     Pt had severe \"night terrors\" when using nicotine patch   • Other      Nicotene patches  Fleas   • Clarithromycin Rash   • Clindamycin Rash   • Erigeron Annuus Rash     Fleas...large hives     Immunization History   Administered Date(s) Administered   • COVID-19 J&J ([employer]) vaccine 0.5 mL 03/21/2021, 12/04/2021   • DTP 1989, 1989, 1989, 12/04/1990, 05/24/1994   • HPV Quadrivalent 10/25/2013, 11/22/2013, 04/01/2014   • Hep B, adult 01/28/1994, 05/24/1994, 06/27/1994   • Hepatitis B 01/28/1994, 05/24/1994, 06/27/1994   • HiB 08/29/1990   • INFLUENZA 10/30/1996, 12/05/1996, 10/25/2013, 11/21/2014, 10/01/2017, 10/15/2020, 11/05/2021, 01/18/2023   • Influenza Quadrivalent 3 years and older 10/30/1996, 12/05/1996   • Influenza, injectable, quadrivalent, preservative free 0.5 mL 11/05/2020, 01/18/2023   • Influenza, seasonal, injectable 10/25/2013, 11/21/2014, 12/05/2015, 10/01/2017   • MMR 08/29/1990, 10/24/1996   • Meningococcal Conjugate (MCV4O) 07/28/2008   • Meningococcal MCV4P 07/28/2008   • OPV 1989, 1989, 12/04/1990, 05/24/1994   • Pneumococcal Polysaccharide PPV23 01/22/2018   • Td (adult), adsorbed 05/09/2000   • Tdap 10/25/2013   • Tuberculin Skin Test 06/12/1990, 04/29/1993, 05/24/1994   • Tuberculin Skin Test-PPD Intradermal 06/12/1990, 04/29/1993, 05/24/1994, 02/24/1998, 04/20/2000, 07/28/2008, 02/06/2012, 03/26/2019   • Varicella 06/19/2001, 07/29/2009     Objective     /80 (BP Location: Left arm, Patient Position: Sitting, Cuff Size: Large)   Pulse 68   Temp 98.4 °F (36.9 °C) (Temporal)   Ht 5' 7.91\" (1.725 m)   Wt 120 kg (264 lb)   SpO2 98%   BMI 40.24 kg/m²     Physical Exam    "

## 2024-08-07 NOTE — ASSESSMENT & PLAN NOTE
Quit smoking in last 2 months. Now only occasionally smokes hemp cigars which do not contain THC or nicotine.

## 2024-08-07 NOTE — ASSESSMENT & PLAN NOTE
Pt recently  and wants to start trying to conceive soon. Instructed her on tapering off wellbutrin and will see psychiatry to find replacement medication. She previously experienced SI on Zoloft, does not believe she has tried any other medications.

## 2024-08-08 ENCOUNTER — TELEPHONE (OUTPATIENT)
Age: 34
End: 2024-08-08

## 2024-08-23 ENCOUNTER — TELEPHONE (OUTPATIENT)
Dept: BARIATRICS | Facility: CLINIC | Age: 34
End: 2024-08-23

## 2024-08-23 NOTE — TELEPHONE ENCOUNTER
Spoke with patient in regards to rescheduling her 8/27/24 Nurse Visit appointment that she had cancelled through My Chart. Patient stated that she decided not to take the Weight Loss Medication so there is no need to reschedule this appointment.

## 2024-10-10 ENCOUNTER — PATIENT MESSAGE (OUTPATIENT)
Age: 34
End: 2024-10-10

## 2024-11-08 DIAGNOSIS — I10 BENIGN ESSENTIAL HTN: ICD-10-CM

## 2024-11-08 RX ORDER — LABETALOL 100 MG/1
100 TABLET, FILM COATED ORAL 2 TIMES DAILY
Qty: 100 TABLET | Refills: 1 | Status: SHIPPED | OUTPATIENT
Start: 2024-11-08

## 2024-11-13 ENCOUNTER — PATIENT MESSAGE (OUTPATIENT)
Age: 34
End: 2024-11-13

## 2024-11-15 ENCOUNTER — ULTRASOUND (OUTPATIENT)
Dept: OBGYN CLINIC | Facility: CLINIC | Age: 34
End: 2024-11-15
Payer: COMMERCIAL

## 2024-11-15 VITALS
OXYGEN SATURATION: 99 % | WEIGHT: 275 LBS | DIASTOLIC BLOOD PRESSURE: 74 MMHG | SYSTOLIC BLOOD PRESSURE: 122 MMHG | HEART RATE: 82 BPM | HEIGHT: 68 IN | BODY MASS INDEX: 41.68 KG/M2

## 2024-11-15 DIAGNOSIS — E66.01 MATERNAL MORBID OBESITY, ANTEPARTUM, FIRST TRIMESTER (HCC): ICD-10-CM

## 2024-11-15 DIAGNOSIS — R73.03 PREDIABETES: ICD-10-CM

## 2024-11-15 DIAGNOSIS — F32.A DEPRESSION AFFECTING PREGNANCY IN FIRST TRIMESTER, ANTEPARTUM: ICD-10-CM

## 2024-11-15 DIAGNOSIS — O99.341 ANXIETY DURING PREGNANCY IN FIRST TRIMESTER, ANTEPARTUM: ICD-10-CM

## 2024-11-15 DIAGNOSIS — Z23 NEED FOR INFLUENZA VACCINATION: ICD-10-CM

## 2024-11-15 DIAGNOSIS — O10.019 CHRONIC BENIGN ESSENTIAL HYPERTENSION, ANTEPARTUM: Primary | ICD-10-CM

## 2024-11-15 DIAGNOSIS — F41.9 ANXIETY DURING PREGNANCY IN FIRST TRIMESTER, ANTEPARTUM: ICD-10-CM

## 2024-11-15 DIAGNOSIS — O99.341 DEPRESSION AFFECTING PREGNANCY IN FIRST TRIMESTER, ANTEPARTUM: ICD-10-CM

## 2024-11-15 DIAGNOSIS — O99.211 MATERNAL MORBID OBESITY, ANTEPARTUM, FIRST TRIMESTER (HCC): ICD-10-CM

## 2024-11-15 DIAGNOSIS — Z3A.10 10 WEEKS GESTATION OF PREGNANCY: ICD-10-CM

## 2024-11-15 PROBLEM — N89.8 VAGINAL SORE: Status: RESOLVED | Noted: 2020-09-01 | Resolved: 2024-11-15

## 2024-11-15 PROBLEM — R21 RASH: Status: RESOLVED | Noted: 2021-06-16 | Resolved: 2024-11-15

## 2024-11-15 PROBLEM — K13.79 MOUTH SORES: Status: RESOLVED | Noted: 2023-01-18 | Resolved: 2024-11-15

## 2024-11-15 PROCEDURE — 87591 N.GONORRHOEAE DNA AMP PROB: CPT | Performed by: OBSTETRICS & GYNECOLOGY

## 2024-11-15 PROCEDURE — 99215 OFFICE O/P EST HI 40 MIN: CPT | Performed by: OBSTETRICS & GYNECOLOGY

## 2024-11-15 PROCEDURE — 90656 IIV3 VACC NO PRSV 0.5 ML IM: CPT | Performed by: OBSTETRICS & GYNECOLOGY

## 2024-11-15 PROCEDURE — 90471 IMMUNIZATION ADMIN: CPT | Performed by: OBSTETRICS & GYNECOLOGY

## 2024-11-15 PROCEDURE — 87491 CHLMYD TRACH DNA AMP PROBE: CPT | Performed by: OBSTETRICS & GYNECOLOGY

## 2024-11-15 RX ORDER — ASPIRIN 81 MG/1
162 TABLET, CHEWABLE ORAL DAILY
Qty: 180 TABLET | Refills: 3 | Status: SHIPPED | OUTPATIENT
Start: 2024-11-15

## 2024-11-15 NOTE — PROGRESS NOTES
Pt is a 34 y.o.  with Patient's last menstrual period was 2024 (exact date).  who presents for prenatal care. She presents with her  Luis Daniel.    Her LMP was normal, on time and without hormonal disruption. She had a dating ultrasound with [facility] as she achieved pregnancy spontaneously prior to treatment on 10/31/2024 with EDC 6/10/2025.    Her PMHx is notable for PCOS, CHTN, depression, anxiety.      She denies history of genital HSV; her partner denies history of genital HSV.     Her ethnic background is Greek, [place] Croatian, polish. Denies any Ashkenazi Orthodox heritage   ; his is eastern . Denies any ashkenazi Orthodox background.      There is population risk for CF and SMA testing and hemoglobinopathy/thalassemia.  They will consider screening    Her family history is notable for twins in her grandma her great great grandma. His is notable for a brother with a heart murmur (no surgery required) and he has WPW syndrome.    Pregnancy Symptoms:  She has noted breast fullness.  She not having difficulty with nausea/vomiting.      Pt desires trisomy screening-plans NIPT testing    Pt does accept blood products if need arises.     Past Medical History:   Diagnosis Date    Arthritis     Atypical squamous cell changes of undetermined significance (ASCUS) on cervical cytology with negative high risk human papilloma virus (HPV) test result     2024-ASCUS +HRHPV; 2024-wnl    High cholesterol     Ovarian cyst 2022    bilateral    PCOS (polycystic ovarian syndrome)     Tobacco use disorder 2014       Past Surgical History:   Procedure Laterality Date    DILATION AND CURETTAGE OF UTERUS      ETOP    WISDOM TOOTH EXTRACTION           Current Outpatient Medications:     aluminum chloride (DRYSOL) 20 % external solution, Use daily as needed., Disp: 35 mL, Rfl: 0    aspirin 81 mg chewable tablet, Chew 2 tablets (162 mg total) daily Start at 13 weeks gestation, Disp: 180  "tablet, Rfl: 3    buPROPion (WELLBUTRIN SR) 150 mg 12 hr tablet, Take 1 tablet (150 mg total) by mouth 2 (two) times a day, Disp: 60 tablet, Rfl: 2    calcium carbonate (TUMS) 500 mg chewable tablet, Chew 2 tablets if needed, Disp: , Rfl:     famotidine (PEPCID) 40 MG tablet, Take 1 tablet (40 mg total) by mouth in the morning, Disp: 90 tablet, Rfl: 1    labetalol (NORMODYNE) 100 mg tablet, TAKE ONE TABLET BY MOUTH TWICE DAILY, Disp: 100 tablet, Rfl: 1    metFORMIN (GLUCOPHAGE) 1000 MG tablet, Take 1 tablet (1,000 mg total) by mouth 2 (two) times a day with meals, Disp: 120 tablet, Rfl: 3    Allergies   Allergen Reactions    Bee Venom Swelling     Large local reaction    Nicotine Other (See Comments)     Pt had severe \"night terrors\" when using nicotine patch    Other      Nicotene patches  Fleas    Clarithromycin Rash    Clindamycin Rash    Erigeron Annuus Rash     Fleas...large hives       OB History    Para Term  AB Living   2    1    SAB IAB Ectopic Multiple Live Births    1         # Outcome Date GA Lbr Stanley/2nd Weight Sex Type Anes PTL Lv   2 Current            1 IAB 16              Obstetric Comments   Menarche: 13         Menses: unsure of birth control       Social History     Socioeconomic History    Marital status: /Civil Union     Spouse name: Luis Daniel    Number of children: None    Years of education: None    Highest education level: Bachelor's degree (e.g., BA, AB, BS)   Occupational History    Occupation: Teacher   Tobacco Use    Smoking status: Former     Current packs/day: 0.00     Average packs/day: 0.3 packs/day for 20.4 years (5.1 ttl pk-yrs)     Types: Cigarettes     Start date:      Quit date: 2024     Years since quittin.4    Smokeless tobacco: Never   Vaping Use    Vaping status: Never Used   Substance and Sexual Activity    Alcohol use: Not Currently     Alcohol/week: 3.0 standard drinks of alcohol     Types: 3 Standard drinks or equivalent per week     " Comment: stopped with + UPT    Drug use: Never    Sexual activity: Yes     Partners: Male   Other Topics Concern    None   Social History Narrative    Restorationism: no preference    Accepts blood products         Social Drivers of Health     Financial Resource Strain: Not on file   Food Insecurity: Not on file   Transportation Needs: Not on file   Physical Activity: Not on file   Stress: Not on file   Social Connections: Unknown (6/18/2024)    Received from [facility]    Social [place]     How often do you feel lonely or isolated from those around you? (Adult - for ages 18 years and over): Not on file   Intimate Partner Violence: Not on file   Housing Stability: Not on file       Family History   Problem Relation Age of Onset    Hypertension Mother     Heart disease Mother     Lung disease Mother     Osteoporosis Mother     Skin cancer Father     Heart Valve Disease Father         leaky valve    Diabetes Maternal Grandmother     Kidney failure Maternal Grandmother     No Known Problems Maternal Grandfather     Ovarian cancer Paternal Grandmother     Lung cancer Paternal Grandfather     Heart attack Paternal Aunt     Stroke Paternal Aunt     Breast cancer Neg Hx     Colon cancer Neg Hx        Review of Systems   Constitutional:  Positive for fatigue. Negative for chills, fever and unexpected weight change.   HENT:  Negative for congestion, mouth sores and sore throat.    Respiratory:  Negative for cough, chest tightness, shortness of breath and wheezing.    Cardiovascular:  Negative for chest pain and palpitations.   Gastrointestinal:  Negative for abdominal distention, abdominal pain, constipation, diarrhea, nausea and vomiting.   Endocrine: Negative for cold intolerance and heat intolerance.   Genitourinary:  Negative for dyspareunia, dysuria, genital sores, menstrual problem, pelvic pain, vaginal bleeding, vaginal discharge and vaginal pain.   Musculoskeletal:  Negative for arthralgias.   Skin:  Negative for color  "change and rash.   Neurological:  Negative for dizziness, light-headedness and headaches.   Hematological:  Negative for adenopathy.       Blood pressure 122/74, pulse 82, height 5' 7.91\" (1.725 m), weight 125 kg (275 lb), last menstrual period 08/20/2024, SpO2 99%, not currently breastfeeding. and Body mass index is 41.92 kg/m².    Physical Exam  Constitutional:       General: She is not in acute distress.     Appearance: Normal appearance. She is obese. She is not ill-appearing.   HENT:      Head: Normocephalic and atraumatic.   Eyes:      Extraocular Movements: Extraocular movements intact.      Conjunctiva/sclera: Conjunctivae normal.   Cardiovascular:      Rate and Rhythm: Normal rate and regular rhythm.      Heart sounds: Normal heart sounds.   Pulmonary:      Effort: Pulmonary effort is normal.      Breath sounds: Normal breath sounds.   Abdominal:      General: Bowel sounds are normal. There is no distension.      Palpations: Abdomen is soft. There is no mass.      Tenderness: There is no abdominal tenderness. There is no guarding or rebound.      Hernia: No hernia is present.   Musculoskeletal:         General: Normal range of motion.      Cervical back: Normal range of motion.      Right lower leg: No edema.      Left lower leg: No edema.   Skin:     General: Skin is warm.      Findings: No erythema or rash.   Neurological:      Mental Status: She is alert and oriented to person, place, and time.   Psychiatric:         Mood and Affect: Mood normal.         Behavior: Behavior normal.         Thought Content: Thought content normal.         Judgment: Judgment normal.       Breasts: breasts appear normal, no suspicious masses, no skin or nipple changes or axillary nodes, symmetric fibrous changes in both upper outer quadrants.       vulva: normal external genitalia for age and no lesions, masses, epithelial changes, or exudate  vagina: color pink and rugae  well formed rugae  cervix: nullip and no lesions "   uterus: NSSC, AF, NT, mobile and 10 wks  adnexa: no masses or tenderness      A/P: Pt is a 34 y.o.  with    Pt has been counseled re diet, exercise, weight gain, foods to avoid, BF, vaccines in pregnancy, trisomy screening, vector borne illness and prevention, routine dental care, travel precautions to include seat belt use and VTE risk reduction.  She has been provided our pregnancy packet which includes how and when to contact providers, medication recommendations, dietary suggestions, breastfeeding information as well as websites for additional information, hospital and delivery concerns, etc.    Yecenia was seen today for initial prenatal visit.    Diagnoses and all orders for this visit:    Chronic benign essential hypertension, antepartum  -     Chlamydia/GC amplified DNA by PCR  -     Varicella zoster antibody, IgG; Future  -     HIV 1/2 AG/AB w Reflex [facility] for 2 yr old and above; Future  -     Hepatitis C antibody; Future  -     UA (URINE) with reflex to Scope  -     Urine culture; Future  -     Hepatitis B surface antigen; Future  -     CBC and differential; Future  -     Rubella antibody, IgG; Future  -     Type and screen; Future  -     RPR-Syphilis Screening (Total Syphilis IGG/IGM); Future  -     Hepatitis B surface antibody; Future  -     Anemia Panel w/Reflex, OB; Future  -     Comprehensive metabolic panel; Future  -     Protein / creatinine ratio, urine; Future  -     Uric acid; Future  -     Glucose, 1H PG; Future  -     Hemoglobin A1C; Future  -     Ambulatory Referral to Maternal Fetal Medicine; Future  -     aspirin 81 mg chewable tablet; Chew 2 tablets (162 mg total) daily Start at 13 weeks gestation    Prediabetes  -     Glucose, 1H PG; Future  -     Hemoglobin A1C; Future  -     Ambulatory Referral to Maternal Fetal Medicine; Future  -     aspirin 81 mg chewable tablet; Chew 2 tablets (162 mg total) daily Start at 13 weeks gestation    Anxiety during pregnancy in first trimester,  antepartum  -     Chlamydia/GC amplified DNA by PCR  -     Varicella zoster antibody, IgG; Future  -     HIV 1/2 AG/AB w Reflex [facility] for 2 yr old and above; Future  -     Hepatitis C antibody; Future  -     UA (URINE) with reflex to Scope  -     Urine culture; Future  -     Hepatitis B surface antigen; Future  -     CBC and differential; Future  -     Rubella antibody, IgG; Future  -     Type and screen; Future  -     RPR-Syphilis Screening (Total Syphilis IGG/IGM); Future  -     Hepatitis B surface antibody; Future  -     Anemia Panel w/Reflex, OB; Future  -     Comprehensive metabolic panel; Future  -     Protein / creatinine ratio, urine; Future  -     Uric acid; Future  -     Glucose, 1H PG; Future  -     Hemoglobin A1C; Future  -     Ambulatory Referral to Maternal Fetal Medicine; Future  -     aspirin 81 mg chewable tablet; Chew 2 tablets (162 mg total) daily Start at 13 weeks gestation    Depression affecting pregnancy in first trimester, antepartum  -     Chlamydia/GC amplified DNA by PCR  -     Varicella zoster antibody, IgG; Future  -     HIV 1/2 AG/AB w Reflex [facility] for 2 yr old and above; Future  -     Hepatitis C antibody; Future  -     UA (URINE) with reflex to Scope  -     Urine culture; Future  -     Hepatitis B surface antigen; Future  -     CBC and differential; Future  -     Rubella antibody, IgG; Future  -     Type and screen; Future  -     RPR-Syphilis Screening (Total Syphilis IGG/IGM); Future  -     Hepatitis B surface antibody; Future  -     Anemia Panel w/Reflex, OB; Future  -     Comprehensive metabolic panel; Future  -     Protein / creatinine ratio, urine; Future  -     Uric acid; Future  -     Glucose, 1H PG; Future  -     Hemoglobin A1C; Future  -     Ambulatory Referral to Maternal Fetal Medicine; Future  -     aspirin 81 mg chewable tablet; Chew 2 tablets (162 mg total) daily Start at 13 weeks gestation    Maternal morbid obesity, antepartum, first trimester (HCC)  -     Chlamydia/GC  amplified DNA by PCR  -     Varicella zoster antibody, IgG; Future  -     HIV 1/2 AG/AB w Reflex [facility] for 2 yr old and above; Future  -     Hepatitis C antibody; Future  -     UA (URINE) with reflex to Scope  -     Urine culture; Future  -     Hepatitis B surface antigen; Future  -     CBC and differential; Future  -     Rubella antibody, IgG; Future  -     Type and screen; Future  -     RPR-Syphilis Screening (Total Syphilis IGG/IGM); Future  -     Hepatitis B surface antibody; Future  -     Anemia Panel w/Reflex, OB; Future  -     Comprehensive metabolic panel; Future  -     Protein / creatinine ratio, urine; Future  -     Uric acid; Future  -     Glucose, 1H PG; Future  -     Hemoglobin A1C; Future  -     Ambulatory Referral to Maternal Fetal Medicine; Future  -     aspirin 81 mg chewable tablet; Chew 2 tablets (162 mg total) daily Start at 13 weeks gestation    10 weeks gestation of pregnancy  -     Chlamydia/GC amplified DNA by PCR  -     Varicella zoster antibody, IgG; Future  -     HIV 1/2 AG/AB w Reflex [facility] for 2 yr old and above; Future  -     Hepatitis C antibody; Future  -     UA (URINE) with reflex to Scope  -     Urine culture; Future  -     Hepatitis B surface antigen; Future  -     CBC and differential; Future  -     Rubella antibody, IgG; Future  -     Type and screen; Future  -     RPR-Syphilis Screening (Total Syphilis IGG/IGM); Future  -     Hepatitis B surface antibody; Future  -     Anemia Panel w/Reflex, OB; Future  -     Comprehensive metabolic panel; Future  -     Protein / creatinine ratio, urine; Future  -     Uric acid; Future  -     Glucose, 1H PG; Future  -     Hemoglobin A1C; Future  -     Ambulatory Referral to Maternal Fetal Medicine; Future  -     aspirin 81 mg chewable tablet; Chew 2 tablets (162 mg total) daily Start at 13 weeks gestation    Need for influenza vaccination  -     influenza vaccine preservative-free 0.5 mL IM (Fluzone, Afluria, Fluarix, Flulaval)      Reviewed  with patient increased risk of GDM and Pre-eclampsia with her prediabetes, PCOS and CHTN  Advised  mg daily 12-36 weeks. Rx given  Baseline labs and early 1 hour GTT ordered  Pt advised she may stop Glucophage as she is already pregnant  She will continue her Wellbutrin as previously advised by her fertility specialist  Advised of 11-20 lb weight gain,  testing for BMI >40  Advised of  testing for CHTN on medications as well  Influenza vaccination administered    F/u 4 weeks.

## 2024-11-16 ENCOUNTER — APPOINTMENT (OUTPATIENT)
Dept: LAB | Facility: HOSPITAL | Age: 34
End: 2024-11-16
Payer: COMMERCIAL

## 2024-11-16 DIAGNOSIS — F32.A DEPRESSION AFFECTING PREGNANCY IN FIRST TRIMESTER, ANTEPARTUM: ICD-10-CM

## 2024-11-16 DIAGNOSIS — O99.341 ANXIETY DURING PREGNANCY IN FIRST TRIMESTER, ANTEPARTUM: ICD-10-CM

## 2024-11-16 DIAGNOSIS — O10.019 CHRONIC BENIGN ESSENTIAL HYPERTENSION, ANTEPARTUM: ICD-10-CM

## 2024-11-16 DIAGNOSIS — E66.01 MATERNAL MORBID OBESITY, ANTEPARTUM, FIRST TRIMESTER (HCC): ICD-10-CM

## 2024-11-16 DIAGNOSIS — R73.03 PREDIABETES: ICD-10-CM

## 2024-11-16 DIAGNOSIS — O99.211 MATERNAL MORBID OBESITY, ANTEPARTUM, FIRST TRIMESTER (HCC): ICD-10-CM

## 2024-11-16 DIAGNOSIS — O99.341 DEPRESSION AFFECTING PREGNANCY IN FIRST TRIMESTER, ANTEPARTUM: ICD-10-CM

## 2024-11-16 DIAGNOSIS — Z3A.10 10 WEEKS GESTATION OF PREGNANCY: ICD-10-CM

## 2024-11-16 DIAGNOSIS — F41.9 ANXIETY DURING PREGNANCY IN FIRST TRIMESTER, ANTEPARTUM: ICD-10-CM

## 2024-11-16 LAB
ABO GROUP BLD: NORMAL
ALBUMIN SERPL BCG-MCNC: 4.2 G/DL (ref 3.5–5)
ALP SERPL-CCNC: 32 U/L (ref 34–104)
ALT SERPL W P-5'-P-CCNC: 19 U/L (ref 7–52)
ANION GAP SERPL CALCULATED.3IONS-SCNC: 6 MMOL/L (ref 4–13)
AST SERPL W P-5'-P-CCNC: 14 U/L (ref 13–39)
BACTERIA UR QL AUTO: NORMAL /HPF
BASOPHILS # BLD AUTO: 0.04 THOUSANDS/ÂΜL (ref 0–0.1)
BASOPHILS NFR BLD AUTO: 1 % (ref 0–1)
BILIRUB SERPL-MCNC: 0.46 MG/DL (ref 0.2–1)
BILIRUB UR QL STRIP: NEGATIVE
BLD GP AB SCN SERPL QL: NEGATIVE
BUN SERPL-MCNC: 6 MG/DL (ref 5–25)
CALCIUM SERPL-MCNC: 9.1 MG/DL (ref 8.4–10.2)
CHLORIDE SERPL-SCNC: 104 MMOL/L (ref 96–108)
CLARITY UR: CLEAR
CO2 SERPL-SCNC: 26 MMOL/L (ref 21–32)
COLOR UR: COLORLESS
CREAT SERPL-MCNC: 0.51 MG/DL (ref 0.6–1.3)
CREAT UR-MCNC: 42.5 MG/DL
EOSINOPHIL # BLD AUTO: 0.12 THOUSAND/ÂΜL (ref 0–0.61)
EOSINOPHIL NFR BLD AUTO: 2 % (ref 0–6)
ERYTHROCYTE [DISTWIDTH] IN BLOOD BY AUTOMATED COUNT: 12.2 % (ref 11.6–15.1)
EST. AVERAGE GLUCOSE BLD GHB EST-MCNC: 111 MG/DL
GFR SERPL CREATININE-BSD FRML MDRD: 124 ML/MIN/1.73SQ M
GLUCOSE 1H P 50 G GLC PO SERPL-MCNC: 104 MG/DL (ref 70–134)
GLUCOSE P FAST SERPL-MCNC: 111 MG/DL (ref 65–99)
GLUCOSE UR STRIP-MCNC: NEGATIVE MG/DL
HBA1C MFR BLD: 5.5 %
HCT VFR BLD AUTO: 38.6 % (ref 34.8–46.1)
HGB BLD-MCNC: 13.1 G/DL (ref 11.5–15.4)
HGB UR QL STRIP.AUTO: NEGATIVE
IMM GRANULOCYTES # BLD AUTO: 0.02 THOUSAND/UL (ref 0–0.2)
IMM GRANULOCYTES NFR BLD AUTO: 0 % (ref 0–2)
KETONES UR STRIP-MCNC: NEGATIVE MG/DL
LEUKOCYTE ESTERASE UR QL STRIP: ABNORMAL
LYMPHOCYTES # BLD AUTO: 1.82 THOUSANDS/ÂΜL (ref 0.6–4.47)
LYMPHOCYTES NFR BLD AUTO: 27 % (ref 14–44)
MCH RBC QN AUTO: 31.1 PG (ref 26.8–34.3)
MCHC RBC AUTO-ENTMCNC: 33.9 G/DL (ref 31.4–37.4)
MCV RBC AUTO: 92 FL (ref 82–98)
MONOCYTES # BLD AUTO: 0.59 THOUSAND/ÂΜL (ref 0.17–1.22)
MONOCYTES NFR BLD AUTO: 9 % (ref 4–12)
NEUTROPHILS # BLD AUTO: 4.26 THOUSANDS/ÂΜL (ref 1.85–7.62)
NEUTS SEG NFR BLD AUTO: 61 % (ref 43–75)
NITRITE UR QL STRIP: NEGATIVE
NON-SQ EPI CELLS URNS QL MICRO: NORMAL /HPF
NRBC BLD AUTO-RTO: 0 /100 WBCS
PH UR STRIP.AUTO: 8 [PH]
PLATELET # BLD AUTO: 239 THOUSANDS/UL (ref 149–390)
PMV BLD AUTO: 11.2 FL (ref 8.9–12.7)
POTASSIUM SERPL-SCNC: 3.8 MMOL/L (ref 3.5–5.3)
PROT SERPL-MCNC: 6.6 G/DL (ref 6.4–8.4)
PROT UR STRIP-MCNC: NEGATIVE MG/DL
PROT UR-MCNC: 4.1 MG/DL
PROT/CREAT UR: 0.1 MG/G{CREAT} (ref 0–0.1)
RBC # BLD AUTO: 4.21 MILLION/UL (ref 3.81–5.12)
RBC #/AREA URNS AUTO: NORMAL /HPF
RH BLD: POSITIVE
RUBV IGG SERPL IA-ACNC: 40.4 IU/ML
SODIUM SERPL-SCNC: 136 MMOL/L (ref 135–147)
SP GR UR STRIP.AUTO: 1.01 (ref 1–1.03)
SPECIMEN EXPIRATION DATE: NORMAL
URATE SERPL-MCNC: 4.1 MG/DL (ref 2–7.5)
UROBILINOGEN UR STRIP-ACNC: <2 MG/DL
WBC # BLD AUTO: 6.85 THOUSAND/UL (ref 4.31–10.16)
WBC #/AREA URNS AUTO: NORMAL /HPF

## 2024-11-16 PROCEDURE — 86762 RUBELLA ANTIBODY: CPT

## 2024-11-16 PROCEDURE — 84550 ASSAY OF BLOOD/URIC ACID: CPT

## 2024-11-16 PROCEDURE — 85025 COMPLETE CBC W/AUTO DIFF WBC: CPT

## 2024-11-16 PROCEDURE — 87340 HEPATITIS B SURFACE AG IA: CPT

## 2024-11-16 PROCEDURE — 36415 COLL VENOUS BLD VENIPUNCTURE: CPT

## 2024-11-16 PROCEDURE — 86900 BLOOD TYPING SEROLOGIC ABO: CPT

## 2024-11-16 PROCEDURE — 87086 URINE CULTURE/COLONY COUNT: CPT

## 2024-11-16 PROCEDURE — 86780 TREPONEMA PALLIDUM: CPT

## 2024-11-16 PROCEDURE — 82950 GLUCOSE TEST: CPT

## 2024-11-16 PROCEDURE — 86901 BLOOD TYPING SEROLOGIC RH(D): CPT

## 2024-11-16 PROCEDURE — 82570 ASSAY OF URINE CREATININE: CPT

## 2024-11-16 PROCEDURE — 86706 HEP B SURFACE ANTIBODY: CPT

## 2024-11-16 PROCEDURE — 84156 ASSAY OF PROTEIN URINE: CPT

## 2024-11-16 PROCEDURE — 86850 RBC ANTIBODY SCREEN: CPT

## 2024-11-16 PROCEDURE — 81001 URINALYSIS AUTO W/SCOPE: CPT | Performed by: OBSTETRICS & GYNECOLOGY

## 2024-11-16 PROCEDURE — 86803 HEPATITIS C AB TEST: CPT

## 2024-11-16 PROCEDURE — 87389 HIV-1 AG W/HIV-1&-2 AB AG IA: CPT

## 2024-11-16 PROCEDURE — 80053 COMPREHEN METABOLIC PANEL: CPT

## 2024-11-16 PROCEDURE — 83036 HEMOGLOBIN GLYCOSYLATED A1C: CPT

## 2024-11-16 PROCEDURE — 86787 VARICELLA-ZOSTER ANTIBODY: CPT

## 2024-11-17 LAB
BACTERIA UR CULT: NORMAL
HBV SURFACE AB SER-ACNC: 75.6 MIU/ML
HBV SURFACE AG SER QL: NORMAL
HCV AB SER QL: NORMAL
HIV 1+2 AB+HIV1 P24 AG SERPL QL IA: NORMAL
HIV 2 AB SERPL QL IA: NORMAL
HIV1 AB SERPL QL IA: NORMAL
HIV1 P24 AG SERPL QL IA: NORMAL
TREPONEMA PALLIDUM IGG+IGM AB [PRESENCE] IN SERUM OR PLASMA BY IMMUNOASSAY: NORMAL
VZV IGG SER QL IA: NORMAL

## 2024-11-18 ENCOUNTER — TELEPHONE (OUTPATIENT)
Age: 34
End: 2024-11-18

## 2024-11-18 ENCOUNTER — RESULTS FOLLOW-UP (OUTPATIENT)
Dept: OBGYN CLINIC | Facility: CLINIC | Age: 34
End: 2024-11-18

## 2024-11-18 LAB
C TRACH DNA SPEC QL NAA+PROBE: NEGATIVE
N GONORRHOEA DNA SPEC QL NAA+PROBE: NEGATIVE

## 2024-11-21 ENCOUNTER — NURSE TRIAGE (OUTPATIENT)
Age: 34
End: 2024-11-21

## 2024-11-21 NOTE — TELEPHONE ENCOUNTER
"11w2d OB reports constipation and rectal pain. Last had BM around 3-4 days ago. States she took metamucil and wondering if she can take something else.     Informed patient can try Miralax as needed as fiber supplement. Advised for constipation can try colace, surfak, or glycerin suppositories. Informed if still no BM, can try fleets enema as last resort.    Reviewed with patient should increase hydration, increase high fiber foods, can try warm prune juice. Advised to call back with further concerns. Patient verbalized understanding.     Routing to on-call provider as fyi.  Reason for Disposition   Over-The-Counter (OTC) medicines for constipation, questions about    Answer Assessment - Initial Assessment Questions  1. STOOL PATTERN OR FREQUENCY: \"How often do you have a bowel movement (BM)?\"  (Normal range: 3 times a day to every 3 days)  \"When was your last BM?\"        Last BM about 3-4 days ago  3. RECTAL PAIN: \"Does your rectum hurt when the stool comes out?\" If Yes, ask: \"Do you have hemorrhoids?\" \"How bad is the pain?\"  (Scale 1-10; or mild, moderate, severe)      Moderate rectal pain  9. LAXATIVES: \"Have you been using any stool softeners, laxatives, or enemas?\"  If Yes, ask \"What, how often, and when was the last time?\"      Denies - took metamucil. Informed this is not our typical recommendations and advised Miralax instead as fiber supplement. Advised for constipation can try colace, surfak, glycerin suppositories. Informed if still no BM, can try fleets enema as last resort.  11. OTHER SYMPTOMS: \"Do you have any other symptoms?\" (e.g., abdomen pain, fever, vaginal bleeding, decreased fetal movement)        Rectal pain.  12. ANDREA: \"What date are you expecting to deliver?\"        06/10/25    Protocols used: Pregnancy - Constipation-Adult-AH    "

## 2024-11-25 ENCOUNTER — PATIENT MESSAGE (OUTPATIENT)
Dept: OBGYN CLINIC | Facility: CLINIC | Age: 34
End: 2024-11-25

## 2024-12-03 ENCOUNTER — INITIAL PRENATAL (OUTPATIENT)
Dept: OBGYN CLINIC | Facility: CLINIC | Age: 34
End: 2024-12-03

## 2024-12-03 VITALS — WEIGHT: 276.4 LBS | SYSTOLIC BLOOD PRESSURE: 124 MMHG | DIASTOLIC BLOOD PRESSURE: 82 MMHG | BODY MASS INDEX: 42.14 KG/M2

## 2024-12-03 DIAGNOSIS — Z34.02 ENCOUNTER FOR SUPERVISION OF NORMAL FIRST PREGNANCY IN SECOND TRIMESTER: Primary | ICD-10-CM

## 2024-12-03 DIAGNOSIS — Z13.71 SCREENING FOR GENETIC DISEASE CARRIER STATUS: ICD-10-CM

## 2024-12-03 PROCEDURE — NOBC

## 2024-12-03 NOTE — PROGRESS NOTES
"OB INTAKE INTERVIEW December 3, 2024    Patient is 34 y.o. who presents for OB intake at 13w0d.  She is not accompanied by anyone during this encounter.  The father of her baby (Luis Daniel Reid) is involved in the pregnancy and he is 34 years old.      Patient's last menstrual period was 2024 (exact date).  Estimated Date of Delivery: 6/10/25 established by Tim Mathias.  Was seen by Tim Mathias but conceived prior to any testing or treatment.     Signs/Symptoms of Pregnancy  Current pregnancy symptoms: breast tenderness, fatigue, and constipation  Headaches no  Cramping YES  Spotting no  PICA cravings no    Diabetes-  Body mass index is 42.14 kg/m².  If patient has 1 or more, please order early 1 hour GTT  History of GDM no  BMI >35 YES  History of PCOS or current metformin use YES  History of LGA/macrosomic infant (4000g/9lbs) no    If patient has 2 or more, please order early 1 hour GTT  BMI>30 YES  AMA YES  First degree relative with type 2 diabetes no  History of chronic HTN, hyperlipidemia, elevated A1C YES  High risk race (, , ,  or ) no    Hypertension- if you answer yes to any of the following, please order baseline preeclampsia labs (cbc, comprehensive metabolic panel, urine protein creatinine ratio, uric acid)  History of of chronic HTN YES  History of gestational HTN no  History of preeclampsia, eclampsia, or HELLP syndrome no  History of diabetes no-reports she was being followed for \"pre-diabetes\"  History of lupus,sjogrens syndrome, kidney disease no    Thyroid- if yes order TSH with reflex T4  History of thyroid disease no    Bleeding Disorder or Hx of DVT-patient or first degree relative with history of. Order the following if not done previously.   (Factor V, antithrombin III, prothrombin gene mutation, protein C and S Ag, lupus anticoagulant, anticardiolipin, beta-2 glycoprotein)   no    OB/GYN-  History of abnormal pap smear YES- " 2024-ASCUS +HRHPV; 2024-wnl       Date of last pap smear 2024  History of HPV YES  History of Herpes/HSV no  History of other STI (gonorrhea, chlamydia, trich) no  History of prior  no  History of prior  no  History of  delivery prior to 36 weeks 6 days no  History of blood transfusion no  Ok for blood transfusion  Yes    Substance screening-   History of tobacco use YES  Currently using tobacco no  Substance Use Screen Level:  N/A    MRSA Screening-   Does the pt have a hx of MRSA? no    Immunizations:  Influenza vaccine given this season: YES   History of Varicella or Vaccination: YES   Discussed Tdap vaccine:  YES  Discussed COVID Vaccine:  YES    Genetic/MFM-  Do you or your partner have a history of any of the following in yourselves or first degree relatives?  Cystic fibrosis no  Spinal muscular atrophy no  Hemoglobinopathy/Sickle Cell/Thalassemia no  Fragile X Intellectual Disability no    If yes, discuss Carrier Screening and recommend consultation with MFM/Genetic Counseling and place specific [facility] Referral for.    If no, discuss Carrier Screening being completed once in a lifetime as a standard of care lab test. Place orders for Cystic Fibrosis Gene Test ([id number]) and Spinal Muscular Atrophy DNA ([id number]).  Patient was informed that prior authorization needs to be completed for these tests and this may take 7-10 business days.  Patient does desire testing for Cystic Fibrosis and Spinal Muscular Atrophy.    Appointment for Nuchal Translucency Ultrasound at [facility] is scheduled for 2024.    Interview education  [facility] Pregnancy Essentials Book reviewed, discussed and attached to their AVS:  YES    Nurse/Family Partnership-patient may qualify NO; referral placed No     Prenatal lab work scripts YES-routine labs ordered and completed already    Extra labs ordered: Cystic Fibrosis gene test and Spinal muscular atrophy DNA    Aspirin/Preeclampsia Screen    Risk Level Risk Factor  "Recommendation   LOW Prior Uncomplicated full-term delivery no No Aspirin recommendation        MODERATE Nulliparity YES Recommend low-dose aspirin if     BMI>30 YES 2 or more moderate risk factors    Family History Preeclampsia (mother/sister) no     35yr old or greater YES     Black Race, Concern for SDOH/Low Socioeconomic no     IVF Pregnancy  no     Personal History Risks (low birth weight, prior adverse preg outcome, >10yr preg interval) no         HIGH History of Preeclampsia no Recommend low-dose aspirin if     Multifetal gestation no 1 or more high risk factors    Chronic HTN YES     Type 1 or 2 Diabetes no     Renal Disease no     Autoimmune Disease  no      Contraindications to ASA therapy:  NSAID/ ASA allergy: no  Nasal polyps: no  Asthma with history of ASA induced bronchospasm: no  Relative contraindications:  History of GI bleed: no  Active peptic ulcer disease: no  Severe hepatic dysfunction: no    Patient does meet recommendation to take ASA 162mg during this pregnancy from 12-36 wks to lower her risk of preeclampsia.  Instructions given and patient verbalized understanding.    Mental Health:  History of depression: YES  History of anxiety: YES  EPDS Screen:  Negative   Score:  2    Dental Exam:  Last dental exam within the past 6 months: Yes    The patient has a history now or in prior pregnancy notable for: chronic HTN, AMA, and obesity.    Details that I feel the provider should be aware of: Patient reports she is \"pre-diabetic\".  Early 1 hour GTT was ordered by Dr. Corrales and completed-WNL. LDASA was order by Dr. Corrales.  Patient has not started as of yet.  Plans to start \"today or tomorrow\".    PN1 visit scheduled. The patient was oriented to our practice and the navigator role.  Reviewed delivering physicians and [facility] for delivery. All questions were answered.    Interviewed by: STEPHANIE RM LPN    "

## 2024-12-03 NOTE — PATIENT INSTRUCTIONS
Congratulations on your pregnancy!  We thank you for allowing us to participate in your care.    NEXT STEPS    Go to the lab to have your prenatal blood work competed, if you have not already done so.  We ask that you have this blood work done prior to your first routine prenatal appointment.  There is a listing of [facility] and locations in your prenatal folder. You may also visit [email or web address] or call [phone].   Please be aware that some insurance companies may require you to go to a specific lab ([employer] or [employer]). You can verify this by contacting your insurance company.   If you have decided to be screened for CF and SMA genetic testing, these tests require prior authorization and scheduling.  Prior Authorization is not a guarantee of payment. There may be out of pocket expenses that includes copays, deductibles and or coinsurance for your individual plan.  Please call [phone] if our team has not contacted you in 7 business days.  A referral was placed to Maternal Fetal Medicine for an ultrasound and or genetic testing.  You may have already scheduled or have had this appointment.  If not, please call their office to schedule.  Based on the referral placed by our office, they will know how to schedule you appropriately.    The phone number for Maternal Fetal Medicine is [phone]. For additional detailed contact information for Maternal Fetal Medicine, please refer to the prenatal folder provided to you by our office.   Return to our office for your first routine prenatal visit.   Some offices have multiple locations. Always check the address of your appointment to ensure you are going to the correct office.   If you experience any problems or concerns, call the office directly.   Remember to only use [employer] for none urgent concerns or questions.  Our doctors deliver at [facility] in [place]. The address is provided below.     [facility]   [address]  [place] [address]    Click on the links below to review our Pregnancy Essential Guide.  [facility] Pregnancy Essentials Guide  [facility] ([email or web address])     Click on the link below to review [facility] Lab locations.  [facility] Lab Locations    [email or web address] resource  Findhelp is a tool to connect you to community resources you may need.    Thank you,  Eda STOUT LPN  OB Nurse Navigator

## 2024-12-04 ENCOUNTER — ROUTINE PRENATAL (OUTPATIENT)
Dept: OBGYN CLINIC | Facility: CLINIC | Age: 34
End: 2024-12-04

## 2024-12-04 VITALS
BODY MASS INDEX: 41.52 KG/M2 | HEIGHT: 68 IN | DIASTOLIC BLOOD PRESSURE: 74 MMHG | SYSTOLIC BLOOD PRESSURE: 110 MMHG | WEIGHT: 274 LBS

## 2024-12-04 DIAGNOSIS — O10.919 CHRONIC HYPERTENSION AFFECTING PREGNANCY: ICD-10-CM

## 2024-12-04 DIAGNOSIS — O09.91 PREGNANCY, HIGH-RISK, FIRST TRIMESTER: Primary | ICD-10-CM

## 2024-12-04 DIAGNOSIS — R87.610 ATYPICAL SQUAMOUS CELL CHANGES OF UNDETERMINED SIGNIFICANCE (ASCUS) ON CERVICAL CYTOLOGY WITH NEGATIVE HIGH RISK HUMAN PAPILLOMA VIRUS (HPV) TEST RESULT: ICD-10-CM

## 2024-12-04 DIAGNOSIS — Z3A.13 13 WEEKS GESTATION OF PREGNANCY: ICD-10-CM

## 2024-12-04 PROBLEM — O09.511 ADVANCED MATERNAL AGE, PRIMIGRAVIDA IN FIRST TRIMESTER, ANTEPARTUM: Status: ACTIVE | Noted: 2024-12-04

## 2024-12-04 PROBLEM — O99.211 OBESITY AFFECTING PREGNANCY IN FIRST TRIMESTER: Status: ACTIVE | Noted: 2024-12-04

## 2024-12-04 PROBLEM — O13.3 PREGNANCY-INDUCED HYPERTENSION IN THIRD TRIMESTER: Status: ACTIVE | Noted: 2024-12-04

## 2024-12-04 PROBLEM — E66.01 OBESITY, CLASS III, BMI 40-49.9 (MORBID OBESITY) (HCC): Status: ACTIVE | Noted: 2024-12-04

## 2024-12-04 PROBLEM — E66.813 OBESITY, CLASS III, BMI 40-49.9 (MORBID OBESITY): Status: ACTIVE | Noted: 2024-12-04

## 2024-12-04 PROBLEM — Z00.01 ANNUAL VISIT FOR GENERAL ADULT MEDICAL EXAMINATION WITH ABNORMAL FINDINGS: Status: RESOLVED | Noted: 2024-08-07 | Resolved: 2024-12-04

## 2024-12-04 PROCEDURE — PNV: Performed by: OBSTETRICS & GYNECOLOGY

## 2024-12-04 NOTE — PATIENT INSTRUCTIONS
"Call if /90 or greater  Go to hospital if 160/110 or greater  Patient Education     Preeclampsia   The Basics   Written by the doctors and editors at [facility]   What is preeclampsia? -- This is a dangerous condition some people get when they are pregnant. It usually happens during the second half of pregnancy (after 20 weeks). It can also happen during labor or soon after the baby is born.  People with preeclampsia have high blood pressure. They might also have too much protein in their urine, or problems with organs like the liver, kidneys, brain, eyes, or placenta. (The placenta is the organ that brings the baby nutrients and oxygen and carries away waste.) Plus, the baby might not grow well and be smaller than normal.  What are the symptoms of preeclampsia? -- Most people do not feel any different than usual. Preeclampsia usually does not cause symptoms unless it is severe. Signs and symptoms of severe preeclampsia include:   Bad headache   Changes in vision, like blurry vision, flashes of light, or spots   Belly pain, especially in the upper belly  If you have any of these symptoms, tell your doctor or nurse. You might not have preeclampsia, because these symptoms can also happen in normal pregnancies. But it's important that your doctor knows about them.  How might preeclampsia affect my baby? -- Preeclampsia can:   Slow the growth of the baby   Decrease the amount of amniotic fluid around the baby (this is the liquid that surrounds and protects the baby in the uterus) (figure 1)  Is there a test for preeclampsia? -- Yes. To test for preeclampsia, your doctor or nurse will:   Take your blood pressure   Check your urine for protein during pregnancy  They will also do blood tests to make sure that your organs are working as they should.  When your doctor or nurse tells you your blood pressure, they will say 2 numbers. For instance, your doctor or nurse might say that your blood pressure is \"140 over 90.\" To " "be diagnosed with preeclampsia, your top number must be 140 or higher, or your bottom number must be 90 or higher. Plus, you must have too much protein in your urine or certain problems with 1 or more of your organs.  It is possible to have high blood pressure (above 140/90) during pregnancy without having high protein in the urine or other problems. That is not preeclampsia. Still, if you develop high blood pressure, your doctor will watch you closely. You could develop preeclampsia or other problems related to high blood pressure.  How is preeclampsia treated? -- For preeclampsia that develops during pregnancy, the only cure is to give birth. Your doctor or nurse will talk with you about whether it is better for you to have your baby right away, or to wait. The best decision depends on how severe your preeclampsia is and how many weeks pregnant you are. While giving birth to your baby will cure your preeclampsia, it's also important to give the baby as much time as possible to grow and develop.  If your preeclampsia is not severe:   If you are less than 34 weeks pregnant, your doctor will probably suggest waiting.   If you are between 34 and 37 weeks pregnant, your doctor will talk to you about your options and help you decide what to do.   If you are more than 37 weeks pregnant, your doctor will probably suggest having your baby.  If the decision is to wait, the doctor will check you and your baby often for any problems. You might need to stay in the hospital until it is time to give birth.  When it is time to have your baby, you will probably get medicine to start contractions. This is called \"inducing labor.\" Most people can have a vaginal birth. But in some cases, the doctor will need to do surgery used to get the baby out of the uterus. This is called \" birth.\"  If your preeclampsia is severe, you will probably need to have your baby as soon as possible. You will also get medicine to lower your blood " pressure, if it is very high. This is to keep you from having a stroke.  Rarely, people with preeclampsia have seizures. Your doctor or nurse might give you medicine during labor to prevent this.  What can I do to prevent preeclampsia? -- You can't do anything to keep from getting preeclampsia. The most important thing you can do is to go to all of the appointments you have with your doctor, nurse, or midwife. That way, they can find out as soon as possible if your blood pressure goes up, or if you have too much protein in your urine or any other problems.  If you are at high risk for preeclampsia, your doctor might tell you to take low-dose aspirin during your second and third trimesters of pregnancy (after 12 weeks). You are at high risk if:   You had preeclampsia before, and your baby was born early.   You are pregnant with twins.   You have high blood pressure even when you are not pregnant.  There are also other conditions that increase your risk. Your doctor can tell you if you are at high risk.  Do not take aspirin or other medicines unless your doctor or nurse tells you that it's safe.  Can preeclampsia cause other health problems? -- If you had preeclampsia, you have a higher chance of getting high blood pressure and heart disease later in life. Tell your primary care doctor or nurse that you had preeclampsia. They can talk to you about how you can lower your chance of getting these health problems in the future. This might include not smoking, eating healthy foods, keeping a healthy weight, and being active.  When should I call the doctor? -- Call your doctor or nurse right away if you have any symptoms of severe preeclampsia, like:   Bad headache   Changes in vision   Belly pain   New shortness of breath  You should also call if you are pregnant and:   Have any bleeding from your vagina   Notice your baby moving less than usual   Think that you might be in labor  All topics are updated as new evidence  becomes available and our peer review process is complete.  This topic retrieved from [employer] on: Mar 28, 2024.  Topic 74584 Version 15.0  Release: 32.2.4 - C32.86  © 2024 UpToDate, Inc. and/or its affiliates. All rights reserved.  figure 1: Pregnancy terms     This drawing shows a baby inside the uterus late in pregnancy.  Graphic 38463 Version 7.0  Consumer Information Use and Disclaimer   Disclaimer: This generalized information is a limited summary of diagnosis, treatment, and/or medication information. It is not meant to be comprehensive and should be used as a tool to help the user understand and/or assess potential diagnostic and treatment options. It does NOT include all information about conditions, treatments, medications, side effects, or risks that may apply to a specific patient. It is not intended to be medical advice or a substitute for the medical advice, diagnosis, or treatment of a health care provider based on the health care provider's examination and assessment of a patient's specific and unique circumstances. Patients must speak with a health care provider for complete information about their health, medical questions, and treatment options, including any risks or benefits regarding use of medications. This information does not endorse any treatments or medications as safe, effective, or approved for treating a specific patient. UpToDate, Inc. and its affiliates disclaim any warranty or liability relating to this information or the use thereof.The use of this information is governed by the Terms of Use, available at https://www.wolterskluwer.com/en/know/clinical-effectiveness-terms. 2024© UpToDate, Inc. and its affiliates and/or licensors. All rights reserved.  Copyright   © 2024 UpToDate, Inc. and/or its affiliates. All rights reserved.

## 2024-12-04 NOTE — PROGRESS NOTES
Assessment & Plan  34 y.o.  at 13w1d presenting for routine prenatal visit.     Problem List       Atypical squamous cell changes of undetermined significance (ASCUS) on cervical cytology with negative high risk human papilloma virus (HPV) test result    Overview   ASCUS, HPV neg 2024  NILM 2024         Acne vulgaris    Dyslipidemia, goal LDL below 160    Food insecurity    Overview   Per [facility] Protocol    Formatting of this note might be different from the original.  Per [facility] Protocol         Major depressive disorder, recurrent episode, moderate ([facility])    Class 3 severe obesity with body mass index (BMI) of 40.0 to 44.9 in adult (HCC)    Overview   ICD-10 update of inactive diagnosis    Formatting of this note might be different from the original.  ICD-10 update of inactive diagnosis         PCOS (polycystic ovarian syndrome)    Overview   Oligomenorrhea since menarche at age 13 (cycle length 6 weeks), testosterone level elevated at , pelvic u/s 2018 normal    Formatting of this note might be different from the original.  Oligomenorrhea since menarche at age 13 (cycle length 6 weeks), testosterone level elevated at , pelvic u/s 2018 normal         Post-traumatic osteoarthritis of right foot    Esophageal spasm    Anxiety disorder due to known physiological condition    Cyst of right ovary    History of PCOS    Anxiety and depression    Benign essential HTN    GERD (gastroesophageal reflux disease)    Prediabetes    Chronic hypertension affecting pregnancy    Overview   LDASA  Labetalol 100 mg BID  Baseline labs wnl         13 weeks gestation of pregnancy    Overview   LDASA  AFP [ ]  Delivery consent [ ]  Tdap [ ]  GBS [ ]  Contraception [ ]         Advanced maternal age, primigravida in first trimester, antepartum     Other Visit Diagnoses         Pregnancy, high-risk, first trimester    -  Primary       "    ____________________________________________________________  Subjective  She is without complaint.   She denies contractions, loss of fluid, or vaginal bleeding.   She feels regular fetal movements.     Objective  /74 (BP Location: Left arm, Cuff Size: Large)   Ht 5' 8\" (1.727 m)   Wt 124 kg (274 lb)   LMP 08/20/2024 (Exact Date)   BMI 41.66 kg/m²   FHR: 152 bpm    Physical Exam  Constitutional:       Appearance: She is well-developed.   Cardiovascular:      Rate and Rhythm: Normal rate.   Pulmonary:      Effort: Pulmonary effort is normal. No respiratory distress.   Abdominal:      Palpations: Abdomen is soft.      Tenderness: There is no abdominal tenderness.   Skin:     General: Skin is warm and dry.          Patient's Active Problem List  Patient Active Problem List   Diagnosis    Atypical squamous cell changes of undetermined significance (ASCUS) on cervical cytology with negative high risk human papilloma virus (HPV) test result    Acne vulgaris    Dyslipidemia, goal LDL below 160    Food insecurity    Major depressive disorder, recurrent episode, moderate ([facility])    Class 3 severe obesity with body mass index (BMI) of 40.0 to 44.9 in adult ([facility])    PCOS (polycystic ovarian syndrome)    Post-traumatic osteoarthritis of right foot    Esophageal spasm    Anxiety disorder due to known physiological condition    Cyst of right ovary    History of PCOS    Anxiety and depression    Benign essential HTN    GERD (gastroesophageal reflux disease)    Prediabetes    Chronic hypertension affecting pregnancy    13 weeks gestation of pregnancy    Advanced maternal age, primigravida in first trimester, antepartum           Katlyn Antony MD  12/4/2024  3:18 PM          "

## 2024-12-05 ENCOUNTER — ROUTINE PRENATAL (OUTPATIENT)
Dept: PERINATAL CARE | Facility: OTHER | Age: 34
End: 2024-12-05
Payer: COMMERCIAL

## 2024-12-05 ENCOUNTER — APPOINTMENT (OUTPATIENT)
Dept: LAB | Facility: HOSPITAL | Age: 34
End: 2024-12-05
Attending: OBSTETRICS & GYNECOLOGY
Payer: COMMERCIAL

## 2024-12-05 VITALS
BODY MASS INDEX: 41.83 KG/M2 | HEIGHT: 68 IN | HEART RATE: 84 BPM | WEIGHT: 276 LBS | SYSTOLIC BLOOD PRESSURE: 128 MMHG | DIASTOLIC BLOOD PRESSURE: 70 MMHG

## 2024-12-05 DIAGNOSIS — O10.919 CHRONIC HYPERTENSION AFFECTING PREGNANCY: ICD-10-CM

## 2024-12-05 DIAGNOSIS — R73.03 PREDIABETES: ICD-10-CM

## 2024-12-05 DIAGNOSIS — O99.211 OBESITY AFFECTING PREGNANCY IN FIRST TRIMESTER, UNSPECIFIED OBESITY TYPE: ICD-10-CM

## 2024-12-05 DIAGNOSIS — O99.211 MATERNAL MORBID OBESITY, ANTEPARTUM, FIRST TRIMESTER (HCC): ICD-10-CM

## 2024-12-05 DIAGNOSIS — F32.A DEPRESSION AFFECTING PREGNANCY IN FIRST TRIMESTER, ANTEPARTUM: ICD-10-CM

## 2024-12-05 DIAGNOSIS — O10.019 CHRONIC BENIGN ESSENTIAL HYPERTENSION, ANTEPARTUM: ICD-10-CM

## 2024-12-05 DIAGNOSIS — O99.341 ANXIETY DURING PREGNANCY IN FIRST TRIMESTER, ANTEPARTUM: ICD-10-CM

## 2024-12-05 DIAGNOSIS — Z3A.13 13 WEEKS GESTATION OF PREGNANCY: ICD-10-CM

## 2024-12-05 DIAGNOSIS — E66.01 OBESITY, CLASS III, BMI 40-49.9 (MORBID OBESITY) (HCC): ICD-10-CM

## 2024-12-05 DIAGNOSIS — Z36.83 ENCOUNTER FOR FETAL SCREENING FOR CONGENITAL CARDIAC ABNORMALITIES: ICD-10-CM

## 2024-12-05 DIAGNOSIS — Z36.82 ENCOUNTER FOR (NT) NUCHAL TRANSLUCENCY SCAN: ICD-10-CM

## 2024-12-05 DIAGNOSIS — F41.9 ANXIETY DURING PREGNANCY IN FIRST TRIMESTER, ANTEPARTUM: ICD-10-CM

## 2024-12-05 DIAGNOSIS — Z3A.10 10 WEEKS GESTATION OF PREGNANCY: ICD-10-CM

## 2024-12-05 DIAGNOSIS — O99.341 DEPRESSION AFFECTING PREGNANCY IN FIRST TRIMESTER, ANTEPARTUM: ICD-10-CM

## 2024-12-05 DIAGNOSIS — E66.01 MATERNAL MORBID OBESITY, ANTEPARTUM, FIRST TRIMESTER (HCC): ICD-10-CM

## 2024-12-05 DIAGNOSIS — O09.511 ADVANCED MATERNAL AGE, PRIMIGRAVIDA IN FIRST TRIMESTER, ANTEPARTUM: Primary | ICD-10-CM

## 2024-12-05 PROCEDURE — 76801 OB US < 14 WKS SINGLE FETUS: CPT | Performed by: OBSTETRICS & GYNECOLOGY

## 2024-12-05 PROCEDURE — 76813 OB US NUCHAL MEAS 1 GEST: CPT | Performed by: OBSTETRICS & GYNECOLOGY

## 2024-12-05 PROCEDURE — 99204 OFFICE O/P NEW MOD 45 MIN: CPT | Performed by: OBSTETRICS & GYNECOLOGY

## 2024-12-05 NOTE — LETTER
"2024    Aretha Corrales MD  [address]  [address]  [place] [address]    Patient: Yecenia Rodriguez   YOB: 1990   Date of Visit: 2024   Gestational age 13w2d   Nature of this communication: Routine       Dear Dr Corrales,    This patient was seen recently in our  office.  The content of my evaluation today is in the ultrasound report under \"OB Procedures\" tab.     Please don't hesitate to contact our office with any concerns or questions.     Sincerely,      Maliha Gardner MD  Attending Physician, Maternal-Fetal Medicine  [facility]      "

## 2024-12-05 NOTE — PROGRESS NOTES
"[facility]: Yecenia Rodriguez was seen today for nuchal translucency ultrasound.  See ultrasound report under \"OB Procedures\" tab.   Please don't hesitate to contact our office with any concerns or questions.  -Maliha Gardner MD    "

## 2024-12-06 ENCOUNTER — TELEPHONE (OUTPATIENT)
Age: 34
End: 2024-12-06

## 2024-12-06 NOTE — TELEPHONE ENCOUNTER
Francheska from [facility] called to communicate pre-auth for pt's genetic testing was approved.    Authorization # [id number]    Francheska provided telephone number for any further questions.    [phone]

## 2024-12-09 ENCOUNTER — TELEPHONE (OUTPATIENT)
Facility: HOSPITAL | Age: 34
End: 2024-12-09

## 2024-12-09 NOTE — TELEPHONE ENCOUNTER
----- Message from Meghan S sent at 2024  2:51 PM EST -----  Regarding: RE: Pre-Authorization  Prior auth initiated and pending auth # [id number].  Will need to check status on it.  ----- Message -----  From: Liliana Silva  Sent: 2024   1:40 PM EST  To: [facility]  Subject: Pre-Authorization                                Hello Team,    Patient was here for her nuchal ultrasound.  She is interested in getting NIPT w/ fetal sex.  Patient has [employer] insurance and is AMA.  She needs pre-authorization.    Thank you

## 2024-12-09 NOTE — TELEPHONE ENCOUNTER
Insurance Authorization for NIPS genetic screening has been approved. Auth # is [id number] with dates of service 12/5/24-2/3/25.   Insurance Authorization is not a guarantee of payment and final determination is based on your member benefits, appropriateness of service provided and eligibility at time of services rendered and claim received. Please follow up with [facility] for your OOP copay.    Left VMM for pt with information above.  PT encouraged to contact office to schedule an appointment or with questions.  Office # [phone].

## 2024-12-10 ENCOUNTER — TELEPHONE (OUTPATIENT)
Dept: BARIATRICS | Facility: CLINIC | Age: 34
End: 2024-12-10

## 2024-12-18 ENCOUNTER — APPOINTMENT (OUTPATIENT)
Dept: LAB | Facility: HOSPITAL | Age: 34
End: 2024-12-18
Payer: COMMERCIAL

## 2024-12-18 ENCOUNTER — CLINICAL SUPPORT (OUTPATIENT)
Age: 34
End: 2024-12-18
Payer: COMMERCIAL

## 2024-12-18 ENCOUNTER — DOCUMENTATION (OUTPATIENT)
Age: 34
End: 2024-12-18

## 2024-12-18 DIAGNOSIS — Z34.02 ENCOUNTER FOR SUPERVISION OF NORMAL FIRST PREGNANCY IN SECOND TRIMESTER: ICD-10-CM

## 2024-12-18 DIAGNOSIS — O09.512 SUPERVISION OF ELDERLY PRIMIGRAVIDA IN SECOND TRIMESTER: Primary | ICD-10-CM

## 2024-12-18 DIAGNOSIS — Z36.0 ENCOUNTER FOR ANTENATAL SCREENING FOR CHROMOSOMAL ANOMALIES: ICD-10-CM

## 2024-12-18 DIAGNOSIS — Z13.71 SCREENING FOR GENETIC DISEASE CARRIER STATUS: ICD-10-CM

## 2024-12-18 PROCEDURE — 36415 COLL VENOUS BLD VENIPUNCTURE: CPT

## 2024-12-18 PROCEDURE — 36415 COLL VENOUS BLD VENIPUNCTURE: CPT | Performed by: OBSTETRICS & GYNECOLOGY

## 2024-12-18 NOTE — PROGRESS NOTES
Patient chose to have [facility] [id number] Non-Invasive Prenatal Screen [id number] PLUS w/ SCA, WITH fetal sex.  Patient choose to be billed through insurance.     Patient given brochure and is aware [facility] will contact patient's insurance and coordinate coverage.  Provided [facility] contact information. General inquiries [phone], Cost estimates [phone] and [facility] Billing [phone]. Website [email or web address].     Blood collection tubes labeled with patient identifiers (name, medical record number, and date of birth).     Filled out [facility] order form. Patient chose to have blood drawn in our office at time of visit. NIPS was drawn from right arm with a butterfly needle by Liliana JARVIS MA. .  Auth # is [id number] nips w/ fetal sex 12/5/24-2/3/25         Maternal Fetal Medicine will have results in approximately 5-7 business days and will call patient or notify via [employer].  Patient aware viewing lab result online will reveal fetal sex if ordered.    Patient verbalized understanding of all instructions and no questions at this time.

## 2024-12-23 LAB
CFDNA.FET/CFDNA.TOTAL SFR FETUS: NORMAL %
CITATION REF LAB TEST: NORMAL
CITATION REF LAB TEST: NORMAL
CLINICAL INFO: NORMAL
ETHNIC BACKGROUND STATED: NORMAL
FET 13+18+21+X+Y ANEUP PLAS.CFDNA: NEGATIVE
FET CHR 21 TS PLAS.CFDNA QL: NEGATIVE
FET CHR 21 TS PLAS.CFDNA QL: NEGATIVE
FET MS X RISK WBC.DNA+CFDNA QL: NOT DETECTED
FET SEX PLAS.CFDNA DOSAGE CFDNA: NORMAL
FET TS 13 RISK PLAS.CFDNA QL: NEGATIVE
FET X + Y ANEUP RISK PLAS.CFDNA SEQ-IMP: NOT DETECTED
GA EST FROM CONCEPTION DATE: NORMAL D
GENE DIS ANL CARRIER INTERP-IMP: NORMAL
GENE MUT TESTED BLD/T: NORMAL
GESTATIONAL AGE > 9:: YES
LAB DIRECTOR NAME PROVIDER: NORMAL
LABORATORY COMMENT REPORT: NORMAL
LIMITATIONS OF THE TEST: NORMAL
NEGATIVE PREDICTIVE VALUE: NORMAL
PERFORMANCE CHARACTERISTICS: NORMAL
POSITIVE PREDICTIVE VALUE: NORMAL
REASON FOR REFERRAL (NARRATIVE): NORMAL
RECOMMENDATION PATIENT DOC-IMP: NORMAL
REF LAB TEST METHOD: NORMAL
REF LAB TEST METHOD: NORMAL
SERVICE CMNT-IMP: NORMAL
SL AMB NOTE:: NORMAL
SMN1 GENE MUT ANL BLD/T: NORMAL
SPECIMEN SOURCE: NORMAL
TEST PERFORMANCE INFO SPEC: NORMAL

## 2024-12-24 ENCOUNTER — ROUTINE PRENATAL (OUTPATIENT)
Dept: OBGYN CLINIC | Facility: CLINIC | Age: 34
End: 2024-12-24

## 2024-12-24 ENCOUNTER — RESULTS FOLLOW-UP (OUTPATIENT)
Age: 34
End: 2024-12-24

## 2024-12-24 ENCOUNTER — RESULTS FOLLOW-UP (OUTPATIENT)
Dept: OBGYN CLINIC | Facility: CLINIC | Age: 34
End: 2024-12-24

## 2024-12-24 VITALS
WEIGHT: 277.4 LBS | BODY MASS INDEX: 42.04 KG/M2 | SYSTOLIC BLOOD PRESSURE: 116 MMHG | HEIGHT: 68 IN | DIASTOLIC BLOOD PRESSURE: 76 MMHG

## 2024-12-24 DIAGNOSIS — O10.919 CHRONIC HYPERTENSION AFFECTING PREGNANCY: ICD-10-CM

## 2024-12-24 DIAGNOSIS — O09.511 ADVANCED MATERNAL AGE, PRIMIGRAVIDA IN FIRST TRIMESTER, ANTEPARTUM: ICD-10-CM

## 2024-12-24 DIAGNOSIS — Z3A.16 16 WEEKS GESTATION OF PREGNANCY: ICD-10-CM

## 2024-12-24 DIAGNOSIS — O09.92 ENCOUNTER FOR SUPERVISION OF HIGH RISK PREGNANCY IN SECOND TRIMESTER, ANTEPARTUM: Primary | ICD-10-CM

## 2024-12-24 PROCEDURE — PNV: Performed by: OBSTETRICS & GYNECOLOGY

## 2024-12-24 NOTE — PROGRESS NOTES
Assessment & Plan  34 y.o.  at 16w0d presenting for routine prenatal visit.     Problem List       Atypical squamous cell changes of undetermined significance (ASCUS) on cervical cytology with negative high risk human papilloma virus (HPV) test result    Overview   ASCUS, HPV neg 2024  NILM 2024         Acne vulgaris    Dyslipidemia, goal LDL below 160    Food insecurity    Overview   Per [facility] Protocol    Formatting of this note might be different from the original.  Per [facility] Protocol         Major depressive disorder, recurrent episode, moderate ([facility])    Class 3 severe obesity with body mass index (BMI) of 40.0 to 44.9 in adult ([facility])    Overview   ICD-10 update of inactive diagnosis    Formatting of this note might be different from the original.  ICD-10 update of inactive diagnosis         PCOS (polycystic ovarian syndrome)    Overview   Oligomenorrhea since menarche at age 13 (cycle length 6 weeks), testosterone level elevated at , pelvic u/s 2018 normal    Formatting of this note might be different from the original.  Oligomenorrhea since menarche at age 13 (cycle length 6 weeks), testosterone level elevated at , pelvic u/s 2018 normal         Post-traumatic osteoarthritis of right foot    Esophageal spasm    Anxiety disorder due to known physiological condition    Cyst of right ovary    History of PCOS    Anxiety and depression    Benign essential HTN    GERD (gastroesophageal reflux disease)    Prediabetes    Chronic hypertension affecting pregnancy    Overview   LDASA  Labetalol 100 mg BID  Baseline labs wnl         16 weeks gestation of pregnancy    Overview   LDASA  AFP ordered  Delivery consent [ ]  Tdap [ ]  GBS [ ]  Contraception [ ]  Weekly AFPS startin at 34 weeks [ ]         Advanced maternal age, primigravida in first trimester, antepartum    Obesity, Class III, BMI 40-49.9 (morbid obesity) ([facility])    Obesity affecting  "pregnancy in first trimester     Other Visit Diagnoses         Encounter for supervision of high risk pregnancy in second trimester, antepartum    -  Primary          ____________________________________________________________  Subjective  She is without complaint.   She denies contractions, loss of fluid, or vaginal bleeding.     Objective  /76 (BP Location: Right arm, Patient Position: Sitting, Cuff Size: Adult)   Ht 5' 8\" (1.727 m)   Wt 126 kg (277 lb 6.4 oz)   LMP 08/20/2024 (Exact Date)   BMI 42.18 kg/m²   FHR: 150 bpm    Physical Exam  Constitutional:       Appearance: She is well-developed.   Cardiovascular:      Rate and Rhythm: Normal rate.   Pulmonary:      Effort: Pulmonary effort is normal. No respiratory distress.   Abdominal:      Palpations: Abdomen is soft.      Tenderness: There is no abdominal tenderness.   Skin:     General: Skin is warm and dry.          Patient's Active Problem List  Patient Active Problem List   Diagnosis    Atypical squamous cell changes of undetermined significance (ASCUS) on cervical cytology with negative high risk human papilloma virus (HPV) test result    Acne vulgaris    Dyslipidemia, goal LDL below 160    Food insecurity    Major depressive disorder, recurrent episode, moderate ([facility])    Class 3 severe obesity with body mass index (BMI) of 40.0 to 44.9 in adult ([facility])    PCOS (polycystic ovarian syndrome)    Post-traumatic osteoarthritis of right foot    Esophageal spasm    Anxiety disorder due to known physiological condition    Cyst of right ovary    History of PCOS    Anxiety and depression    Benign essential HTN    GERD (gastroesophageal reflux disease)    Prediabetes    Chronic hypertension affecting pregnancy    16 weeks gestation of pregnancy    Advanced maternal age, primigravida in first trimester, antepartum    Obesity, Class III, BMI 40-49.9 (morbid obesity) ([facility])    Obesity affecting pregnancy in first trimester           Katlyn Antony, " MD  12/24/2024  9:48 AM

## 2024-12-24 NOTE — RESULT ENCOUNTER NOTE
I have reviewed the patient's lab results which are normal.  Please contact the patient to inform her of the normal results, unless Ms Rodriguez has already reviewed the results using [employer].      Ja Pennington MD

## 2024-12-26 ENCOUNTER — DOCUMENTATION (OUTPATIENT)
Age: 34
End: 2024-12-26

## 2024-12-27 ENCOUNTER — PATIENT MESSAGE (OUTPATIENT)
Dept: OBGYN CLINIC | Facility: CLINIC | Age: 34
End: 2024-12-27

## 2025-01-03 LAB
CFTR FULL MUT ANL BLD/T SEQ: NORMAL
CITATION REF LAB TEST: NORMAL
CLINICAL INFO: NORMAL
ETHNIC BACKGROUND STATED: NORMAL
GENE DIS ANL CARRIER INTERP-IMP: NORMAL
INDICATION: NORMAL
LAB DIRECTOR NAME PROVIDER: NORMAL
RECOMMENDATION PATIENT DOC-IMP: NORMAL
REF LAB TEST METHOD: NORMAL
SERVICE CMNT-IMP: NORMAL
SPECIMEN SOURCE: NORMAL

## 2025-01-06 ENCOUNTER — NURSE TRIAGE (OUTPATIENT)
Age: 35
End: 2025-01-06

## 2025-01-06 ENCOUNTER — TELEPHONE (OUTPATIENT)
Dept: OBGYN CLINIC | Facility: CLINIC | Age: 35
End: 2025-01-06

## 2025-01-06 NOTE — TELEPHONE ENCOUNTER
Regardinw, itching and d/c  ----- Message from Ryann JARVIS sent at 2025  2:54 PM EST -----  Pt sent a myc message that she is having extreme itching and discharge. She is 17w.

## 2025-01-06 NOTE — TELEPHONE ENCOUNTER
"Yecenia has noted increase clear discharge with odor and vaginl itching/irritation x 5 days. Has used vagisil with no relief.    Provided appt for tomorrow at 4pm ( conflict for earlier appt).      Advised to stop vagisil. Encouraged wash with mild unscented soap such as dove/ivory or plain water. Pat dry. Apply aquaphor or vitamin A/D ointment externally for comfort and to provide barrier.  Open to air at hs, avoid tight wet clothing.            Reason for Disposition  • Bad smelling vaginal discharge    Answer Assessment - Initial Assessment Questions  1. DISCHARGE: \"Describe the discharge.\" (e.g., white, yellow, green, gray, foamy, cottage cheese-like)      clearish  2. ODOR: \"Is there a bad odor?\"      unpleasant  3. ONSET: \"When did the discharge begin?\"      thursday  4. RASH: \"Is there a rash in that area?\" If Yes, ask: \"Describe it.\" (e.g., redness, blisters, sores, bumps)      Irritation from itching  5. ABDOMEN PAIN: \"Are you having any abdomen pain?\" If Yes, ask: \"What does it feel like?\" (e.g., crampy, dull, intermittent, constant)       denies  6. ABDOMEN PAIN SEVERITY: If present, ask: \"How bad is it?\"  (e.g., Scale 1-10; mild, moderate, or severe)      N/a  7. CAUSE: \"What do you think is causing the discharge?\"      Unsure if yeast,   8. OTHER SYMPTOMS: \"Do you have any other symptoms?\" (e.g., fever, itching, vaginal bleeding, pain with urination)      Itching, moderate  9. ANDREA: \"What date are you expecting to deliver?\"       6/10/2022  10. PREGNANCY: \"How many weeks pregnant are you?\"        17w6d    Protocols used: Pregnancy - Vaginal Discharge-Adult-[place]    "

## 2025-01-07 ENCOUNTER — TELEPHONE (OUTPATIENT)
Dept: OBGYN CLINIC | Facility: CLINIC | Age: 35
End: 2025-01-07

## 2025-01-07 ENCOUNTER — ROUTINE PRENATAL (OUTPATIENT)
Dept: OBGYN CLINIC | Facility: CLINIC | Age: 35
End: 2025-01-07
Payer: COMMERCIAL

## 2025-01-07 VITALS
DIASTOLIC BLOOD PRESSURE: 70 MMHG | HEART RATE: 88 BPM | HEIGHT: 68 IN | WEIGHT: 281 LBS | OXYGEN SATURATION: 98 % | SYSTOLIC BLOOD PRESSURE: 112 MMHG | BODY MASS INDEX: 42.59 KG/M2

## 2025-01-07 DIAGNOSIS — N76.0 VULVOVAGINITIS: Primary | ICD-10-CM

## 2025-01-07 PROCEDURE — 87480 CANDIDA DNA DIR PROBE: CPT | Performed by: NURSE PRACTITIONER

## 2025-01-07 PROCEDURE — 87510 GARDNER VAG DNA DIR PROBE: CPT | Performed by: NURSE PRACTITIONER

## 2025-01-07 PROCEDURE — 87660 TRICHOMONAS VAGIN DIR PROBE: CPT | Performed by: NURSE PRACTITIONER

## 2025-01-07 PROCEDURE — 99213 OFFICE O/P EST LOW 20 MIN: CPT | Performed by: NURSE PRACTITIONER

## 2025-01-07 PROCEDURE — 99459 PELVIC EXAMINATION: CPT | Performed by: NURSE PRACTITIONER

## 2025-01-07 NOTE — TELEPHONE ENCOUNTER
Overall how are you doing? Patient states she is doing well.    Compliant with routine OB care appointments? Yes    Have you completed your 1st trimester labs? Yes    If you had NIPS with MFM, do you have a order for MSAFP? Yes, patient has order and was reminded to have done in the appropriate time frame.   Can be completed 15w-21w6d, ideally 16w-18w    Have you seen MFM and do you have your detailed US scheduled? Yes    Pregnancy Education-have you had a chance to review the classes offered and registered? Yes, patient is interested and plans to register for classes.

## 2025-01-07 NOTE — PROGRESS NOTES
"Assessment / Plan    Assessment & Plan  Vulvovaginitis  Vaginal culture collected.  Will treat accordingly.    Orders:    VAGINOSIS DNA PROBE        Subjective   PROBLEM VISIT     Yecenia Rodriguez is a 34 y.o. female         5 days ago started with itching and some vaginal discharge.  Clear in color, + odor.  Had antibiotic treatment a couple months ago for a tooth    Pertinent history:  Currently 18w pregnant    Pertinent labs:  2024 vaginal culture bacterial vaginosis  2024 G/C negative    Menstrual History:  OB History          2    Para        Term                AB   1    Living             SAB        IAB   1    Ectopic        Multiple        Live Births               Obstetric Comments   Menarche: 13      Menses: unsure of birth control                Patient's last menstrual period was 2024 (exact date).       The following portions of the patient's history were reviewed and updated as appropriate: allergies, current medications, past family history, past medical history, past social history, past surgical history, and problem list.    Review of Systems      Review of Systems   Constitutional:  Negative for chills and fever.   Genitourinary:  Positive for vaginal discharge (itching and odor). Negative for dyspareunia, dysuria, frequency, genital sores, hematuria, menstrual problem, pelvic pain, urgency, vaginal bleeding and vaginal pain.        Recent antibiotic treatment- 2 mo ago       Objective     Visit Vitals  /70 (BP Location: Left arm, Patient Position: Sitting, Cuff Size: Large)   Pulse 88   Ht 5' 8\" (1.727 m)   Wt 127 kg (281 lb)   LMP 2024 (Exact Date)   SpO2 98%   BMI 42.73 kg/m²   OB Status Pregnant   Smoking Status Former   BSA 2.36 m²      Chaperone present: Sapphire Aleman NP student     /70 (BP Location: Left arm, Patient Position: Sitting, Cuff Size: Large)   Pulse 88   Ht 5' 8\" (1.727 m)   Wt 127 kg (281 lb)   LMP 2024 (Exact " Date)   SpO2 98%   BMI 42.73 kg/m²   Physical Exam  Constitutional:       General: She is not in acute distress.     Appearance: Normal appearance. She is well-developed. She is not ill-appearing or diaphoretic.      Comments: Body mass index is 42.73 kg/m².     HENT:      Head: Normocephalic and atraumatic.   Eyes:      Pupils: Pupils are equal, round, and reactive to light.   Pulmonary:      Effort: Pulmonary effort is normal.   Abdominal:      General: Abdomen is flat.      Palpations: Abdomen is soft.   Genitourinary:     General: Normal vulva.      Exam position: Lithotomy position.      Labia:         Right: No rash, tenderness, lesion or injury.         Left: No rash, tenderness, lesion or injury.       Vagina: No signs of injury and foreign body. Vaginal discharge (moderate, white, creamy) present. No erythema, tenderness or bleeding.      Cervix: No cervical motion tenderness, discharge or friability.      Uterus: Not enlarged and not tender.       Adnexa:         Right: No mass or tenderness.          Left: No mass or tenderness.     Skin:     General: Skin is warm and dry.   Neurological:      General: No focal deficit present.      Mental Status: She is alert and oriented to person, place, and time.   Psychiatric:         Mood and Affect: Mood normal.         Behavior: Behavior normal.         Thought Content: Thought content normal.         Judgment: Judgment normal.

## 2025-01-08 ENCOUNTER — RESULTS FOLLOW-UP (OUTPATIENT)
Dept: OBGYN CLINIC | Facility: CLINIC | Age: 35
End: 2025-01-08

## 2025-01-08 LAB
CANDIDA RRNA VAG QL PROBE: NOT DETECTED
G VAGINALIS RRNA GENITAL QL PROBE: NOT DETECTED
T VAGINALIS RRNA GENITAL QL PROBE: NOT DETECTED

## 2025-01-20 ENCOUNTER — APPOINTMENT (OUTPATIENT)
Dept: LAB | Facility: HOSPITAL | Age: 35
End: 2025-01-20
Payer: COMMERCIAL

## 2025-01-20 DIAGNOSIS — O09.92 ENCOUNTER FOR SUPERVISION OF HIGH RISK PREGNANCY IN SECOND TRIMESTER, ANTEPARTUM: ICD-10-CM

## 2025-01-20 PROCEDURE — 36415 COLL VENOUS BLD VENIPUNCTURE: CPT

## 2025-01-20 PROCEDURE — 82105 ALPHA-FETOPROTEIN SERUM: CPT

## 2025-01-21 ENCOUNTER — OFFICE VISIT (OUTPATIENT)
Age: 35
End: 2025-01-21
Payer: COMMERCIAL

## 2025-01-21 VITALS
WEIGHT: 281 LBS | HEIGHT: 68 IN | HEART RATE: 80 BPM | TEMPERATURE: 98.3 F | DIASTOLIC BLOOD PRESSURE: 76 MMHG | SYSTOLIC BLOOD PRESSURE: 112 MMHG | BODY MASS INDEX: 42.59 KG/M2 | OXYGEN SATURATION: 98 %

## 2025-01-21 DIAGNOSIS — K22.4 ESOPHAGEAL SPASM: ICD-10-CM

## 2025-01-21 DIAGNOSIS — E13.9 OTHER SPECIFIED DIABETES MELLITUS WITHOUT COMPLICATIONS (HCC): Primary | ICD-10-CM

## 2025-01-21 PROCEDURE — 99214 OFFICE O/P EST MOD 30 MIN: CPT | Performed by: INTERNAL MEDICINE

## 2025-01-21 RX ORDER — FAMOTIDINE 40 MG/1
40 TABLET, FILM COATED ORAL DAILY
Qty: 90 TABLET | Refills: 1 | Status: SHIPPED | OUTPATIENT
Start: 2025-01-21

## 2025-01-21 NOTE — ASSESSMENT & PLAN NOTE
Orders:  •  famotidine (PEPCID) 40 MG tablet; Take 1 tablet (40 mg total) by mouth in the morning

## 2025-01-21 NOTE — PROGRESS NOTES
Name: Yecenia Rodriguez      : 1990      MRN: [id number]  Encounter Provider: Mahogany Encarnacion MD  Encounter Date: 2025   Encounter department: [facility]    Assessment & Plan  Esophageal spasm    Orders:  •  famotidine (PEPCID) 40 MG tablet; Take 1 tablet (40 mg total) by mouth in the morning    Other specified diabetes mellitus without complications (HCC)    Lab Results   Component Value Date    HGBA1C 5.5 2024            Assessment & Plan  1. Pregnancy.  Her blood pressure readings are within the normal range. She is currently 20 weeks pregnant and has not experienced any morning sickness or significant issues. She is on labetalol twice a day for blood pressure management and aspirin twice a day due to the risk of preeclampsia. She reports occasional heart palpitations, likely related to the pregnancy, but nothing concerning. She is advised to wear compression stockings during the winter months to manage potential leg swelling. She is also advised to continue monitoring her blood pressure and blood sugar levels.    2. Polycystic Ovary Syndrome (PCOS).  She was previously attending a fertility clinic due to PCOS but conceived naturally.    3. Heartburn.  She reports significant heartburn, especially since entering the second trimester. A prescription for Pepcid 40 mg will be renewed and sent to [facility]. She is advised to continue taking Tums as needed.    4. Diabetes Mellitus.  Her fasting blood sugar was slightly elevated at 111 in 2024, but her A1c was stable at 5.5. She is on metformin 1000 mg twice a day, prescribed by her endocrinologist. She is advised to consult with her endocrinologist regarding the continuation of metformin during pregnancy and/ the need for additional meds if needed    5. Medication Management.  She is currently taking Wellbutrin and has discussed its safety during pregnancy with her fertility doctor.  She is advised to continue Wellbutrin if needed for mental health stability.    Follow-up  The patient will follow up in the fall.       Chief Complaint   Patient presents with   • Follow-up     Patient is 20wks pregnant with first.         Chief Complaint   Patient presents with   • Follow-up     Patient is 20wks pregnant with first.         History of Present Illness     History of Present Illness  The patient presents for evaluation of pregnancy, PCOS, heartburn, and diabetes.    She is currently 20 weeks pregnant and has been experiencing a generally positive pregnancy without any morning sickness or vomiting. She was previously under the care of a fertility clinic due to her PCOS but conceived naturally. She reports no significant foot swelling. She experiences occasional heart palpitations, approximately once every 2 weeks, which she attributes to her pregnancy. Her blood pressure readings have been within normal limits. She is on aspirin twice daily due to her risk of preeclampsia. She is mindful of her weight gain during pregnancy. Her due date is 06/10/2024.    She has been taking Wellbutrin throughout her pregnancy, as advised by her fertility doctor, who deemed it safe for breastfeeding.    She has been self-monitoring her blood glucose levels, which were elevated during a recent test. However, her OB-GYN did not express concern. She has an upcoming appointment with her endocrinologist next week. She has been on metformin 1000 mg twice daily for an extended period, prescribed by either her fertility doctor or endocrinologist. Her OB-GYN suggested discussing the necessity of continuing metformin with her endocrinologist, as she may not require it. She reports feeling well on the medication.    She has been managing her heartburn with Tums during her pregnancy. She experienced relief from heartburn until her second trimester but has since noticed a recurrence, even with milk consumption.    SOCIAL  HISTORY  She is a teacher.    MEDICATIONS  Current: Labetalol, metformin, Pepcid, aspirin, Wellbutrin  Review of Systems  Past Medical History:   Diagnosis Date   • Arthritis    • Atypical squamous cell changes of undetermined significance (ASCUS) on cervical cytology with negative high risk human papilloma virus (HPV) test result     2024-ASCUS +HRHPV; 2024-wnl   • Genetic screening     2024-SMA carrier negative   • High cholesterol    • Hypertension    • Ovarian cyst 2022    bilateral   • PCOS (polycystic ovarian syndrome)    • Tobacco use disorder 2014   • Varicella      Past Surgical History:   Procedure Laterality Date   • DILATION AND CURETTAGE OF UTERUS      ETOP   • WISDOM TOOTH EXTRACTION       Family History   Problem Relation Age of Onset   • Hypertension Mother    • Heart disease Mother    • Lung disease Mother    • Osteoporosis Mother    • Skin cancer Father    • Heart Valve Disease Father         leaky valve   • Diabetes Maternal Grandmother    • Kidney failure Maternal Grandmother    • No Known Problems Maternal Grandfather    • Ovarian cancer Paternal Grandmother    • Lung cancer Paternal Grandfather    • Heart attack Paternal Aunt    • Stroke Paternal Aunt    • Breast cancer Neg Hx    • Colon cancer Neg Hx      Social History     Tobacco Use   • Smoking status: Former     Current packs/day: 0.00     Average packs/day: 0.3 packs/day for 20.4 years (5.1 ttl pk-yrs)     Types: Cigarettes     Start date:      Quit date: 2024     Years since quittin.6   • Smokeless tobacco: Never   Vaping Use   • Vaping status: Never Used   Substance and Sexual Activity   • Alcohol use: Not Currently     Alcohol/week: 3.0 standard drinks of alcohol     Types: 3 Standard drinks or equivalent per week     Comment: stopped with + UPT   • Drug use: Never   • Sexual activity: Yes     Partners: Male     Current Outpatient Medications on File Prior to Visit   Medication Sig   • aspirin 81 mg  "chewable tablet Chew 2 tablets (162 mg total) daily Start at 13 weeks gestation   • buPROPion (WELLBUTRIN SR) 150 mg 12 hr tablet Take 1 tablet (150 mg total) by mouth 2 (two) times a day   • calcium carbonate (TUMS) 500 mg chewable tablet Chew 2 tablets if needed   • labetalol (NORMODYNE) 100 mg tablet TAKE ONE TABLET BY MOUTH TWICE DAILY   • metFORMIN (GLUCOPHAGE) 1000 MG tablet Take 1 tablet (1,000 mg total) by mouth 2 (two) times a day with meals   • aluminum chloride (DRYSOL) 20 % external solution Use daily as needed. (Patient not taking: Reported on 1/21/2025)   • [DISCONTINUED] famotidine (PEPCID) 40 MG tablet Take 1 tablet (40 mg total) by mouth in the morning (Patient not taking: Reported on 12/24/2024)     Allergies   Allergen Reactions   • Bee Venom Swelling     Large local reaction   • Nicotine Other (See Comments)     Pt had severe \"night terrors\" when using nicotine patch   • Other      Nicotene patches  Fleas   • Clarithromycin Rash   • Clindamycin Rash   • Erigeron Annuus Rash     Fleas...large hives     Immunization History   Administered Date(s) Administered   • COVID-19 J&J ([employer]) vaccine 0.5 mL 03/21/2021, 12/04/2021   • DTP 1990, 1990, 1990, 12/04/1990, 05/24/1994   • HPV Quadrivalent 10/25/2013, 11/22/2013, 04/01/2014   • Hep B, adult 01/28/1994, 05/24/1994, 06/27/1994   • Hepatitis B 01/28/1994, 05/24/1994, 06/27/1994   • HiB 08/29/1990   • INFLUENZA 10/30/1996, 12/05/1996, 10/25/2013, 11/21/2014, 10/01/2017, 10/15/2020, 11/05/2021, 01/18/2023   • Influenza Quadrivalent 3 years and older 10/30/1996, 12/05/1996   • Influenza, injectable, quadrivalent, preservative free 0.5 mL 11/05/2020, 01/18/2023   • Influenza, seasonal, injectable 10/25/2013, 11/21/2014, 12/05/2015, 10/01/2017   • Influenza, seasonal, injectable, preservative free 11/15/2024   • MMR 08/29/1990, 10/24/1996   • Meningococcal Conjugate (MCV4O) 07/28/2008   • Meningococcal MCV4P 07/28/2008   • OPV " "1989, 1989, 12/04/1990, 05/24/1994   • Pneumococcal Polysaccharide PPV23 01/22/2018   • Td (adult), adsorbed 05/09/2000   • Tdap 10/25/2013   • Tuberculin Skin Test 06/12/1990, 04/29/1993, 05/24/1994   • Tuberculin Skin Test-PPD Intradermal 06/12/1990, 04/29/1993, 05/24/1994, 02/24/1998, 04/20/2000, 07/28/2008, 02/06/2012, 03/26/2019   • Varicella 06/19/2001, 07/29/2009     Objective   /76 (BP Location: Left arm, Patient Position: Sitting, Cuff Size: Adult)   Pulse 80   Temp 98.3 °F (36.8 °C) (Temporal)   Ht 5' 8\" (1.727 m)   Wt 127 kg (281 lb)   LMP 08/20/2024 (Exact Date)   SpO2 98%   BMI 42.73 kg/m²     Physical Exam        Blood pressure is normal.  Physical Exam    Gen: NAD  Heent: atraumatic, normocephalic  Mouth: is moist  Neck: is supple, no JVD, no carotid bruits.   Heart: is regular Normal s1, s2,  Lungs: are clear to auscultation  Abd: soft, non tender, NABS  Ext: no edema, distal pulses normal  Skin: no rashes, ulcers  Mood: normal affect  Neuro: AAOx3     "

## 2025-01-22 ENCOUNTER — RESULTS FOLLOW-UP (OUTPATIENT)
Dept: OBGYN CLINIC | Facility: CLINIC | Age: 35
End: 2025-01-22

## 2025-01-22 ENCOUNTER — TELEPHONE (OUTPATIENT)
Age: 35
End: 2025-01-22

## 2025-01-22 DIAGNOSIS — F06.4 ANXIETY DISORDER DUE TO KNOWN PHYSIOLOGICAL CONDITION: ICD-10-CM

## 2025-01-22 DIAGNOSIS — E66.812 OBESITY, CLASS II, BMI 35-39.9: ICD-10-CM

## 2025-01-22 LAB
2ND TRIMESTER 4 SCREEN SERPL-IMP: NORMAL
AFP ADJ MOM SERPL: 1.92
AFP INTERP AMN-IMP: NORMAL
AFP INTERP SERPL-IMP: NORMAL
AFP INTERP SERPL-IMP: NORMAL
AFP SERPL-MCNC: 73.9 NG/ML
AGE AT DELIVERY: 36 YR
GA METHOD: NORMAL
GA: 19.9 WEEKS
IDDM PATIENT QL: NO
MULTIPLE PREGNANCY: NO
NEURAL TUBE DEFECT RISK FETUS: 965 %

## 2025-01-22 RX ORDER — BUPROPION HYDROCHLORIDE 150 MG/1
150 TABLET, EXTENDED RELEASE ORAL 2 TIMES DAILY
Qty: 60 TABLET | Refills: 0 | Status: SHIPPED | OUTPATIENT
Start: 2025-01-22

## 2025-01-24 ENCOUNTER — ROUTINE PRENATAL (OUTPATIENT)
Dept: OBGYN CLINIC | Facility: CLINIC | Age: 35
End: 2025-01-24

## 2025-01-24 VITALS
WEIGHT: 284.4 LBS | SYSTOLIC BLOOD PRESSURE: 110 MMHG | DIASTOLIC BLOOD PRESSURE: 64 MMHG | OXYGEN SATURATION: 100 % | HEIGHT: 68 IN | HEART RATE: 76 BPM | BODY MASS INDEX: 43.1 KG/M2

## 2025-01-24 DIAGNOSIS — N90.89 VULVAR IRRITATION: ICD-10-CM

## 2025-01-24 DIAGNOSIS — O99.212 OBESITY AFFECTING PREGNANCY IN SECOND TRIMESTER, UNSPECIFIED OBESITY TYPE: ICD-10-CM

## 2025-01-24 DIAGNOSIS — O10.919 CHRONIC HYPERTENSION AFFECTING PREGNANCY: Primary | ICD-10-CM

## 2025-01-24 DIAGNOSIS — Z3A.20 20 WEEKS GESTATION OF PREGNANCY: ICD-10-CM

## 2025-01-24 PROBLEM — Z3A.21 21 WEEKS GESTATION OF PREGNANCY: Status: ACTIVE | Noted: 2024-12-04

## 2025-01-24 PROBLEM — O09.519 HIGH-RISK PREGNANCY, PRIMIGRAVIDA OF ADVANCED MATERNAL AGE: Status: ACTIVE | Noted: 2024-12-04

## 2025-01-24 PROCEDURE — PNV: Performed by: OBSTETRICS & GYNECOLOGY

## 2025-01-24 RX ORDER — ASPIRIN 81 MG/1
162 TABLET ORAL DAILY
Qty: 60 TABLET | Refills: 1 | Status: SHIPPED | OUTPATIENT
Start: 2025-01-24

## 2025-01-24 RX ORDER — NYSTATIN AND TRIAMCINOLONE ACETONIDE 100000; 1 [USP'U]/G; MG/G
OINTMENT TOPICAL 2 TIMES DAILY
Qty: 15 G | Refills: 0 | Status: SHIPPED | OUTPATIENT
Start: 2025-01-24

## 2025-01-24 NOTE — PROGRESS NOTES
Assessment & Plan  34 y.o.  at 20w3d presenting for routine prenatal visit.       Problem List       Atypical squamous cell changes of undetermined significance (ASCUS) on cervical cytology with negative high risk human papilloma virus (HPV) test result    Overview   ASCUS, HPV neg 2024  NILM 2024         Acne vulgaris    Dyslipidemia, goal LDL below 160    Food insecurity    Overview   Per [facility] Protocol    Formatting of this note might be different from the original.  Per [facility] Protocol         Major depressive disorder, recurrent episode, moderate ([facility])    Class 3 severe obesity with body mass index (BMI) of 40.0 to 44.9 in adult ([facility])    Overview   ICD-10 update of inactive diagnosis    Formatting of this note might be different from the original.  ICD-10 update of inactive diagnosis         PCOS (polycystic ovarian syndrome)    Overview   Oligomenorrhea since menarche at age 13 (cycle length 6 weeks), testosterone level elevated at , pelvic u/s 2018 normal    Formatting of this note might be different from the original.  Oligomenorrhea since menarche at age 13 (cycle length 6 weeks), testosterone level elevated at , pelvic u/s 2018 normal         Post-traumatic osteoarthritis of right foot    Esophageal spasm    Anxiety disorder due to known physiological condition    Cyst of right ovary    History of PCOS    Anxiety and depression    Benign essential HTN    GERD (gastroesophageal reflux disease)    Prediabetes    Chronic hypertension affecting pregnancy    Overview   LDASA  Labetalol 100 mg BID  Baseline labs wnl         20 weeks gestation of pregnancy    Overview   LDASA  AFP ordered  Delivery consent [ ]  Tdap [ ]  GBS [ ]  Contraception [ ]  Growth scan at 34w [ ]  Weekly AFPS starting at 34 weeks [ ]         High-risk pregnancy, primigravida of advanced maternal age    Obesity, Class III, BMI 40-49.9 (morbid obesity) ([facility])     "Obesity affecting pregnancy in second trimester    Other specified diabetes mellitus without complications (HCC)     ____________________________________________________________  Subjective  She is without complaint.   She denies contractions, loss of fluid, or vaginal bleeding.       Objective  /64 (BP Location: Left arm, Patient Position: Sitting, Cuff Size: Large)   Pulse 76   Ht 5' 8\" (1.727 m)   Wt 129 kg (284 lb 6.4 oz)   LMP 08/20/2024 (Exact Date)   SpO2 100%   BMI 43.24 kg/m²   FHR: 140's via doppler    Physical Exam  Constitutional:       Appearance: She is well-developed.   Cardiovascular:      Rate and Rhythm: Normal rate and regular rhythm.      Heart sounds: Normal heart sounds. No murmur heard.     No friction rub. No gallop.   Pulmonary:      Effort: Pulmonary effort is normal. No respiratory distress.      Breath sounds: No wheezing.   Abdominal:      Palpations: Abdomen is soft.      Tenderness: There is no abdominal tenderness.   Musculoskeletal:         General: No tenderness.   Neurological:      Mental Status: She is alert and oriented to person, place, and time.   Vitals reviewed.        Patient's Active Problem List  Patient Active Problem List   Diagnosis    Atypical squamous cell changes of undetermined significance (ASCUS) on cervical cytology with negative high risk human papilloma virus (HPV) test result    Acne vulgaris    Dyslipidemia, goal LDL below 160    Food insecurity    Major depressive disorder, recurrent episode, moderate ([facility])    Class 3 severe obesity with body mass index (BMI) of 40.0 to 44.9 in adult (HCC)    PCOS (polycystic ovarian syndrome)    Post-traumatic osteoarthritis of right foot    Esophageal spasm    Anxiety disorder due to known physiological condition    Cyst of right ovary    History of PCOS    Anxiety and depression    Benign essential HTN    GERD (gastroesophageal reflux disease)    Prediabetes    Chronic hypertension affecting pregnancy    20 " weeks gestation of pregnancy    High-risk pregnancy, primigravida of advanced maternal age    Obesity, Class III, BMI 40-49.9 (morbid obesity) (HCC)    Obesity affecting pregnancy in second trimester    Other specified diabetes mellitus without complications (HCC)           Khris Chakraborty MD  1/24/2025  12:41 PM

## 2025-01-30 ENCOUNTER — ROUTINE PRENATAL (OUTPATIENT)
Age: 35
End: 2025-01-30
Payer: COMMERCIAL

## 2025-01-30 VITALS
HEART RATE: 88 BPM | SYSTOLIC BLOOD PRESSURE: 128 MMHG | BODY MASS INDEX: 43.77 KG/M2 | DIASTOLIC BLOOD PRESSURE: 74 MMHG | HEIGHT: 68 IN | WEIGHT: 288.8 LBS

## 2025-01-30 DIAGNOSIS — Z3A.21 21 WEEKS GESTATION OF PREGNANCY: ICD-10-CM

## 2025-01-30 DIAGNOSIS — Z36.3 ENCOUNTER FOR ANTENATAL SCREENING FOR MALFORMATION USING ULTRASOUND: ICD-10-CM

## 2025-01-30 DIAGNOSIS — Z36.86 ENCOUNTER FOR ANTENATAL SCREENING FOR CERVICAL LENGTH: Primary | ICD-10-CM

## 2025-01-30 DIAGNOSIS — O99.212 OBESITY AFFECTING PREGNANCY IN SECOND TRIMESTER, UNSPECIFIED OBESITY TYPE: ICD-10-CM

## 2025-01-30 PROCEDURE — 99213 OFFICE O/P EST LOW 20 MIN: CPT | Performed by: OBSTETRICS & GYNECOLOGY

## 2025-01-30 PROCEDURE — 76811 OB US DETAILED SNGL FETUS: CPT | Performed by: OBSTETRICS & GYNECOLOGY

## 2025-01-30 PROCEDURE — 76817 TRANSVAGINAL US OBSTETRIC: CPT | Performed by: OBSTETRICS & GYNECOLOGY

## 2025-01-30 NOTE — LETTER
"   Date: 2025    Katlyn Antony MD  [id number] [address]    Patient: Yecenia Rodriguez   YOB: 1990   Date of Visit: 2025   Gestational age 21w2d   Nature of this communication: Routine       This patient was seen recently in our  office.  Please see ultrasound report under \"OB Procedures\" tab.  Please don't hesitate to contact our office with any concerns or questions.      Sincerely,      Kailey Elizalde MD  Attending Physician, Maternal-Fetal Medicine  [facility]    "

## 2025-01-30 NOTE — PROGRESS NOTES
Ultrasound Probe Disinfection    A transvaginal ultrasound was performed.   Prior to use, disinfection was performed with High Level Disinfection Process ([employer]).  Probe serial number [id number] was used.    Kailey Carvajal  01/30/25  2:17 PM

## 2025-01-30 NOTE — PROGRESS NOTES
"[facility]: Yecenia Rodriguez was seen today at 21w2d for anatomic survey and cervical length screening ultrasound.  See ultrasound report under \"OB Procedures\" tab.  Please don't hesitate to contact our office with any concerns or questions.  -Kailey Elizalde MD      "

## 2025-02-03 ENCOUNTER — OFFICE VISIT (OUTPATIENT)
Dept: ENDOCRINOLOGY | Facility: CLINIC | Age: 35
End: 2025-02-03
Payer: COMMERCIAL

## 2025-02-03 VITALS
OXYGEN SATURATION: 97 % | DIASTOLIC BLOOD PRESSURE: 70 MMHG | WEIGHT: 287.4 LBS | HEART RATE: 89 BPM | HEIGHT: 68 IN | SYSTOLIC BLOOD PRESSURE: 110 MMHG | BODY MASS INDEX: 43.56 KG/M2

## 2025-02-03 DIAGNOSIS — E28.2 PCOS (POLYCYSTIC OVARIAN SYNDROME): Primary | ICD-10-CM

## 2025-02-03 PROCEDURE — 99214 OFFICE O/P EST MOD 30 MIN: CPT | Performed by: INTERNAL MEDICINE

## 2025-02-03 NOTE — PROGRESS NOTES
Chief Complaint   Patient presents with   • Polycystic Ovary Syndrome      Referring Provider  No referring provider defined for this encounter.     History of Present Illness:   Yecenia Rodriguez is a 34 y.o. female with PCOS seen in f/u. Last in the office in July 2024  Since her last visit, she is now pregnant at 22 weeks.  She saw CHARLA but was able to conceive without assistance. She is still taking the PNV they recommend ([employer] brand)    She was formerly seeing a physician at [facility]  Her PCOS was currently treated with metformin 1000mg twice daily and OCPs prior to her pregnancy.   She remains on the metformin and asks about this today. Her OB is fine with her continuing this.     She has hair on her upper lip which she has treated with laser in the past    Her weight has been a long process. She has had several visits with nutritionists over the years and is active. Some medications are not covered by her current insurance. She has received information on both surgical and medical options, but she is not inclined towards surgery at this time.   Her A1c has trended up and she has a family hx of both diabetes and obesity.     Patient Active Problem List   Diagnosis   • Atypical squamous cell changes of undetermined significance (ASCUS) on cervical cytology with negative high risk human papilloma virus (HPV) test result   • Acne vulgaris   • Dyslipidemia, goal LDL below 160   • Food insecurity   • Major depressive disorder, recurrent episode, moderate (HCC)   • Class 3 severe obesity with body mass index (BMI) of 40.0 to 44.9 in adult (HCC)   • PCOS (polycystic ovarian syndrome)   • Post-traumatic osteoarthritis of right foot   • Esophageal spasm   • Anxiety disorder due to known physiological condition   • Cyst of right ovary   • History of PCOS   • Anxiety and depression   • Benign essential HTN   • GERD (gastroesophageal reflux disease)   • Prediabetes   • Chronic hypertension affecting pregnancy    • 21 weeks gestation of pregnancy   • High-risk pregnancy, primigravida of advanced maternal age   • Obesity, Class III, BMI 40-49.9 (morbid obesity) (HCC)   • Obesity affecting pregnancy in second trimester   • Other specified diabetes mellitus without complications (HCC)      Past Medical History:   Diagnosis Date   • Arthritis    • Atypical squamous cell changes of undetermined significance (ASCUS) on cervical cytology with negative high risk human papilloma virus (HPV) test result     2024-ASCUS +HRHPV; 2024-wnl   • Genetic screening     2024-SMA carrier negative   • High cholesterol    • Hypertension    • Ovarian cyst 2022    bilateral   • PCOS (polycystic ovarian syndrome)    • Tobacco use disorder 2014   • Varicella       Past Surgical History:   Procedure Laterality Date   • DILATION AND CURETTAGE OF UTERUS      ETOP   • WISDOM TOOTH EXTRACTION        Family History   Problem Relation Age of Onset   • Hypertension Mother    • Heart disease Mother         High Blood Pressure   • Lung disease Mother    • Osteoporosis Mother    • Cancer Mother    • Skin cancer Father    • Heart Valve Disease Father         leaky valve   • Diabetes Maternal Grandmother    • Kidney failure Maternal Grandmother    • No Known Problems Maternal Grandfather    • Ovarian cancer Paternal Grandmother    • Lung cancer Paternal Grandfather    • Diabetes Paternal Grandfather    • Heart attack Paternal Aunt    • Stroke Paternal Aunt    • Breast cancer Neg Hx    • Colon cancer Neg Hx      Social History     Tobacco Use   • Smoking status: Every Day     Current packs/day: 0.00     Average packs/day: 0.3 packs/day for 20.4 years (5.1 ttl pk-yrs)     Types: Cigarettes     Start date:      Last attempt to quit: 2024     Years since quittin.6   • Smokeless tobacco: Never   Substance Use Topics   • Alcohol use: Never     Alcohol/week: 3.0 standard drinks of alcohol     Types: 3 Standard drinks or equivalent per  "week     Comment: stopped with + UPT     Allergies   Allergen Reactions   • Bee Venom Swelling     Large local reaction   • Nicotine Other (See Comments)     Pt had severe \"night terrors\" when using nicotine patch   • Other      Nicotene patches  Fleas   • Clarithromycin Rash   • Clindamycin Rash   • Erigeron Annuus Rash     Fleas...large hives         Current Outpatient Medications:   •  aluminum chloride (DRYSOL) 20 % external solution, Use daily as needed., Disp: 35 mL, Rfl: 0  •  aspirin (Aspirin 81) 81 mg EC tablet, Take 2 tablets (162 mg total) by mouth daily, Disp: 60 tablet, Rfl: 1  •  buPROPion (WELLBUTRIN SR) 150 mg 12 hr tablet, Take 1 tablet (150 mg total) by mouth 2 (two) times a day, Disp: 60 tablet, Rfl: 0  •  calcium carbonate (TUMS) 500 mg chewable tablet, Chew 2 tablets if needed, Disp: , Rfl:   •  labetalol (NORMODYNE) 100 mg tablet, TAKE ONE TABLET BY MOUTH TWICE DAILY, Disp: 100 tablet, Rfl: 1  •  metFORMIN (GLUCOPHAGE) 1000 MG tablet, Take 1 tablet (1,000 mg total) by mouth 2 (two) times a day with meals, Disp: 120 tablet, Rfl: 3  •  Prenatal MV-Min-Fe Fum-FA-DHA (PRENATAL 1 PO), Take 1 each by mouth in the morning One pill once daily ([employer] brand), Disp: , Rfl:   •  famotidine (PEPCID) 40 MG tablet, Take 1 tablet (40 mg total) by mouth in the morning (Patient not taking: Reported on 1/30/2025), Disp: 90 tablet, Rfl: 1  Review of Systems   Constitutional:  Negative for unexpected weight change.   HENT:  Negative for hearing loss and voice change.    Eyes:  Negative for visual disturbance.   Cardiovascular:  Negative for leg swelling.   Neurological:  Negative for tremors.   Psychiatric/Behavioral:  The patient is not nervous/anxious.        Physical Exam:  Body mass index is 43.7 kg/m².  /70   Pulse 89   Ht 5' 8\" (1.727 m)   Wt 130 kg (287 lb 6.4 oz)   LMP 08/20/2024 (Exact Date)   SpO2 97%   BMI 43.70 kg/m²    Wt Readings from Last 3 Encounters:   02/03/25 130 kg (287 lb 6.4 " oz)   01/30/25 131 kg (288 lb 12.8 oz)   01/24/25 129 kg (284 lb 6.4 oz)         Physical Exam   Gen: appears well-developed and well-nourished. No apparent distress.   Head: Normocephalic and atraumatic.   Eyes: no stare or proptosis, no periorbital edema  E/N/M nl facies, hearing grossly intact  Neck: range of motion nl   Pulmonary/Chest: breathing  comfortably, no accessory muscle use, effort normal.   AB: gravid  Musculoskeletal: moves all 4 extremities  Neurological: alert and oriented to person, place, and time. No upper ext tremor appreciated  Skin: does not appear diaphoretic, no facial plethora  Psychiatric: normal mood and affect; behavior is normal; no gross lapses in memory, answer questions appropriately      DATA:  Labs:   Lab Results   Component Value Date    HGBA1C 5.5 11/16/2024                 Radiology    Impression/Plan:  Problem List Items Addressed This Visit     PCOS (polycystic ovarian syndrome) - Primary    Doing well with her pregnancy. Continue 1g twice daily as she is at higher risk for gestational DM and T2DM. Her OB is comfortable with it as am I. She asked about OCPs after she delivers, though she would like another child so may have a shorter interval pregnancy.          Relevant Medications    metFORMIN (GLUCOPHAGE) 1000 MG tablet          Discussed with the patient and all questioned fully answered. She will call me if any problems arise.        Dona Rivera MD

## 2025-02-03 NOTE — ASSESSMENT & PLAN NOTE
Doing well with her pregnancy. Continue 1g twice daily as she is at higher risk for gestational DM and T2DM. Her OB is comfortable with it as am I. She asked about OCPs after she delivers, though she would like another child so may have a shorter interval pregnancy.

## 2025-02-19 ENCOUNTER — ROUTINE PRENATAL (OUTPATIENT)
Dept: OBGYN CLINIC | Facility: CLINIC | Age: 35
End: 2025-02-19

## 2025-02-19 VITALS
BODY MASS INDEX: 43.65 KG/M2 | DIASTOLIC BLOOD PRESSURE: 72 MMHG | SYSTOLIC BLOOD PRESSURE: 116 MMHG | HEART RATE: 85 BPM | HEIGHT: 68 IN | WEIGHT: 288 LBS

## 2025-02-19 DIAGNOSIS — O10.919 CHRONIC HYPERTENSION AFFECTING PREGNANCY: ICD-10-CM

## 2025-02-19 DIAGNOSIS — O09.92 ENCOUNTER FOR SUPERVISION OF HIGH RISK PREGNANCY IN SECOND TRIMESTER, ANTEPARTUM: Primary | ICD-10-CM

## 2025-02-19 DIAGNOSIS — O99.212 OBESITY AFFECTING PREGNANCY IN SECOND TRIMESTER, UNSPECIFIED OBESITY TYPE: ICD-10-CM

## 2025-02-19 DIAGNOSIS — Z3A.24 24 WEEKS GESTATION OF PREGNANCY: ICD-10-CM

## 2025-02-19 DIAGNOSIS — O09.519 HIGH-RISK PREGNANCY, PRIMIGRAVIDA OF ADVANCED MATERNAL AGE: ICD-10-CM

## 2025-02-19 PROCEDURE — PNV: Performed by: OBSTETRICS & GYNECOLOGY

## 2025-02-19 NOTE — PROGRESS NOTES
Assessment & Plan  34 y.o.  at 24w1d presenting for routine prenatal visit.     Problem List       Atypical squamous cell changes of undetermined significance (ASCUS) on cervical cytology with negative high risk human papilloma virus (HPV) test result    Overview   ASCUS, HPV neg 2024  NILM 2024         Acne vulgaris    Dyslipidemia, goal LDL below 160    Food insecurity    Overview   Per [facility] Protocol    Formatting of this note might be different from the original.  Per [facility] Protocol         Major depressive disorder, recurrent episode, moderate ([facility])    Class 3 severe obesity with body mass index (BMI) of 40.0 to 44.9 in adult ([facility])    Overview   ICD-10 update of inactive diagnosis    Formatting of this note might be different from the original.  ICD-10 update of inactive diagnosis         PCOS (polycystic ovarian syndrome)    Overview   Oligomenorrhea since menarche at age 13 (cycle length 6 weeks), testosterone level elevated at , pelvic u/s 2018 normal    Formatting of this note might be different from the original.  Oligomenorrhea since menarche at age 13 (cycle length 6 weeks), testosterone level elevated at , pelvic u/s 2018 normal         Post-traumatic osteoarthritis of right foot    Esophageal spasm    Anxiety disorder due to known physiological condition    Cyst of right ovary    History of PCOS    Anxiety and depression    Benign essential HTN    GERD (gastroesophageal reflux disease)    Prediabetes    Chronic hypertension affecting pregnancy    Overview   LDASA  Labetalol 100 mg BID  Baseline labs wnl         24 weeks gestation of pregnancy    Overview   LDASA  Delivery consent [ ]  Tdap [ ]  GBS [ ]  Contraception [ ]  Growth scan at 34w [ ]  Weekly AFPS starting at 34 weeks [ ]         High-risk pregnancy, primigravida of advanced maternal age    Obesity, Class III, BMI 40-49.9 (morbid obesity) ([facility])    Obesity affecting  "pregnancy in second trimester    Other specified diabetes mellitus without complications (HCC)     Other Visit Diagnoses         Encounter for supervision of high risk pregnancy in second trimester, antepartum    -  Primary          ____________________________________________________________  Subjective  She is without complaint.   She denies contractions, loss of fluid, or vaginal bleeding.   She feels regular fetal movements.     Objective  /72 (BP Location: Right arm, Cuff Size: Large)   Pulse 85   Ht 5' 8\" (1.727 m)   Wt 131 kg (288 lb)   LMP 08/20/2024 (Exact Date)   BMI 43.79 kg/m²   FHR: 150 bpm  Fundal height 24 cm    Physical Exam  Constitutional:       Appearance: She is well-developed.   Cardiovascular:      Rate and Rhythm: Normal rate.   Pulmonary:      Effort: Pulmonary effort is normal. No respiratory distress.   Abdominal:      Palpations: Abdomen is soft.      Tenderness: There is no abdominal tenderness.   Skin:     General: Skin is warm and dry.          Patient's Active Problem List  Patient Active Problem List   Diagnosis    Atypical squamous cell changes of undetermined significance (ASCUS) on cervical cytology with negative high risk human papilloma virus (HPV) test result    Acne vulgaris    Dyslipidemia, goal LDL below 160    Food insecurity    Major depressive disorder, recurrent episode, moderate (HCC)    Class 3 severe obesity with body mass index (BMI) of 40.0 to 44.9 in adult (HCC)    PCOS (polycystic ovarian syndrome)    Post-traumatic osteoarthritis of right foot    Esophageal spasm    Anxiety disorder due to known physiological condition    Cyst of right ovary    History of PCOS    Anxiety and depression    Benign essential HTN    GERD (gastroesophageal reflux disease)    Prediabetes    Chronic hypertension affecting pregnancy    24 weeks gestation of pregnancy    High-risk pregnancy, primigravida of advanced maternal age    Obesity, Class III, BMI 40-49.9 (morbid " obesity) (HCC)    Obesity affecting pregnancy in second trimester    Other specified diabetes mellitus without complications (HCC)           Katlyn Antony MD  2/19/2025  1:31 PM

## 2025-02-24 ENCOUNTER — CLINICAL SUPPORT (OUTPATIENT)
Dept: POSTPARTUM | Facility: CLINIC | Age: 35
End: 2025-02-24

## 2025-02-24 DIAGNOSIS — Z32.2 ENCOUNTER FOR CHILDBIRTH INSTRUCTION: Primary | ICD-10-CM

## 2025-03-02 DIAGNOSIS — I10 BENIGN ESSENTIAL HTN: ICD-10-CM

## 2025-03-03 RX ORDER — LABETALOL 100 MG/1
100 TABLET, FILM COATED ORAL 2 TIMES DAILY
Qty: 100 TABLET | Refills: 0 | Status: SHIPPED | OUTPATIENT
Start: 2025-03-03

## 2025-03-15 ENCOUNTER — APPOINTMENT (OUTPATIENT)
Dept: LAB | Facility: HOSPITAL | Age: 35
End: 2025-03-15
Payer: COMMERCIAL

## 2025-03-15 DIAGNOSIS — O09.92 ENCOUNTER FOR SUPERVISION OF HIGH RISK PREGNANCY IN SECOND TRIMESTER, ANTEPARTUM: ICD-10-CM

## 2025-03-15 LAB
ALBUMIN SERPL BCG-MCNC: 3.6 G/DL (ref 3.5–5)
ALP SERPL-CCNC: 40 U/L (ref 34–104)
ALT SERPL W P-5'-P-CCNC: 11 U/L (ref 7–52)
ANION GAP SERPL CALCULATED.3IONS-SCNC: 7 MMOL/L (ref 4–13)
AST SERPL W P-5'-P-CCNC: 10 U/L (ref 13–39)
BILIRUB SERPL-MCNC: 0.33 MG/DL (ref 0.2–1)
BUN SERPL-MCNC: 7 MG/DL (ref 5–25)
CALCIUM SERPL-MCNC: 7.7 MG/DL (ref 8.4–10.2)
CHLORIDE SERPL-SCNC: 106 MMOL/L (ref 96–108)
CO2 SERPL-SCNC: 21 MMOL/L (ref 21–32)
CREAT SERPL-MCNC: 0.56 MG/DL (ref 0.6–1.3)
ERYTHROCYTE [DISTWIDTH] IN BLOOD BY AUTOMATED COUNT: 12.5 % (ref 11.6–15.1)
GFR SERPL CREATININE-BSD FRML MDRD: 121 ML/MIN/1.73SQ M
GLUCOSE 1H P 50 G GLC PO SERPL-MCNC: 139 MG/DL (ref 70–134)
GLUCOSE SERPL-MCNC: 142 MG/DL (ref 65–140)
HCT VFR BLD AUTO: 34.5 % (ref 34.8–46.1)
HGB BLD-MCNC: 11.4 G/DL (ref 11.5–15.4)
MCH RBC QN AUTO: 30.3 PG (ref 26.8–34.3)
MCHC RBC AUTO-ENTMCNC: 33 G/DL (ref 31.4–37.4)
MCV RBC AUTO: 92 FL (ref 82–98)
PLATELET # BLD AUTO: 205 THOUSANDS/UL (ref 149–390)
PMV BLD AUTO: 10.7 FL (ref 8.9–12.7)
POTASSIUM SERPL-SCNC: 3.7 MMOL/L (ref 3.5–5.3)
PROT SERPL-MCNC: 6 G/DL (ref 6.4–8.4)
RBC # BLD AUTO: 3.76 MILLION/UL (ref 3.81–5.12)
SODIUM SERPL-SCNC: 134 MMOL/L (ref 135–147)
WBC # BLD AUTO: 6.95 THOUSAND/UL (ref 4.31–10.16)

## 2025-03-15 PROCEDURE — 82950 GLUCOSE TEST: CPT

## 2025-03-15 PROCEDURE — 80053 COMPREHEN METABOLIC PANEL: CPT

## 2025-03-15 PROCEDURE — 36415 COLL VENOUS BLD VENIPUNCTURE: CPT

## 2025-03-15 PROCEDURE — 86780 TREPONEMA PALLIDUM: CPT

## 2025-03-15 PROCEDURE — 85027 COMPLETE CBC AUTOMATED: CPT

## 2025-03-16 LAB — TREPONEMA PALLIDUM IGG+IGM AB [PRESENCE] IN SERUM OR PLASMA BY IMMUNOASSAY: NORMAL

## 2025-03-17 ENCOUNTER — RESULTS FOLLOW-UP (OUTPATIENT)
Dept: OBGYN CLINIC | Facility: CLINIC | Age: 35
End: 2025-03-17

## 2025-03-17 DIAGNOSIS — R73.09 ELEVATED GLUCOSE TOLERANCE TEST: Primary | ICD-10-CM

## 2025-03-19 ENCOUNTER — CLINICAL SUPPORT (OUTPATIENT)
Age: 35
End: 2025-03-19

## 2025-03-19 DIAGNOSIS — Z32.2 ENCOUNTER FOR CHILDBIRTH INSTRUCTION: Primary | ICD-10-CM

## 2025-03-19 PROBLEM — O99.213 OBESITY AFFECTING PREGNANCY IN THIRD TRIMESTER: Status: ACTIVE | Noted: 2024-12-04

## 2025-03-19 PROBLEM — O34.10 UTERINE FIBROID IN PREGNANCY: Status: ACTIVE | Noted: 2025-03-19

## 2025-03-19 PROBLEM — D25.9 UTERINE FIBROID IN PREGNANCY: Status: ACTIVE | Noted: 2025-03-19

## 2025-03-20 ENCOUNTER — ULTRASOUND (OUTPATIENT)
Age: 35
End: 2025-03-20
Payer: COMMERCIAL

## 2025-03-20 ENCOUNTER — ROUTINE PRENATAL (OUTPATIENT)
Dept: OBGYN CLINIC | Facility: CLINIC | Age: 35
End: 2025-03-20

## 2025-03-20 VITALS
DIASTOLIC BLOOD PRESSURE: 76 MMHG | HEART RATE: 97 BPM | SYSTOLIC BLOOD PRESSURE: 114 MMHG | BODY MASS INDEX: 43.74 KG/M2 | HEIGHT: 68 IN | WEIGHT: 288.6 LBS

## 2025-03-20 VITALS
HEIGHT: 68 IN | BODY MASS INDEX: 43.86 KG/M2 | DIASTOLIC BLOOD PRESSURE: 78 MMHG | OXYGEN SATURATION: 98 % | WEIGHT: 289.4 LBS | SYSTOLIC BLOOD PRESSURE: 112 MMHG | HEART RATE: 85 BPM

## 2025-03-20 DIAGNOSIS — Z36.2 ENCOUNTER FOR FOLLOW-UP ULTRASOUND OF FETAL ANATOMY: ICD-10-CM

## 2025-03-20 DIAGNOSIS — Z3A.28 28 WEEKS GESTATION OF PREGNANCY: ICD-10-CM

## 2025-03-20 DIAGNOSIS — Z36.89 ENCOUNTER FOR ULTRASOUND TO ASSESS FETAL GROWTH: ICD-10-CM

## 2025-03-20 DIAGNOSIS — O34.10 UTERINE FIBROID IN PREGNANCY: ICD-10-CM

## 2025-03-20 DIAGNOSIS — O99.213 OBESITY AFFECTING PREGNANCY IN THIRD TRIMESTER, UNSPECIFIED OBESITY TYPE: ICD-10-CM

## 2025-03-20 DIAGNOSIS — O10.919 CHRONIC HYPERTENSION AFFECTING PREGNANCY: ICD-10-CM

## 2025-03-20 DIAGNOSIS — D25.9 UTERINE FIBROID IN PREGNANCY: ICD-10-CM

## 2025-03-20 DIAGNOSIS — O09.519 HIGH-RISK PREGNANCY, PRIMIGRAVIDA OF ADVANCED MATERNAL AGE: ICD-10-CM

## 2025-03-20 DIAGNOSIS — O10.919 CHRONIC HYPERTENSION AFFECTING PREGNANCY: Primary | ICD-10-CM

## 2025-03-20 DIAGNOSIS — O09.93 ENCOUNTER FOR SUPERVISION OF HIGH RISK PREGNANCY IN THIRD TRIMESTER, ANTEPARTUM: Primary | ICD-10-CM

## 2025-03-20 PROCEDURE — 76816 OB US FOLLOW-UP PER FETUS: CPT | Performed by: OBSTETRICS & GYNECOLOGY

## 2025-03-20 PROCEDURE — PNV: Performed by: OBSTETRICS & GYNECOLOGY

## 2025-03-20 PROCEDURE — 99214 OFFICE O/P EST MOD 30 MIN: CPT | Performed by: PHYSICIAN ASSISTANT

## 2025-03-20 NOTE — PROGRESS NOTES
Ms. Rodriguez is here today for evaluation of fetal weight and amniotic fluid. Her pregnancy is notable for advanced maternal age, class III obesity, and chronic hypertension, well-controlled on labetalol. I reviewed her NIPT and MSAFP, which were low risk.     A viable white intrauterine pregnancy is seen. Estimated fetal weight and amniotic fluid are within normal limits for gestational age. No fetal anomalies are visualized on limited survey. Placenta is within normal limits.     Evaluation and Management:   The patient was counseled regarding the above findings by Anya Bob PA-C. The limitations of ultrasound were reviewed. Medical decision-making for this encounter was low.    Split-shared decision-making between Anya Bob PA-C and myself was utilized, with the majority of the time spent by PASTORA Bob.      Our recommendations are as follows:   1.  We discussed the appropriate for gestational age growth at the 76 percentile.    2.  We discussed her elevated glucose tolerance test completed on 3/15/2025 which resulted as 139.  She is aware that she needs to complete the 3-hour glucose tolerance test.  We discussed the importance of glycemic control in prevention of complications including hypertensive disorders, macrosomia, polyhydramnios,  delivery, shoulder dystocia, stillbirth and hypoglycemia in .  We discussed that if her 3-hour glucose tolerance test indicates gestational diabetes, she will return for a 32-week growth and she will be scheduled for education with our diabetic team.    3.  We discussed weekly APFS, beginning at 32 weeks, for the indication of chronic hypertension, controlled on medication.  She is interested in scheduling her testing at her OB office.  She will be seen later today and attempt to schedule.  She was encouraged to contact our office if she is unable to make weekly appointments there.    4.  Return for fetal growth ultrasound at 36 weeks.   If her 3-hour glucose tolerance test indicates gestational diabetes, recommend 32-week growth assessment.      Thank you very much for this kind referral and please do not hesitate to contact me with any further questions or concerns.    I reviewed the ultrasound pictures and recommended the medical decision making transcribed in the care of this patient.      Ekaterina Hernandez M.D.

## 2025-03-20 NOTE — LETTER
2025     Katlyn Antony MD  [address] [place] [address]    Patient: Yecenia Rodriguez   YOB: 1990   Date of Visit: 3/20/2025       Dear Dr. Antony:    Thank you for referring Yecenia Rodriguez to me for evaluation. Below are my notes for this consultation.    If you have questions, please do not hesitate to call me. I look forward to following your patient along with you.         Sincerely,        Ekaterina Hernandez MD        CC: No Recipients    Ekaterina Hernandez MD  3/20/2025  1:16 PM  Sign when Signing Visit  Ms. Rodriguez is here today for evaluation of fetal weight and amniotic fluid. Her pregnancy is notable for advanced maternal age, class III obesity, and chronic hypertension, well-controlled on labetalol. I reviewed her NIPT and MSAFP, which were low risk.     A viable white intrauterine pregnancy is seen. Estimated fetal weight and amniotic fluid are within normal limits for gestational age. No fetal anomalies are visualized on limited survey. Placenta is within normal limits.     Evaluation and Management:   The patient was counseled regarding the above findings by Anya Bob PA-C. The limitations of ultrasound were reviewed. Medical decision-making for this encounter was low.    Split-shared decision-making between Anya Bob PA-C and myself was utilized, with the majority of the time spent by PASTORA Bob.      Our recommendations are as follows:   1.  We discussed the appropriate for gestational age growth at the 76 percentile.    2.  We discussed her elevated glucose tolerance test completed on 3/15/2025 which resulted as 139.  She is aware that she needs to complete the 3-hour glucose tolerance test.  We discussed the importance of glycemic control in prevention of complications including hypertensive disorders, macrosomia, polyhydramnios,  delivery, shoulder dystocia, stillbirth and hypoglycemia in .  We discussed that  if her 3-hour glucose tolerance test indicates gestational diabetes, she will return for a 32-week growth and she will be scheduled for education with our diabetic team.    3.  We discussed weekly APFS, beginning at 32 weeks, for the indication of chronic hypertension, controlled on medication.  She is interested in scheduling her testing at her OB office.  She will be seen later today and attempt to schedule.  She was encouraged to contact our office if she is unable to make weekly appointments there.    4.  Return for fetal growth ultrasound at 36 weeks.  If her 3-hour glucose tolerance test indicates gestational diabetes, recommend 32-week growth assessment.      Thank you very much for this kind referral and please do not hesitate to contact me with any further questions or concerns.    I reviewed the ultrasound pictures and recommended the medical decision making transcribed in the care of this patient.      Ekaterina Hernandez M.D.

## 2025-03-20 NOTE — PROGRESS NOTES
Assessment & Plan  34 y.o.  at 28w2d presenting for routine prenatal visit.     Problem List       Atypical squamous cell changes of undetermined significance (ASCUS) on cervical cytology with negative high risk human papilloma virus (HPV) test result    Overview   ASCUS, HPV neg 2024  NILM 2024         Acne vulgaris    Dyslipidemia, goal LDL below 160    Food insecurity    Overview   Per [facility] Protocol    Formatting of this note might be different from the original.  Per [facility] Protocol         Major depressive disorder, recurrent episode, moderate ([facility])    Class 3 severe obesity with body mass index (BMI) of 40.0 to 44.9 in adult ([facility])    Overview   ICD-10 update of inactive diagnosis    Formatting of this note might be different from the original.  ICD-10 update of inactive diagnosis         PCOS (polycystic ovarian syndrome)    Overview   Oligomenorrhea since menarche at age 13 (cycle length 6 weeks), testosterone level elevated at , pelvic u/s 2018 normal    Formatting of this note might be different from the original.  Oligomenorrhea since menarche at age 13 (cycle length 6 weeks), testosterone level elevated at , pelvic u/s 2018 normal         Post-traumatic osteoarthritis of right foot    Esophageal spasm    Anxiety disorder due to known physiological condition    Cyst of right ovary    History of PCOS    Anxiety and depression    Benign essential HTN    GERD (gastroesophageal reflux disease)    Prediabetes    Chronic hypertension affecting pregnancy    Overview   LDASA  Labetalol 100 mg BID  Baseline labs wnl         28 weeks gestation of pregnancy    Overview   LDASA  Delivery consent [ ]  Tdap [ ]  GBS [ ]  Contraception [ ]  Growth scan at 34w [ ]  Weekly AFPS starting at 32 weeks [ ]         High-risk pregnancy, primigravida of advanced maternal age    Obesity, Class III, BMI 40-49.9 (morbid obesity) ([facility])    Obesity affecting  "pregnancy in third trimester    Other specified diabetes mellitus without complications (HCC)    Elevated glucose tolerance test    Overview   Elevated 1 hour GTT, 3 hour ordered         Uterine fibroid in pregnancy     Other Visit Diagnoses         Encounter for supervision of high risk pregnancy in third trimester, antepartum    -  Primary          ____________________________________________________________  Subjective  She is without complaint.   She denies contractions, loss of fluid, or vaginal bleeding.   She feels regular fetal movements.     Objective  /78 (BP Location: Right arm, Patient Position: Sitting, Cuff Size: Standard)   Pulse 85   Ht 5' 8\" (1.727 m)   Wt 131 kg (289 lb 6.4 oz)   LMP 08/20/2024 (Exact Date)   SpO2 98%   BMI 44.00 kg/m²   FHR: 145 bpm  Fundal height 29 cm    Physical Exam  Constitutional:       Appearance: She is well-developed.   Cardiovascular:      Rate and Rhythm: Normal rate.   Pulmonary:      Effort: Pulmonary effort is normal. No respiratory distress.   Abdominal:      Palpations: Abdomen is soft.      Tenderness: There is no abdominal tenderness.   Skin:     General: Skin is warm and dry.          Patient's Active Problem List  Patient Active Problem List   Diagnosis    Atypical squamous cell changes of undetermined significance (ASCUS) on cervical cytology with negative high risk human papilloma virus (HPV) test result    Acne vulgaris    Dyslipidemia, goal LDL below 160    Food insecurity    Major depressive disorder, recurrent episode, moderate (HCC)    Class 3 severe obesity with body mass index (BMI) of 40.0 to 44.9 in adult (HCC)    PCOS (polycystic ovarian syndrome)    Post-traumatic osteoarthritis of right foot    Esophageal spasm    Anxiety disorder due to known physiological condition    Cyst of right ovary    History of PCOS    Anxiety and depression    Benign essential HTN    GERD (gastroesophageal reflux disease)    Prediabetes    Chronic " hypertension affecting pregnancy    28 weeks gestation of pregnancy    High-risk pregnancy, primigravida of advanced maternal age    Obesity, Class III, BMI 40-49.9 (morbid obesity) (HCC)    Obesity affecting pregnancy in third trimester    Other specified diabetes mellitus without complications (HCC)    Elevated glucose tolerance test    Uterine fibroid in pregnancy           Katlyn Antony MD  3/20/2025  3:57 PM

## 2025-03-22 ENCOUNTER — APPOINTMENT (OUTPATIENT)
Dept: LAB | Facility: HOSPITAL | Age: 35
End: 2025-03-22
Payer: COMMERCIAL

## 2025-03-22 DIAGNOSIS — R73.09 ELEVATED GLUCOSE TOLERANCE TEST: ICD-10-CM

## 2025-03-22 LAB
GLUCOSE 1H P 100 G GLC PO SERPL-MCNC: 164 MG/DL (ref 65–179)
GLUCOSE 2H P 100 G GLC PO SERPL-MCNC: 144 MG/DL (ref 65–154)
GLUCOSE 3H P 100 G GLC PO SERPL-MCNC: 118 MG/DL (ref 65–139)
GLUCOSE P FAST SERPL-MCNC: 82 MG/DL (ref 65–94)

## 2025-03-22 PROCEDURE — 36415 COLL VENOUS BLD VENIPUNCTURE: CPT

## 2025-03-22 PROCEDURE — 82951 GLUCOSE TOLERANCE TEST (GTT): CPT

## 2025-03-22 PROCEDURE — 82952 GTT-ADDED SAMPLES: CPT

## 2025-03-24 ENCOUNTER — RESULTS FOLLOW-UP (OUTPATIENT)
Dept: OBGYN CLINIC | Facility: CLINIC | Age: 35
End: 2025-03-24

## 2025-03-24 DIAGNOSIS — E66.812 OBESITY, CLASS II, BMI 35-39.9: ICD-10-CM

## 2025-03-24 DIAGNOSIS — F06.4 ANXIETY DISORDER DUE TO KNOWN PHYSIOLOGICAL CONDITION: ICD-10-CM

## 2025-03-26 ENCOUNTER — PATIENT MESSAGE (OUTPATIENT)
Dept: OBGYN CLINIC | Facility: CLINIC | Age: 35
End: 2025-03-26

## 2025-03-26 RX ORDER — BUPROPION HYDROCHLORIDE 150 MG/1
150 TABLET, EXTENDED RELEASE ORAL 2 TIMES DAILY
Qty: 60 TABLET | Refills: 5 | Status: SHIPPED | OUTPATIENT
Start: 2025-03-26

## 2025-03-27 ENCOUNTER — NURSE TRIAGE (OUTPATIENT)
Age: 35
End: 2025-03-27

## 2025-03-27 NOTE — TELEPHONE ENCOUNTER
"FOLLOW UP: monitor b/p at home, call with reading of 140/90 or above. Continue labetolol 100mg BID as prescribed. Increase hydration. F/U at next OB visit on 4/2/2025    REASON FOR CONVERSATION: Pregnancy Problem    SYMPTOMS: felt off after 4 mile walk.   OTHER:  B/P 143/91 at 4pm. Readings low 120's/low 80's last night and this morning. Inadequate hydration yesterday.   Denies headache,visual change, sudden swelling, RUQ pain. +FM. Feels fine today.         ESC to on call provider Dr. Antony,  for any additional recommendations.   ESC reply: agree with recommendaitons    DISPOSITION: Home Care              Reason for Disposition   High blood pressure during pregnancy, questions about    Answer Assessment - Initial Assessment Questions  1. BLOOD PRESSURE: \"What is your blood pressure?\" \"Did you take at least two measurements 5 minutes apart?\"      4pm 143/91, 9pm 123/81,  this morning similar to 123/81  2. ONSET: \"When did you take your blood pressure?\"      Last night, this morning  3. HOW: \"How did you take your blood pressure?\" (e.g., automatic home BP monitor, visiting nurse)      Automatic home cuff  4. HISTORY: \"Do you have a history of high blood pressure?\" \"Were you diagnosed with preeclampsia during this or previous pregnancies?\"      Yes,  5. MEDICINES: \"Are you taking any medicines for blood pressure?\" \"Have you missed any doses recently?\"      Labetolol 100mg twice daily  6. PREGNANCY: \"How many weeks pregnant are you?\" \"How many babies are you carrying?\" (e.g., single baby, twins)      29w2d  7. ANDREA: \"What date are you expecting to deliver?\"      6/10/2025  8. OTHER SYMPTOMS: \"Do you have any other symptoms?\" (e.g., abdomen pain, chest pain, difficulty breathing, hand or face swelling, headache, vision changes)      Felt fine at time of elevated b/p.    Protocols used: Pregnancy - High Blood Pressure-Adult-AH    "

## 2025-03-27 NOTE — TELEPHONE ENCOUNTER
Regardinw elevated blood pressure  ----- Message from Ryann JARVIS sent at 3/27/2025  8:39 AM EDT -----  Pt sent a myc message yesterday at 5 that she was feeling off so she took her blood pressure. It was 143-91. I didn't see that she called in since then.

## 2025-04-01 ENCOUNTER — NURSE TRIAGE (OUTPATIENT)
Dept: OTHER | Facility: OTHER | Age: 35
End: 2025-04-01

## 2025-04-01 ENCOUNTER — HOSPITAL ENCOUNTER (EMERGENCY)
Facility: HOSPITAL | Age: 35
Discharge: HOME | End: 2025-04-01
Attending: EMERGENCY MEDICINE
Payer: COMMERCIAL

## 2025-04-01 ENCOUNTER — HOSPITAL ENCOUNTER (OUTPATIENT)
Facility: HOSPITAL | Age: 35
Discharge: HOME | End: 2025-04-01
Attending: OBSTETRICS & GYNECOLOGY | Admitting: OBSTETRICS & GYNECOLOGY
Payer: COMMERCIAL

## 2025-04-01 VITALS
OXYGEN SATURATION: 96 % | HEART RATE: 82 BPM | RESPIRATION RATE: 18 BRPM | DIASTOLIC BLOOD PRESSURE: 84 MMHG | TEMPERATURE: 97.3 F | SYSTOLIC BLOOD PRESSURE: 146 MMHG

## 2025-04-01 VITALS — DIASTOLIC BLOOD PRESSURE: 75 MMHG | SYSTOLIC BLOOD PRESSURE: 119 MMHG | HEART RATE: 74 BPM

## 2025-04-01 DIAGNOSIS — Z34.90 PREGNANT: ICD-10-CM

## 2025-04-01 DIAGNOSIS — K21.9 GASTROESOPHAGEAL REFLUX DISEASE, UNSPECIFIED WHETHER ESOPHAGITIS PRESENT: Primary | ICD-10-CM

## 2025-04-01 DIAGNOSIS — K21.9 GERD (GASTROESOPHAGEAL REFLUX DISEASE): ICD-10-CM

## 2025-04-01 DIAGNOSIS — J69.0: Primary | ICD-10-CM

## 2025-04-01 PROCEDURE — G0463 HOSPITAL OUTPT CLINIC VISIT: HCPCS

## 2025-04-01 PROCEDURE — 99283 EMERGENCY DEPT VISIT LOW MDM: CPT

## 2025-04-01 PROCEDURE — 99202 OFFICE O/P NEW SF 15 MIN: CPT

## 2025-04-01 PROCEDURE — 99214 OFFICE O/P EST MOD 30 MIN: CPT | Performed by: OBSTETRICS & GYNECOLOGY

## 2025-04-01 RX ORDER — PANTOPRAZOLE SODIUM 20 MG/1
20 TABLET, DELAYED RELEASE ORAL DAILY
Qty: 30 TABLET | Refills: 3 | Status: SHIPPED | OUTPATIENT
Start: 2025-04-01

## 2025-04-01 RX ORDER — MAGNESIUM HYDROXIDE/ALUMINUM HYDROXICE/SIMETHICONE 120; 1200; 1200 MG/30ML; MG/30ML; MG/30ML
30 SUSPENSION ORAL ONCE
Status: COMPLETED | OUTPATIENT
Start: 2025-04-01 | End: 2025-04-01

## 2025-04-01 RX ADMIN — ALUMINUM HYDROXIDE, MAGNESIUM HYDROXIDE, AND SIMETHICONE 30 ML: 200; 200; 20 SUSPENSION ORAL at 03:21

## 2025-04-01 NOTE — TELEPHONE ENCOUNTER
"FOLLOW UP: Please follow up with patient in the morning    REASON FOR CONVERSATION: Vomiting During Pregnancy    SYMPTOMS: Patient woke up vomiting and started having shortness of breath and difficulty coughing. Pt reports she tried to cough/vomit as much as she could and it was red. Breathing improved but still felt chest heaviness.    OTHER: 30w0d     DISPOSITION: Go to ED Now (overriding Go to ED Now (or PCP Triage))    Contacted on call provider who recommended ER for evaluation. Patient agreeable to disposition and will go to [facility].    Reason for Disposition   Patient sounds very sick or weak to the triager    Additional Information   [1] Vomiting AND [2] pregnant 20 or more weeks    Answer Assessment - Initial Assessment Questions  1. ONSET: \"When did the vomiting begin?\"         This morning. Patient woke up throwing up and felt shortness of breath and was struggling to cough. She tried to cough/vomit as much she could. Breathing improved but chest felt heavy and feels as if she has a cold.    2. ABDOMEN PAIN: \"Are your having any abdomen pain?\" If Yes : \"How bad is it and what does it feel like?\" (e.g., crampy, dull, intermittent, constant)         Denies    3. CAUSE: \"What do you think is causing your vomiting?\"        Patient reports that she has had reflux throughout pregnancy    4. OTHER SYMPTOMS: \"Do you have any other symptoms?\" (e.g., fever, headache, vertigo, vomiting blood or coffee grounds, recent head injury)        Patient reports vomit was red.    5. PREGNANCY: \"Is there any chance you are pregnant?\" \"When was your last menstrual period?\"          30w0d    Protocols used: Pregnancy - Morning Sickness (Nausea and Vomiting of Pregnancy)-Adult-AH, Vomiting-Adult-AH    "

## 2025-04-01 NOTE — TELEPHONE ENCOUNTER
"Regarding: vomiting/ 6 months  ----- Message from Autumn ONEILL sent at 4/1/2025  2:11 AM EDT -----  \"I am 6 months pregnant and I threw up. I feel as if I feel liquid in my lungs.\"    "

## 2025-04-01 NOTE — PROGRESS NOTES
Triage Note - OB  Yecenia Rodriguez 34 y.o. female MRN: [id number]  Unit/Bed#:  TRIAGE 3-01 Encounter: [id number]    OB TRIAGE NOTE  Yecenia Rodriguez  [id number]  2025  4:55 AM  LD TRIAGE 3/LD TRIAGE 3-*    ASSESS:  34 y.o.  30w0d with vomiting and GERD    PLAN  #1. Vomiting/GERD:   Feeling better after maalox in the ED  Will send Protonix to pharmacy    #2. Pregnancy 30w0d:   Reactive NST  No Obstetric complaints  Discharge home    Discharge instructions  Patient instructed to call if experiencing worsening contractions, vaginal bleeding, loss of fluid or decreased fetal movement.  Will follow up with OBGYN.      SUBJECTIVE    ANDREA: Estimated Date of Delivery: 6/10/25    HPI Chronology:  34 y.o.  30w0d presents with complaint of vomiting and GERD at home. She noticed that her vomit was also red. She now feels well after treatment with maalox in the ER.     Contractions: no  Leakage: no  Bleeding: no  Fetal Movement: yes  Pelvic pain: no    Vitals:   /75 (BP Location: Right arm)   Pulse 74   LMP 2024 (Exact Date)   There is no height or weight on file to calculate BMI.    Physical Exam  Constitutional:       Appearance: Normal appearance.   HENT:      Head: Normocephalic and atraumatic.   Cardiovascular:      Rate and Rhythm: Normal rate.   Pulmonary:      Effort: Pulmonary effort is normal. No respiratory distress.   Abdominal:      Palpations: Abdomen is soft.      Tenderness: There is no abdominal tenderness.   Musculoskeletal:         General: Normal range of motion.   Skin:     General: Skin is warm and dry.   Neurological:      Mental Status: She is alert.           FHT:  Baseline Rate (FHR): 130 bpm  Variability: Moderate  Accelerations: 10 x 10 (<32 weeks)  Decelerations: None  TOCO:   Contraction Frequency (minutes): 0  Contraction Duration (seconds): NA  Contraction Intensity:  (NA)    Labs: No results found for this or any previous visit (from the past 24  hours).    Lab, Imaging and other studies: I have personally reviewed pertinent reports.        Katlyn Antony MD  OB/GYN   4/1/2025  4:55 AM

## 2025-04-01 NOTE — DISCHARGE INSTRUCTIONS
Symptoms will likely improve on their own. Return for worsening chest pain, difficulty breathing, unexplained fevers, etc. Likewise for abdominal pain, vaginal bleeding, decreased fetal movement.

## 2025-04-01 NOTE — ED PROVIDER NOTES
Time reflects when diagnosis was documented in both MDM as applicable and the Disposition within this note       Time User Action Codes Description Comment    4/1/2025  3:48 AM Gunnar Caballero [J69.0] Pneumonitis due to food and vomit (HCC)     4/1/2025  3:48 AM Gunnar Caballero [K21.9] GERD (gastroesophageal reflux disease)     4/1/2025  3:49 AM Gunnar Caballero [Z34.90] Pregnant           ED Disposition       ED Disposition   Discharge    Condition   Stable    Date/Time   Tue Apr 1, 2025  3:47 AM    Comment   Yecenia Rodriguez discharge to home/self care.                   Assessment & Plan       Medical Decision Making  34-year-old female currently 30 weeks pregnant presents for evaluation of GERD and vomiting that woke her up from sleep tonight.  Since then has been coughing, thinks she might of aspirated a little bit.  On exam she is well-appearing, mildly hypertensive, normal heart rate and oxygen saturation, normal respiratory effort.  Does have a semi-frequent cough.  Lungs are CTAB.  Patent posterior oropharynx.    Patient has had bad GERD for a while and takes Pepcid daily, and frequently takes Tums.  Last night she went out to [employer] and had food late in the evening, now waking up in the middle the night with bad heartburn.  She may have aspirated a small amount of saliva or acid, could have a mild pneumonitis.  But overall has an unconcerning presentation.  Quick bedside ultrasound was performed showing normal findings.  Discussed with Dr. Silva from OB/GYN, patient is cleared from ER standpoint and will go upstairs for quick NST.  Discussed return precautions with patient including worsening chest pain, SOB, unexplained fevers.  Or pregnancy related such as abdominal pain, vaginal bleeding, vaginal pain, decreased fetal movement.  Patient expresses understanding of the condition, treatment plan, follow-up instructions, and return precautions.      Risk  OTC drugs.             Medications  "  aluminum-magnesium hydroxide-simethicone (MAALOX) oral suspension 30 mL (30 mL Oral Given 4/1/25 0321)       ED Risk Strat Scores                            SBIRT 22yo+      Flowsheet Row Most Recent Value   Initial Alcohol Screen: US AUDIT-C     1. How often do you have a drink containing alcohol? 0 Filed at: 04/01/2025 0314   2. How many drinks containing alcohol do you have on a typical day you are drinking?  0 Filed at: 04/01/2025 0314   3a. Male UNDER 65: How often do you have five or more drinks on one occasion? 0 Filed at: 04/01/2025 0314   3b. FEMALE Any Age, or MALE 65+: How often do you have 4 or more drinks on one occassion? 0 Filed at: 04/01/2025 0314   Audit-C Score 0 Filed at: 04/01/2025 0314   NANCY: How many times in the past year have you...    Used an illegal drug or used a prescription medication for non-medical reasons? Never Filed at: 04/01/2025 0314                            History of Present Illness       Chief Complaint   Patient presents with    Vomiting     Pt woke up w/ vomiting. Pt stated she then started coughing and feeling SOB and \"like something was stuck in her throat.\" C/o cough and CP. + fetal movement. Denies any other c/o.        Past Medical History:   Diagnosis Date    Arthritis     Atypical squamous cell changes of undetermined significance (ASCUS) on cervical cytology with negative high risk human papilloma virus (HPV) test result     2/2024-ASCUS +HRHPV; 6/2024-wnl    Genetic screening     12/2024-SMA carrier negative    High cholesterol     Hypertension     Ovarian cyst 06/30/2022    bilateral    PCOS (polycystic ovarian syndrome)     Tobacco use disorder 11/21/2014    Uterine fibroid in pregnancy 3/19/2025    Varicella       Past Surgical History:   Procedure Laterality Date    DILATION AND CURETTAGE OF UTERUS      ETOP    WISDOM TOOTH EXTRACTION  2007      Family History   Problem Relation Age of Onset    Hypertension Mother     Heart disease Mother         High Blood " Pressure    Lung disease Mother     Osteoporosis Mother     Cancer Mother     Skin cancer Father     Heart Valve Disease Father         leaky valve    Diabetes Maternal Grandmother     Kidney failure Maternal Grandmother     No Known Problems Maternal Grandfather     Ovarian cancer Paternal Grandmother     Lung cancer Paternal Grandfather     Diabetes Paternal Grandfather     Heart attack Paternal Aunt     Stroke Paternal Aunt     Breast cancer Neg Hx     Colon cancer Neg Hx       Social History     Tobacco Use    Smoking status: Former     Current packs/day: 0.00     Average packs/day: 0.3 packs/day for 20.4 years (5.1 ttl pk-yrs)     Types: Cigarettes     Start date:      Quit date: 2024     Years since quittin.8    Smokeless tobacco: Never   Vaping Use    Vaping status: Never Used   Substance Use Topics    Alcohol use: Never     Alcohol/week: 3.0 standard drinks of alcohol     Types: 3 Standard drinks or equivalent per week     Comment: stopped with + UPT    Drug use: Never      E-Cigarette/Vaping    E-Cigarette Use Never User       E-Cigarette/Vaping Substances    Nicotine No     THC No     CBD No     Flavoring No     Other No     Unknown No       I have reviewed and agree with the history as documented.     34-year-old G1, P0 female approximately 30 weeks pregnant presents for evaluation of GERD and vomiting.  States that she went out to eat at [employer]'s around 9 PM last night, had spinach today.  Tonight woke up with really bad GERD, few episodes where she felt like she was vomiting up small amounts of mucus.  In between she felt very short of breath.  After which she has been coughing somewhat frequently.  Overall symptoms have been greatly improving since this episode.  Mild discomfort in her chest.  She takes Pepcid daily for GERD, frequently takes Tums.  No complications during this pregnancy such as preeclampsia, eclampsia, gestational diabetes or hypertension.  Denies any abdominal pain,  vaginal bleeding.  Feels normal fetal movement right now.        Review of Systems   Constitutional:  Negative for chills and fever.   HENT:  Negative for ear pain and sore throat.    Eyes:  Negative for pain and visual disturbance.   Respiratory:  Positive for cough. Negative for shortness of breath.    Cardiovascular:  Negative for palpitations.   Gastrointestinal:  Positive for vomiting. Negative for abdominal pain.   Genitourinary:  Negative for dysuria and hematuria.   Musculoskeletal:  Negative for arthralgias and back pain.   Skin:  Negative for color change and rash.   Neurological:  Negative for seizures and syncope.   All other systems reviewed and are negative.          Objective       ED Triage Vitals [04/01/25 0245]   Temperature Pulse Blood Pressure Respirations SpO2 Patient Position - Orthostatic VS   (!) 97.3 °F (36.3 °C) 82 146/84 18 96 % --      Temp Source Heart Rate Source BP Location FiO2 (%) Pain Score    Oral -- -- -- --      Vitals      Date and Time Temp Pulse SpO2 Resp BP Pain Score FACES Pain Rating User   04/01/25 0245 97.3 °F (36.3 °C) 82 96 % 18 146/84 -- -- SH            Physical Exam  Vitals and nursing note reviewed.   Constitutional:       General: She is not in acute distress.     Appearance: She is well-developed.   HENT:      Head: Normocephalic and atraumatic.   Eyes:      Conjunctiva/sclera: Conjunctivae normal.   Cardiovascular:      Rate and Rhythm: Normal rate and regular rhythm.      Heart sounds: No murmur heard.  Pulmonary:      Effort: Pulmonary effort is normal. No respiratory distress.      Breath sounds: Normal breath sounds.      Comments: Somewhat frequent cough  Abdominal:      Palpations: Abdomen is soft.      Tenderness: There is no abdominal tenderness.   Musculoskeletal:         General: No swelling.      Cervical back: Neck supple.   Skin:     General: Skin is warm and dry.      Capillary Refill: Capillary refill takes less than 2 seconds.   Neurological:       Mental Status: She is alert.   Psychiatric:         Mood and Affect: Mood normal.         Results Reviewed       None            No orders to display       POC Pelvic US    Date/Time: 4/1/2025 3:00 AM    Performed by: Gunnar Caballero PA-C  Authorized by: Gunnar Caballero PA-C    Patient location:  ED  Other Assisting Provider: No    Procedure details:     Exam Type:  Diagnostic    Indications comment:  Pregnant 30 weeks    Assessment for: evaluate fetal viability      Technique:  Transabdominal obstetric (HCG+) exam    Image quality: limited diagnostic      Image availability:  Still images obtained  Uterine findings:     Fetal heart rate (bpm):  146  Interpretation:     Ultrasound impressions: normal      Pregnancy findings: intrauterine pregnancy (IUP)        ED Medication and Procedure Management   Prior to Admission Medications   Prescriptions Last Dose Informant Patient Reported? Taking?   Prenatal MV-Min-Fe Fum-FA-DHA (PRENATAL 1 PO)   Yes No   Sig: Take 1 each by mouth in the morning One pill once daily ([employer] brand)   aluminum chloride (DRYSOL) 20 % external solution  Self No No   Sig: Use daily as needed.   aspirin (Aspirin 81) 81 mg EC tablet  Self No No   Sig: Take 2 tablets (162 mg total) by mouth daily   buPROPion (WELLBUTRIN SR) 150 mg 12 hr tablet   No No   Sig: Take 1 tablet (150 mg total) by mouth 2 (two) times a day   calcium carbonate (TUMS) 500 mg chewable tablet  Self Yes No   Sig: Chew 2 tablets if needed   labetalol (NORMODYNE) 100 mg tablet   No No   Sig: TAKE ONE TABLET BY MOUTH TWICE DAILY   metFORMIN (GLUCOPHAGE) 1000 MG tablet   No No   Sig: Take 1 tablet (1,000 mg total) by mouth 2 (two) times a day with meals      Facility-Administered Medications: None     Discharge Medication List as of 4/1/2025  3:49 AM        CONTINUE these medications which have NOT CHANGED    Details   aluminum chloride (DRYSOL) 20 % external solution Use daily as needed., Normal      aspirin (Aspirin  81) 81 mg EC tablet Take 2 tablets (162 mg total) by mouth daily, Starting Fri 1/24/2025, Normal      buPROPion (WELLBUTRIN SR) 150 mg 12 hr tablet Take 1 tablet (150 mg total) by mouth 2 (two) times a day, Starting Wed 3/26/2025, Normal      calcium carbonate (TUMS) 500 mg chewable tablet Chew 2 tablets if needed, Historical Med      labetalol (NORMODYNE) 100 mg tablet TAKE ONE TABLET BY MOUTH TWICE DAILY, Starting Mon 3/3/2025, Normal      metFORMIN (GLUCOPHAGE) 1000 MG tablet Take 1 tablet (1,000 mg total) by mouth 2 (two) times a day with meals, Starting Mon 2/3/2025, Normal      Prenatal MV-Min-Fe Fum-FA-DHA (PRENATAL 1 PO) Take 1 each by mouth in the morning One pill once daily ([employer] brand), Historical Med      famotidine (PEPCID) 40 MG tablet Take 1 tablet (40 mg total) by mouth in the morning, Starting Tue 1/21/2025, Normal           No discharge procedures on file.  ED SEPSIS DOCUMENTATION   Time reflects when diagnosis was documented in both MDM as applicable and the Disposition within this note       Time User Action Codes Description Comment    4/1/2025  3:48 AM Gunnar Caballero [J69.0] Pneumonitis due to food and vomit (HCC)     4/1/2025  3:48 AM Gunnar Caballero [K21.9] GERD (gastroesophageal reflux disease)     4/1/2025  3:49 AM Gunnar Caballero [Z34.90] Pregnant                  Gunnar Caballero PA-C  04/01/25 0616

## 2025-04-02 ENCOUNTER — ROUTINE PRENATAL (OUTPATIENT)
Dept: OBGYN CLINIC | Facility: CLINIC | Age: 35
End: 2025-04-02

## 2025-04-02 VITALS
OXYGEN SATURATION: 98 % | BODY MASS INDEX: 43.8 KG/M2 | HEART RATE: 82 BPM | HEIGHT: 68 IN | WEIGHT: 289 LBS | DIASTOLIC BLOOD PRESSURE: 80 MMHG | SYSTOLIC BLOOD PRESSURE: 124 MMHG

## 2025-04-02 DIAGNOSIS — Z3A.30 30 WEEKS GESTATION OF PREGNANCY: ICD-10-CM

## 2025-04-02 DIAGNOSIS — D25.9 UTERINE FIBROID IN PREGNANCY: ICD-10-CM

## 2025-04-02 DIAGNOSIS — O09.519 HIGH-RISK PREGNANCY, PRIMIGRAVIDA OF ADVANCED MATERNAL AGE: ICD-10-CM

## 2025-04-02 DIAGNOSIS — O10.919 CHRONIC HYPERTENSION AFFECTING PREGNANCY: ICD-10-CM

## 2025-04-02 DIAGNOSIS — O34.10 UTERINE FIBROID IN PREGNANCY: ICD-10-CM

## 2025-04-02 DIAGNOSIS — O99.213 OBESITY AFFECTING PREGNANCY IN THIRD TRIMESTER, UNSPECIFIED OBESITY TYPE: ICD-10-CM

## 2025-04-02 DIAGNOSIS — O09.93 ENCOUNTER FOR SUPERVISION OF HIGH RISK PREGNANCY IN THIRD TRIMESTER, ANTEPARTUM: ICD-10-CM

## 2025-04-02 DIAGNOSIS — Z23 ENCOUNTER FOR IMMUNIZATION: Primary | ICD-10-CM

## 2025-04-02 NOTE — PROGRESS NOTES
Assessment & Plan  34 y.o.  at 30w1d presenting for routine prenatal visit.     Problem List       Atypical squamous cell changes of undetermined significance (ASCUS) on cervical cytology with negative high risk human papilloma virus (HPV) test result    Overview   ASCUS, HPV neg 2024  NILM 2024         Acne vulgaris    Dyslipidemia, goal LDL below 160    Food insecurity    Overview   Per [facility] Protocol    Formatting of this note might be different from the original.  Per [facility] Protocol         Major depressive disorder, recurrent episode, moderate ([facility])    Class 3 severe obesity with body mass index (BMI) of 40.0 to 44.9 in adult ([facility])    Overview   ICD-10 update of inactive diagnosis    Formatting of this note might be different from the original.  ICD-10 update of inactive diagnosis         PCOS (polycystic ovarian syndrome)    Overview   Oligomenorrhea since menarche at age 13 (cycle length 6 weeks), testosterone level elevated at , pelvic u/s 2018 normal    Formatting of this note might be different from the original.  Oligomenorrhea since menarche at age 13 (cycle length 6 weeks), testosterone level elevated at , pelvic u/s 2018 normal         Post-traumatic osteoarthritis of right foot    Esophageal spasm    Anxiety disorder due to known physiological condition    Cyst of right ovary    History of PCOS    Anxiety and depression    Benign essential HTN    GERD (gastroesophageal reflux disease)    Prediabetes    Chronic hypertension affecting pregnancy    Overview   LDASA  Labetalol 100 mg BID  Baseline labs wnl         30 weeks gestation of pregnancy    Overview   LDASA  Delivery consent [ ]  Tdap [ ]  GBS [ ]  Contraception [ ]  Growth scan at 34w [ ]  Weekly AFPS starting at 32 weeks [ ]         High-risk pregnancy, primigravida of advanced maternal age    Obesity, Class III, BMI 40-49.9 (morbid obesity) ([facility])    Obesity affecting  "pregnancy in third trimester    Other specified diabetes mellitus without complications (HCC)    Elevated glucose tolerance test    Overview   Elevated 1 hour GTT, 3 hour ordered         Uterine fibroid in pregnancy     Other Visit Diagnoses         Encounter for supervision of high risk pregnancy in third trimester, antepartum    -  Primary          ____________________________________________________________  Subjective  She is without complaint.   She denies contractions, loss of fluid, or vaginal bleeding.   She feels regular fetal movements.     Objective  /80 (BP Location: Right arm, Patient Position: Sitting, Cuff Size: Standard)   Pulse 82   Ht 5' 8\" (1.727 m)   Wt 131 kg (289 lb)   LMP 08/20/2024 (Exact Date)   SpO2 98%   BMI 43.94 kg/m²   FHR: 145 bpm  Fundal height 30 cm    Physical Exam  Constitutional:       Appearance: She is well-developed.   Cardiovascular:      Rate and Rhythm: Normal rate.   Pulmonary:      Effort: Pulmonary effort is normal. No respiratory distress.   Abdominal:      Palpations: Abdomen is soft.      Tenderness: There is no abdominal tenderness.   Skin:     General: Skin is warm and dry.          Patient's Active Problem List  Patient Active Problem List   Diagnosis    Atypical squamous cell changes of undetermined significance (ASCUS) on cervical cytology with negative high risk human papilloma virus (HPV) test result    Acne vulgaris    Dyslipidemia, goal LDL below 160    Food insecurity    Major depressive disorder, recurrent episode, moderate ([facility])    Class 3 severe obesity with body mass index (BMI) of 40.0 to 44.9 in adult ([facility])    PCOS (polycystic ovarian syndrome)    Post-traumatic osteoarthritis of right foot    Esophageal spasm    Anxiety disorder due to known physiological condition    Cyst of right ovary    History of PCOS    Anxiety and depression    Benign essential HTN    GERD (gastroesophageal reflux disease)    Prediabetes    Chronic hypertension " affecting pregnancy    30 weeks gestation of pregnancy    High-risk pregnancy, primigravida of advanced maternal age    Obesity, Class III, BMI 40-49.9 (morbid obesity) (HCC)    Obesity affecting pregnancy in third trimester    Other specified diabetes mellitus without complications (HCC)    Elevated glucose tolerance test    Uterine fibroid in pregnancy           Katlyn Antony MD  4/2/2025  3:42 PM

## 2025-04-03 ENCOUNTER — TELEMEDICINE (OUTPATIENT)
Dept: PSYCHIATRY | Facility: CLINIC | Age: 35
End: 2025-04-03
Payer: COMMERCIAL

## 2025-04-03 DIAGNOSIS — F33.42 RECURRENT MAJOR DEPRESSIVE DISORDER, IN FULL REMISSION (HCC): Primary | ICD-10-CM

## 2025-04-03 PROCEDURE — 90792 PSYCH DIAG EVAL W/MED SRVCS: CPT | Performed by: PSYCHIATRY & NEUROLOGY

## 2025-04-03 NOTE — PSYCH
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 record, Reviewing/placing orders in the medical record (including tests, medications, and/or procedures), and Obtaining or reviewing history  , not including the time spent for establishing the audio/video connection.    Visit Time  Visit Start Time: 1030am  Visit Stop Time: 1130am  Total Visit Duration:  60 minutes    Laura More MD 04/03/25

## 2025-04-03 NOTE — BH TREATMENT PLAN
TREATMENT PLAN (Medication Management Only)        [facility]    Name and Date of Birth:  Yecenia Rodriguez 34 y.o. 1990  MRN: [id number]  Date of Treatment Plan: April 3, 2025  Diagnosis/Diagnoses:    1. Recurrent major depressive disorder, in full remission (HCC)      Strengths/Personal Resources for Self-Care: supportive family, supportive friends, ability to understand psychiatric illness, average or above intelligence, good understanding of illness, independence, stable employment, well educated, work skills.  Area/Areas of need (in own words): depressive symptoms  1. Long Term Goal:   maintain improvement in depression.  Target Date:6 months - 10/3/2025  Person/Persons responsible for completion of goal: Frankiss  2.  Short Term Objective (s) - How will we reach this goal?:   A.  Provider new recommended medication/dosage changes and/or continue medication(s): continue current medications as prescribed.  B.  Attend medication management appointments regularly..  C.  N/A.  Target Date:3 months - 7/3/2025  Person/Persons Responsible for Completion of Goal: Bliss  Progress Towards Goals: initiating treatment  Treatment Modality:  medication management as needed, referral for individual psychotherapy  Review due 180 days from date of this plan: 3 months - 7/3/2025  Expected length of service: maintenance unless revised  My Physician/PA/NP and I have developed this plan together and I agree to work on the goals and objectives. I understand the treatment goals that were developed for my treatment.   Electronic Signatures: on file (unless signed below)    Laura More MD 04/03/25

## 2025-04-05 ENCOUNTER — CLINICAL SUPPORT (OUTPATIENT)
Age: 35
End: 2025-04-05

## 2025-04-05 DIAGNOSIS — Z32.2 ENCOUNTER FOR CHILDBIRTH INSTRUCTION: Primary | ICD-10-CM

## 2025-04-07 DIAGNOSIS — O10.919 CHRONIC HYPERTENSION AFFECTING PREGNANCY: Primary | ICD-10-CM

## 2025-04-07 RX ORDER — NEBULIZER AND COMPRESSOR
EACH MISCELLANEOUS DAILY
Qty: 1 KIT | Refills: 0 | Status: SHIPPED | OUTPATIENT
Start: 2025-04-07

## 2025-04-08 ENCOUNTER — CLINICAL SUPPORT (OUTPATIENT)
Age: 35
End: 2025-04-08

## 2025-04-08 ENCOUNTER — TELEPHONE (OUTPATIENT)
Age: 35
End: 2025-04-08

## 2025-04-08 DIAGNOSIS — Z32.2 ENCOUNTER FOR CHILDBIRTH INSTRUCTION: Primary | ICD-10-CM

## 2025-04-08 NOTE — TELEPHONE ENCOUNTER
Called patient for third trimester assessment.  Left message on voicemail and sent follow up [employer] message.

## 2025-04-13 DIAGNOSIS — I10 BENIGN ESSENTIAL HTN: ICD-10-CM

## 2025-04-14 RX ORDER — LABETALOL 100 MG/1
100 TABLET, FILM COATED ORAL 2 TIMES DAILY
Qty: 100 TABLET | Refills: 1 | Status: SHIPPED | OUTPATIENT
Start: 2025-04-14

## 2025-04-16 ENCOUNTER — ROUTINE PRENATAL (OUTPATIENT)
Dept: OBGYN CLINIC | Facility: CLINIC | Age: 35
End: 2025-04-16

## 2025-04-16 ENCOUNTER — CLINICAL SUPPORT (OUTPATIENT)
Age: 35
End: 2025-04-16

## 2025-04-16 VITALS
HEIGHT: 68 IN | DIASTOLIC BLOOD PRESSURE: 80 MMHG | HEART RATE: 94 BPM | SYSTOLIC BLOOD PRESSURE: 140 MMHG | OXYGEN SATURATION: 98 % | BODY MASS INDEX: 44.25 KG/M2 | WEIGHT: 292 LBS

## 2025-04-16 DIAGNOSIS — O99.213 OBESITY AFFECTING PREGNANCY IN THIRD TRIMESTER, UNSPECIFIED OBESITY TYPE: ICD-10-CM

## 2025-04-16 DIAGNOSIS — R73.09 ELEVATED GLUCOSE TOLERANCE TEST: ICD-10-CM

## 2025-04-16 DIAGNOSIS — O09.519 HIGH-RISK PREGNANCY, PRIMIGRAVIDA OF ADVANCED MATERNAL AGE: ICD-10-CM

## 2025-04-16 DIAGNOSIS — O09.93 ENCOUNTER FOR SUPERVISION OF HIGH RISK PREGNANCY IN THIRD TRIMESTER, ANTEPARTUM: Primary | ICD-10-CM

## 2025-04-16 DIAGNOSIS — Z3A.32 32 WEEKS GESTATION OF PREGNANCY: ICD-10-CM

## 2025-04-16 DIAGNOSIS — O10.919 CHRONIC HYPERTENSION AFFECTING PREGNANCY: ICD-10-CM

## 2025-04-16 DIAGNOSIS — Z32.2 ENCOUNTER FOR CHILDBIRTH INSTRUCTION: Primary | ICD-10-CM

## 2025-04-16 PROCEDURE — PNV: Performed by: OBSTETRICS & GYNECOLOGY

## 2025-04-16 NOTE — PROGRESS NOTES
Assessment & Plan  34 y.o.  at 32w1d presenting for routine prenatal visit.     Problem List       Atypical squamous cell changes of undetermined significance (ASCUS) on cervical cytology with negative high risk human papilloma virus (HPV) test result    Overview   ASCUS, HPV neg 2024  NILM 2024         Acne vulgaris    Dyslipidemia, goal LDL below 160    Food insecurity    Overview   Per [facility] Protocol    Formatting of this note might be different from the original.  Per [facility] Protocol         Major depressive disorder, recurrent episode, moderate ([facility])    Class 3 severe obesity with body mass index (BMI) of 40.0 to 44.9 in adult ([facility])    Overview   ICD-10 update of inactive diagnosis    Formatting of this note might be different from the original.  ICD-10 update of inactive diagnosis         PCOS (polycystic ovarian syndrome)    Overview   Oligomenorrhea since menarche at age 13 (cycle length 6 weeks), testosterone level elevated at , pelvic u/s 2018 normal    Formatting of this note might be different from the original.  Oligomenorrhea since menarche at age 13 (cycle length 6 weeks), testosterone level elevated at , pelvic u/s 2018 normal         Post-traumatic osteoarthritis of right foot    Esophageal spasm    Anxiety disorder due to known physiological condition    Cyst of right ovary    History of PCOS    Anxiety and depression    Benign essential HTN    GERD (gastroesophageal reflux disease)    Prediabetes    Chronic hypertension affecting pregnancy    Overview   LDASA  Labetalol 100 mg BID  Baseline labs wnl         32 weeks gestation of pregnancy    Overview   LDASA  Delivery consent [x]  Tdap [x]  GBS [ ]  Contraception [ ]  Growth scan at 34w [ ]  Weekly AFPS starting at 32 weeks [ ]         High-risk pregnancy, primigravida of advanced maternal age    Obesity, Class III, BMI 40-49.9 (morbid obesity) (HCC)    Obesity affecting  "pregnancy in third trimester    Other specified diabetes mellitus without complications (HCC)    Elevated glucose tolerance test    Overview   Elevated 1 hour GTT, passed 3 hour          Uterine fibroid in pregnancy     Other Visit Diagnoses         Encounter for supervision of high risk pregnancy in third trimester, antepartum    -  Primary          ____________________________________________________________  Subjective  She is without complaint.   She denies contractions, loss of fluid, or vaginal bleeding.   She feels regular fetal movements.     Objective  /80 (BP Location: Right arm, Patient Position: Sitting, Cuff Size: Standard)   Pulse 94   Ht 5' 8\" (1.727 m)   Wt 132 kg (292 lb)   LMP 08/20/2024 (Exact Date)   SpO2 98%   BMI 44.40 kg/m²   FHR: 140 bpm  Fundal height 33 cm    Physical Exam  Constitutional:       Appearance: She is well-developed.   Cardiovascular:      Rate and Rhythm: Normal rate.   Pulmonary:      Effort: Pulmonary effort is normal. No respiratory distress.   Abdominal:      Palpations: Abdomen is soft.      Tenderness: There is no abdominal tenderness.   Skin:     General: Skin is warm and dry.          Patient's Active Problem List  Patient Active Problem List   Diagnosis    Atypical squamous cell changes of undetermined significance (ASCUS) on cervical cytology with negative high risk human papilloma virus (HPV) test result    Acne vulgaris    Dyslipidemia, goal LDL below 160    Food insecurity    Major depressive disorder, recurrent episode, moderate ([facility])    Class 3 severe obesity with body mass index (BMI) of 40.0 to 44.9 in adult ([facility])    PCOS (polycystic ovarian syndrome)    Post-traumatic osteoarthritis of right foot    Esophageal spasm    Anxiety disorder due to known physiological condition    Cyst of right ovary    History of PCOS    Anxiety and depression    Benign essential HTN    GERD (gastroesophageal reflux disease)    Prediabetes    Chronic hypertension " affecting pregnancy    32 weeks gestation of pregnancy    High-risk pregnancy, primigravida of advanced maternal age    Obesity, Class III, BMI 40-49.9 (morbid obesity) (HCC)    Obesity affecting pregnancy in third trimester    Other specified diabetes mellitus without complications (HCC)    Elevated glucose tolerance test    Uterine fibroid in pregnancy           Katlyn Antony MD  4/16/2025  4:39 PM

## 2025-04-17 DIAGNOSIS — O10.919 CHRONIC HYPERTENSION AFFECTING PREGNANCY: ICD-10-CM

## 2025-04-17 NOTE — TELEPHONE ENCOUNTER
Patient called to request a refill for their Asprin advised a refill was requested on 4/17 and is pending approval. Patient verbalized understanding and is in agreement.     Does the patient have enough for 3 days?   [] Yes   [x] No - Send as HP to POD

## 2025-04-18 RX ORDER — ASPIRIN 81 MG/1
162 TABLET, COATED ORAL DAILY
Qty: 60 TABLET | Refills: 0 | Status: SHIPPED | OUTPATIENT
Start: 2025-04-18

## 2025-04-21 ENCOUNTER — ULTRASOUND (OUTPATIENT)
Dept: OBGYN CLINIC | Facility: CLINIC | Age: 35
End: 2025-04-21

## 2025-04-21 VITALS
HEIGHT: 68 IN | BODY MASS INDEX: 44.19 KG/M2 | SYSTOLIC BLOOD PRESSURE: 140 MMHG | WEIGHT: 291.6 LBS | DIASTOLIC BLOOD PRESSURE: 84 MMHG

## 2025-04-21 DIAGNOSIS — O99.213 OBESITY AFFECTING PREGNANCY IN THIRD TRIMESTER, UNSPECIFIED OBESITY TYPE: ICD-10-CM

## 2025-04-21 DIAGNOSIS — O10.919 CHRONIC HYPERTENSION AFFECTING PREGNANCY: Primary | ICD-10-CM

## 2025-04-21 NOTE — PROGRESS NOTES
Procedures    Serial transabdominal images were obtained through the gravid maternal uterus. The patient's working ANDREA is 6/10/2025 with a gestational age of 32w6d. The indication for today's biophysical profile is chronic hypertension and obesity.    Exam start time:  1542hrs  Exam end time:   1553hrs    OH =    3.55 + 1.56 + 2.77 + 4.80 = 12.68cm  Fetal Postion=  vertex  FHR=   150bpm    Fetal Breathing Movements 2/2  Gross Body Movements 2/2  Fetal Tone   2/2  Qualitative OH  2/2    Impression:  Total BPP 8/8    Tahmina Callahan RDMS    [facility]  [address] [place] [address]  Phone  [phone]  Fax  [phone]

## 2025-04-28 ENCOUNTER — ULTRASOUND (OUTPATIENT)
Dept: OBGYN CLINIC | Facility: CLINIC | Age: 35
End: 2025-04-28

## 2025-04-28 ENCOUNTER — ROUTINE PRENATAL (OUTPATIENT)
Dept: OBGYN CLINIC | Facility: CLINIC | Age: 35
End: 2025-04-28

## 2025-04-28 VITALS
HEIGHT: 68 IN | OXYGEN SATURATION: 97 % | DIASTOLIC BLOOD PRESSURE: 84 MMHG | WEIGHT: 291 LBS | BODY MASS INDEX: 44.1 KG/M2 | HEART RATE: 107 BPM | SYSTOLIC BLOOD PRESSURE: 140 MMHG

## 2025-04-28 DIAGNOSIS — O10.919 CHRONIC HYPERTENSION AFFECTING PREGNANCY: Primary | ICD-10-CM

## 2025-04-28 DIAGNOSIS — O10.919 CHRONIC HYPERTENSION IN PREGNANCY: Primary | ICD-10-CM

## 2025-04-28 DIAGNOSIS — O99.213 OBESITY AFFECTING PREGNANCY IN THIRD TRIMESTER, UNSPECIFIED OBESITY TYPE: ICD-10-CM

## 2025-04-28 DIAGNOSIS — Z3A.33 33 WEEKS GESTATION OF PREGNANCY: ICD-10-CM

## 2025-04-28 DIAGNOSIS — O09.519 HIGH-RISK PREGNANCY, PRIMIGRAVIDA OF ADVANCED MATERNAL AGE: ICD-10-CM

## 2025-04-28 PROCEDURE — PNV: Performed by: OBSTETRICS & GYNECOLOGY

## 2025-04-28 NOTE — PROGRESS NOTES
Assessment & Plan  34 y.o.  at 33w6d presenting for routine prenatal visit.       Problem List       Atypical squamous cell changes of undetermined significance (ASCUS) on cervical cytology with negative high risk human papilloma virus (HPV) test result    Overview   ASCUS, HPV neg 2024  NILM 2024         Acne vulgaris    Dyslipidemia, goal LDL below 160    Food insecurity    Overview   Per [facility] Protocol    Formatting of this note might be different from the original.  Per [facility] Protocol         Major depressive disorder, recurrent episode, moderate (HCC)    Class 3 severe obesity with body mass index (BMI) of 40.0 to 44.9 in adult    Overview   ICD-10 update of inactive diagnosis    Formatting of this note might be different from the original.  ICD-10 update of inactive diagnosis         PCOS (polycystic ovarian syndrome)    Overview   Oligomenorrhea since menarche at age 13 (cycle length 6 weeks), testosterone level elevated at , pelvic u/s 2018 normal    Formatting of this note might be different from the original.  Oligomenorrhea since menarche at age 13 (cycle length 6 weeks), testosterone level elevated at , pelvic u/s 2018 normal         Post-traumatic osteoarthritis of right foot    Esophageal spasm    Anxiety disorder due to known physiological condition    Cyst of right ovary    History of PCOS    Anxiety and depression    Benign essential HTN    GERD (gastroesophageal reflux disease)    Prediabetes    Chronic hypertension affecting pregnancy    Overview   LDASA  Labetalol 100 mg BID  Baseline labs wnl         33 weeks gestation of pregnancy    Overview   LDASA  Delivery consent [x]  Tdap [x]  GBS [ ]  Contraception [ ]  Growth scan at 34w [ ]  Weekly AFPS starting at 32 weeks [ ]         High-risk pregnancy, primigravida of advanced maternal age    Obesity, Class III, BMI 40-49.9 (morbid obesity)    Obesity affecting pregnancy  "in third trimester    Other specified diabetes mellitus without complications (HCC)    Elevated glucose tolerance test    Overview   Elevated 1 hour GTT, passed 3 hour          Uterine fibroid in pregnancy     ____________________________________________________________  Subjective  She is without complaint.   She denies contractions, loss of fluid, or vaginal bleeding.   She feels regular fetal movements.       Objective  /84 (Patient Position: Sitting, Cuff Size: Large)   Ht 5' 8\" (1.727 m)   Wt 132 kg (291 lb)   LMP 08/20/2024 (Exact Date)   BMI 44.25 kg/m²   BPP: 8/8    Physical Exam  Constitutional:       Appearance: She is well-developed.   Cardiovascular:      Rate and Rhythm: Normal rate and regular rhythm.      Heart sounds: Normal heart sounds. No murmur heard.     No friction rub. No gallop.   Pulmonary:      Effort: Pulmonary effort is normal. No respiratory distress.      Breath sounds: No wheezing.   Abdominal:      Palpations: Abdomen is soft.      Tenderness: There is no abdominal tenderness.   Musculoskeletal:         General: No tenderness.   Neurological:      Mental Status: She is alert and oriented to person, place, and time.   Vitals reviewed.          Patient's Active Problem List  Patient Active Problem List   Diagnosis    Atypical squamous cell changes of undetermined significance (ASCUS) on cervical cytology with negative high risk human papilloma virus (HPV) test result    Acne vulgaris    Dyslipidemia, goal LDL below 160    Food insecurity    Major depressive disorder, recurrent episode, moderate (HCC)    Class 3 severe obesity with body mass index (BMI) of 40.0 to 44.9 in adult    PCOS (polycystic ovarian syndrome)    Post-traumatic osteoarthritis of right foot    Esophageal spasm    Anxiety disorder due to known physiological condition    Cyst of right ovary    History of PCOS    Anxiety and depression    Benign essential HTN    GERD (gastroesophageal reflux disease)    " Prediabetes    Chronic hypertension affecting pregnancy    33 weeks gestation of pregnancy    High-risk pregnancy, primigravida of advanced maternal age    Obesity, Class III, BMI 40-49.9 (morbid obesity)    Obesity affecting pregnancy in third trimester    Other specified diabetes mellitus without complications (HCC)    Elevated glucose tolerance test    Uterine fibroid in pregnancy           Khris Chakraborty MD  4/28/2025  2:20 PM

## 2025-04-28 NOTE — PROGRESS NOTES
Procedures    Serial transabdominal images were obtained through the gravid maternal uterus. The patient's working ANDREA is 6/10/2025 with a gestational age of 33w6d. The indication for today's biophysical profile is chronic hypertension and obesity.    Exam start time:  1352hrs  Exam end time:   1359hrs    OH =    3.61 + 6.46 + 3.32 + 2.59 = 15.98  Fetal Postion=  Vertex  FHR=   142bpm    Fetal Breathing Movements 2/2  Gross Body Movements 2/2  Fetal Tone   2/2  Qualitative OH  2/2    Impression:  Total BPP 8/8    Tahmina Callahan RDMS    [facility]  [address] [place] [address]  Phone  [phone]  Fax  [phone]

## 2025-04-30 ENCOUNTER — CLINICAL SUPPORT (OUTPATIENT)
Age: 35
End: 2025-04-30

## 2025-04-30 DIAGNOSIS — Z32.2 ENCOUNTER FOR CHILDBIRTH INSTRUCTION: Primary | ICD-10-CM

## 2025-05-06 ENCOUNTER — ULTRASOUND (OUTPATIENT)
Age: 35
End: 2025-05-06
Payer: COMMERCIAL

## 2025-05-06 VITALS
BODY MASS INDEX: 44.41 KG/M2 | SYSTOLIC BLOOD PRESSURE: 126 MMHG | HEART RATE: 90 BPM | DIASTOLIC BLOOD PRESSURE: 82 MMHG | WEIGHT: 293 LBS | HEIGHT: 68 IN

## 2025-05-06 DIAGNOSIS — O99.213 SEVERE OBESITY DUE TO EXCESS CALORIES AFFECTING PREGNANCY IN THIRD TRIMESTER (HCC): ICD-10-CM

## 2025-05-06 DIAGNOSIS — E66.01 SEVERE OBESITY DUE TO EXCESS CALORIES AFFECTING PREGNANCY IN THIRD TRIMESTER (HCC): ICD-10-CM

## 2025-05-06 DIAGNOSIS — O36.63X0 MACROSOMIA OF FETUS AFFECTING MANAGEMENT OF MOTHER IN THIRD TRIMESTER, SINGLE OR UNSPECIFIED FETUS: ICD-10-CM

## 2025-05-06 DIAGNOSIS — O10.919 CHRONIC HYPERTENSION AFFECTING PREGNANCY: Primary | ICD-10-CM

## 2025-05-06 DIAGNOSIS — Z3A.34 34 WEEKS GESTATION OF PREGNANCY: ICD-10-CM

## 2025-05-06 PROCEDURE — 76816 OB US FOLLOW-UP PER FETUS: CPT | Performed by: OBSTETRICS & GYNECOLOGY

## 2025-05-06 PROCEDURE — 99214 OFFICE O/P EST MOD 30 MIN: CPT | Performed by: NURSE PRACTITIONER

## 2025-05-07 ENCOUNTER — ULTRASOUND (OUTPATIENT)
Age: 35
End: 2025-05-07

## 2025-05-07 VITALS
WEIGHT: 293 LBS | BODY MASS INDEX: 44.41 KG/M2 | HEIGHT: 68 IN | SYSTOLIC BLOOD PRESSURE: 128 MMHG | DIASTOLIC BLOOD PRESSURE: 84 MMHG

## 2025-05-07 DIAGNOSIS — O99.213 OBESITY AFFECTING PREGNANCY IN THIRD TRIMESTER, UNSPECIFIED OBESITY TYPE: ICD-10-CM

## 2025-05-07 DIAGNOSIS — O10.919 CHRONIC HYPERTENSION AFFECTING PREGNANCY: Primary | ICD-10-CM

## 2025-05-07 NOTE — PROGRESS NOTES
Procedures    Serial transabdominal images were obtained through the gravid maternal uterus. The patient's working ANDREA is 6/10/2025 with a gestational age of 35w1d. The indication for today's biophysical profile is chronic hypertension and obesity.    Exam start time:  0926am  Exam end time:   0933am    OH =    1.23 + 1.30 + 4.11 + 3.23 = 9.88cm  Fetal Postion=  vertex  FHR=   139bpm    Fetal Breathing Movements 2/2  Gross Body Movements 2/2  Fetal Tone   2/2  Qualitative OH  2/2    Impression:  Total BPP 8/8    Tahmina Callahan RDMS    Ultrasound performed at:     [facility]  [facility] [address] [place] [address]  Phone:  [phone]  Fax:  [phone]

## 2025-05-10 ENCOUNTER — APPOINTMENT (OUTPATIENT)
Dept: LAB | Facility: HOSPITAL | Age: 35
End: 2025-05-10
Payer: COMMERCIAL

## 2025-05-10 DIAGNOSIS — O36.63X0 MACROSOMIA OF FETUS AFFECTING MANAGEMENT OF MOTHER IN THIRD TRIMESTER, SINGLE OR UNSPECIFIED FETUS: ICD-10-CM

## 2025-05-10 LAB
GLUCOSE 1H P 100 G GLC PO SERPL-MCNC: 187 MG/DL (ref 65–179)
GLUCOSE 2H P 100 G GLC PO SERPL-MCNC: 147 MG/DL (ref 65–154)
GLUCOSE 3H P 100 G GLC PO SERPL-MCNC: 129 MG/DL (ref 65–139)
GLUCOSE P FAST SERPL-MCNC: 93 MG/DL (ref 65–94)

## 2025-05-10 PROCEDURE — 82952 GTT-ADDED SAMPLES: CPT

## 2025-05-10 PROCEDURE — 82951 GLUCOSE TOLERANCE TEST (GTT): CPT

## 2025-05-10 PROCEDURE — 36415 COLL VENOUS BLD VENIPUNCTURE: CPT

## 2025-05-12 ENCOUNTER — ROUTINE PRENATAL (OUTPATIENT)
Dept: OBGYN CLINIC | Facility: CLINIC | Age: 35
End: 2025-05-12

## 2025-05-12 ENCOUNTER — ULTRASOUND (OUTPATIENT)
Dept: OBGYN CLINIC | Facility: CLINIC | Age: 35
End: 2025-05-12

## 2025-05-12 VITALS
DIASTOLIC BLOOD PRESSURE: 86 MMHG | OXYGEN SATURATION: 99 % | HEART RATE: 86 BPM | WEIGHT: 293 LBS | HEIGHT: 68 IN | SYSTOLIC BLOOD PRESSURE: 124 MMHG | BODY MASS INDEX: 44.41 KG/M2

## 2025-05-12 DIAGNOSIS — Z36.85 ENCOUNTER FOR ANTENATAL SCREENING FOR STREPTOCOCCUS B: ICD-10-CM

## 2025-05-12 DIAGNOSIS — O10.919 CHRONIC HYPERTENSION AFFECTING PREGNANCY: Primary | ICD-10-CM

## 2025-05-12 DIAGNOSIS — O36.63X0 MACROSOMIA OF FETUS AFFECTING MANAGEMENT OF MOTHER IN THIRD TRIMESTER, SINGLE OR UNSPECIFIED FETUS: ICD-10-CM

## 2025-05-12 DIAGNOSIS — Z3A.35 35 WEEKS GESTATION OF PREGNANCY: ICD-10-CM

## 2025-05-12 LAB
DME PARACHUTE DELIVERY DATE REQUESTED: NORMAL
DME PARACHUTE ITEM DESCRIPTION: NORMAL
DME PARACHUTE ORDER STATUS: NORMAL
DME PARACHUTE SUPPLIER NAME: NORMAL
DME PARACHUTE SUPPLIER PHONE: NORMAL

## 2025-05-12 PROCEDURE — PNV: Performed by: OBSTETRICS & GYNECOLOGY

## 2025-05-12 PROCEDURE — 87150 DNA/RNA AMPLIFIED PROBE: CPT | Performed by: OBSTETRICS & GYNECOLOGY

## 2025-05-12 NOTE — PROGRESS NOTES
Assessment & Plan  35 y.o.  at 35w6d presenting for routine prenatal visit.       Problem List       Atypical squamous cell changes of undetermined significance (ASCUS) on cervical cytology with negative high risk human papilloma virus (HPV) test result    Overview   ASCUS, HPV neg 2024  NILM 2024         Acne vulgaris    Dyslipidemia, goal LDL below 160    Food insecurity    Overview   Per [facility] Protocol    Formatting of this note might be different from the original.  Per [facility] Protocol         Major depressive disorder, recurrent episode, moderate (HCC)    Class 3 severe obesity with body mass index (BMI) of 40.0 to 44.9 in adult    Overview   ICD-10 update of inactive diagnosis    Formatting of this note might be different from the original.  ICD-10 update of inactive diagnosis         PCOS (polycystic ovarian syndrome)    Overview   Oligomenorrhea since menarche at age 13 (cycle length 6 weeks), testosterone level elevated at , pelvic u/s 2018 normal    Formatting of this note might be different from the original.  Oligomenorrhea since menarche at age 13 (cycle length 6 weeks), testosterone level elevated at , pelvic u/s 2018 normal         Post-traumatic osteoarthritis of right foot    Esophageal spasm    Anxiety disorder due to known physiological condition    Cyst of right ovary    History of PCOS    Anxiety and depression    Benign essential HTN    GERD (gastroesophageal reflux disease)    Prediabetes    Chronic hypertension affecting pregnancy    Overview   LDASA  Labetalol 100 mg BID  Baseline labs wnl  Wkly testing at Ob office  Delivery between [id number]         35 weeks gestation of pregnancy    Overview   LDASA  Delivery consent [x]  Tdap [x]  GBS [ ]  Contraception [ ]  Growth scan at 34w [ ]  Weekly AFPS starting at 32 weeks [ ]         High-risk pregnancy, primigravida of advanced maternal age    Obesity, Class III,  "BMI 40-49.9 (morbid obesity)    Obesity affecting pregnancy in third trimester    Other specified diabetes mellitus without complications (HCC)    Elevated glucose tolerance test    Overview   Elevated 1 hour GTT, passed 3 hour          Uterine fibroid in pregnancy    Macrosomia affecting management of mother in third trimester     ____________________________________________________________  Subjective  She is without complaint.   She denies contractions, loss of fluid, or vaginal bleeding.   She feels regular fetal movements.       Objective  /86 (BP Location: Left arm, Patient Position: Sitting, Cuff Size: Large)   Pulse 86   Ht 5' 8\" (1.727 m)   Wt 133 kg (294 lb)   LMP 08/20/2024 (Exact Date)   SpO2 99%   BMI 44.70 kg/m²   FHR: 139 bpm via U/S    Physical Exam  Constitutional:       Appearance: She is well-developed.   Cardiovascular:      Rate and Rhythm: Normal rate and regular rhythm.      Heart sounds: Normal heart sounds. No murmur heard.     No friction rub. No gallop.   Pulmonary:      Effort: Pulmonary effort is normal. No respiratory distress.      Breath sounds: No wheezing.   Abdominal:      Palpations: Abdomen is soft.      Tenderness: There is no abdominal tenderness.   Musculoskeletal:         General: No tenderness.   Neurological:      Mental Status: She is alert and oriented to person, place, and time.   Vitals reviewed.          Patient's Active Problem List  Patient Active Problem List   Diagnosis    Atypical squamous cell changes of undetermined significance (ASCUS) on cervical cytology with negative high risk human papilloma virus (HPV) test result    Acne vulgaris    Dyslipidemia, goal LDL below 160    Food insecurity    Major depressive disorder, recurrent episode, moderate (HCC)    Class 3 severe obesity with body mass index (BMI) of 40.0 to 44.9 in adult    PCOS (polycystic ovarian syndrome)    Post-traumatic osteoarthritis of right foot    Esophageal spasm    Anxiety " disorder due to known physiological condition    Cyst of right ovary    History of PCOS    Anxiety and depression    Benign essential HTN    GERD (gastroesophageal reflux disease)    Prediabetes    Chronic hypertension affecting pregnancy    35 weeks gestation of pregnancy    High-risk pregnancy, primigravida of advanced maternal age    Obesity, Class III, BMI 40-49.9 (morbid obesity)    Obesity affecting pregnancy in third trimester    Other specified diabetes mellitus without complications (HCC)    Elevated glucose tolerance test    Uterine fibroid in pregnancy    Macrosomia affecting management of mother in third trimester           Khris Chakraborty MD  5/12/2025  4:08 PM

## 2025-05-12 NOTE — PROGRESS NOTES
Procedures    Serial transabdominal images were obtained through the gravid maternal uterus. The patient's working ANDREA is 6/10/2025 with a gestational age of 35w6d. The indication for today's biophysical profile is chronic pre-existing hypertension.    Exam start time:  1520pm  Exam end time:   1527pm    OH =    2.99 +4.54 + 4.12 + 5.18 = 16.83cm  Fetal Postion=  vertex  FHR=   139bpm    Fetal Breathing Movements 2/2  Gross Body Movements 2/2  Fetal Tone   2/2  Qualitative OH  2/2    Impression:  Total BPP 8/8    Tahmina Callahan RDMS    [facility]  [address] [place] [address]  Phone  [phone]  Fax  [phone]

## 2025-05-12 NOTE — PROGRESS NOTES
The patient was seen today for an ultrasound.  Please see ultrasound report (located under Ob Procedures) for additional details.   Thank you very much for allowing us to participate in the care of this very nice patient.  Should you have any questions, please do not hesitate to contact me.     Donn Kemp MD FACOG  Attending Physician, Maternal-Fetal Medicine  [facility]

## 2025-05-13 ENCOUNTER — RESULTS FOLLOW-UP (OUTPATIENT)
Dept: PERINATAL CARE | Facility: CLINIC | Age: 35
End: 2025-05-13

## 2025-05-15 LAB — GP B STREP DNA SPEC QL NAA+PROBE: NEGATIVE

## 2025-05-19 ENCOUNTER — ULTRASOUND (OUTPATIENT)
Dept: OBGYN CLINIC | Facility: CLINIC | Age: 35
End: 2025-05-19

## 2025-05-19 ENCOUNTER — TELEPHONE (OUTPATIENT)
Dept: OBGYN CLINIC | Facility: CLINIC | Age: 35
End: 2025-05-19

## 2025-05-19 ENCOUNTER — NURSE TRIAGE (OUTPATIENT)
Age: 35
End: 2025-05-19

## 2025-05-19 ENCOUNTER — ROUTINE PRENATAL (OUTPATIENT)
Dept: OBGYN CLINIC | Facility: CLINIC | Age: 35
End: 2025-05-19

## 2025-05-19 ENCOUNTER — HOSPITAL ENCOUNTER (OUTPATIENT)
Facility: HOSPITAL | Age: 35
Discharge: HOME | End: 2025-05-19
Attending: STUDENT IN AN ORGANIZED HEALTH CARE EDUCATION/TRAINING PROGRAM | Admitting: STUDENT IN AN ORGANIZED HEALTH CARE EDUCATION/TRAINING PROGRAM
Payer: COMMERCIAL

## 2025-05-19 VITALS
WEIGHT: 293 LBS | BODY MASS INDEX: 44.41 KG/M2 | HEIGHT: 68 IN | SYSTOLIC BLOOD PRESSURE: 128 MMHG | DIASTOLIC BLOOD PRESSURE: 80 MMHG | HEART RATE: 101 BPM | OXYGEN SATURATION: 98 %

## 2025-05-19 VITALS
SYSTOLIC BLOOD PRESSURE: 120 MMHG | DIASTOLIC BLOOD PRESSURE: 88 MMHG | TEMPERATURE: 98.2 F | RESPIRATION RATE: 18 BRPM | HEART RATE: 82 BPM

## 2025-05-19 DIAGNOSIS — O10.919 CHRONIC HYPERTENSION AFFECTING PREGNANCY: ICD-10-CM

## 2025-05-19 DIAGNOSIS — O09.519 HIGH-RISK PREGNANCY, PRIMIGRAVIDA OF ADVANCED MATERNAL AGE: ICD-10-CM

## 2025-05-19 DIAGNOSIS — O99.213 SEVERE OBESITY DUE TO EXCESS CALORIES AFFECTING PREGNANCY IN THIRD TRIMESTER (HCC): ICD-10-CM

## 2025-05-19 DIAGNOSIS — O09.93 ENCOUNTER FOR SUPERVISION OF HIGH RISK PREGNANCY IN THIRD TRIMESTER, ANTEPARTUM: Primary | ICD-10-CM

## 2025-05-19 DIAGNOSIS — R73.09 ELEVATED GLUCOSE TOLERANCE TEST: ICD-10-CM

## 2025-05-19 DIAGNOSIS — E66.01 SEVERE OBESITY DUE TO EXCESS CALORIES AFFECTING PREGNANCY IN THIRD TRIMESTER (HCC): ICD-10-CM

## 2025-05-19 DIAGNOSIS — Z3A.36 36 WEEKS GESTATION OF PREGNANCY: ICD-10-CM

## 2025-05-19 DIAGNOSIS — O10.919 CHRONIC PRE-EXISTING HYPERTENSION DURING PREGNANCY: Primary | ICD-10-CM

## 2025-05-19 DIAGNOSIS — O36.63X0 MACROSOMIA OF FETUS AFFECTING MANAGEMENT OF MOTHER IN THIRD TRIMESTER, SINGLE OR UNSPECIFIED FETUS: ICD-10-CM

## 2025-05-19 LAB
ALBUMIN SERPL BCG-MCNC: 3.5 G/DL (ref 3.5–5)
ALP SERPL-CCNC: 79 U/L (ref 34–104)
ALT SERPL W P-5'-P-CCNC: 12 U/L (ref 7–52)
ANION GAP SERPL CALCULATED.3IONS-SCNC: 8 MMOL/L (ref 4–13)
AST SERPL W P-5'-P-CCNC: 12 U/L (ref 13–39)
BILIRUB SERPL-MCNC: 0.37 MG/DL (ref 0.2–1)
BUN SERPL-MCNC: 7 MG/DL (ref 5–25)
CALCIUM SERPL-MCNC: 8.5 MG/DL (ref 8.4–10.2)
CHLORIDE SERPL-SCNC: 106 MMOL/L (ref 96–108)
CO2 SERPL-SCNC: 20 MMOL/L (ref 21–32)
CREAT SERPL-MCNC: 0.43 MG/DL (ref 0.6–1.3)
CREAT UR-MCNC: 182.8 MG/DL
ERYTHROCYTE [DISTWIDTH] IN BLOOD BY AUTOMATED COUNT: 12.5 % (ref 11.6–15.1)
GFR SERPL CREATININE-BSD FRML MDRD: 131 ML/MIN/1.73SQ M
GLUCOSE P FAST SERPL-MCNC: 97 MG/DL (ref 65–99)
GLUCOSE SERPL-MCNC: 97 MG/DL (ref 65–140)
HCT VFR BLD AUTO: 31.8 % (ref 34.8–46.1)
HGB BLD-MCNC: 10.7 G/DL (ref 11.5–15.4)
MCH RBC QN AUTO: 29.2 PG (ref 26.8–34.3)
MCHC RBC AUTO-ENTMCNC: 33.6 G/DL (ref 31.4–37.4)
MCV RBC AUTO: 87 FL (ref 82–98)
PLATELET # BLD AUTO: 209 THOUSANDS/UL (ref 149–390)
PMV BLD AUTO: 10.9 FL (ref 8.9–12.7)
POTASSIUM SERPL-SCNC: 3.8 MMOL/L (ref 3.5–5.3)
PROT SERPL-MCNC: 6.2 G/DL (ref 6.4–8.4)
PROT UR-MCNC: 26.1 MG/DL
PROT/CREAT UR: 0.1 MG/G{CREAT}
RBC # BLD AUTO: 3.67 MILLION/UL (ref 3.81–5.12)
SODIUM SERPL-SCNC: 134 MMOL/L (ref 135–147)
WBC # BLD AUTO: 7.56 THOUSAND/UL (ref 4.31–10.16)

## 2025-05-19 PROCEDURE — 99214 OFFICE O/P EST MOD 30 MIN: CPT | Performed by: STUDENT IN AN ORGANIZED HEALTH CARE EDUCATION/TRAINING PROGRAM

## 2025-05-19 PROCEDURE — G0463 HOSPITAL OUTPT CLINIC VISIT: HCPCS

## 2025-05-19 PROCEDURE — PNV: Performed by: OBSTETRICS & GYNECOLOGY

## 2025-05-19 PROCEDURE — 99213 OFFICE O/P EST LOW 20 MIN: CPT

## 2025-05-19 PROCEDURE — 82570 ASSAY OF URINE CREATININE: CPT

## 2025-05-19 PROCEDURE — 80053 COMPREHEN METABOLIC PANEL: CPT

## 2025-05-19 PROCEDURE — 84156 ASSAY OF PROTEIN URINE: CPT

## 2025-05-19 PROCEDURE — 85027 COMPLETE CBC AUTOMATED: CPT

## 2025-05-19 NOTE — DISCHARGE INSTR - AVS FIRST PAGE
High blood pressure and pregnancy    The Basics  Written by the doctors and editors at [facility]  Can people with high blood pressure have a normal pregnancy?  Yes. Most people with high blood pressure before pregnancy will have a normal pregnancy. But people with high blood pressure before pregnancy have a higher chance of having certain problems when they are pregnant. These might include:  ? Preeclampsia - People with preeclampsia have high blood pressure and too much protein in their urine or problems with certain organs. Preeclampsia usually happens during the second half of pregnancy, and can be a dangerous condition. It can cause problems with a baby's growth in the mother's uterus. It can also affect the mother's liver, kidneys, blood, heart, eyes, and nervous system.  ? Placental abruption - During pregnancy, the placenta is the organ that brings the baby nutrients and oxygen, and carries away waste. It is attached to the inside wall of the uterus. A placental abruption is when part or all of the placenta separates from the uterus before the baby is born. If this happens, the baby might not get enough nutrients and oxygen.  ? Slowed growth of the baby - The baby can be small and not grow normally.  What should I do before trying to get pregnant?  Talk with your doctor. If you are taking blood pressure medicine and your blood pressure is well managed, your doctor might want to switch you to a different medicine that is safer during pregnancy.  If your blood pressure is not well managed, your doctor will work with you to treat it.  Will I have tests during pregnancy?  Yes. At each visit, the doctor or nurse will check your blood pressure and your baby's growth. You will also have tests to check your baby's health at different times during pregnancy.  Will I need to take medicine during pregnancy?  It depends on how high your blood pressure is.  When your doctor or nurse tells you your blood pressure, they will  "say 2 numbers. For instance, they might say that your blood pressure is \"130 over 80.\" Your doctor will recommend medicine if:  ? Your \"systolic\" blood pressure (the top number) is 140 or higher, or  ? Your \"diastolic\" blood pressure (the bottom number) is 90 or higher.  If you do start blood pressure medicine or switch to a new medicine, your doctor will make sure that it is safe to take during pregnancy.  Your doctor might also tell you to take low-dose aspirin during your second and third trimesters of pregnancy (after 12 weeks). This can lower your risk of preeclampsia. Do not take aspirin, or any other medicines, unless your doctor or nurse tells you that it's safe.  What symptoms should I watch for during pregnancy?  Your doctor or nurse will talk to you about how to recognize signs of preeclampsia or placental abruption, or other problems, during pregnancy.  Call your doctor or nurse right away if:  ? You don't feel your baby move as much as usual.  ? You start having contractions. A contraction is when the uterus muscle squeezes. This can cause pain and make your belly hard.  ? You have belly pain.  ? You have bleeding from your vagina.  ? You have any symptoms of preeclampsia. These can include:  ? Bad headache  ? Changes in vision, such as blurry vision or flashes of lights  ? Pain in the upper belly  Can I have a normal vaginal delivery?  Yes. Most people can have a normal vaginal delivery.  Will my baby be healthy?  Probably. If you have high blood pressure during pregnancy, you are more likely to give birth earlier than normal. That's because if you have preeclampsia, a placental abruption, or a baby that is small for their age, the doctor might need to deliver your baby early. But if your blood pressure is managed during pregnancy, chances are good that your baby will be healthy.  What will happen after I give birth?  Your doctor or nurse will continue to check your blood pressure. They will also have " you come in for a blood pressure check, or check your blood pressure yourself at home, several days later. This is because blood pressure sometimes goes down right after giving birth but then rises again. If this happens, your doctor might recommend starting blood pressure medicine again (if you stopped during pregnancy) or changing your medicine.

## 2025-05-19 NOTE — PROGRESS NOTES
Assessment & Plan  35 y.o.  at 36w6d presenting for routine prenatal visit.     Problem List       Atypical squamous cell changes of undetermined significance (ASCUS) on cervical cytology with negative high risk human papilloma virus (HPV) test result    Overview   ASCUS, HPV neg 2024  NILM 2024         Dyslipidemia, goal LDL below 160    Food insecurity    Overview   Per [facility] Protocol    Formatting of this note might be different from the original.  Per [facility] Protocol         Major depressive disorder, recurrent episode, moderate (HCC)    Class 3 severe obesity with body mass index (BMI) of 40.0 to 44.9 in adult    Overview   ICD-10 update of inactive diagnosis    Formatting of this note might be different from the original.  ICD-10 update of inactive diagnosis         PCOS (polycystic ovarian syndrome)    Overview   Oligomenorrhea since menarche at age 13 (cycle length 6 weeks), testosterone level elevated at , pelvic u/s 2018 normal    Formatting of this note might be different from the original.  Oligomenorrhea since menarche at age 13 (cycle length 6 weeks), testosterone level elevated at , pelvic u/s 2018 normal         Post-traumatic osteoarthritis of right foot    Esophageal spasm    Anxiety disorder due to known physiological condition    Cyst of right ovary    History of PCOS    Anxiety and depression    Benign essential HTN    GERD (gastroesophageal reflux disease)    Prediabetes    Chronic hypertension affecting pregnancy    Overview   LDASA  Labetalol 100 mg BID  Baseline labs wnl  Wkly testing at Ob office  Delivery between [id number]         36 weeks gestation of pregnancy    Overview   LDASA  Delivery consent [x]  Tdap [x]  GBS [ ]  Contraception [ ]  Growth scan at 34w [ ]  Weekly AFPS starting at 32 weeks [ ]         High-risk pregnancy, primigravida of advanced maternal age    Obesity, Class III, BMI 40-49.9 (morbid  "obesity)    Obesity affecting pregnancy in third trimester    Other specified diabetes mellitus without complications (HCC)    Elevated glucose tolerance test    Overview   Elevated 1 hour GTT, passed 3 hour          Uterine fibroid in pregnancy    Macrosomia affecting management of mother in third trimester     Other Visit Diagnoses         Encounter for supervision of high risk pregnancy in third trimester, antepartum    -  Primary          ____________________________________________________________  Subjective  She is without complaint. Reviewed induction options.  She denies contractions, loss of fluid, or vaginal bleeding.   She feels regular fetal movements.     Objective  /80 (BP Location: Left arm, Patient Position: Sitting, Cuff Size: Large)   Pulse 101   Ht 5' 8\" (1.727 m)   Wt 133 kg (294 lb)   LMP 08/20/2024 (Exact Date)   SpO2 98%   BMI 44.70 kg/m²   FHR: 150 bpm  Fundal height 37 cm  SVE closed/thick/high    Physical Exam  Constitutional:       Appearance: She is well-developed.     Cardiovascular:      Rate and Rhythm: Normal rate.   Pulmonary:      Effort: Pulmonary effort is normal. No respiratory distress.   Abdominal:      Palpations: Abdomen is soft.      Tenderness: There is no abdominal tenderness.     Skin:     General: Skin is warm and dry.          Patient's Active Problem List  Problem List[1]        Katlyn Antony MD  5/19/2025  3:47 PM               [1]   Patient Active Problem List  Diagnosis    Atypical squamous cell changes of undetermined significance (ASCUS) on cervical cytology with negative high risk human papilloma virus (HPV) test result    Dyslipidemia, goal LDL below 160    Food insecurity    Major depressive disorder, recurrent episode, moderate (HCC)    Class 3 severe obesity with body mass index (BMI) of 40.0 to 44.9 in adult    PCOS (polycystic ovarian syndrome)    Post-traumatic osteoarthritis of right foot    Esophageal spasm    Anxiety disorder due to " known physiological condition    Cyst of right ovary    History of PCOS    Anxiety and depression    Benign essential HTN    GERD (gastroesophageal reflux disease)    Prediabetes    Chronic hypertension affecting pregnancy    36 weeks gestation of pregnancy    High-risk pregnancy, primigravida of advanced maternal age    Obesity, Class III, BMI 40-49.9 (morbid obesity)    Obesity affecting pregnancy in third trimester    Other specified diabetes mellitus without complications (HCC)    Elevated glucose tolerance test    Uterine fibroid in pregnancy    Macrosomia affecting management of mother in third trimester

## 2025-05-19 NOTE — PROGRESS NOTES
Triage Note - OB/GYN   Name: Yecenia Rodriguez 35 y.o. female I MRN: [id number]  Unit/Bed#: -01 I Date of Admission: 2025   Date of Service: 2025 I Hospital Day: 0      Yecenia Rodriguez is a 35 y.o.  at 36w6d who presents today due to concern for preeclampsia. CBC/CMP without acute finding and p:c ratio: 0.1. Blood pressures wnl in triage. Safe for discharge home with return precautions given.     PLAN:    1) Chronic hypertension affecting pregnancy  - Blood pressures in triage: wnl - 120/88  - CBC/CMP without acute finding; p:c ratio: 0.1  - c/w labetalol 100mg bid    2) Anemia during pregnancy  -noted Hb 10.7 on   -presumed iron deficiency - c/w iron supplementation    3) 36 weeks gestation of pregnancy  - Continue routine prenatal care  - Discharge from OB triage with  labor precautions   - Reviewed rupture of membranes, false vs true labor, decreased fetal movement, and vaginal bleeding  - Pt to call provider with any concerns and follow up at her next scheduled prenatal appointment   - Case discussed with Dr. Vasquez    Subjective     Yecenia Rodriguez 35 y.o.  at 36w6d with an Estimated Date of Delivery: 6/10/25 presenting for elevated blood pressure at work. She has personal history of chronic hypertension, compliant on labetalol 100mg bid. Reports normal blood pressure during current pregnancy. She works as a teacher. Earlier this morning, construction workers were painting the roof and patient became nauseous by the smell. She vomited a little, then 15 min later, she asked school nurse to check her blood pressure. This was checked by auscultation. She and the nurse thought blood cuff size may be small. The school nurse told her that the systolic BP was below 140, diastolic was 100 - 10 min later repeat systolic BP was below 140, diastolic was 105. She called OB office and was told to come to L/D triage.     She denies headache, vision changes, SOB,  RUQ pain, and increased swelling.    Her current pregnancy has been notable for macrosomia, chronic hypertension, GERD, elevated glucose tolerance test (passed 3 hour GTT).  Denies history of pre-eclampsia    Contractions: denies  Leakage of fluid: denies  Vaginal Bleeding: denies  Fetal movement: present    Constitutional: Negative  Respiratory: Negative  Cardiovascular: Negative    Gastrointestinal: Negative    Objective :  Temp:  [98.2 °F (36.8 °C)] 98.2 °F (36.8 °C)  HR:  [86] 86  BP: (121)/(80) 121/80  Resp:  [18] 18    Physical Exam  Constitutional:       General: She is not in acute distress.  HENT:      Head: Normocephalic and atraumatic.     Cardiovascular:      Rate and Rhythm: Normal rate.   Pulmonary:      Effort: Pulmonary effort is normal. No respiratory distress.   Abdominal:      General: There is no distension.     Skin:     General: Skin is warm and dry.     Neurological:      General: No focal deficit present.     Psychiatric:         Mood and Affect: Mood normal.         Lab Results: I have reviewed the following results:    Cervical Exam  SVE: declined    Fetal monitoring:  Fetal heart rate:  , moderate variability, acceleration present, no deceleration. Category I tracing  Grant:  there is no contraction      Christina Leo MD  5/19/2025  12:13 PM

## 2025-05-19 NOTE — TELEPHONE ENCOUNTER
"FOLLOW UP: ESC message to Dr. Antony. Per Dr. Antony, patient to go to     REASON FOR CONVERSATION: Hypertension - Pregnant    SYMPTOMS: Elevated blood pressure    OTHER: This patient is 36 weeks 6 days c/o high blood pressure. 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 She doesn't remember the values but says the diastolic was 100 the first time and 105 the second time (this was 30 minutes ago). She also says the nurse thinks the cuff was a little small for her. It was done manual. She drove to a store and rechecked while on the phone with me and it was 147/93. While at work there was a strong paint thinner smell that did make her vomit and she was lightheaded about 30 minutes ago. Denies abdominal pain, visual changes, chest pain, difficulty breathing, swelling of face or hands, decreased fetal movement, vaginal bleeding, leaking fluid or any  other symptoms.     DISPOSITION: Go to  Now (overriding See HPC Within 4 Hours (Or PCP Triage))  Caledd patient back and advised she go to [facility] now. Her  is driving her. She verbalized understanding.     Reason for Disposition   [1] Pregnant 20 or more weeks AND [2] Systolic BP >= 140 OR Diastolic >= 90    Answer Assessment - Initial Assessment Questions  1. BLOOD PRESSURE: \"What is your blood pressure?\" \"Did you take at least two measurements 5 minutes apart?\"      147/93  2. ONSET: \"When did you take your blood pressure?\"      30 minutes ago   3. HOW: \"How did you take your blood pressure?\" (e.g., automatic home BP monitor, visiting nurse)      Manual   4. HISTORY: \"Do you have a history of high blood pressure?\" \"Were you diagnosed with preeclampsia during this or previous pregnancies?\"      CHTN   5. MEDICINES: \"Are you taking any medicines for blood pressure?\" \"Have you missed any doses recently?\"      Labetolol   6. PREGNANCY: \"How many weeks pregnant are you?\" \"How many babies are you carrying?\" (e.g., " "single baby, twins)      36 weeks 6 days pregnant   7. ANDREA: \"What date are you expecting to deliver?\"      6/10/25   8. OTHER SYMPTOMS: \"Do you have any other symptoms?\" (e.g., abdomen pain, chest pain, difficulty breathing, hand or face swelling, headache, vision changes)      Vomiting , lightheaded - 30 minutes ago,    Protocols used: Pregnancy - High Blood Pressure-Adult-AH    "

## 2025-05-19 NOTE — PROGRESS NOTES
Procedures    Serial transabdominal images were obtained through the gravid maternal uterus. The patient's working ANDREA is 6/10/2025 with a gestational age of 36w6d. The indication for today's OH is pre-existing hypertension and obesity. Patient was on monitor this morning at hospital.      OH =    3.67 + 4.95 + 1.80+ 5.24 = 15.66cm  Fetal Postion=  vertex  FHR=   135bpm    Impression:  Appropriate OH for fetal age.       Tahmina Callahan RDMS  [facility]  [facility] [address] [place] [address]  Phone  [phone]  Fax  [phone]

## 2025-05-19 NOTE — TELEPHONE ENCOUNTER
Patient was not scheduled for BPP or NST/OH next week. She was scheduled with NP who does not do ultrasound. 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 Attempted to call [facility], however the office was closed for the day. A message was sent via [employer] to the [facility] Clerical team requesting an appointment for next week for NST/OH.

## 2025-05-20 ENCOUNTER — TELEPHONE (OUTPATIENT)
Dept: PERINATAL CARE | Facility: OTHER | Age: 35
End: 2025-05-20

## 2025-05-20 DIAGNOSIS — O10.919 CHRONIC HYPERTENSION AFFECTING PREGNANCY: ICD-10-CM

## 2025-05-20 RX ORDER — ASPIRIN 81 MG/1
162 TABLET, COATED ORAL DAILY
Qty: 60 TABLET | Refills: 0 | OUTPATIENT
Start: 2025-05-20

## 2025-05-23 LAB
DME PARACHUTE DELIVERY DATE ACTUAL: NORMAL
DME PARACHUTE DELIVERY DATE REQUESTED: NORMAL
DME PARACHUTE ITEM DESCRIPTION: NORMAL
DME PARACHUTE ORDER STATUS: NORMAL
DME PARACHUTE SUPPLIER NAME: NORMAL
DME PARACHUTE SUPPLIER PHONE: NORMAL

## 2025-05-27 ENCOUNTER — ROUTINE PRENATAL (OUTPATIENT)
Dept: OBGYN CLINIC | Facility: CLINIC | Age: 35
End: 2025-05-27

## 2025-05-27 VITALS
WEIGHT: 293 LBS | SYSTOLIC BLOOD PRESSURE: 110 MMHG | HEART RATE: 83 BPM | OXYGEN SATURATION: 99 % | HEIGHT: 68 IN | DIASTOLIC BLOOD PRESSURE: 70 MMHG | BODY MASS INDEX: 44.41 KG/M2

## 2025-05-27 DIAGNOSIS — O10.919 CHRONIC PRE-EXISTING HYPERTENSION DURING PREGNANCY: ICD-10-CM

## 2025-05-27 DIAGNOSIS — E66.01 SEVERE OBESITY DUE TO EXCESS CALORIES AFFECTING PREGNANCY IN THIRD TRIMESTER (HCC): ICD-10-CM

## 2025-05-27 DIAGNOSIS — O99.213 SEVERE OBESITY DUE TO EXCESS CALORIES AFFECTING PREGNANCY IN THIRD TRIMESTER (HCC): ICD-10-CM

## 2025-05-27 DIAGNOSIS — O09.93 ENCOUNTER FOR SUPERVISION OF HIGH RISK PREGNANCY IN THIRD TRIMESTER, ANTEPARTUM: Primary | ICD-10-CM

## 2025-05-27 PROCEDURE — PNV: Performed by: NURSE PRACTITIONER

## 2025-05-27 NOTE — PROGRESS NOTES
AMA, Obesity, hx of PCOS, anxiety/depression, chronic HTN (labetalol 100 mg bid); macrosomia    Seen at L&D on 5/19 for an elevated BP reading she had at work.  CBC/CMP no acute finding, P:C ration 0.1 and normal BP in triage.    35w growth scan 94%  Weekly BPP w/ us.    Baby is active, denies leakage of fluid, vaginal bleeding or uterine contractions.  S=D, normal FHTs, normal BP    Desires IOL later in 39 weeks if possible-- patient is considering 6/8 as a possible induction date, although would like to let things happen spontaneously for the longest/safest amount of time.  SROM if possible; if not, prefers attending to rupture    RV one week

## 2025-05-29 ENCOUNTER — ULTRASOUND (OUTPATIENT)
Dept: PERINATAL CARE | Facility: OTHER | Age: 35
End: 2025-05-29
Payer: COMMERCIAL

## 2025-05-29 ENCOUNTER — APPOINTMENT (OUTPATIENT)
Dept: PERINATAL CARE | Facility: OTHER | Age: 35
End: 2025-05-29
Attending: OBSTETRICS & GYNECOLOGY
Payer: COMMERCIAL

## 2025-05-29 VITALS
WEIGHT: 293 LBS | SYSTOLIC BLOOD PRESSURE: 116 MMHG | HEART RATE: 84 BPM | BODY MASS INDEX: 44.41 KG/M2 | DIASTOLIC BLOOD PRESSURE: 74 MMHG | HEIGHT: 68 IN

## 2025-05-29 DIAGNOSIS — E66.813 CLASS 3 OBESITY: ICD-10-CM

## 2025-05-29 DIAGNOSIS — O99.213 MATERNAL MORBID OBESITY IN THIRD TRIMESTER, ANTEPARTUM (HCC): ICD-10-CM

## 2025-05-29 DIAGNOSIS — O10.913 MATERNAL CHRONIC HYPERTENSION, THIRD TRIMESTER: ICD-10-CM

## 2025-05-29 DIAGNOSIS — E66.01 MATERNAL MORBID OBESITY IN THIRD TRIMESTER, ANTEPARTUM (HCC): ICD-10-CM

## 2025-05-29 DIAGNOSIS — Z33.1 NORMAL PREGNANCY, INCIDENTAL: ICD-10-CM

## 2025-05-29 DIAGNOSIS — Z3A.38 38 WEEKS GESTATION OF PREGNANCY: Primary | ICD-10-CM

## 2025-05-29 PROCEDURE — 76815 OB US LIMITED FETUS(S): CPT | Performed by: OBSTETRICS & GYNECOLOGY

## 2025-05-29 PROCEDURE — 59025 FETAL NON-STRESS TEST: CPT | Performed by: OBSTETRICS & GYNECOLOGY

## 2025-05-29 NOTE — PROGRESS NOTES
Please refer to the [facility] ultrasound report in Imaging for additional information regarding today's visit

## 2025-05-29 NOTE — LETTER
May 29, 2025     Katlyn Antony MD  [address] [address]    Patient: Yecenia Rodriguez   YOB: 1990   Date of Visit: 5/29/2025       Dear Dr. Katlyn Antony MD:    Thank you for referring Yecenia Rodriguez to me for evaluation. Below are my notes for this consultation.    If you have questions, please do not hesitate to call me. I look forward to following your patient along with you.         Sincerely,        Puja Contreras RN        CC: No Recipients

## 2025-05-29 NOTE — LETTER
NST sleeve cover sheet    Patient name: Yecenia Rodriguez  : 1990  MRN: [id number]    ANDREA: Estimated Date of Delivery: 6/10/25    Obstetrician: [place]    Reason(s) for testing: cHTN (meds), BMI cl III     Testing frequency:    ___  2x/wk  ___  1x/wk  ___  Dopplers  ___  BPP?      Last growth scan: __________, _________, _________    Baby:      Male   /   Female   /   [place]             Baby Name: ___________________            IOL or  C/S: _____________________

## 2025-05-29 NOTE — PROGRESS NOTES
Non-Stress Testing:    Non-Stress test, equipment, procedure, and expected outcomes explained. Reviewed fetal kick counts and when to call OB.Verified patient understanding of fetal kick counts with teach back method. Patient reports feeling daily fetal movements. Patient has no questions or concerns.     Reviewed non-stress test with Dr. Arnold in-person

## 2025-05-31 ENCOUNTER — PATIENT MESSAGE (OUTPATIENT)
Dept: OBGYN CLINIC | Facility: CLINIC | Age: 35
End: 2025-05-31

## 2025-06-02 ENCOUNTER — ULTRASOUND (OUTPATIENT)
Dept: OBGYN CLINIC | Facility: CLINIC | Age: 35
End: 2025-06-02

## 2025-06-02 ENCOUNTER — ROUTINE PRENATAL (OUTPATIENT)
Dept: OBGYN CLINIC | Facility: CLINIC | Age: 35
End: 2025-06-02

## 2025-06-02 VITALS
BODY MASS INDEX: 44.41 KG/M2 | DIASTOLIC BLOOD PRESSURE: 84 MMHG | SYSTOLIC BLOOD PRESSURE: 122 MMHG | HEART RATE: 95 BPM | OXYGEN SATURATION: 99 % | WEIGHT: 293 LBS | HEIGHT: 68 IN

## 2025-06-02 DIAGNOSIS — R73.09 ELEVATED GLUCOSE TOLERANCE TEST: ICD-10-CM

## 2025-06-02 DIAGNOSIS — O10.919 CHRONIC HYPERTENSION AFFECTING PREGNANCY: Primary | ICD-10-CM

## 2025-06-02 DIAGNOSIS — O09.519 HIGH-RISK PREGNANCY, PRIMIGRAVIDA OF ADVANCED MATERNAL AGE: ICD-10-CM

## 2025-06-02 DIAGNOSIS — Z3A.38 38 WEEKS GESTATION OF PREGNANCY: ICD-10-CM

## 2025-06-02 DIAGNOSIS — O36.63X0 MACROSOMIA OF FETUS AFFECTING MANAGEMENT OF MOTHER IN THIRD TRIMESTER, SINGLE OR UNSPECIFIED FETUS: ICD-10-CM

## 2025-06-02 PROCEDURE — PNV: Performed by: OBSTETRICS & GYNECOLOGY

## 2025-06-02 NOTE — PROGRESS NOTES
Procedures    Serial transabdominal images were obtained through the gravid maternal uterus. The patient's working ANDREA is 6/10/2025 with a gestational age of 38w6d. The indication for today's biophysical profile is chronic hypertension.    Exam start time:  1520pm  Exam end time:   1532hrs    OH =    2.51 + 3.04 + 1.64 +3.64 = 10.83cm  Fetal Postion=  vertex  FHR=   146bpm    Fetal Breathing Movements 2/2  Gross Body Movements 2/2  Fetal Tone   2/2  Qualitative OH  2/2    Impression:  Total BPP 8/8    Tahmina Callahan RDMS    [facility]  [address] [place] [address]  Phone  [phone]  Fax  [phone]

## 2025-06-02 NOTE — PROGRESS NOTES
Assessment & Plan  35 y.o.  at 38w6d presenting for routine prenatal visit.       Problem List       Atypical squamous cell changes of undetermined significance (ASCUS) on cervical cytology with negative high risk human papilloma virus (HPV) test result    Overview   ASCUS, HPV neg 2024  NILM 2024         Dyslipidemia, goal LDL below 160    Food insecurity    Overview   Per [facility] Protocol    Formatting of this note might be different from the original.  Per [facility] Protocol         Major depressive disorder, recurrent episode, moderate (HCC)    Class 3 severe obesity with body mass index (BMI) of 40.0 to 44.9 in adult    Overview   ICD-10 update of inactive diagnosis    Formatting of this note might be different from the original.  ICD-10 update of inactive diagnosis         PCOS (polycystic ovarian syndrome)    Overview   Oligomenorrhea since menarche at age 13 (cycle length 6 weeks), testosterone level elevated at , pelvic u/s 2018 normal    Formatting of this note might be different from the original.  Oligomenorrhea since menarche at age 13 (cycle length 6 weeks), testosterone level elevated at , pelvic u/s 2018 normal         Post-traumatic osteoarthritis of right foot    Esophageal spasm    Anxiety disorder due to known physiological condition    Cyst of right ovary    History of PCOS    Anxiety and depression    Benign essential HTN    GERD (gastroesophageal reflux disease)    Prediabetes    Chronic hypertension affecting pregnancy    Overview   LDASA  Labetalol 100 mg BID  Baseline labs wnl  Wkly testing at Ob office  Delivery between 34g9d-45w8d         36 weeks gestation of pregnancy    Overview   LDASA  Delivery consent [x]  Tdap [x]  GBS [ ]  Contraception [ ]  Growth scan at 34w [ ]  Weekly AFPS starting at 32 weeks [ ]         High-risk pregnancy, primigravida of advanced maternal age    Obesity, Class III, BMI 40-49.9 (morbid  "obesity)    Obesity affecting pregnancy in third trimester    Other specified diabetes mellitus without complications (HCC)    Elevated glucose tolerance test    Overview   Elevated 1 hour GTT, passed 3 hour          Uterine fibroid in pregnancy    Macrosomia affecting management of mother in third trimester    Encounter for supervision of high risk pregnancy in third trimester, antepartum     ____________________________________________________________  Subjective  She is without complaint.   She denies contractions, loss of fluid, or vaginal bleeding.   She feels regular fetal movements.       Objective  /84 (BP Location: Left arm, Patient Position: Sitting, Cuff Size: Standard)   Pulse 95   Ht 5' 8\" (1.727 m)   Wt 136 kg (300 lb)   LMP 08/20/2024 (Exact Date)   SpO2 99%   BMI 45.61 kg/m²   BPP: 8/8  SVE: 1/50/-3, cervical membrane sweep performed    Physical Exam  Constitutional:       Appearance: She is well-developed.     Cardiovascular:      Rate and Rhythm: Normal rate and regular rhythm.      Heart sounds: Normal heart sounds. No murmur heard.     No friction rub. No gallop.   Pulmonary:      Effort: Pulmonary effort is normal. No respiratory distress.      Breath sounds: No wheezing.   Abdominal:      Palpations: Abdomen is soft.      Tenderness: There is no abdominal tenderness.     Musculoskeletal:         General: No tenderness.     Neurological:      Mental Status: She is alert and oriented to person, place, and time.   Vitals reviewed.          Patient's Active Problem List  Problem List[1]        Khris Chakraborty MD  6/2/2025  3:00 PM               [1]   Patient Active Problem List  Diagnosis    Atypical squamous cell changes of undetermined significance (ASCUS) on cervical cytology with negative high risk human papilloma virus (HPV) test result    Dyslipidemia, goal LDL below 160    Food insecurity    Major depressive disorder, recurrent episode, moderate (HCC)    Class 3 severe obesity with " body mass index (BMI) of 40.0 to 44.9 in adult    PCOS (polycystic ovarian syndrome)    Post-traumatic osteoarthritis of right foot    Esophageal spasm    Anxiety disorder due to known physiological condition    Cyst of right ovary    History of PCOS    Anxiety and depression    Benign essential HTN    GERD (gastroesophageal reflux disease)    Prediabetes    Chronic hypertension affecting pregnancy    36 weeks gestation of pregnancy    High-risk pregnancy, primigravida of advanced maternal age    Obesity, Class III, BMI 40-49.9 (morbid obesity)    Obesity affecting pregnancy in third trimester    Other specified diabetes mellitus without complications (HCC)    Elevated glucose tolerance test    Uterine fibroid in pregnancy    Macrosomia affecting management of mother in third trimester    Encounter for supervision of high risk pregnancy in third trimester, antepartum

## 2025-06-04 NOTE — PATIENT COMMUNICATION
It looks like she has her election induction scheduled for 6/7 p.m. and she does not have another prenatal visit before that time.

## 2025-06-06 ENCOUNTER — ROUTINE PRENATAL (OUTPATIENT)
Dept: OBGYN CLINIC | Facility: CLINIC | Age: 35
End: 2025-06-06

## 2025-06-06 VITALS
SYSTOLIC BLOOD PRESSURE: 128 MMHG | HEIGHT: 68 IN | WEIGHT: 293 LBS | BODY MASS INDEX: 44.41 KG/M2 | DIASTOLIC BLOOD PRESSURE: 78 MMHG

## 2025-06-06 DIAGNOSIS — Z3A.39 39 WEEKS GESTATION OF PREGNANCY: ICD-10-CM

## 2025-06-06 DIAGNOSIS — O10.919 CHRONIC HYPERTENSION AFFECTING PREGNANCY: Primary | ICD-10-CM

## 2025-06-06 PROCEDURE — PNV: Performed by: OBSTETRICS & GYNECOLOGY

## 2025-06-06 NOTE — PROGRESS NOTES
Assessment & Plan  35 y.o.  at 39w3d presenting for routine prenatal visit. Discussed induction process in detail.      Problem List       Atypical squamous cell changes of undetermined significance (ASCUS) on cervical cytology with negative high risk human papilloma virus (HPV) test result    Overview   ASCUS, HPV neg 2024  NILM 2024         Dyslipidemia, goal LDL below 160    Food insecurity    Overview   Per [facility] Protocol    Formatting of this note might be different from the original.  Per [facility] Protocol         Major depressive disorder, recurrent episode, moderate (HCC)    Class 3 severe obesity with body mass index (BMI) of 40.0 to 44.9 in adult    Overview   ICD-10 update of inactive diagnosis    Formatting of this note might be different from the original.  ICD-10 update of inactive diagnosis         PCOS (polycystic ovarian syndrome)    Overview   Oligomenorrhea since menarche at age 13 (cycle length 6 weeks), testosterone level elevated at , pelvic u/s 2018 normal    Formatting of this note might be different from the original.  Oligomenorrhea since menarche at age 13 (cycle length 6 weeks), testosterone level elevated at , pelvic u/s 2018 normal         Post-traumatic osteoarthritis of right foot    Esophageal spasm    Anxiety disorder due to known physiological condition    Cyst of right ovary    History of PCOS    Anxiety and depression    Benign essential HTN    GERD (gastroesophageal reflux disease)    Prediabetes    Chronic hypertension affecting pregnancy    Overview   LDASA  Labetalol 100 mg BID  Baseline labs wnl  Wkly testing at Ob office  IOL schedule for  at 9pm         38 weeks gestation of pregnancy    Overview   LDASA  Delivery consent [x]  Tdap [x]  GBS [ ]  Contraception [ ]  Growth scan at 34w [ ]  Weekly AFPS starting at 32 weeks [ ]         High-risk pregnancy, primigravida of advanced maternal age     "Obesity, Class III, BMI 40-49.9 (morbid obesity)    Obesity affecting pregnancy in third trimester    Other specified diabetes mellitus without complications (HCC)    Elevated glucose tolerance test    Overview   Elevated 1 hour GTT, passed 3 hour          Uterine fibroid in pregnancy    Macrosomia affecting management of mother in third trimester    Encounter for supervision of high risk pregnancy in third trimester, antepartum     ____________________________________________________________  Subjective  She is without complaint.   She denies contractions, loss of fluid, or vaginal bleeding.   She feels regular fetal movements.       Objective  /78 (BP Location: Left arm, Patient Position: Sitting, Cuff Size: Standard)   Ht 5' 8\" (1.727 m)   Wt (!) 137 kg (302 lb)   LMP 08/20/2024 (Exact Date)   BMI 45.92 kg/m²   FHR: 150's via doppler  SVE: 1/50/-3; membrane sweep performed    Physical Exam  Constitutional:       Appearance: She is well-developed.     Cardiovascular:      Rate and Rhythm: Normal rate and regular rhythm.      Heart sounds: Normal heart sounds. No murmur heard.     No friction rub. No gallop.   Pulmonary:      Effort: Pulmonary effort is normal. No respiratory distress.      Breath sounds: No wheezing.   Abdominal:      Palpations: Abdomen is soft.      Tenderness: There is no abdominal tenderness.     Musculoskeletal:         General: No tenderness.     Neurological:      Mental Status: She is alert and oriented to person, place, and time.   Vitals reviewed.          Patient's Active Problem List  Problem List[1]        Khris Chakraborty MD  6/6/2025  3:27 PM               [1]   Patient Active Problem List  Diagnosis    Atypical squamous cell changes of undetermined significance (ASCUS) on cervical cytology with negative high risk human papilloma virus (HPV) test result    Dyslipidemia, goal LDL below 160    Food insecurity    Major depressive disorder, recurrent episode, moderate (HCC)    " Class 3 severe obesity with body mass index (BMI) of 40.0 to 44.9 in adult    PCOS (polycystic ovarian syndrome)    Post-traumatic osteoarthritis of right foot    Esophageal spasm    Anxiety disorder due to known physiological condition    Cyst of right ovary    History of PCOS    Anxiety and depression    Benign essential HTN    GERD (gastroesophageal reflux disease)    Prediabetes    Chronic hypertension affecting pregnancy    38 weeks gestation of pregnancy    High-risk pregnancy, primigravida of advanced maternal age    Obesity, Class III, BMI 40-49.9 (morbid obesity)    Obesity affecting pregnancy in third trimester    Other specified diabetes mellitus without complications (HCC)    Elevated glucose tolerance test    Uterine fibroid in pregnancy    Macrosomia affecting management of mother in third trimester    Encounter for supervision of high risk pregnancy in third trimester, antepartum

## 2025-06-07 ENCOUNTER — HOSPITAL ENCOUNTER (INPATIENT)
Facility: HOSPITAL | Age: 35
LOS: 6 days | Discharge: HOME | End: 2025-06-13
Attending: STUDENT IN AN ORGANIZED HEALTH CARE EDUCATION/TRAINING PROGRAM | Admitting: STUDENT IN AN ORGANIZED HEALTH CARE EDUCATION/TRAINING PROGRAM
Payer: COMMERCIAL

## 2025-06-07 ENCOUNTER — HOSPITAL ENCOUNTER (OUTPATIENT)
Dept: LABOR AND DELIVERY | Facility: HOSPITAL | Age: 35
Discharge: HOME | End: 2025-06-07
Payer: COMMERCIAL

## 2025-06-07 DIAGNOSIS — Z98.891 STATUS POST PRIMARY LOW TRANSVERSE CESAREAN SECTION: ICD-10-CM

## 2025-06-07 DIAGNOSIS — O10.919 CHRONIC HYPERTENSION AFFECTING PREGNANCY: Primary | ICD-10-CM

## 2025-06-07 LAB
ALBUMIN SERPL BCG-MCNC: 3.6 G/DL (ref 3.5–5)
ALP SERPL-CCNC: 99 U/L (ref 34–104)
ALT SERPL W P-5'-P-CCNC: 24 U/L (ref 7–52)
ANION GAP SERPL CALCULATED.3IONS-SCNC: 8 MMOL/L (ref 4–13)
AST SERPL W P-5'-P-CCNC: 18 U/L (ref 13–39)
BILIRUB SERPL-MCNC: 0.35 MG/DL (ref 0.2–1)
BUN SERPL-MCNC: 12 MG/DL (ref 5–25)
CALCIUM SERPL-MCNC: 8.2 MG/DL (ref 8.4–10.2)
CHLORIDE SERPL-SCNC: 106 MMOL/L (ref 96–108)
CO2 SERPL-SCNC: 20 MMOL/L (ref 21–32)
CREAT SERPL-MCNC: 0.6 MG/DL (ref 0.6–1.3)
CREAT UR-MCNC: 209 MG/DL
ERYTHROCYTE [DISTWIDTH] IN BLOOD BY AUTOMATED COUNT: 12.6 % (ref 11.6–15.1)
GFR SERPL CREATININE-BSD FRML MDRD: 117 ML/MIN/1.73SQ M
GLUCOSE SERPL-MCNC: 107 MG/DL (ref 65–140)
HCT VFR BLD AUTO: 30.3 % (ref 34.8–46.1)
HGB BLD-MCNC: 10.4 G/DL (ref 11.5–15.4)
HOLD SPECIMEN: YES
MCH RBC QN AUTO: 29.4 PG (ref 26.8–34.3)
MCHC RBC AUTO-ENTMCNC: 34.3 G/DL (ref 31.4–37.4)
MCV RBC AUTO: 86 FL (ref 82–98)
PLATELET # BLD AUTO: 159 THOUSANDS/UL (ref 149–390)
PMV BLD AUTO: 11.2 FL (ref 8.9–12.7)
POTASSIUM SERPL-SCNC: 3.9 MMOL/L (ref 3.5–5.3)
PROT SERPL-MCNC: 6.2 G/DL (ref 6.4–8.4)
PROT UR-MCNC: 23.8 MG/DL
PROT/CREAT UR: 0.1 MG/G{CREAT}
RBC # BLD AUTO: 3.54 MILLION/UL (ref 3.81–5.12)
SODIUM SERPL-SCNC: 134 MMOL/L (ref 135–147)
WBC # BLD AUTO: 4.61 THOUSAND/UL (ref 4.31–10.16)

## 2025-06-07 PROCEDURE — 86850 RBC ANTIBODY SCREEN: CPT

## 2025-06-07 PROCEDURE — 80053 COMPREHEN METABOLIC PANEL: CPT

## 2025-06-07 PROCEDURE — 86900 BLOOD TYPING SEROLOGIC ABO: CPT

## 2025-06-07 PROCEDURE — 84156 ASSAY OF PROTEIN URINE: CPT

## 2025-06-07 PROCEDURE — NC001 PR NO CHARGE: Performed by: STUDENT IN AN ORGANIZED HEALTH CARE EDUCATION/TRAINING PROGRAM

## 2025-06-07 PROCEDURE — 82570 ASSAY OF URINE CREATININE: CPT

## 2025-06-07 PROCEDURE — 85027 COMPLETE CBC AUTOMATED: CPT

## 2025-06-07 PROCEDURE — 86901 BLOOD TYPING SEROLOGIC RH(D): CPT

## 2025-06-07 PROCEDURE — 86780 TREPONEMA PALLIDUM: CPT

## 2025-06-07 RX ORDER — LABETALOL 100 MG/1
100 TABLET, FILM COATED ORAL 2 TIMES DAILY
Status: DISCONTINUED | OUTPATIENT
Start: 2025-06-07 | End: 2025-06-08

## 2025-06-07 RX ORDER — BUPIVACAINE HYDROCHLORIDE 2.5 MG/ML
30 INJECTION, SOLUTION EPIDURAL; INFILTRATION; INTRACAUDAL; PERINEURAL ONCE AS NEEDED
Status: DISCONTINUED | OUTPATIENT
Start: 2025-06-07 | End: 2025-06-09

## 2025-06-07 RX ORDER — SODIUM CHLORIDE, SODIUM LACTATE, POTASSIUM CHLORIDE, CALCIUM CHLORIDE 600; 310; 30; 20 MG/100ML; MG/100ML; MG/100ML; MG/100ML
125 INJECTION, SOLUTION INTRAVENOUS CONTINUOUS
Status: DISCONTINUED | OUTPATIENT
Start: 2025-06-07 | End: 2025-06-09

## 2025-06-07 RX ORDER — BUPROPION HYDROCHLORIDE 150 MG/1
300 TABLET ORAL DAILY
Status: DISCONTINUED | OUTPATIENT
Start: 2025-06-08 | End: 2025-06-09

## 2025-06-07 RX ORDER — BUPROPION HYDROCHLORIDE 150 MG/1
150 TABLET ORAL DAILY
Status: DISCONTINUED | OUTPATIENT
Start: 2025-06-08 | End: 2025-06-07

## 2025-06-07 RX ADMIN — SODIUM CHLORIDE, SODIUM LACTATE, POTASSIUM CHLORIDE, AND CALCIUM CHLORIDE 125 ML/HR: .6; .31; .03; .02 INJECTION, SOLUTION INTRAVENOUS at 23:05

## 2025-06-08 ENCOUNTER — ANESTHESIA (INPATIENT)
Dept: LABOR AND DELIVERY | Facility: HOSPITAL | Age: 35
End: 2025-06-08
Payer: COMMERCIAL

## 2025-06-08 ENCOUNTER — ANESTHESIA EVENT (INPATIENT)
Dept: LABOR AND DELIVERY | Facility: HOSPITAL | Age: 35
End: 2025-06-08
Payer: COMMERCIAL

## 2025-06-08 LAB
ABO GROUP BLD: NORMAL
BLD GP AB SCN SERPL QL: NEGATIVE
RH BLD: POSITIVE
SPECIMEN EXPIRATION DATE: NORMAL
TREPONEMA PALLIDUM IGG+IGM AB [PRESENCE] IN SERUM OR PLASMA BY IMMUNOASSAY: NORMAL

## 2025-06-08 PROCEDURE — 10H07YZ INSERTION OF OTHER DEVICE INTO PRODUCTS OF CONCEPTION, VIA NATURAL OR ARTIFICIAL OPENING: ICD-10-PCS | Performed by: OBSTETRICS & GYNECOLOGY

## 2025-06-08 PROCEDURE — 4A1HXCZ MONITORING OF PRODUCTS OF CONCEPTION, CARDIAC RATE, EXTERNAL APPROACH: ICD-10-PCS | Performed by: OBSTETRICS & GYNECOLOGY

## 2025-06-08 PROCEDURE — 3E033VJ INTRODUCTION OF OTHER HORMONE INTO PERIPHERAL VEIN, PERCUTANEOUS APPROACH: ICD-10-PCS | Performed by: OBSTETRICS & GYNECOLOGY

## 2025-06-08 RX ORDER — TERBUTALINE SULFATE 1 MG/ML
0.25 INJECTION SUBCUTANEOUS ONCE
Status: DISCONTINUED | OUTPATIENT
Start: 2025-06-08 | End: 2025-06-08

## 2025-06-08 RX ORDER — OXYTOCIN/0.9 % SODIUM CHLORIDE 30/500 ML
1-30 PLASTIC BAG, INJECTION (ML) INTRAVENOUS
Status: DISCONTINUED | OUTPATIENT
Start: 2025-06-08 | End: 2025-06-09

## 2025-06-08 RX ORDER — LIDOCAINE HYDROCHLORIDE AND EPINEPHRINE 15; 5 MG/ML; UG/ML
INJECTION, SOLUTION EPIDURAL AS NEEDED
Status: DISCONTINUED | OUTPATIENT
Start: 2025-06-08 | End: 2025-06-09 | Stop reason: HOSPADM

## 2025-06-08 RX ORDER — BUTORPHANOL TARTRATE 1 MG/ML
1 INJECTION, SOLUTION INTRAMUSCULAR; INTRAVENOUS ONCE
Status: COMPLETED | OUTPATIENT
Start: 2025-06-08 | End: 2025-06-08

## 2025-06-08 RX ORDER — ROPIVACAINE HYDROCHLORIDE 5 MG/ML
INJECTION, SOLUTION EPIDURAL; INFILTRATION; PERINEURAL AS NEEDED
Status: DISCONTINUED | OUTPATIENT
Start: 2025-06-08 | End: 2025-06-09 | Stop reason: HOSPADM

## 2025-06-08 RX ORDER — TERBUTALINE SULFATE 1 MG/ML
INJECTION SUBCUTANEOUS
Status: COMPLETED
Start: 2025-06-08 | End: 2025-06-09

## 2025-06-08 RX ORDER — LABETALOL 100 MG/1
100 TABLET, FILM COATED ORAL EVERY 12 HOURS SCHEDULED
Status: DISCONTINUED | OUTPATIENT
Start: 2025-06-08 | End: 2025-06-09

## 2025-06-08 RX ORDER — CALCIUM CARBONATE 500 MG/1
1000 TABLET, CHEWABLE ORAL 3 TIMES DAILY PRN
Status: DISCONTINUED | OUTPATIENT
Start: 2025-06-08 | End: 2025-06-13 | Stop reason: HOSPADM

## 2025-06-08 RX ORDER — PANTOPRAZOLE SODIUM 20 MG/1
20 TABLET, DELAYED RELEASE ORAL
Status: DISCONTINUED | OUTPATIENT
Start: 2025-06-08 | End: 2025-06-13 | Stop reason: HOSPADM

## 2025-06-08 RX ORDER — ONDANSETRON 2 MG/ML
4 INJECTION INTRAMUSCULAR; INTRAVENOUS EVERY 4 HOURS PRN
Status: DISCONTINUED | OUTPATIENT
Start: 2025-06-08 | End: 2025-06-09

## 2025-06-08 RX ORDER — ROPIVACAINE HYDROCHLORIDE 2 MG/ML
INJECTION, SOLUTION EPIDURAL; INFILTRATION; PERINEURAL AS NEEDED
Status: DISCONTINUED | OUTPATIENT
Start: 2025-06-08 | End: 2025-06-09 | Stop reason: HOSPADM

## 2025-06-08 RX ORDER — ROPIVACAINE HYDROCHLORIDE 5 MG/ML
INJECTION, SOLUTION EPIDURAL; INFILTRATION; PERINEURAL AS NEEDED
Status: DISCONTINUED | OUTPATIENT
Start: 2025-06-08 | End: 2025-06-08

## 2025-06-08 RX ADMIN — SODIUM CHLORIDE, SODIUM LACTATE, POTASSIUM CHLORIDE, AND CALCIUM CHLORIDE 125 ML/HR: .6; .31; .03; .02 INJECTION, SOLUTION INTRAVENOUS at 08:16

## 2025-06-08 RX ADMIN — Medication 50 MCG: at 00:25

## 2025-06-08 RX ADMIN — ROPIVACAINE HYDROCHLORIDE 10 ML/HR: 2 INJECTION EPIDURAL; INFILTRATION; PERINEURAL at 07:56

## 2025-06-08 RX ADMIN — BUPROPION HYDROCHLORIDE 300 MG: 150 TABLET, EXTENDED RELEASE ORAL at 09:33

## 2025-06-08 RX ADMIN — ROPIVACAINE HYDROCHLORIDE 10 ML: 2 INJECTION EPIDURAL; INFILTRATION; PERINEURAL at 07:50

## 2025-06-08 RX ADMIN — SODIUM CHLORIDE, SODIUM LACTATE, POTASSIUM CHLORIDE, AND CALCIUM CHLORIDE 125 ML/HR: .6; .31; .03; .02 INJECTION, SOLUTION INTRAVENOUS at 06:16

## 2025-06-08 RX ADMIN — SODIUM CHLORIDE, SODIUM LACTATE, POTASSIUM CHLORIDE, AND CALCIUM CHLORIDE 999 ML/HR: .6; .31; .03; .02 INJECTION, SOLUTION INTRAVENOUS at 07:18

## 2025-06-08 RX ADMIN — ROPIVACAINE HYDROCHLORIDE: 2 INJECTION, SOLUTION EPIDURAL; INFILTRATION at 08:10

## 2025-06-08 RX ADMIN — ONDANSETRON 4 MG: 2 INJECTION INTRAMUSCULAR; INTRAVENOUS at 21:55

## 2025-06-08 RX ADMIN — LIDOCAINE HYDROCHLORIDE AND EPINEPHRINE 3 ML: 15; 5 INJECTION, SOLUTION EPIDURAL; INFILTRATION; INTRACAUDAL; PERINEURAL at 07:44

## 2025-06-08 RX ADMIN — SODIUM CHLORIDE, SODIUM LACTATE, POTASSIUM CHLORIDE, AND CALCIUM CHLORIDE 999 ML/HR: .6; .31; .03; .02 INJECTION, SOLUTION INTRAVENOUS at 23:00

## 2025-06-08 RX ADMIN — ROPIVACAINE HYDROCHLORIDE: 2 INJECTION, SOLUTION EPIDURAL; INFILTRATION at 22:34

## 2025-06-08 RX ADMIN — LABETALOL HYDROCHLORIDE 100 MG: 100 TABLET, FILM COATED ORAL at 09:33

## 2025-06-08 RX ADMIN — PANTOPRAZOLE SODIUM 20 MG: 20 TABLET, DELAYED RELEASE ORAL at 13:29

## 2025-06-08 RX ADMIN — ROPIVACAINE HYDROCHLORIDE 5 ML: 5 INJECTION EPIDURAL; INFILTRATION; PERINEURAL at 21:52

## 2025-06-08 RX ADMIN — CALCIUM CARBONATE (ANTACID) CHEW TAB 500 MG 1000 MG: 500 CHEW TAB at 21:46

## 2025-06-08 RX ADMIN — ROPIVACAINE HYDROCHLORIDE: 2 INJECTION, SOLUTION EPIDURAL; INFILTRATION at 16:09

## 2025-06-08 RX ADMIN — Medication 2 MILLI-UNITS/MIN: at 08:07

## 2025-06-08 RX ADMIN — SODIUM CHLORIDE, SODIUM LACTATE, POTASSIUM CHLORIDE, AND CALCIUM CHLORIDE 125 ML/HR: .6; .31; .03; .02 INJECTION, SOLUTION INTRAVENOUS at 16:08

## 2025-06-08 RX ADMIN — LABETALOL HYDROCHLORIDE 100 MG: 100 TABLET, FILM COATED ORAL at 20:08

## 2025-06-08 RX ADMIN — BUTORPHANOL TARTRATE 1 MG: 1 INJECTION, SOLUTION INTRAMUSCULAR; INTRAVENOUS at 03:12

## 2025-06-08 NOTE — ASSESSMENT & PLAN NOTE
Growth at 35 weeks    AC             33.70 cm        37 weeks 4 days* (98%)  BPD             9.70 cm        39 weeks 5 days* (>99%)  HC             34.03 cm        39 weeks 1 day * (97%)  Femur           6.57 cm        33 weeks 6 days* (16%)     HC/AC           1.01 [0.93 - 1.11]                 (38%)  FL/AC             19 [20 - 24]  FL/BPD            68 [71 - 87]  EFW Hadlock 4   3120 grams - 6 lbs 14 oz                 (94%)

## 2025-06-08 NOTE — OB LABOR/OXYTOCIN SAFETY PROGRESS
Oxytocin Safety Progress Check Note - Yecenia Rodriguez 35 y.o. female MRN: [id number]    Unit/Bed#: -01 Encounter: [id number]    Dose (rashard-units/min) Oxytocin: 8 rashard-units/min  Contraction Frequency (minutes): 3-4  Contraction Intensity: Mild/Moderate  Uterine Activity Characteristics: Regular  Cervical Dilation: 3-4        Cervical Effacement: 50  Fetal Station: -3  Baseline Rate (FHR): 150 bpm  Fetal Heart Rate (FHT): 142 BPM  FHR Category:                Vital Signs:   Vitals:    06/08/25 1027   BP: 132/71   Pulse: 88   Resp:    Temp:    SpO2:        Notes/comments:   Patient resting comfortably s/p epidural.  FHT category 1 with contractions every 3-4 min.  Pit running at 2, continue titrating to contractions.  Will defer SVE and recheck in 2 hr or sooner if clinically indicated.  Dr. Chakraborty aware.    Flora Calzada MD 6/8/2025 9:00 AM

## 2025-06-08 NOTE — ASSESSMENT & PLAN NOTE
- Continue routine post partum care  - FIM146, HgB 10.4 -> 10.2  - Pain well controlled: tylenol/motrin scheduled, tim prn  - Lochia within normal limits: continue to monitor   - OOB: as able, encourage ambulation  - Passing flatus  - Voiding spontaneously s/p Reis removal  - DVT ppx: SCD, Lovenox 40mg BID  - Breastfeeding  - Baby in: room  - Dispo: anticipate d/c home POD2-4

## 2025-06-08 NOTE — OB LABOR/OXYTOCIN SAFETY PROGRESS
Oxytocin Safety Progress Check Note - Yecenia Rodriguez 35 y.o. female MRN: [id number]    Unit/Bed#: -01 Encounter: [id number]    Dose (rashard-units/min) Oxytocin: 16 rashard-units/min  Contraction Frequency (minutes): irritability  Contraction Intensity: Mild/Moderate  Uterine Activity Characteristics: Regular  Cervical Dilation: 4        Cervical Effacement: 70  Fetal Station: -3  Baseline Rate (FHR): 150 bpm  Fetal Heart Rate (FHT): 142 BPM  FHR Category: Cat I               Vital Signs:   Vitals:    06/08/25 1800   BP: 154/96   Pulse: 90   Resp:    Temp:    SpO2:        Notes/comments:   Cervical exam as above. Continue maternal repositioning and Pitocin titration.      Khris Chakraborty MD 6/8/2025 6:16 PM

## 2025-06-08 NOTE — OB LABOR/OXYTOCIN SAFETY PROGRESS
Labor Progress Note - Yecenia Rodriguez 35 y.o. female MRN: [id number]    Unit/Bed#: -01 Encounter: [id number]       Cervical Dilation: 1        Cervical Effacement: 50  Fetal Station: -3        FHR Category: 1               Vital Signs:   Vitals:    06/07/25 2238   BP: 120/82   Pulse: 103   Resp: 16   SpO2: 96%     PROCEDURE:  WILLIS BALLOON PLACEMENT    A 24F willis with a 30cc balloon was selected, SVE was performed and cervix was located, willis was introduced over sterile gloved hands. Balloon advanced through cervix beyond the internal cervical os. A small amount amount of sterile saline solution was instilled in the balloon to confirm placement. Placement was confirmed to be beyond the internal cervical os. A total of 60cc of sterile saline solution was placed into the balloon. Pt tolerated well.   Leakage of fluid noted through the balloon. Suspect AROM. Willis balloon kept in place. Will plan for oral cytotec.    Dr. Vasquez aware    Leatha Hampton MD 6/8/2025 12:31 AM

## 2025-06-08 NOTE — OB LABOR/OXYTOCIN SAFETY PROGRESS
Oxytocin Safety Progress Check Note - Yecenia Rodriguez 35 y.o. female MRN: [id number]    Unit/Bed#: -01 Encounter: [id number]    Dose (rashard-units/min) Oxytocin: 8 rashard-units/min  Contraction Frequency (minutes): 3-4  Contraction Intensity: Mild/Moderate  Uterine Activity Characteristics: Regular  Cervical Dilation: 3-4        Cervical Effacement: 50  Fetal Station: -3  Baseline Rate (FHR): 150 bpm  Fetal Heart Rate (FHT): 142 BPM  FHR Category: 1               Vital Signs:   Vitals:    06/08/25 1027   BP: 132/71   Pulse: 88   Resp:    Temp:    SpO2:        Notes/comments:   Patient resting comfortably.  FHT category 1 with contractions every 3-4 min.  Pit running at 8, continue titrating to contractions.  SVE unchanged.  Will recheck in 2-4 hr or sooner if clinically indicated.  Dr. Chakraborty aware.     Flora Calzada MD 6/8/2025 11:18 AM

## 2025-06-08 NOTE — ANESTHESIA PROCEDURE NOTES
Epidural Block    Patient location during procedure: OR  Start time: 6/8/2025 7:43 AM  Reason for block: primary anesthetic  Staffing  Performed by: Lio Hand DO  Authorized by: Lio Hand DO    Preanesthetic Checklist  Completed: patient identified, IV checked, site marked, risks and benefits discussed, surgical consent, monitors and equipment checked, pre-op evaluation and timeout performed  Epidural  Patient position: sitting  Prep: Betadine  Sedation Level: no sedation  Patient monitoring: frequent blood pressure checks, heart rate and continuous pulse oximetry  Approach: midline  Location: lumbar, L3-4  Injection technique: KAYLEY air  Needle  Needle type: Tuohy   Needle gauge: 18 G  Needle insertion depth: 5.5 cm  Catheter type: closed tip, side hole and spring wound  Catheter size: 20 G  Catheter at skin depth: 8 cm  Catheter securement method: clear occlusive dressing, stabilization device and tape  Test dose: negative  Assessment  Number of attempts: 1negative aspiration for CSF, negative aspiration for heme and no paresthesia on injection  patient tolerated the procedure well with no immediate complications

## 2025-06-08 NOTE — OB LABOR/OXYTOCIN SAFETY PROGRESS
Oxytocin Safety Progress Check Note - Yecenia Rodriguez 35 y.o. female MRN: [id number]    Unit/Bed#: -01 Encounter: [id number]    Dose (rashard-units/min) Oxytocin: 12 rashard-units/min  Contraction Frequency (minutes): 2-4  Contraction Intensity: Mild/Moderate  Uterine Activity Characteristics: Regular  Cervical Dilation: 4        Cervical Effacement: 60  Fetal Station: -3  Baseline Rate (FHR): 150 bpm  Fetal Heart Rate (FHT): 142 BPM  FHR Category: 1               Vital Signs:   Vitals:    06/08/25 1457   BP: 137/92   Pulse: (!) 112   Resp:    Temp:    SpO2:        Notes/comments:   Patient resting comfortably.  FHT category 1 with contractions every 2-4 min.  Pit running at 12, continue titrating to contractions.  SVE 4/60/-3, though cervix is now more mid position as opposed to posterior.  Will recheck in 2 hr or sooner if clinically indicated & consider IUPC at next check pending SVE at that time.      Flora Calzada MD 6/8/2025 3:22 PM

## 2025-06-08 NOTE — H&P
H & P- Obstetrics   Yecenia Rodriguez 35 y.o. female MRN: [id number]  Unit/Bed#: -01 Encounter: [id number]    Assessment: 35 y.o.  at 39w5d admitted for induction of labor in the setting of chronic hypertension.  SVE: /-3  FHT: cat 1: baseline 140 bpm, moderate variability, 15x15 accels, no decels    Plan:   Macrosomia affecting management of mother in third trimester  Assessment & Plan  Growth at 35 weeks    AC             33.70 cm        37 weeks 4 days* (98%)  BPD             9.70 cm        39 weeks 5 days* (>99%)  HC             34.03 cm        39 weeks 1 day * (97%)  Femur           6.57 cm        33 weeks 6 days* (16%)     HC/AC           1.01 [0.93 - 1.11]                 (38%)  FL/AC             19 [20 - 24]  FL/BPD            68 [71 - 87]  EFW Hadlock 4   3120 grams - 6 lbs 14 oz                 (94%)    Elevated glucose tolerance test  Assessment & Plan  Elevated 1 hr GTT  Passed 3 hr    Chronic hypertension affecting pregnancy  Assessment & Plan  On Labetalol 100 mg BID  CBC/CMP, P:C ordered    Class 3 severe obesity with body mass index (BMI) of 40.0 to 44.9 in adult  Assessment & Plan  BMI 45    Atypical squamous cell changes of undetermined significance (ASCUS) on cervical cytology with negative high risk human papilloma virus (HPV) test result  Assessment & Plan  ASCUS, HPV neg 2024  NILM 2025    * 39 weeks gestation of pregnancy  Assessment & Plan  Admit to OBGYN   Clear liquid diet   F/u T&S, CBC, RPR   IVF LR 125cc/hr   Continuous fetal monitoring and tocometry   Analgesia at maternal request   GBS neg  Vertex by TAUS  Induction plan denis and cyto, followed by pitocin and AROM        Discussed case and plan w/ Dr. Vasquez    Chief Complaint: none    HPI: Yecenia Rodriguez is a 35 y.o.  with an ANDREA of 6/10/2025, by Patient Reported at 39w5d who is being admitted for induction of labor. She denies having uterine contractions, has no LOF, and reports no VB.  She states she has felt good FM.    Problem List[1]    Baby complications/comments: none    Review of Systems   Constitutional:  Negative for chills and fever.   HENT:  Negative for ear pain and sore throat.    Eyes:  Negative for pain and visual disturbance.   Respiratory:  Negative for cough and shortness of breath.    Cardiovascular:  Negative for chest pain and palpitations.   Gastrointestinal:  Negative for abdominal pain and vomiting.   Genitourinary:  Negative for dysuria and hematuria.   Musculoskeletal:  Negative for arthralgias and back pain.   Skin:  Negative for color change and rash.   Neurological:  Negative for seizures and syncope.   All other systems reviewed and are negative.      OB Hx:  OB History    Para Term  AB Living   2    1    SAB IAB Ectopic Multiple Live Births    1         # Outcome Date GA Lbr Stanley/2nd Weight Sex Type Anes PTL Lv   2 Current            1 IAB 17              Obstetric Comments   Menarche: 13         Menses: unsure of birth control       Past Medical Hx:  Past Medical History[2]    Past Surgical hx:  Past Surgical History[3]      Allergies[4]      Medications Prior to Admission:     aluminum chloride (DRYSOL) 20 % external solution    Blood Pressure Monitoring (Adult Blood Pressure Cuff Lg) KIT    buPROPion (WELLBUTRIN SR) 150 mg 12 hr tablet    calcium carbonate (TUMS) 500 mg chewable tablet    labetalol (NORMODYNE) 100 mg tablet    metFORMIN (GLUCOPHAGE) 1000 MG tablet    pantoprazole (PROTONIX) 20 mg tablet    Prenatal MV-Min-Fe Fum-FA-DHA (PRENATAL 1 PO)    Objective:  HR:  [] 86  BP: (120-154)/(71-84) 154/71  Resp:  [16] 16  SpO2:  [96 %] 96 %  There is no height or weight on file to calculate BMI.     Physical Exam:  Physical Exam  Constitutional:       Appearance: Normal appearance. She is obese.   HENT:      Head: Atraumatic.     Eyes:      Extraocular Movements: Extraocular movements intact.      Conjunctiva/sclera: Conjunctivae normal.        Cardiovascular:      Rate and Rhythm: Normal rate and regular rhythm.      Pulses: Normal pulses.   Pulmonary:      Effort: Pulmonary effort is normal. No respiratory distress.      Breath sounds: Normal breath sounds.   Abdominal:      Palpations: Abdomen is soft.      Tenderness: There is no abdominal tenderness.     Neurological:      General: No focal deficit present.      Mental Status: She is alert and oriented to person, place, and time.     Skin:     General: Skin is warm and dry.     Psychiatric:         Mood and Affect: Mood normal.         Behavior: Behavior normal.   Vitals reviewed. Exam conducted with a chaperone present.          Lab Results   Component Value Date    WBC 4.61 06/07/2025    HGB 10.4 (L) 06/07/2025    HCT 30.3 (L) 06/07/2025     06/07/2025     Lab Results   Component Value Date    K 3.9 06/07/2025     06/07/2025    CO2 20 (L) 06/07/2025    BUN 12 06/07/2025    CREATININE 0.60 06/07/2025    AST 18 06/07/2025    ALT 24 06/07/2025     Prenatal Labs: Reviewed      >2 Midnights  INPATIENT     Signature/Title: Leatha Hampton MD  Date: 6/8/2025  Time: 2:23 AM        [1]   Patient Active Problem List  Diagnosis    Atypical squamous cell changes of undetermined significance (ASCUS) on cervical cytology with negative high risk human papilloma virus (HPV) test result    Dyslipidemia, goal LDL below 160    Food insecurity    Major depressive disorder, recurrent episode, moderate (HCC)    Class 3 severe obesity with body mass index (BMI) of 40.0 to 44.9 in adult    PCOS (polycystic ovarian syndrome)    Post-traumatic osteoarthritis of right foot    Esophageal spasm    Anxiety disorder due to known physiological condition    Cyst of right ovary    History of PCOS    Anxiety and depression    Benign essential HTN    GERD (gastroesophageal reflux disease)    Prediabetes    Chronic hypertension affecting pregnancy    39 weeks gestation of pregnancy    High-risk pregnancy, primigravida  "of advanced maternal age    Obesity, Class III, BMI 40-49.9 (morbid obesity)    Obesity affecting pregnancy in third trimester    Other specified diabetes mellitus without complications (HCC)    Elevated glucose tolerance test    Uterine fibroid in pregnancy    Macrosomia affecting management of mother in third trimester    Encounter for supervision of high risk pregnancy in third trimester, antepartum   [2]   Past Medical History:  Diagnosis Date    Arthritis     Atypical squamous cell changes of undetermined significance (ASCUS) on cervical cytology with negative high risk human papilloma virus (HPV) test result     2/2024-ASCUS +HRHPV; 6/2024-wnl    Genetic screening     12/2024-SMA carrier negative    Hypertension     Ovarian cyst 06/30/2022    bilateral    PCOS (polycystic ovarian syndrome)     Tobacco use disorder 11/21/2014    Uterine fibroid in pregnancy 03/19/2025    Varicella    [3]   Past Surgical History:  Procedure Laterality Date    DILATION AND CURETTAGE OF UTERUS      ETOP    ENDOSCOPIC ULTRASOUND (UPPER)  a month ago    not sure if it's endoscopic...I had an ultra sound for PCOS    WISDOM TOOTH EXTRACTION  2007   [4]   Allergies  Allergen Reactions    Bee Venom Swelling     Large local reaction    Nicotine Other (See Comments)     Pt had severe \"night terrors\" when using nicotine patch    Other      Nicotene patches  Fleas    Clarithromycin Rash    Clindamycin Rash     "

## 2025-06-08 NOTE — PLAN OF CARE
Problem: PAIN - ADULT  Goal: Verbalizes/displays adequate comfort level or baseline comfort level  Description: Interventions:  - Encourage patient to monitor pain and request assistance  - Assess pain using appropriate pain scale  - Administer analgesics as ordered based on type and severity of pain and evaluate response  - Implement non-pharmacological measures as appropriate and evaluate response  - Consider cultural and social influences on pain and pain management  - Notify physician/advanced practitioner if interventions unsuccessful or patient reports new pain  - Educate patient/family on pain management process including their role and importance of  reporting pain   - Provide non-pharmacologic/complimentary pain relief interventions  Outcome: Progressing     Problem: INFECTION - ADULT  Goal: Absence or prevention of progression during hospitalization  Description: INTERVENTIONS:  - Assess and monitor for signs and symptoms of infection  - Monitor lab/diagnostic results  - Monitor all insertion sites, i.e. indwelling lines, tubes, and drains  - Monitor endotracheal if appropriate and nasal secretions for changes in amount and color  - [place] appropriate cooling/warming therapies per order  - Administer medications as ordered  - Instruct and encourage patient and family to use good hand hygiene technique  - Identify and instruct in appropriate isolation precautions for identified infection/condition  Outcome: Progressing  Goal: Absence of fever/infection during neutropenic period  Description: INTERVENTIONS:  - Monitor WBC  - Perform strict hand hygiene  - Limit to healthy visitors only  - No plants, dried, fresh or silk flowers with denton in patient room  Outcome: Progressing     Problem: SAFETY ADULT  Goal: Patient will remain free of falls  Description: INTERVENTIONS:  - Educate patient/family on patient safety including physical limitations  - Instruct patient to call for assistance with activity   -  Consider consulting OT/PT to assist with strengthening/mobility based on AM PAC & JH-HLM score  - Consult OT/PT to assist with strengthening/mobility   - Keep Call bell within reach  - Keep bed low and locked with side rails adjusted as appropriate  - Keep care items and personal belongings within reach  - Initiate and maintain comfort rounds  - Make Fall Risk Sign visible to staff  - Apply yellow socks and bracelet for high fall risk patients  - Consider moving patient to room near nurses station  Outcome: Progressing  Goal: Maintain or return to baseline ADL function  Description: INTERVENTIONS:  -  Assess patient's ability to carry out ADLs; assess patient's baseline for ADL function and identify physical deficits which impact ability to perform ADLs (bathing, care of mouth/teeth, toileting, grooming, dressing, etc.)  - Assess/evaluate cause of self-care deficits   - Assess range of motion  - Assess patient's mobility; develop plan if impaired  - Assess patient's need for assistive devices and provide as appropriate  - Encourage maximum independence but intervene and supervise when necessary  - Involve family in performance of ADLs  - Assess for home care needs following discharge   - Consider OT consult to assist with ADL evaluation and planning for discharge  - Provide patient education as appropriate  - Monitor functional capacity and physical performance, use of AM PAC & JH-HLM   - Monitor gait, balance and fatigue with ambulation    Outcome: Progressing  Goal: Maintains/Returns to pre admission functional level  Description: INTERVENTIONS:  - Perform AM-PAC 6 Click Basic Mobility/ Daily Activity assessment daily.  - Set and communicate daily mobility goal to care team and patient/family/caregiver.   - Collaborate with rehabilitation services on mobility goals if consulted  - Out of bed for toileting  - Record patient progress and toleration of activity level   Outcome: Progressing     Problem: DISCHARGE  PLANNING  Goal: Discharge to home or other facility with appropriate resources  Description: INTERVENTIONS:  - Identify barriers to discharge w/patient and caregiver  - Arrange for needed discharge resources and transportation as appropriate  - Identify discharge learning needs (meds, wound care, etc.)  - Arrange for interpretive services to assist at discharge as needed  - Refer to Case Management Department for coordinating discharge planning if the patient needs post-hospital services based on physician/advanced practitioner order or complex needs related to functional status, cognitive ability, or social support system  Outcome: Progressing     Problem: Knowledge Deficit  Goal: Patient/family/caregiver demonstrates understanding of disease process, treatment plan, medications, and discharge instructions  Description: Complete learning assessment and assess knowledge base.  Interventions:  - Provide teaching at level of understanding  - Provide teaching via preferred learning methods  Outcome: Progressing  Goal: Verbalizes understanding of labor plan  Description: Assess patient/family/caregiver's baseline knowledge level and ability to understand information.  Provide education via patient/family/caregiver's preferred learning method at appropriate level of understanding.     1. Provide teaching at level of understanding.  2. Provide teaching via preferred learning method(s).  Outcome: Progressing     Problem: Labor & Delivery  Goal: Manages discomfort  Description: Assess and monitor for signs and symptoms of discomfort.  Assess patient's pain level regularly and per hospital policy.  Administer medications as ordered. Support use of nonpharmacological methods to help control pain such as distraction, imagery, relaxation, and application of heat and cold.  Collaborate with interdisciplinary team and patient to determine appropriate pain management plan.    1. Include patient in decisions related to comfort.  2.  Offer non-pharmacological pain management interventions.  3. Report ineffective pain management to physician.  Outcome: Progressing  Goal: Patient vital signs are stable  Description: 1. Assess vital signs - vaginal delivery.  Outcome: Progressing

## 2025-06-08 NOTE — ANESTHESIA PREPROCEDURE EVALUATION
Procedure:  LABOR ANALGESIA    Relevant Problems   ANESTHESIA (within normal limits)      CARDIO   (+) Benign essential HTN   (+) Chronic hypertension affecting pregnancy   (+) Dyslipidemia, goal LDL below 160      ENDO (within normal limits)      GI/HEPATIC   (+) GERD (gastroesophageal reflux disease)      /RENAL (within normal limits)      GYN   (+) 39 weeks gestation of pregnancy   (+) Encounter for supervision of high risk pregnancy in third trimester, antepartum   (+) High-risk pregnancy, primigravida of advanced maternal age      HEMATOLOGY (within normal limits)      MUSCULOSKELETAL   (+) Post-traumatic osteoarthritis of right foot      NEURO/PSYCH   (+) Anxiety and depression   (+) Anxiety disorder due to known physiological condition   (+) Major depressive disorder, recurrent episode, moderate (HCC)      PULMONARY (within normal limits)        Physical Exam    Airway     Mallampati score: III  TM Distance: >3 FB  Neck ROM: full      Cardiovascular  Rhythm: regular, Rate: normalCardiovascular exam normal    Dental   No notable dental hx     Pulmonary  Pulmonary exam normal Breath sounds clear to auscultation    Neurological  - normal exam  She appears awake, alert and oriented x3.      Other Findings  post-pubertal.      Anesthesia Plan  ASA Score- 3     Anesthesia Type- epidural with ASA Monitors.         Additional Monitors:     Airway Plan:            Plan Factors-Exercise tolerance (METS): >4 METS.    Chart reviewed.   Existing labs reviewed. Patient summary reviewed.    Patient is not a current smoker.              Induction-     Postoperative Plan-     Perioperative Resuscitation Plan - Level 1 - Full Code.       Informed Consent- Anesthetic plan and risks discussed with patient and spouse.        NPO Status:  No vitals data found for the desired time range.

## 2025-06-08 NOTE — OB LABOR/OXYTOCIN SAFETY PROGRESS
Labor Progress Note - Yecenia Rodriguez 35 y.o. female MRN: [id number]    Unit/Bed#: -01 Encounter: [id number]       Contraction Frequency (minutes): 4-5  Contraction Intensity: Mild  Uterine Activity Characteristics: Irregular  Cervical Dilation: 3-4        Cervical Effacement: 50  Fetal Station: -3  Baseline Rate (FHR): 145 bpm  Fetal Heart Rate (FHT): 142 BPM  FHR Category: 1               Vital Signs:   Vitals:    06/08/25 0645   BP: 149/79   Pulse: 83   Resp:    Temp:    SpO2:        Notes/comments:   Patient requesting epidural s/p denis balloon becoming dislodged.  FTH category 1 with contractions every 3-4 min.  SVE 3.5/50/-3, posterior, medium consistency.  Will start Pitocin s/p epidural.  Will recheck 2-4 hr s/p starting Pitocin or sooner if clinically indicated.    Flora Calzada MD 6/8/2025 6:48 AM

## 2025-06-08 NOTE — OB LABOR/OXYTOCIN SAFETY PROGRESS
Oxytocin Safety Progress Check Note - Yecenia Rodriguez 35 y.o. female MRN: [id number]    Unit/Bed#: -01 Encounter: [id number]    Dose (rashard-units/min) Oxytocin: 10 rashard-units/min  Contraction Frequency (minutes): irritability  Contraction Intensity: Mild/Moderate  Uterine Activity Characteristics: Regular  Cervical Dilation: 3-4        Cervical Effacement: 50  Fetal Station: -3  Baseline Rate (FHR): 150 bpm  Fetal Heart Rate (FHT): 142 BPM  FHR Category: 1               Vital Signs:   Vitals:    06/08/25 1211   BP: 126/77   Pulse: 82   Resp:    Temp: 98.3 °F (36.8 °C)   SpO2:        Notes/comments:   Patient resting comfortably.  FHT category 1 with contractions every 3-4 min.  Pit running at 10, continue titrating to contractions.  Will defer SVE and recheck in 2 hr or sooner if clinically indicated.      Flora Calzada MD 6/8/2025 1:20 PM

## 2025-06-09 PROBLEM — Z98.891 STATUS POST PRIMARY LOW TRANSVERSE CESAREAN SECTION: Status: ACTIVE | Noted: 2024-12-04

## 2025-06-09 LAB
ATRIAL RATE: 86 BPM
BASE EXCESS BLDCOA CALC-SCNC: -5.7 MMOL/L (ref 3–11)
BASE EXCESS BLDCOV CALC-SCNC: -6.7 MMOL/L (ref 1–9)
ERYTHROCYTE [DISTWIDTH] IN BLOOD BY AUTOMATED COUNT: 12.7 % (ref 11.6–15.1)
HCO3 BLDCOA-SCNC: 22.5 MMOL/L (ref 17.3–27.3)
HCO3 BLDCOV-SCNC: 20.8 MMOL/L (ref 12.2–28.6)
HCT VFR BLD AUTO: 30.6 % (ref 34.8–46.1)
HGB BLD-MCNC: 10.2 G/DL (ref 11.5–15.4)
MCH RBC QN AUTO: 28.8 PG (ref 26.8–34.3)
MCHC RBC AUTO-ENTMCNC: 33.3 G/DL (ref 31.4–37.4)
MCV RBC AUTO: 86 FL (ref 82–98)
O2 CT VFR BLDCOA CALC: 3.5 ML/DL
OXYHGB MFR BLDCOA: 16.3 %
OXYHGB MFR BLDCOV: 21.9 %
P AXIS: 21 DEGREES
PCO2 BLDCOA: 54.8 MM[HG] (ref 30–60)
PCO2 BLDCOV: 49 MM HG (ref 27–43)
PH BLDCOA: 7.23 [PH] (ref 7.23–7.43)
PH BLDCOV: 7.25 [PH] (ref 7.19–7.49)
PLATELET # BLD AUTO: 172 THOUSANDS/UL (ref 149–390)
PMV BLD AUTO: 11.1 FL (ref 8.9–12.7)
PO2 BLDCOA: 12.5 MM HG (ref 5–25)
PO2 BLDCOV: 13.8 MM HG (ref 15–45)
PR INTERVAL: 134 MS
QRS AXIS: 13 DEGREES
QRSD INTERVAL: 80 MS
QT INTERVAL: 390 MS
QTC INTERVAL: 466 MS
RBC # BLD AUTO: 3.54 MILLION/UL (ref 3.81–5.12)
SAO2 % BLDCOV: 4.7 ML/DL
T WAVE AXIS: 8 DEGREES
VENTRICULAR RATE: 86 BPM
WBC # BLD AUTO: 10.58 THOUSAND/UL (ref 4.31–10.16)

## 2025-06-09 PROCEDURE — 59510 CESAREAN DELIVERY: CPT | Performed by: OBSTETRICS & GYNECOLOGY

## 2025-06-09 PROCEDURE — 88307 TISSUE EXAM BY PATHOLOGIST: CPT | Performed by: PATHOLOGY

## 2025-06-09 PROCEDURE — 82805 BLOOD GASES W/O2 SATURATION: CPT | Performed by: STUDENT IN AN ORGANIZED HEALTH CARE EDUCATION/TRAINING PROGRAM

## 2025-06-09 PROCEDURE — 93005 ELECTROCARDIOGRAM TRACING: CPT

## 2025-06-09 PROCEDURE — 93010 ELECTROCARDIOGRAM REPORT: CPT | Performed by: INTERNAL MEDICINE

## 2025-06-09 PROCEDURE — 85027 COMPLETE CBC AUTOMATED: CPT

## 2025-06-09 RX ORDER — KETOROLAC TROMETHAMINE 30 MG/ML
15 INJECTION, SOLUTION INTRAMUSCULAR; INTRAVENOUS EVERY 6 HOURS SCHEDULED
Status: COMPLETED | OUTPATIENT
Start: 2025-06-09 | End: 2025-06-10

## 2025-06-09 RX ORDER — NALOXONE HYDROCHLORIDE 0.4 MG/ML
0.1 INJECTION, SOLUTION INTRAMUSCULAR; INTRAVENOUS; SUBCUTANEOUS
Status: CANCELLED | OUTPATIENT
Start: 2025-06-09 | End: 2025-06-10

## 2025-06-09 RX ORDER — LABETALOL 100 MG/1
100 TABLET, FILM COATED ORAL 2 TIMES DAILY
Status: DISCONTINUED | OUTPATIENT
Start: 2025-06-09 | End: 2025-06-11

## 2025-06-09 RX ORDER — ENOXAPARIN SODIUM 100 MG/ML
40 INJECTION SUBCUTANEOUS EVERY 12 HOURS
Status: DISCONTINUED | OUTPATIENT
Start: 2025-06-10 | End: 2025-06-13 | Stop reason: HOSPADM

## 2025-06-09 RX ORDER — MORPHINE SULFATE 0.5 MG/ML
INJECTION, SOLUTION EPIDURAL; INTRATHECAL; INTRAVENOUS
Status: COMPLETED
Start: 2025-06-09 | End: 2025-06-09

## 2025-06-09 RX ORDER — OXYCODONE HYDROCHLORIDE 5 MG/1
5 TABLET ORAL EVERY 4 HOURS PRN
Status: DISCONTINUED | OUTPATIENT
Start: 2025-06-10 | End: 2025-06-13 | Stop reason: HOSPADM

## 2025-06-09 RX ORDER — MIDAZOLAM HYDROCHLORIDE 2 MG/2ML
INJECTION, SOLUTION INTRAMUSCULAR; INTRAVENOUS AS NEEDED
Status: DISCONTINUED | OUTPATIENT
Start: 2025-06-09 | End: 2025-06-09 | Stop reason: HOSPADM

## 2025-06-09 RX ORDER — DIPHENHYDRAMINE HCL 25 MG
25 TABLET ORAL EVERY 6 HOURS PRN
Status: DISCONTINUED | OUTPATIENT
Start: 2025-06-09 | End: 2025-06-13 | Stop reason: HOSPADM

## 2025-06-09 RX ORDER — OXYCODONE HYDROCHLORIDE 5 MG/1
10 TABLET ORAL EVERY 4 HOURS PRN
Refills: 0 | Status: CANCELLED | OUTPATIENT
Start: 2025-06-09

## 2025-06-09 RX ORDER — DEXAMETHASONE SODIUM PHOSPHATE 10 MG/ML
INJECTION, SOLUTION INTRAMUSCULAR; INTRAVENOUS
Status: COMPLETED
Start: 2025-06-09 | End: 2025-06-09

## 2025-06-09 RX ORDER — LIDOCAINE HYDROCHLORIDE AND EPINEPHRINE 20; 5 MG/ML; UG/ML
INJECTION, SOLUTION EPIDURAL; INFILTRATION; INTRACAUDAL; PERINEURAL
Status: COMPLETED
Start: 2025-06-09 | End: 2025-06-09

## 2025-06-09 RX ORDER — NALBUPHINE HYDROCHLORIDE 10 MG/ML
5 INJECTION INTRAMUSCULAR; INTRAVENOUS; SUBCUTANEOUS
Status: ACTIVE | OUTPATIENT
Start: 2025-06-09 | End: 2025-06-10

## 2025-06-09 RX ORDER — DOCUSATE SODIUM 100 MG/1
100 CAPSULE, LIQUID FILLED ORAL 2 TIMES DAILY
Status: DISCONTINUED | OUTPATIENT
Start: 2025-06-09 | End: 2025-06-13 | Stop reason: HOSPADM

## 2025-06-09 RX ORDER — KETOROLAC TROMETHAMINE 30 MG/ML
INJECTION, SOLUTION INTRAMUSCULAR; INTRAVENOUS AS NEEDED
Status: DISCONTINUED | OUTPATIENT
Start: 2025-06-09 | End: 2025-06-09 | Stop reason: HOSPADM

## 2025-06-09 RX ORDER — OXYTOCIN/0.9 % SODIUM CHLORIDE 30/500 ML
62.5 PLASTIC BAG, INJECTION (ML) INTRAVENOUS ONCE
Status: COMPLETED | OUTPATIENT
Start: 2025-06-09 | End: 2025-06-09

## 2025-06-09 RX ORDER — KETAMINE HCL IN NACL, ISO-OSM 100MG/10ML
SYRINGE (ML) INJECTION
Status: COMPLETED
Start: 2025-06-09 | End: 2025-06-09

## 2025-06-09 RX ORDER — ONDANSETRON 2 MG/ML
4 INJECTION INTRAMUSCULAR; INTRAVENOUS EVERY 6 HOURS PRN
Status: CANCELLED | OUTPATIENT
Start: 2025-06-09 | End: 2025-06-10

## 2025-06-09 RX ORDER — BENZOCAINE/MENTHOL 6 MG-10 MG
1 LOZENGE MUCOUS MEMBRANE DAILY PRN
Status: DISCONTINUED | OUTPATIENT
Start: 2025-06-09 | End: 2025-06-13 | Stop reason: HOSPADM

## 2025-06-09 RX ORDER — MORPHINE SULFATE 0.5 MG/ML
INJECTION, SOLUTION EPIDURAL; INTRATHECAL; INTRAVENOUS AS NEEDED
Status: DISCONTINUED | OUTPATIENT
Start: 2025-06-09 | End: 2025-06-09 | Stop reason: HOSPADM

## 2025-06-09 RX ORDER — FAMOTIDINE 10 MG/ML
20 INJECTION, SOLUTION INTRAVENOUS EVERY 12 HOURS SCHEDULED
Status: DISCONTINUED | OUTPATIENT
Start: 2025-06-09 | End: 2025-06-12

## 2025-06-09 RX ORDER — LIDOCAINE HYDROCHLORIDE AND EPINEPHRINE 20; 5 MG/ML; UG/ML
INJECTION, SOLUTION EPIDURAL; INFILTRATION; INTRACAUDAL; PERINEURAL AS NEEDED
Status: DISCONTINUED | OUTPATIENT
Start: 2025-06-09 | End: 2025-06-09 | Stop reason: HOSPADM

## 2025-06-09 RX ORDER — IBUPROFEN 600 MG/1
600 TABLET, FILM COATED ORAL EVERY 6 HOURS
Status: DISCONTINUED | OUTPATIENT
Start: 2025-06-10 | End: 2025-06-11

## 2025-06-09 RX ORDER — OXYTOCIN/0.9 % SODIUM CHLORIDE 30/500 ML
PLASTIC BAG, INJECTION (ML) INTRAVENOUS
Status: DISPENSED
Start: 2025-06-09 | End: 2025-06-09

## 2025-06-09 RX ORDER — ACETAMINOPHEN 325 MG/1
975 TABLET ORAL EVERY 6 HOURS SCHEDULED
Status: DISCONTINUED | OUTPATIENT
Start: 2025-06-09 | End: 2025-06-11

## 2025-06-09 RX ORDER — ONDANSETRON 2 MG/ML
4 INJECTION INTRAMUSCULAR; INTRAVENOUS EVERY 6 HOURS PRN
Status: DISPENSED | OUTPATIENT
Start: 2025-06-09 | End: 2025-06-10

## 2025-06-09 RX ORDER — DEXAMETHASONE SODIUM PHOSPHATE 10 MG/ML
INJECTION, SOLUTION INTRAMUSCULAR; INTRAVENOUS AS NEEDED
Status: DISCONTINUED | OUTPATIENT
Start: 2025-06-09 | End: 2025-06-09 | Stop reason: HOSPADM

## 2025-06-09 RX ORDER — NALOXONE HYDROCHLORIDE 0.4 MG/ML
0.1 INJECTION, SOLUTION INTRAMUSCULAR; INTRAVENOUS; SUBCUTANEOUS
Status: ACTIVE | OUTPATIENT
Start: 2025-06-09 | End: 2025-06-10

## 2025-06-09 RX ORDER — MIDAZOLAM HYDROCHLORIDE 2 MG/2ML
INJECTION, SOLUTION INTRAMUSCULAR; INTRAVENOUS
Status: COMPLETED
Start: 2025-06-09 | End: 2025-06-09

## 2025-06-09 RX ORDER — OXYCODONE HYDROCHLORIDE 5 MG/1
10 TABLET ORAL EVERY 4 HOURS PRN
Status: DISCONTINUED | OUTPATIENT
Start: 2025-06-09 | End: 2025-06-11 | Stop reason: SDUPTHER

## 2025-06-09 RX ORDER — KETOROLAC TROMETHAMINE 30 MG/ML
INJECTION, SOLUTION INTRAMUSCULAR; INTRAVENOUS
Status: COMPLETED
Start: 2025-06-09 | End: 2025-06-09

## 2025-06-09 RX ORDER — KETAMINE HCL IN NACL, ISO-OSM 100MG/10ML
SYRINGE (ML) INJECTION AS NEEDED
Status: DISCONTINUED | OUTPATIENT
Start: 2025-06-09 | End: 2025-06-09 | Stop reason: HOSPADM

## 2025-06-09 RX ORDER — SODIUM CHLORIDE, SODIUM LACTATE, POTASSIUM CHLORIDE, CALCIUM CHLORIDE 600; 310; 30; 20 MG/100ML; MG/100ML; MG/100ML; MG/100ML
INJECTION, SOLUTION INTRAVENOUS CONTINUOUS PRN
Status: DISCONTINUED | OUTPATIENT
Start: 2025-06-09 | End: 2025-06-09 | Stop reason: HOSPADM

## 2025-06-09 RX ORDER — NALBUPHINE HYDROCHLORIDE 10 MG/ML
5 INJECTION INTRAMUSCULAR; INTRAVENOUS; SUBCUTANEOUS
Status: CANCELLED | OUTPATIENT
Start: 2025-06-09 | End: 2025-06-10

## 2025-06-09 RX ORDER — SIMETHICONE 80 MG
80 TABLET,CHEWABLE ORAL 4 TIMES DAILY PRN
Status: DISCONTINUED | OUTPATIENT
Start: 2025-06-09 | End: 2025-06-13 | Stop reason: HOSPADM

## 2025-06-09 RX ORDER — OXYCODONE HYDROCHLORIDE 5 MG/1
10 TABLET ORAL EVERY 4 HOURS PRN
Status: DISCONTINUED | OUTPATIENT
Start: 2025-06-10 | End: 2025-06-13 | Stop reason: HOSPADM

## 2025-06-09 RX ORDER — SODIUM CHLORIDE, SODIUM LACTATE, POTASSIUM CHLORIDE, CALCIUM CHLORIDE 600; 310; 30; 20 MG/100ML; MG/100ML; MG/100ML; MG/100ML
125 INJECTION, SOLUTION INTRAVENOUS CONTINUOUS
Status: DISCONTINUED | OUTPATIENT
Start: 2025-06-09 | End: 2025-06-13 | Stop reason: HOSPADM

## 2025-06-09 RX ORDER — POLYETHYLENE GLYCOL 3350 17 G/17G
17 POWDER, FOR SOLUTION ORAL DAILY PRN
Status: DISCONTINUED | OUTPATIENT
Start: 2025-06-09 | End: 2025-06-13 | Stop reason: HOSPADM

## 2025-06-09 RX ADMIN — PHENYLEPHRINE HYDROCHLORIDE 50 MCG/MIN: 10 INJECTION INTRAVENOUS at 02:06

## 2025-06-09 RX ADMIN — DEXAMETHASONE SODIUM PHOSPHATE 10 MG: 10 INJECTION, SOLUTION INTRAMUSCULAR; INTRAVENOUS at 02:04

## 2025-06-09 RX ADMIN — MORPHINE SULFATE 3 MG: 0.5 INJECTION, SOLUTION EPIDURAL; INTRATHECAL; INTRAVENOUS at 02:31

## 2025-06-09 RX ADMIN — Medication 62.5 MILLI-UNITS/MIN: at 03:00

## 2025-06-09 RX ADMIN — LABETALOL HYDROCHLORIDE 100 MG: 100 TABLET, FILM COATED ORAL at 21:28

## 2025-06-09 RX ADMIN — Medication 30 MG: at 02:26

## 2025-06-09 RX ADMIN — KETOROLAC TROMETHAMINE 15 MG: 30 INJECTION, SOLUTION INTRAMUSCULAR; INTRAVENOUS at 15:43

## 2025-06-09 RX ADMIN — Medication 20 MG: at 02:33

## 2025-06-09 RX ADMIN — TERBUTALINE SULFATE 1 MG: 1 INJECTION, SOLUTION SUBCUTANEOUS at 01:17

## 2025-06-09 RX ADMIN — ACETAMINOPHEN 975 MG: 325 TABLET, FILM COATED ORAL at 17:41

## 2025-06-09 RX ADMIN — LIDOCAINE HYDROCHLORIDE,EPINEPHRINE BITARTRATE 15 ML: 20; .005 INJECTION, SOLUTION EPIDURAL; INFILTRATION; INTRACAUDAL; PERINEURAL at 01:52

## 2025-06-09 RX ADMIN — ACETAMINOPHEN 975 MG: 325 TABLET, FILM COATED ORAL at 12:16

## 2025-06-09 RX ADMIN — MIDAZOLAM 2 MG: 1 INJECTION INTRAMUSCULAR; INTRAVENOUS at 02:15

## 2025-06-09 RX ADMIN — SODIUM CHLORIDE, SODIUM LACTATE, POTASSIUM CHLORIDE, AND CALCIUM CHLORIDE 125 ML/HR: .6; .31; .03; .02 INJECTION, SOLUTION INTRAVENOUS at 10:25

## 2025-06-09 RX ADMIN — SODIUM CHLORIDE, SODIUM LACTATE, POTASSIUM CHLORIDE, AND CALCIUM CHLORIDE: .6; .31; .03; .02 INJECTION, SOLUTION INTRAVENOUS at 02:35

## 2025-06-09 RX ADMIN — ONDANSETRON 4 MG: 2 INJECTION INTRAMUSCULAR; INTRAVENOUS at 02:04

## 2025-06-09 RX ADMIN — FAMOTIDINE 20 MG: 10 INJECTION, SOLUTION INTRAVENOUS at 21:22

## 2025-06-09 RX ADMIN — KETOROLAC TROMETHAMINE 30 MG: 30 INJECTION, SOLUTION INTRAMUSCULAR; INTRAVENOUS at 02:37

## 2025-06-09 RX ADMIN — ACETAMINOPHEN 975 MG: 325 TABLET, FILM COATED ORAL at 05:57

## 2025-06-09 RX ADMIN — KETOROLAC TROMETHAMINE 15 MG: 30 INJECTION, SOLUTION INTRAMUSCULAR; INTRAVENOUS at 08:36

## 2025-06-09 RX ADMIN — DOCUSATE SODIUM 100 MG: 100 CAPSULE, LIQUID FILLED ORAL at 17:41

## 2025-06-09 RX ADMIN — ACETAMINOPHEN 975 MG: 325 TABLET, FILM COATED ORAL at 23:50

## 2025-06-09 RX ADMIN — SODIUM CHLORIDE, SODIUM LACTATE, POTASSIUM CHLORIDE, AND CALCIUM CHLORIDE: .6; .31; .03; .02 INJECTION, SOLUTION INTRAVENOUS at 01:51

## 2025-06-09 RX ADMIN — ONDANSETRON 4 MG: 2 INJECTION INTRAMUSCULAR; INTRAVENOUS at 10:25

## 2025-06-09 RX ADMIN — KETOROLAC TROMETHAMINE 15 MG: 30 INJECTION, SOLUTION INTRAMUSCULAR; INTRAVENOUS at 21:26

## 2025-06-09 NOTE — PLAN OF CARE
Problem: POSTPARTUM  Goal: Experiences normal postpartum course  Description: INTERVENTIONS:  - Monitor maternal vital signs  - Assess uterine involution and lochia  Outcome: Progressing  Goal: Appropriate maternal -  bonding  Description: INTERVENTIONS:  - Identify family support  - Assess for appropriate maternal/infant bonding   -Encourage maternal/infant bonding opportunities  - Referral to  or  as needed  Outcome: Progressing  Goal: Establishment of infant feeding pattern  Description: INTERVENTIONS:  - Assess breast/bottle feeding  - Refer to lactation as needed  Outcome: Progressing  Goal: Incision(s), wounds(s) or drain site(s) healing without S/S of infection  Description: INTERVENTIONS  - Assess and document dressing, incision, wound bed, drain sites and surrounding tissue  - Provide patient and family education    Outcome: Progressing     Problem: PAIN - ADULT  Goal: Verbalizes/displays adequate comfort level or baseline comfort level  Description: Interventions:  - Encourage patient to monitor pain and request assistance  - Assess pain using appropriate pain scale  - Administer analgesics as ordered based on type and severity of pain and evaluate response  - Implement non-pharmacological measures as appropriate and evaluate response  - Consider cultural and social influences on pain and pain management  - Notify physician/advanced practitioner if interventions unsuccessful or patient reports new pain  - Educate patient/family on pain management process including their role and importance of  reporting pain   - Provide non-pharmacologic/complimentary pain relief interventions  Outcome: Progressing     Problem: SAFETY ADULT  Goal: Patient will remain free of falls  Description: INTERVENTIONS:  - Keep Call bell within reach  - Keep bed low and locked with side rails adjusted as appropriate  - Keep care items and personal belongings within reach  - Initiate and maintain  comfort rounds  Outcome: Progressing     Problem: Knowledge Deficit  Goal: Patient/family/caregiver demonstrates understanding of disease process, treatment plan, medications, and discharge instructions  Description: Complete learning assessment and assess knowledge base.  Interventions:  - Provide teaching at level of understanding  - Provide teaching via preferred learning methods  Outcome: Progressing     Problem: DISCHARGE PLANNING  Goal: Discharge to home or other facility with appropriate resources  Description: INTERVENTIONS:  - Identify barriers to discharge w/patient and caregiver  - Arrange for needed discharge resources and transportation as appropriate  - Identify discharge learning needs (meds, wound care, etc.)  - Arrange for interpretive services to assist at discharge as needed  - Refer to Case Management Department for coordinating discharge planning if the patient needs post-hospital services based on physician/advanced practitioner order or complex needs related to functional status, cognitive ability, or social support system  Outcome: Progressing

## 2025-06-09 NOTE — QUICK NOTE
"Pt seen at bedside after report of chest pain/discomfort in the last 20 min    S:  Pt reports she got OOB for the first time, tried to void after denis dc (unable to void)  While ambulating and in bathroom felt dull central CP w throbbing sensation  No radiation, no SOB, no dizziness or unsteadiness   Hasn't felt like eating much today but is taking liquids  Recently had some sour reflux into throat but unlike CP episode    O:  I&O  In 400cc since this AM  Out 350cc (denis)    VS once back in bed:  /91 (BP Location: Left arm)   Pulse 93   Temp 98 °F (36.7 °C) (Oral)   Resp 19   Ht 5' 8\" (1.727 m)   Wt (!) 137 kg (302 lb) Comment: pregnancy weight  LMP 08/20/2024 (Exact Date)   SpO2 97%   Breastfeeding Yes   BMI 45.92 kg/m²      PE  NAD  CTAB without crackles, ronchi, wheezes  No chest wall TTP  RRR without m/r/g  BLE edema 1-2+, pitting      EKG shows NSR per nursing, awaiting image upload    A:  POD 0 from C/S in 36F with pregnancy c/b HTN  -Continue labetalol at 100mg BID -- next dose tonight  -Continue pain control with tylenol, toradol PRNs  -SCD and lovenox, ecouraged OOB, IS  -Continue PPI, added pepcid for recurrent sx  -If chest discomfort recurs would get troponin, repeat EKG, CXR, consider lasix    Discussed w team  "

## 2025-06-09 NOTE — DISCHARGE SUMMARY
Discharge Summary - OB/GYN   Name: Yecenia Rodriguez 35 y.o. female I MRN: [id number]  Unit/Bed#: -01 I Date of Admission: 2025   Date of Service: 2025 I Hospital Day: 6    ADMISSION  Patient of: [facility] (Dr. Corrales, Dr. Chakraborty, Dr. Antony)  Admission Date: 2025   Admitting Attending: Dr. Steen   Admitting Diagnoses: Problem List[1]    DELIVERY  Delivery Method: , Low Transverse   Delivery Date and Time: 2025 2:10 AM  Delivery Attending: Khris Chakraborty    DISCHARGE  Discharge Date: 25   Discharge Attending: Dr. Krhis Chakraborty MD  Discharge Diagnosis:   Same, Delivered    Clinical course: Admission to Delivery  Yecenia Rodriguez is a 35 y.o.  at 39w6d admitted for IOL in setting of CHTN. Initial SVE was 1/50/-3, and FB was placed for cervical ripening. Accidental AROM occurred at the time of FB placement for clear fluid. She received an oral cytotec for ripening. FB was expelled and pitocin was started. She progressed to  5/80/-2 and FHT began to have prolonged decelerations requiring repositioning, turning off pitocin, and terbutaline. Due to the prolonged decelerations, pitocin was unable to be titrated and  section was recommended. Yecenia agreed and anesthesia was notified. She was taken to the OR     Delivery  Route of Delivery: , Low Transverse  Reason for  delivery: Category II FHR Tracing      Anesthesia: Epidural,   QBL:  543mL    Delivery: , Low Transverse at 2025 2:10 AM    Baby's Weight: 3770 g (8 lb 5 oz); 132.98    Apgar scores: 2  and 8  at 1 and 5 minutes, respectively    Clinical Course: Post-Delivery:  The post delivery course was remarkable for right flank pain. She required multiple medications for pain control. A CT C/A scan showed a small 3 cm intermediate density mass in the right kidney. A follow-up US noted exopyhitic hypoechoic lesion in R kidney. Recommended renal mass protocol for  imaging when clinically feasible. Her postpartum course was also complicated by difficult to control blood pressures. She required titration of her medication. She was discharged home once her blood pressures were better controlled. She was discharged home with Labetalol 400mg BID and Lasix protocol.    On the day of discharge, the patient was ambulating, voiding spontaneously, tolerating oral intake, and hemodynamically stable. She was able to reasonably perform all ADLs. She had appropriate bowel function. Pain was well-controlled. She was discharged home on postpartum/postop day #4 without complications. Patient was instructed to follow up with her OB as an outpatient and was given appropriate warnings to call her provider with problems or concerns.    Pertinent lab findings included:   Blood type A+.     Last three Hgb values:  Lab Results   Component Value Date    HGB 8.7 (L) 06/11/2025    HGB 8.7 (L) 06/11/2025    HGB 10.2 (L) 06/09/2025      Discharge med list:  Contraception: undecided     Medication List      START taking these medications     acetaminophen 325 mg tablet; Commonly known as: TYLENOL; Take 2 tablets   (650 mg total) by mouth every 6 (six) hours as needed for mild pain   benzocaine-menthol-lanolin-aloe 20-0.5 % topical spray; Commonly known   as: DERMOPLAST; Apply 1 Application topically every 6 (six) hours as   needed for irritation   furosemide 20 mg tablet; Commonly known as: LASIX; Take 1 tablet (20 mg   total) by mouth daily for 3 doses; Start taking on: June 14, 2025   hydrocortisone 1 % cream; Apply 1 Application topically daily as needed   for irritation   ibuprofen 600 mg tablet; Commonly known as: MOTRIN; Take 1 tablet (600   mg total) by mouth every 6 (six) hours as needed for mild pain   witch hazel-glycerin topical pad; Commonly known as: TUCKS; Apply 1 Pad   topically every 4 (four) hours as needed for irritation     CHANGE how you take these medications     Labetalol HCl 400 MG  Tabs; Take 400 mg by mouth 2 (two) times a day;   What changed: medication strength, how much to take     CONTINUE taking these medications     Adult Blood Pressure Cuff Lg Kit; Use in the morning   buPROPion 150 mg 12 hr tablet; Commonly known as: WELLBUTRIN SR; Take 1   tablet (150 mg total) by mouth 2 (two) times a day   calcium carbonate 500 mg chewable tablet; Commonly known as: TUMS   metFORMIN 1000 MG tablet; Commonly known as: GLUCOPHAGE; Take 1 tablet   (1,000 mg total) by mouth 2 (two) times a day with meals   pantoprazole 20 mg tablet; Commonly known as: PROTONIX; Take 1 tablet   (20 mg total) by mouth daily   PRENATAL 1 PO     STOP taking these medications     aluminum chloride 20 % external solution; Commonly known as: DRYSOL       Condition at discharge:   good     Disposition:   Home    Planned Readmission:   Mimi Hampton  PGY-1 OB/GYN  25  4:49 PM           [1]   Patient Active Problem List  Diagnosis    Atypical squamous cell changes of undetermined significance (ASCUS) on cervical cytology with negative high risk human papilloma virus (HPV) test result    Dyslipidemia, goal LDL below 160    Food insecurity    Major depressive disorder, recurrent episode, moderate (HCC)    Class 3 severe obesity with body mass index (BMI) of 40.0 to 44.9 in adult    PCOS (polycystic ovarian syndrome)    Post-traumatic osteoarthritis of right foot    Esophageal spasm    Anxiety disorder due to known physiological condition    Cyst of right ovary    History of PCOS    Anxiety and depression    Benign essential HTN    GERD (gastroesophageal reflux disease)    Prediabetes    Chronic hypertension affecting pregnancy    Status post primary low transverse  section    High-risk pregnancy, primigravida of advanced maternal age    Obesity, Class III, BMI 40-49.9 (morbid obesity)    Obesity affecting pregnancy in third trimester    Other specified diabetes mellitus without complications (HCC)    Elevated  glucose tolerance test    Uterine fibroid in pregnancy    Macrosomia affecting management of mother in third trimester    Encounter for supervision of high risk pregnancy in third trimester, antepartum    Right upper quadrant pain

## 2025-06-09 NOTE — UTILIZATION REVIEW
"NOTIFICATION OF INPATIENT ADMISSION   MATERNITY/DELIVERY AUTHORIZATION REQUEST   SERVICING FACILITY:   [facility]  Parent Child Health - L&D, [place], NICU  [address] [place] [address]  Tax ID: [id number]  NPI: [id number] ATTENDING PROVIDER:  Attending Name and NPI#: Khris Chakraborty Md [[id number]]  Address: [address] [place] [address]  Phone: [phone]     ADMISSION INFORMATION:  Place of Service: Inpatient [facility]  Place of Service Code: 21  Inpatient Admission Date/Time: 25  9:49 PM  Discharge Date/Time: No discharge date for patient encounter.  Admitting Diagnosis Code/Description:  Encounter for induction of labor [Z34.90]   Mother: Yecenia Rodriguez 1990 Estimated Date of Delivery: 6/10/25  Delivering clinician: Khris Chakraborty   OB History          2    Para   1    Term   1            AB   1    Living   1         SAB        IAB   1    Ectopic        Multiple   0    Live Births   1           Obstetric Comments   Menarche: 13      Menses: unsure of birth control              Name & MRN:   Information for the patient's :  Jennifer, Baby Boy (Yecenia) [[id number]]   [place] Delivery Information:  Sex: male  Delivered 2025 2:10 AM by , Low Transverse; Gestational Age: 39w6d     Measurements:  Weight: 8 lb 5 oz (3770 g);  Height: 23\"    APGAR 1 minute 5 minutes 10 minutes   Totals: 2 8       UTILIZATION REVIEW CONTACT:  Carla Lovell, Utilization   Network Utilization Review Department  Phone: [phone]  Fax [phone]  Email: [email or web address]  Contact for approvals/pending authorizations, clinical reviews, and discharge.   PHYSICIAN ADVISORY SERVICES:  Medical Necessity Denial & Myka-cf-Kgmu Review  Phone: [phone]  Fax: [phone]  Email: [email or web address]   DISCHARGE SUPPORT TEAM:  For Patients Discharge Needs & Updates  Phone: " [phone] opt. 2 Fax: [phone]  Email: [email or web address]

## 2025-06-09 NOTE — PLAN OF CARE
Problem: PAIN - ADULT  Goal: Verbalizes/displays adequate comfort level or baseline comfort level  Description: Interventions:  - Encourage patient to monitor pain and request assistance  - Assess pain using appropriate pain scale  - Administer analgesics as ordered based on type and severity of pain and evaluate response  - Implement non-pharmacological measures as appropriate and evaluate response  - Consider cultural and social influences on pain and pain management  - Notify physician/advanced practitioner if interventions unsuccessful or patient reports new pain  - Educate patient/family on pain management process including their role and importance of  reporting pain   - Provide non-pharmacologic/complimentary pain relief interventions  Outcome: Completed     Problem: INFECTION - ADULT  Goal: Absence or prevention of progression during hospitalization  Description: INTERVENTIONS:  - Assess and monitor for signs and symptoms of infection  - Monitor lab/diagnostic results  - Monitor all insertion sites, i.e. indwelling lines, tubes, and drains  - Monitor endotracheal if appropriate and nasal secretions for changes in amount and color  - [place] appropriate cooling/warming therapies per order  - Administer medications as ordered  - Instruct and encourage patient and family to use good hand hygiene technique  - Identify and instruct in appropriate isolation precautions for identified infection/condition  Outcome: Completed  Goal: Absence of fever/infection during neutropenic period  Description: INTERVENTIONS:  - Monitor WBC  - Perform strict hand hygiene  - Limit to healthy visitors only  - No plants, dried, fresh or silk flowers with denton in patient room  Outcome: Completed     Problem: SAFETY ADULT  Goal: Patient will remain free of falls  Description: INTERVENTIONS:    - Consult OT/PT to assist with strengthening/mobility   - Keep Call bell within reach  - Keep bed low and locked with side rails adjusted  as appropriate  - Keep care items and personal belongings within reach  - Initiate and maintain comfort rounds  - Make Fall Risk Sign visible to staff  - Apply yellow socks and bracelet for high fall risk patients  - Consider moving patient to room near nurses station  Outcome: Completed  Goal: Maintain or return to baseline ADL function  Description: INTERVENTIONS:  -  Assess patient's ability to carry out ADLs; assess patient's baseline for ADL function and identify physical deficits which impact ability to perform ADLs (bathing, care of mouth/teeth, toileting, grooming, dressing, etc.)  - Assess/evaluate cause of self-care deficits   - Assess range of motion  - Assess patient's mobility; develop plan if impaired  - Assess patient's need for assistive devices and provide as appropriate  - Encourage maximum independence but intervene and supervise when necessary  - Involve family in performance of ADLs  - Assess for home care needs following discharge   - Consider OT consult to assist with ADL evaluation and planning for discharge  - Provide patient education as appropriate  - Monitor functional capacity and physical performance, use of AM PAC & JH-HLM   - Monitor gait, balance and fatigue with ambulation    Outcome: Completed  Goal: Maintains/Returns to pre admission functional level  Description: INTERVENTIONS:  -   - Record patient progress and toleration of activity level   Outcome: Completed     Problem: DISCHARGE PLANNING  Goal: Discharge to home or other facility with appropriate resources  Description: INTERVENTIONS:  - Identify barriers to discharge w/patient and caregiver  - Arrange for needed discharge resources and transportation as appropriate  - Identify discharge learning needs (meds, wound care, etc.)  - Arrange for interpretive services to assist at discharge as needed  - Refer to Case Management Department for coordinating discharge planning if the patient needs post-hospital services based on  physician/advanced practitioner order or complex needs related to functional status, cognitive ability, or social support system  Outcome: Completed     Problem: Knowledge Deficit  Goal: Patient/family/caregiver demonstrates understanding of disease process, treatment plan, medications, and discharge instructions  Description: Complete learning assessment and assess knowledge base.  Interventions:  - Provide teaching at level of understanding  - Provide teaching via preferred learning methods  Outcome: Completed  Goal: Verbalizes understanding of labor plan  Description: Assess patient/family/caregiver's baseline knowledge level and ability to understand information.  Provide education via patient/family/caregiver's preferred learning method at appropriate level of understanding.     1. Provide teaching at level of understanding.  2. Provide teaching via preferred learning method(s).  Outcome: Completed     Problem: Labor & Delivery  Goal: Manages discomfort  Description: Assess and monitor for signs and symptoms of discomfort.  Assess patient's pain level regularly and per hospital policy.  Administer medications as ordered. Support use of nonpharmacological methods to help control pain such as distraction, imagery, relaxation, and application of heat and cold.  Collaborate with interdisciplinary team and patient to determine appropriate pain management plan.    1. Include patient in decisions related to comfort.  2. Offer non-pharmacological pain management interventions.  3. Report ineffective pain management to physician.  Outcome: Completed  Goal: Patient vital signs are stable  Description: 1. Assess vital signs - vaginal delivery.  Outcome: Completed

## 2025-06-09 NOTE — LACTATION NOTE
This note was copied from a baby's chart.  CONSULT - LACTATION  Baby Boy (Ilana Rodriguez 0 days male MRN: [id number]    Replaced by [facility] NURSERY Room / Bed: (N)/(N) Encounter: [id number]    Maternal Information     MOTHER:  Yecenia Rodriguez  Maternal Age: 35 y.o.  OB History: # 1 - Date: 17, Sex: None, Weight: None, GA: None, Type: None, Apgar1: None, Apgar5: None, Living: None, Birth Comments: None    # 2 - Date: 25, Sex: Male, Weight: 3770 g (8 lb 5 oz), GA: 39w6d, Type: , Low Transverse, Apgar1: 2, Apgar5: 8, Living: Living, Birth Comments: None   Previous breast reduction surgery? No    Lactation history:   Has patient previously breast fed: No   How long had patient previously breast fed:     Previous breast feeding complications:       Past Surgical History:   Procedure Laterality Date    DILATION AND CURETTAGE OF UTERUS      ETOP    ENDOSCOPIC ULTRASOUND (UPPER)  a month ago    not sure if it's endoscopic...I had an ultra sound for PCOS    WISDOM TOOTH EXTRACTION         Birth information:  YOB: 2026   Time of birth: 2:10 AM   Sex: male   Delivery type: , Low Transverse   Birth Weight: 3770 g (8 lb 5 oz)   Percent of Weight Change: 0%     Gestational Age: 39w6d   [unfilled]    Reason for Consult:    Reason for Consult: Initial assessment (routine) - 10 min, Latch Assess (ext) - 20 min    Risk Factors:    Risk Factors:  (AMA, PCOS)    Breast and nipple assessment:   Breasts/Nipples  Left Breast: Soft (Tubular, wide spaced)  Right Breast: Soft (Tubular, wide spaced)  Left Nipple: Everted (short)  Right Nipple: Everted (short)  Intervention: Hand expression  Breastfeeding Progress: Not yet established    Breast Pump:    Breast Pump  Pump: Personal (Has a breast pump through ins.)       Assessment: normal assessment, Sleepy 9 hours old.    Feeding assessment: feeding well with stimulation & breast  compression.  LATCH:  Latch: Grasps breast, tongue down, lips flanged, rhythmic sucking   Audible Swallowing: A few with stimulation   Type of Nipple: Everted (After stimulation)   Comfort (Breast/Nipple): Soft/non-tender   Hold (Positioning): Partial assist, teach one side, mother does other, staff holds   LATCH Score: 8       Feeding recommendations:  breast feed on demand every 2-3 hours, watching for early feeding cues. At least 8-12 feedings in 24 hours.    Consulted with Yecenia for an initial visit.    Patient has history of PCOS, tubular breasts and reports little to no breast changes throughout pregnancy.    Baby 9 hours old, assisted with feeding baby. Baby was brought to the right breast in football position. Reviewed proper latch and positioning with patient. Demonstrated hand expression with patient, with permission. Drops of colostrum noted, patient was reassured by observed drops.  Lactation provided hand on hand support and guided dyad to deep latch. Baby actively sucking at the breast, patient noted strong pull and tug sensation.     Encouraged breast compression and stimulating baby to keep baby actively sucking at the breast.    Discussed that if it feels like baby is continuously pinching at the breast, encouraged to break the suction by putting pinky in the corner of the baby's mouth and relatching the baby.    Discussed how baby under 24 hours tend to be very sleepy.    Feeding plan:  Patient is encouraged to breastfeed on demand, every 2-3 hours or when baby showing early feeding cues. Reviewed to offer both breasts during a feed.  Discussed the importance of ensuring that baby feeds 8-12x in 24 hours and not waiting over 3 hours to feed. Educated to watch the baby for hunger and fullness cues.    Discussed how to know the baby is feeding adequately at the breast:  -Baby effectively feeding at least 8-12 times in 24 hours.  -Mother feels a comfortable tugging sensation during a feeding.  -Baby  is showing fullness cues, post feed.  -Baby is having adequate amounts of diapers and meeting goal diapers.  -Baby's weight loss is within normal ranges.     Provided family with feeding log. Educated on how to document.  Patient was encouraged to document baby's feedings and outputs to ensure baby is adequately feeding at the breast.    Limited education provided d/t maternal status. Lactation to follow up and continue education and support.     Patient was encouraged to contact lactation for a latch assessment, continued support and/or questions that arise.       NURA Dougherty 6/9/2025 11:43 AM

## 2025-06-09 NOTE — OP NOTE
OPERATIVE REPORT  PATIENT NAME: Yecenia Rodriguez    :  1990  MRN: [id number]  Pt Location: [facility] OR ROOM 01    SURGERY DATE: 2025    Surgeons and Role:     * Khris Chakraborty MD - Primary     * Mehnaz Rosenthal MD    Preop Diagnosis:  Pregnancy at 39 weeks  Macrosomia  Chronic hypertension    Procedure(s) (LRB):   SECTION () (N/A)    Specimen(s):  ID Type Source Tests Collected by Time Destination   1 : TO PATHOLOGY Tissue Placenta TISSUE EXAM Khris Chakraborty MD 2025 0150    A :  Cord Blood Cord BLOOD GAS, VENOUS, CORD, BLOOD GAS, ARTERIAL, CORD Khris Chakraborty MD 2025 0150        Surgical QBL:  Surgical QBL (mL): 543 mL      Drains:  Urethral Catheter Non-latex 16 Fr. (Active)   Site Assessment Skin intact;Clean 25 1900   Reis Care Done 25 1931   Collection Container Standard drainage bag 25 2345   Securement Method Securing device (Describe) 25 2345   Output (mL) 175 mL 25 2240   Number of days: 1       Anesthesia Type:   Epidural    Operative Indications:  Category II tracing, prolonged decelerations     Chucky Group Classification System:  No Multiple pregnancy, No Transverse or oblique lie, No Breech lie, Gestational age is > or =37 weeks, Nulliparous, Labor induced +  is CHUCKY GROUP 2a    Operative Findings:  1. Viable male  on 2025 at 0210 with APGARs of 2 and 8 at 1 and 5 minutes. Fetus weighted 8lb 5oz.  2. Meconium stained amniotic fluid  3. Normal intact placenta with centrally inserted 3VC.  4. Normal uterus and bilateral tubes   5. No adhesions    Umbilical Cord Venous Blood Gas:  Results from last 7 days   Lab Units 25  0223   PH COV  7.246   PCO2 COV mm HG 49.0*   HCO3 COV mmol/L 20.8   BASE EXC COV mmol/L -6.7*   O2 CT CD VB mL/dL 4.7   O2 HGB, VENOUS CORD % 21.9     Umbilical Cord Arterial Blood Gas:  Results from last 7 days   Lab Units 25  0223   PH COA  7.231   PCO2 COA  54.8   PO2 COA mm HG 12.5    HCO3 COA mmol/L 22.5   BASE EXC COA mmol/L -5.7*   O2 CONTENT CORD ART ml/dl 3.5   O2 HGB, ARTERIAL CORD % 16.3     Brief History  Yecenia Rodriguez is a 35 y.o.  at 39w6d admitted for IOL in setting of CHTN. Initial SVE was 1/50/-3, and FB was placed for cervical ripening. Accidental AROM occurred at the time of FB placement for clear fluid. She received an oral cytotec for ripening. FB was expelled and pitocin was started. She progressed to  5/80/-2 and FHT began to have prolonged decelerations requiring repositioning, turning off pitocin, and terbutaline. Due to the prolonged decelerations, pitocin was unable to be titrated and  section was recommended. Yecenia agreed and anesthesia was notified. She was taken to the OR    Operative Technique  The patient was taken to the operating room. Ancef was given for preoperative prophylaxis. The patient was then placed in the dorsal supine position with a left tilt of the hips. The patient was then prepped with chlorhexidine for vaginal prep and chloraprep for abdominal prep and draped in the usual sterile fashion for a Pfannenstiel skin incision.      A time out was performed to confirm correct patient and correct procedure.     A Pfannenstiel incision was made and carried down through the underlying subcutaneous tissue to the fascia using a scalpel. Rectus fascia was then incised. We then proceeded in Delbert-Bailey fashion. All anatomic layers were well-demarcated. The rectus muscles were  and the peritoneum was identified, entered, and extended longitudinally with blunt dissection.     The bladder blade was inserted and a transverse incision was made in the lower uterine segment using a new surgical blade. The uterine incision was extended cephalad and caudal using blunt dissection. The amniotic sac was entered.    The surgeon's hand was placed into the uterine cavity. The fetal head was identified and elevated. At this time, the fetus was  noted to flip to breech presentation.   The fetal feet were palpated and elevated to the level of the hysterotomy followed by the fetal pelvis. The fetal pelvis was supported and the fetus was then delivered to the level of the scapulae.  The fetus was rotated to have the right arm anterior, and the Loveset maneuver was performed to deliver the right fetal arm.  The fetus was rotated to have the left arm anterior, and the left fetal arm was delivered in the same fashion.  The Mauriceau Smellie Veit maneuver was performed to flex the fetal head, after which the fetal head spontaneously delivered. No nuchal cord was noted.    On delivery the cord was doubly clamped and cut. The infant was then passed off the table to the awaiting  staff. Venous and arterial blood gas, cord blood, and portion of cord was obtained for analysis and routine blood testing. The placenta delivery was then sent to pathology. Placenta was noted to be intact with a centrally inserted three-vessel cord.  Oxytocin was administered by IV infusion to enhance uterine contraction.     The Josep-O retractor was placed and the uterus was cleared of all clots and remaining products of conception.      The uterine incision was re approximated using an 0 Vicryl in a running locked fashion. A right lower uterine extension was identified and repaired in a running locked fashion with the hysterotomy. A second horizontal imbricating stitch with the same suture was applied. The uterine incision was examined and noted to be hemostatic. The pericolic gutters were cleared of all clots.  The uterine incision was once again reexamined and noted to be hemostatic. The fascia was re approximated using an 0 Vicryl in a running nonlocked fashion. The subcutaneous tissue was irrigated and cleared of all clots and debris. Good hemostasis was noted with Bovie electrocautery. The subcutaneous tissue was reapproximated with 3-0 Plain. The skin incision was closed  using 4-0 Monocryl. Good hemostasis was noted. The uterus was expressed of clots.     Patient tolerated the procedure well. All needle, sponge, and instrument counts were noted to be correct x 2 at the end of the procedure.  Patient was transferred to the recovery room in stable condition. Dr. Chakraborty was present and participated in the entire surgery.    Complications:   None apparent      Patient Disposition:  PACU         SIGNATURE: Mehnaz Rosenthal MD  DATE: June 9, 2025  TIME: 4:32 AM

## 2025-06-09 NOTE — OB LABOR/OXYTOCIN SAFETY PROGRESS
Oxytocin Safety Progress Check Note - Yecenia Rodriguez 35 y.o. female MRN: [id number]    Unit/Bed#: -01 Encounter: [id number]    Dose (rashard-units/min) Oxytocin: 22 rashard-units/min  Contraction Frequency (minutes): 2.5-3  Contraction Intensity: Mild/Moderate  Uterine Activity Characteristics: Regular  Cervical Dilation: 4        Cervical Effacement: 80  Fetal Station: -2  Baseline Rate (FHR): 155 bpm  Fetal Heart Rate (FHT): 142 BPM  FHR Category: Cat 1               Vital Signs:   Vitals:    06/08/25 2046   BP: 137/76   Pulse: 91   Resp:    Temp:    SpO2:        Notes/comments:   Contractions have been hard to trace. Will consider IUPC if tracing contractions remains difficult. Continue maternal repositioning.       Khris Chakraborty MD 6/8/2025 9:04 PM

## 2025-06-09 NOTE — OB LABOR/OXYTOCIN SAFETY PROGRESS
Oxytocin Safety Progress Check Note - Yecenia Rodriguez 35 y.o. female MRN: [id number]    Unit/Bed#: -01 Encounter: [id number]    Dose (rashard-units/min) Oxytocin: 0 rashard-units/min  Contraction Frequency (minutes): occasional  Contraction Intensity: Mild/Moderate  Uterine Activity Characteristics: Regular  Cervical Dilation: 5        Cervical Effacement: 80  Fetal Station: -2  Baseline Rate (FHR): 155 bpm  Fetal Heart Rate (FHT): 142 BPM  FHR Category: II               Vital Signs:   Vitals:    06/08/25 2227   BP: 132/86   Pulse: 81   Resp:    Temp:    SpO2:        Notes/comments:   FHT with prolonged deceleration. Pitocin held and fluid bolus given. Patient was being repositioned side to side upon arrival to the room. SVE as above. IUPC and FSE placed. Patient repositioned to hands and knees. Dr. Chakraborty present in room. FHR returned to baseline. Will hold pitocin until 30min of category I tracing. D/w Dr. Chakraborty.     Mehnaz Rosenthal MD 6/8/2025 11:03 PM

## 2025-06-09 NOTE — UTILIZATION REVIEW
Initial Clinical Review    Admission: Date/Time/Statement:   Admission Orders (From admission, onward)       Ordered        259  Inpatient Admission  Once                          Orders Placed This Encounter   Procedures    Inpatient Admission     Standing Status:   Standing     Number of Occurrences:   1     Level of Care:   Med Surg [16]     Estimated length of stay:   More than 2 Midnights     Certification:   I certify that inpatient services are medically necessary for this patient for a duration of greater than two midnights. See H&P and MD Progress Notes for additional information about the patient's course of treatment.     ED Arrival Information       Patient not seen in ED                       Chief Complaint   Patient presents with    Scheduled Induction       Initial Presentation: 35 y.o. female  at 39w5d Inpatient admission for induction of labor in the setting of chronic hypertension.  SVE: /-3  FHT: cat 1: baseline 140 bpm, moderate variability, 15x15 accels, no decels    Plan continuous external monitoring, IVF, Cytotec denis balloon, Epidural , IV pitocin, and  supportive care     Date: 2025   Day 2:   @ 0031 DENIS BALLOON PLACEMENT   Leakage of fluid noted through the balloon. Suspect AROM. Denis balloon kept in place. Will plan for oral cytotec.     @ 0648  Will start Pitocin s/p epidural   Contraction Frequency (minutes): 4-5  Contraction Intensity: Mild  Uterine Activity Characteristics: Irregular  Cervical Dilation: 3-4  Cervical Effacement: 50  Fetal Station: -3  Baseline Rate (FHR): 145 bpm  Fetal Heart Rate (FHT): 142 BPM  FHR Category: 1    Epidural 2025 7:43 AM     @ 1522  Oxytocin: 12 rashard-units/min  Contraction Frequency (minutes): 2-4  Contraction Intensity: Mild/Moderate  Uterine Activity Characteristics: Regular  Cervical Dilation: 4  Cervical Effacement: 60  Fetal Station: -3  FHR Category: 1    @ 2104 Cont maternal repositioning  Oxytocin: 22  rashard-units/min  Contraction Frequency (minutes): 2.5-3  Contraction Intensity: Mild/Moderate  Uterine Activity Characteristics: Regular  Cervical Dilation: 4  Cervical Effacement: 80  Fetal Station: -2  FHR Category: Cat 1     @ 2303 FHT with prolonged deceleration. Pitocin held and fluid bolus given. Patient was being repositioned side to side upon arrival to the room. SVE as above. IUPC and FSE placed. Patient repositioned to hands and knees.   Oxytocin: 0 rashard-units/min  Contraction Frequency (minutes): occasional  Contraction Intensity: Mild/Moderate  Uterine Activity Characteristics: Regular  Cervical Dilation: 5  Cervical Effacement: 80  Fetal Station: -2  FHR Category: II    @ 2336 FHT w/ 3 min deceleration to the 80s which resolved with repositioning. SVE unchanged. Will hold pitocin until 30 min of category I   Oxytocin: 0 rashard-units/min  Contraction Frequency (minutes): occasional  Contraction Intensity: Mild/Moderate  Uterine Activity Characteristics: Regular  Cervical Dilation: 5  Cervical Effacement: 80  Fetal Station: -2  FHR Category: II    DATE  2025  @ 0125  patient category II tracing and prolonged decelerations as well as inability to restart pitocin due to prolonged decelerations.   Recommended  section at this time to avoid emergent  section as this is the 3rd prolonged deceleration. Patient agreeable at this time   Oxytocin: 4 rashard-units/min  Contraction Frequency (minutes): 2-3.5  Contraction Intensity: Moderate  Uterine Activity Characteristics: Regular  Cervical Dilation: 5  Cervical Effacement: 80  Fetal Station: -2  Baseline Rate (FHR): 150 bpm  Fetal Heart Rate (FHT): 142 BPM  FHR Category: II     section  2025 @ 0210  Viable male    with APGARs of 2 and 8 at 1 and 5 minutes  Fetus Birthweight 8lb 5oz.     ED Treatment-Medication Administration - No Administrations Displayed (No Start Event Found)       None            Scheduled  Medications:  acetaminophen, 975 mg, Oral, Q6H VELASQUEZ  docusate sodium, 100 mg, Oral, BID  [START ON 6/10/2025] enoxaparin, 40 mg, Subcutaneous, Q12H  ketorolac, 15 mg, Intravenous, Q6H VELASQUEZ   Followed by  [START ON 6/10/2025] ibuprofen, 600 mg, Oral, Q6H  pantoprazole, 20 mg, Oral, Daily Before Breakfast  6/8 x2=    labetalol (NORMODYNE) tablet 100 mg  Dose: 100 mg  Freq: Every 12 hours scheduled Route: PO  Start: 06/08/25 0900 End: 06/09/25 0307     6/8 x1=  miSOPROStol (Cytotec) split tablet 50 mcg  Dose: 50 mcg  Freq: Once Route: PO  Start: 06/08/25 0015 End: 06/08/25 0025      Continuous IV Infusions:  lactated ringers, 125 mL/hr, Intravenous, Continuous    oxytocin (Pitocin) 30 units in sodium chloride 0.9% 500 mL  Rate: 1-30 mL/hr Dose: 1-30 rashard-units/min  Freq: Titrated Route: IV  Last Dose: Stopped (06/09/25 0145)  Start: 06/08/25 0700 End: 06/09/25 0307    ropivacaine 0.2% PCEA  Continuous Rate: 12 mL/hr  PCEA Dose: 5 mL  PCEA Lock-out: 15 Minutes  Number of Doses per Hour: 3 doses/hr  Freq: Continuous Route: EP  Indications of Use: LABOR PAIN  Last Dose: Stopped (06/09/25 0145)  Start: 06/08/25 2200 End: 06/09/25 030     ropivacaine 0.2% PCEA  Continuous Rate: 10 mL/hr  PCEA Dose: 5 mL  PCEA Lock-out: 15 Minutes  Number of Doses per Hour: 3 doses/hr  Freq: Continuous Route: EP  Indications of Use: LABOR PAIN  Last Dose: Stopped (06/08/25 2233)  Start: 06/08/25 0730 End: 06/08/25 2157  PRN Meds:  benzocaine-menthol-lanolin-aloe, 1 Application, Topical, Q6H PRN  calcium carbonate, 1,000 mg, Oral, TID PRN  diphenhydrAMINE, 25 mg, Oral, Q6H PRN  hydrocortisone, 1 Application, Topical, Daily PRN  nalbuphine, 5 mg, Intravenous, Q3H PRN  naloxone, 0.1 mg, Intravenous, Q3 min PRN  ondansetron, 4 mg, Intravenous, Q6H PRN  oxyCODONE, 10 mg, Oral, Q4H PRN  [START ON 6/10/2025] oxyCODONE, 10 mg, Oral, Q4H PRN  [START ON 6/10/2025] oxyCODONE, 5 mg, Oral, Q4H PRN  polyethylene glycol, 17 g, Oral, Daily PRN  simethicone,  80 mg, Oral, 4x Daily PRN  witch hazel-glycerin, 1 Pad, Topical, Q4H PRN      ED Triage Vitals   Temperature Pulse Respirations Blood Pressure SpO2 Pain Score   06/08/25 0100 06/07/25 2238 06/07/25 2238 06/07/25 2238 06/07/25 2238 06/08/25 0312   98.1 °F (36.7 °C) 103 16 120/82 96 % 6     Weight (last 2 days)       Date/Time Weight    06/09/25 0712 137 (302)     Weight: pregnancy weight at 06/09/25 0712            Vital Signs (last 3 days)       Date/Time Temp Pulse Resp BP MAP (mmHg) SpO2 O2 Device Cardiac (WDL) Patient Position - Orthostatic VS Pain    06/09/25 1216 -- -- -- -- -- -- -- -- -- 3    06/09/25 1200 98.1 °F (36.7 °C) 96 18 128/82 -- 96 % Nasal cannula -- Lying --    06/09/25 0836 -- -- -- -- -- -- -- -- -- 3    06/09/25 0830 97.2 °F (36.2 °C) 90 18 126/81 -- 97 % None (Room air) -- Lying --    06/09/25 0730 97.8 °F (36.6 °C) 101 18 115/77 -- 96 % None (Room air) WDL Lying --    06/09/25 0629 98.4 °F (36.9 °C) 92 18 120/82 88 96 % None (Room air) -- Lying 4 06/09/25 0557 -- -- -- -- -- -- -- -- -- 4 06/09/25 0527 99.1 °F (37.3 °C) 95 18 126/71 92 96 % None (Room air) -- Lying 4 06/09/25 0430 99.3 °F (37.4 °C) 95 16 124/70 -- 97 % -- -- Lying 4 06/09/25 0400 98.8 °F (37.1 °C) 102 18 119/66 -- 99 % -- -- Lying 4 06/09/25 0330 98.5 °F (36.9 °C) 94 16 110/59 -- 98 % -- -- Lying 4 06/09/25 0320 97.6 °F (36.4 °C) 96 16 103/57 -- 99 % -- -- Lying 4 06/09/25 0310 97.7 °F (36.5 °C) 82 16 102/53 -- 96 % -- -- Lying 4 06/09/25 0300 97.8 °F (36.6 °C) 84 16 102/55 -- 98 % -- -- Lying 4 06/09/25 0145 -- 98 -- 152/87 -- 100 % -- -- -- --    06/09/25 0140 -- 113 -- 164/71 -- -- -- -- -- --    06/09/25 0139 -- 97 -- -- -- 99 % -- -- -- --    06/09/25 0137 -- 100 -- 143/83 -- -- -- -- -- --    06/09/25 0134 -- 100 -- 150/74 -- 99 % -- -- -- --    06/09/25 0130 -- 95 -- 154/84 -- 99 % -- -- -- --    06/09/25 0129 -- 93 -- -- -- -- -- -- -- --    06/09/25 0128 -- 98 -- 155/84 -- -- -- -- -- --     06/09/25 0125 -- 100 -- 151/78 -- -- -- -- -- --    06/09/25 0124 -- 101 -- -- -- 99 % -- -- -- --    06/09/25 0122 -- 100 -- 135/90 -- -- -- -- -- --    06/09/25 0119 -- 102 -- -- -- 98 % -- -- -- --    06/09/25 0115 -- 121 -- 135/68 -- -- -- -- -- --    06/09/25 0113 -- 87 -- 128/73 -- -- -- -- -- --    06/09/25 0110 -- 88 -- 134/70 -- -- -- -- -- --    06/09/25 0107 -- 90 -- 126/68 -- -- -- -- -- --    06/09/25 0104 -- 96 -- 132/68 -- -- -- -- -- --    06/09/25 0101 -- 97 -- 129/70 -- -- -- -- -- --    06/09/25 0058 -- 93 -- 133/77 -- -- -- -- -- --    06/09/25 0055 -- 93 -- 131/80 -- -- -- -- -- --    06/09/25 0052 -- 92 -- 136/88 -- -- -- -- -- --    06/09/25 0048 -- 93 -- 128/78 -- -- -- -- -- --    06/09/25 0045 -- 95 -- 151/91 -- -- -- -- -- --    06/09/25 0042 98.1 °F (36.7 °C) 98 16 144/87 -- -- None (Room air) -- -- --    06/09/25 0040 -- 114 -- 141/87 -- -- -- -- -- --    06/09/25 0037 -- 104 -- 135/68 -- -- -- -- -- --    06/09/25 0033 -- 96 -- 153/89 -- -- -- -- -- --    06/09/25 0030 -- 91 -- 145/85 -- -- -- -- -- --    06/09/25 0027 -- 88 -- 139/83 -- -- -- -- -- --    06/09/25 0024 -- 91 -- 151/85 -- -- -- -- -- --    06/09/25 0021 -- 89 -- 145/82 -- -- -- -- -- --    06/09/25 0018 -- 88 -- 147/85 -- -- -- -- -- --    06/09/25 0015 -- 88 -- 145/87 -- -- -- -- -- --    06/09/25 0012 -- 91 -- 143/88 -- -- -- -- -- --    06/09/25 0009 -- 88 -- 139/84 -- -- -- -- -- --    06/09/25 0006 -- 88 -- 136/80 -- -- -- -- -- --    06/09/25 0003 -- 86 -- 137/79 -- -- -- -- -- --    06/09/25 0000 -- 94 -- 141/88 -- -- -- -- -- --    06/08/25 2358 -- 85 -- 138/79 -- -- -- -- -- --    06/08/25 2351 -- 91 -- 135/78 -- -- -- -- -- --    06/08/25 2348 -- 90 -- 131/84 -- -- -- -- -- --    06/08/25 2345 -- 90 -- 131/86 -- -- -- -- -- --    06/08/25 2342 -- 97 -- 130/93 -- -- -- -- -- --    06/08/25 2339 -- 83 -- 128/82 -- -- -- -- -- --    06/08/25 2336 -- 86 -- 133/77 -- -- -- -- -- --    06/08/25 7871 -- 90 -- 127/76  -- -- -- -- -- --    06/08/25 2328 -- 85 -- 152/69 -- -- -- -- -- --    06/08/25 2325 -- 94 -- 156/83 -- -- -- -- -- --    06/08/25 2318 -- 93 -- 129/79 -- -- -- -- -- --    06/08/25 2315 -- 105 -- 128/81 -- -- -- -- -- --    06/08/25 2312 -- 93 -- 127/81 -- -- -- -- -- --    06/08/25 2310 -- 87 -- 122/83 -- -- -- -- -- --    06/08/25 2306 -- 96 -- 122/82 -- -- -- -- -- --    06/08/25 2303 98.2 °F (36.8 °C) 92 -- 130/82 -- -- -- -- -- --    06/08/25 2300 -- 97 -- 131/77 -- -- -- -- -- --    06/08/25 2257 -- 106 -- 127/82 -- -- -- -- -- --    06/08/25 2248 -- 146 -- 147/89 -- -- -- -- -- --    06/08/25 2246 -- 100 -- 95/70 -- -- -- -- -- --    06/08/25 2227 -- 81 -- 132/86 -- -- -- -- -- --    06/08/25 2224 -- 81 -- 131/90 -- -- -- -- -- --    06/08/25 2221 -- 82 -- 141/85 -- -- -- -- -- --    06/08/25 2218 -- 82 -- 140/85 -- -- -- -- -- --    06/08/25 2215 -- 80 -- 140/89 -- -- -- -- -- --    06/08/25 2212 -- 83 -- 135/89 -- -- -- -- -- --    06/08/25 2209 -- 90 -- 136/88 -- -- -- -- -- --    06/08/25 2206 -- 86 -- 132/89 -- -- -- -- -- --    06/08/25 2203 98.2 °F (36.8 °C) 86 -- 142/90 -- -- -- -- -- --    06/08/25 2200 -- 80 -- 142/86 -- -- -- -- -- --    06/08/25 2147 -- 97 -- 150/74 -- -- -- -- -- --    06/08/25 2132 -- 98 -- 140/80 -- -- -- -- -- --    06/08/25 2118 -- 90 -- 141/87 -- -- -- -- -- --    06/08/25 2101 98.3 °F (36.8 °C) 94 16 146/83 -- -- -- -- -- --    06/08/25 2046 -- 91 -- 137/76 -- -- -- -- -- --    06/08/25 2031 -- 100 -- 146/83 -- -- -- -- -- --    06/08/25 2024 -- 96 -- 139/81 -- -- -- -- -- --    06/08/25 2017 -- 89 -- 166/101 -- -- -- -- -- --    06/08/25 2001 -- 88 -- 155/92 -- -- -- -- -- --    06/08/25 1946 -- 85 -- 151/89 -- -- -- -- -- --    06/08/25 1931 -- 96 16 162/87 -- -- -- -- Lying 4    06/08/25 1916 -- 84 -- 144/83 -- -- -- -- -- --    06/08/25 1903 -- 89 -- 146/92 -- -- -- -- -- --    06/08/25 1832 98.3 °F (36.8 °C) 94 -- 130/62 -- -- -- -- -- --    06/08/25 1818 -- 91 --  135/77 -- -- -- -- -- --    06/08/25 1800 -- 90 -- 154/96 -- -- -- -- -- --    06/08/25 1757 -- 88 -- 150/92 -- -- -- -- -- --    06/08/25 1742 -- 88 -- 141/87 -- -- -- -- -- --    06/08/25 1727 98.7 °F (37.1 °C) 115 -- 120/74 -- -- -- -- -- --    06/08/25 1711 -- 88 -- 146/85 -- -- -- -- -- --    06/08/25 1700 -- 96 -- 146/85 -- -- -- -- -- --    06/08/25 1657 -- 98 -- 170/97 -- -- -- -- -- --    06/08/25 1641 -- 103 -- 148/89 -- -- -- -- -- --    06/08/25 1626 -- 90 -- 146/91 -- -- -- -- -- --    06/08/25 1613 98.6 °F (37 °C) 86 -- 139/89 -- -- -- -- -- --    06/08/25 1556 -- 94 -- 143/90 -- -- -- -- -- --    06/08/25 1541 -- 104 -- 129/84 -- -- -- -- -- --    06/08/25 1526 -- 106 -- 144/88 -- -- -- -- -- --    06/08/25 1511 97.7 °F (36.5 °C) 100 -- 140/86 -- -- -- -- -- --    06/08/25 1457 -- 112 -- 137/92 -- -- -- -- -- --    06/08/25 1443 98.3 °F (36.8 °C) 100 -- 140/83 -- -- -- -- -- --    06/08/25 1430 -- 97 -- 131/84 -- -- -- -- -- --    06/08/25 1412 -- 93 -- 149/91 -- -- -- -- -- --    06/08/25 1357 -- 109 -- 159/87 -- -- -- -- -- --    06/08/25 1342 -- 97 -- 137/96 -- -- -- -- -- --    06/08/25 1331 97.7 °F (36.5 °C) -- -- -- -- -- -- -- -- --    06/08/25 1326 -- 111 -- 133/83 -- -- -- -- -- --    06/08/25 1311 -- 86 -- 136/86 -- -- -- -- -- --    06/08/25 1257 -- 94 -- 136/91 -- -- -- -- -- --    06/08/25 1211 98.3 °F (36.8 °C) 82 -- 126/77 -- -- -- -- -- --    06/08/25 1156 -- 82 -- 136/79 -- -- -- -- -- --    06/08/25 1141 -- 96 -- 148/83 -- -- -- -- -- --    06/08/25 1128 -- 80 -- 149/79 -- -- -- -- -- --    06/08/25 1119 98.3 °F (36.8 °C) -- -- -- -- -- -- -- -- --    06/08/25 1112 -- 81 -- 129/72 -- -- -- -- -- --    06/08/25 1058 -- 81 -- 116/66 -- -- -- -- -- --    06/08/25 1027 -- 88 -- 132/71 -- -- -- -- -- --    06/08/25 1013 -- 85 -- 132/70 -- -- -- -- -- --    06/08/25 0959 -- 82 -- 124/67 -- -- -- -- -- --    06/08/25 0942 -- 90 -- 127/78 -- -- -- -- -- --    06/08/25 0938 97.7 °F (36.5 °C)  -- -- -- -- -- -- -- -- --    06/08/25 0933 -- 90 -- 124/73 -- -- -- -- -- --    06/08/25 0927 -- 90 -- 124/73 -- -- -- -- -- --    06/08/25 0912 -- 77 -- 130/66 -- -- -- -- -- --    06/08/25 0857 -- 86 -- 139/84 -- -- -- -- -- --    06/08/25 0838 -- 81 -- 125/77 -- -- -- -- -- --    06/08/25 0835 -- 86 -- 122/80 -- -- -- -- -- --    06/08/25 0832 -- 96 -- 130/76 -- -- -- -- -- --    06/08/25 0829 -- 95 -- 135/78 -- -- -- -- -- --    06/08/25 0826 -- 90 -- 128/72 -- -- -- -- -- --    06/08/25 0823 -- 97 -- 120/68 -- -- -- -- -- --    06/08/25 0820 -- 83 -- 123/72 -- -- -- -- -- --    06/08/25 0817 -- 83 -- 127/77 -- -- -- -- -- --    06/08/25 0814 -- 82 -- 125/76 -- -- -- -- -- --    06/08/25 0811 -- 85 -- 142/76 -- -- -- -- -- --    06/08/25 0808 -- 85 -- 131/81 -- -- -- -- -- --    06/08/25 0805 -- 85 -- 128/74 -- -- -- -- -- --    06/08/25 0802 -- 83 -- 128/72 -- -- -- -- -- --    06/08/25 0759 -- 81 -- 121/72 -- -- -- -- -- --    06/08/25 0756 -- 85 -- 113/71 -- -- -- -- -- --    06/08/25 0753 97.5 °F (36.4 °C) 76 -- 128/73 -- -- -- -- -- --    06/08/25 0750 -- 76 -- 139/78 -- -- -- -- -- --    06/08/25 0748 -- 75 -- 151/85 -- -- -- -- -- --    06/08/25 0745 -- 84 -- 125/79 -- -- -- -- -- --    06/08/25 0744 -- 83 -- -- -- 98 % -- -- -- --    06/08/25 0739 -- 81 -- -- -- 99 % -- -- -- --    06/08/25 0734 -- 97 -- -- -- 97 % -- -- -- --    06/08/25 0729 -- 83 -- -- -- 97 % -- -- -- --    06/08/25 0724 -- 78 -- -- -- 98 % -- -- -- --    06/08/25 0645 -- 83 -- 149/79 -- -- -- -- -- --    06/08/25 0640 97.7 °F (36.5 °C) -- -- -- -- -- -- -- -- --    06/08/25 0555 -- 84 -- 136/84 -- -- -- -- -- --    06/08/25 0430 98 °F (36.7 °C) 82 -- 117/62 -- -- -- -- Lying --    06/08/25 0312 -- -- -- -- -- -- -- -- -- 6    06/08/25 0300 98.4 °F (36.9 °C) -- -- 136/70 -- -- -- -- Lying --    06/08/25 0200 98 °F (36.7 °C) -- -- -- -- -- -- -- -- --    06/08/25 0147 -- 86 -- 154/71 -- -- -- -- Lying --    06/08/25 0130 -- 80 --  "140/80 -- -- -- -- -- --    06/08/25 0115 -- 78 -- 142/79 -- -- -- -- -- --    06/08/25 0100 98.1 °F (36.7 °C) 80 -- 136/83 -- -- -- -- -- --    06/08/25 0045 -- 83 -- 147/84 -- -- -- -- -- --    06/07/25 2238 -- 103 16 120/82 -- 96 % -- -- Sitting --              Pertinent Labs/Diagnostic Test Results:   Radiology:  No orders to display     Cardiology:  No orders to display     GI:  No orders to display           Results from last 7 days   Lab Units 06/09/25  1116 06/07/25  2312   WBC Thousand/uL 10.58* 4.61   HEMOGLOBIN g/dL 10.2* 10.4*   HEMATOCRIT % 30.6* 30.3*   PLATELETS Thousands/uL 172 159         Results from last 7 days   Lab Units 06/07/25  2312   SODIUM mmol/L 134*   POTASSIUM mmol/L 3.9   CHLORIDE mmol/L 106   CO2 mmol/L 20*   ANION GAP mmol/L 8   BUN mg/dL 12   CREATININE mg/dL 0.60   EGFR ml/min/1.73sq m 117   CALCIUM mg/dL 8.2*     Results from last 7 days   Lab Units 06/07/25  2312   AST U/L 18   ALT U/L 24   ALK PHOS U/L 99   TOTAL PROTEIN g/dL 6.2*   ALBUMIN g/dL 3.6   TOTAL BILIRUBIN mg/dL 0.35         Results from last 7 days   Lab Units 06/07/25  2312   GLUCOSE RANDOM mg/dL 107             No results found for: \"BETA-HYDROXYBUTYRATE\"                                                                                       Results from last 7 days   Lab Units 06/07/25  2312   CREATININE UR mg/dL 209.0   PROTEIN UR mg/dL 23.8   PROT/CREAT RATIO UR  0.1                                                   Past Medical History[1]  Present on Admission:   Atypical squamous cell changes of undetermined significance (ASCUS) on cervical cytology with negative high risk human papilloma virus (HPV) test result   Macrosomia affecting management of mother in third trimester   Elevated glucose tolerance test   Chronic hypertension affecting pregnancy      Admitting Diagnosis: Encounter for induction of labor [Z34.90]  Age/Sex: 35 y.o. female    Network Utilization Review Department  ATTENTION: Please call with any " questions or concerns to [phone] and carefully listen to the prompts so that you are directed to the right person. All voicemails are confidential.   For Discharge needs, contact Care Management DC Support Team at [phone] opt. 2  Send all requests for admission clinical reviews, approved or denied determinations and any other requests to dedicated fax number below belonging to the [facility] where the patient is receiving treatment. List of dedicated fax numbers for the Facilities:  FACILITY NAME UR FAX NUMBER   ADMISSION DENIALS (Administrative/Medical Necessity) [phone]   DISCHARGE SUPPORT TEAM (NETWORK) [phone]   PARENT CHILD HEALTH (Maternity/NICU/Pediatrics) [phone]   [facility] [phone]   [facility] [phone]   Replaced by [facility] [phone]   [facility] [phone]   [facility] [phone]   [facility] [phone]   [facility] [phone]   [facility] [phone]   [facility] [phone]   [facility] [phone]   [facility] [phone]   [facility] [phone]              [1]   Past Medical History:  Diagnosis Date    Arthritis     Atypical squamous cell changes of undetermined significance (ASCUS) on cervical cytology with negative high risk human papilloma virus (HPV) test result     2/2024-ASCUS +HRHPV; 6/2024-wnl    Genetic screening     12/2024-SMA carrier negative    Hypertension     Ovarian cyst 06/30/2022    bilateral    PCOS (polycystic ovarian syndrome)     Tobacco use disorder 11/21/2014    Uterine fibroid in pregnancy 03/19/2025    Varicella

## 2025-06-09 NOTE — OB LABOR/OXYTOCIN SAFETY PROGRESS
LATE ENTRY DUE TO PATIENT CARE  Oxytocin Safety Progress Check Note - Yecenia Rodriguez 35 y.o. female MRN: [id number]    Unit/Bed#: -01 Encounter: [id number]    Dose (rashard-units/min) Oxytocin: 4 rashard-units/min  Contraction Frequency (minutes): 2-3.5  Contraction Intensity: Moderate  Uterine Activity Characteristics: Regular  Cervical Dilation: 5        Cervical Effacement: 80  Fetal Station: -2  Baseline Rate (FHR): 150 bpm  Fetal Heart Rate (FHT): 142 BPM  FHR Category: II               Vital Signs:   Vitals:    25 0300   BP: 102/55   Pulse: 84   Resp: 16   Temp: 97.8 °F (36.6 °C)   SpO2: 98%       Notes/comments:   Presented to pt room w/ Dr. Chakraborty to assess prolonged deceleration. Patient repositioned, pitocin held, and terbutaline given for prolonged deceleration. FHT return to baseline after repositioning to hands and knees. SVE remains unchanged.     Discussed with patient category II tracing and prolonged decelerations as well as inability to restart pitocin due to prolonged decelerations. Recommended  section at this time to avoid emergent  section as this is the 3rd prolonged deceleration. Patient agreeable at this time. All questions answered. Anesthesia notified.    Mehnaz oRsenthal MD 2025 4:56 AM

## 2025-06-09 NOTE — OB LABOR/OXYTOCIN SAFETY PROGRESS
Oxytocin Safety Progress Check Note - Yecenia Rodriguez 35 y.o. female MRN: [id number]    Unit/Bed#: -01 Encounter: [id number]    Dose (rashard-units/min) Oxytocin: 0 rashard-units/min  Contraction Frequency (minutes): occasional  Contraction Intensity: Mild/Moderate  Uterine Activity Characteristics: Regular  Cervical Dilation: 5        Cervical Effacement: 80  Fetal Station: -2  Baseline Rate (FHR): 155 bpm  Fetal Heart Rate (FHT): 142 BPM  FHR Category: II               Vital Signs:   Vitals:    06/08/25 2315   BP: 128/81   Pulse: 105   Resp:    Temp:    SpO2:        Notes/comments:   FHT w/ 3 min deceleration to the  80s which resolved with repositioning. SVE unchanged. Will hold pitocin until 30 min of category I tracing. D/w Dr. Chakraborty.     Mehnaz Rosenthal MD 6/8/2025 11:36 PM

## 2025-06-09 NOTE — ANESTHESIA POSTPROCEDURE EVALUATION
Post-Op Assessment Note    Last Filed PACU Vitals:  Vitals Value Taken Time   Temp 97    Pulse 97    /78    Resp 18    SpO2 99

## 2025-06-09 NOTE — ANESTHESIA POSTPROCEDURE EVALUATION
Okay to refill medication as in the past.  No change in dose.  Rx sent to patient pharmacy     Post-Op Assessment Note    CV Status:  Stable    Pain management: adequate      Post-op block assessment: catheter intact and no complications   Mental Status:  Alert and awake   Hydration Status:  Euvolemic   PONV Controlled:  Controlled   Airway Patency:  Patent     Post Op Vitals Reviewed: Yes    No anethesia notable event occurred.    Staff: Anesthesiologist, CRNA           Last Filed PACU Vitals:  Vitals Value Taken Time   Temp 97.8 °F (36.6 °C) 06/09/25 03:00   Pulse 84 06/09/25 03:00   /55 06/09/25 03:00   Resp 16 06/09/25 03:00   SpO2 98 % 06/09/25 03:00

## 2025-06-10 PROCEDURE — 99024 POSTOP FOLLOW-UP VISIT: CPT | Performed by: OBSTETRICS & GYNECOLOGY

## 2025-06-10 PROCEDURE — 94762 N-INVAS EAR/PLS OXIMTRY CONT: CPT

## 2025-06-10 RX ADMIN — ENOXAPARIN SODIUM 40 MG: 40 INJECTION SUBCUTANEOUS at 09:17

## 2025-06-10 RX ADMIN — ENOXAPARIN SODIUM 40 MG: 40 INJECTION SUBCUTANEOUS at 20:38

## 2025-06-10 RX ADMIN — LABETALOL HYDROCHLORIDE 100 MG: 100 TABLET, FILM COATED ORAL at 20:37

## 2025-06-10 RX ADMIN — IBUPROFEN 600 MG: 600 TABLET ORAL at 22:21

## 2025-06-10 RX ADMIN — IBUPROFEN 600 MG: 600 TABLET ORAL at 15:46

## 2025-06-10 RX ADMIN — FAMOTIDINE 20 MG: 10 INJECTION, SOLUTION INTRAVENOUS at 20:39

## 2025-06-10 RX ADMIN — DOCUSATE SODIUM 100 MG: 100 CAPSULE, LIQUID FILLED ORAL at 18:44

## 2025-06-10 RX ADMIN — LABETALOL HYDROCHLORIDE 100 MG: 100 TABLET, FILM COATED ORAL at 09:19

## 2025-06-10 RX ADMIN — SODIUM CHLORIDE, SODIUM LACTATE, POTASSIUM CHLORIDE, AND CALCIUM CHLORIDE 100 ML: .6; .31; .03; .02 INJECTION, SOLUTION INTRAVENOUS at 00:10

## 2025-06-10 RX ADMIN — ACETAMINOPHEN 975 MG: 325 TABLET, FILM COATED ORAL at 06:40

## 2025-06-10 RX ADMIN — OXYCODONE HYDROCHLORIDE 5 MG: 5 TABLET ORAL at 23:28

## 2025-06-10 RX ADMIN — KETOROLAC TROMETHAMINE 15 MG: 30 INJECTION, SOLUTION INTRAMUSCULAR; INTRAVENOUS at 08:45

## 2025-06-10 RX ADMIN — SIMETHICONE 80 MG: 80 TABLET, CHEWABLE ORAL at 20:37

## 2025-06-10 RX ADMIN — ACETAMINOPHEN 975 MG: 325 TABLET, FILM COATED ORAL at 18:44

## 2025-06-10 RX ADMIN — ACETAMINOPHEN 975 MG: 325 TABLET, FILM COATED ORAL at 13:23

## 2025-06-10 RX ADMIN — FAMOTIDINE 20 MG: 10 INJECTION, SOLUTION INTRAVENOUS at 09:09

## 2025-06-10 RX ADMIN — OXYCODONE HYDROCHLORIDE 5 MG: 5 TABLET ORAL at 18:46

## 2025-06-10 RX ADMIN — PANTOPRAZOLE SODIUM 20 MG: 20 TABLET, DELAYED RELEASE ORAL at 06:41

## 2025-06-10 RX ADMIN — OXYCODONE HYDROCHLORIDE 5 MG: 5 TABLET ORAL at 13:52

## 2025-06-10 RX ADMIN — DOCUSATE SODIUM 100 MG: 100 CAPSULE, LIQUID FILLED ORAL at 09:16

## 2025-06-10 RX ADMIN — POLYETHYLENE GLYCOL 3350 17 G: 17 POWDER, FOR SOLUTION ORAL at 16:03

## 2025-06-10 RX ADMIN — KETOROLAC TROMETHAMINE 15 MG: 30 INJECTION, SOLUTION INTRAMUSCULAR; INTRAVENOUS at 02:45

## 2025-06-10 NOTE — PROGRESS NOTES
Progress Note - OB/GYN   Name: Yecenia Rodriguez 35 y.o. female I MRN: [id number]  Unit/Bed#: -01 I Date of Admission: 2025   Date of Service: 6/10/2025 I Hospital Day: 3     Assessment & Plan  Status post primary low transverse  section  - Continue routine post partum care  - [id number], HgB 10.4 -> 10.2  - Pain well controlled: tylenol/motrin scheduled, tim prn  - Lochia within normal limits: continue to monitor   - OOB: as able, encourage ambulation  - Passing flatus  - Voiding spontaneously s/p Reis removal  - DVT ppx: SCD, Lovenox 40mg BID  - Breastfeeding  - Baby in: room  - Dispo: anticipate d/c home POD2-4    Atypical squamous cell changes of undetermined significance (ASCUS) on cervical cytology with negative high risk human papilloma virus (HPV) test result  - ASCUS, HPV neg 2024  - NILM 2025  Class 3 severe obesity with body mass index (BMI) of 40.0 to 44.9 in adult  - BMI 45  - Lovenox ordered  Chronic hypertension affecting pregnancy  - On Labetalol 100 mg BID  - CBC/CMP wnl; urine P:C 0.1    OB Post-Partum Progress Note  Subjective   Post delivery. Patient is doing well. Lochia WNL. Pain well controlled. No complaints at this time.     Pain: yes, cramping, improved with meds  Tolerating PO: yes  Voiding: spontaneously  Flatus: not yet  BM: not yet   Ambulating: yes  Breastfeeding:  yes  Chest pain: no  Shortness of breath: no  Leg pain: no  Lochia: WNL    Objective :  Temp:  [97.2 °F (36.2 °C)-98.4 °F (36.9 °C)] 97.6 °F (36.4 °C)  HR:  [] 90  BP: (111-136)/(56-91) 111/56  Resp:  [16-20] 16  SpO2:  [96 %-97 %] 97 %  O2 Device: None (Room air)    Physical Exam  Constitutional:       Appearance: Normal appearance.     Cardiovascular:      Rate and Rhythm: Normal rate.   Abdominal:      Comments: Pfannenstiel clean/dry/intact  Fundus firm, below umbilicus   Genitourinary:     Comments: Lochia wnl    Musculoskeletal:      Right lower leg: No edema.      Left lower leg: No  edema.     Skin:     General: Skin is warm and dry.     Neurological:      General: No focal deficit present.      Mental Status: She is alert and oriented to person, place, and time.         Lab Results: I have reviewed the following results:  Lab Results   Component Value Date    WBC 10.58 (H) 06/09/2025    HGB 10.2 (L) 06/09/2025    HCT 30.6 (L) 06/09/2025    MCV 86 06/09/2025     06/09/2025     Jena Quintero MD  OBGYN PGY2

## 2025-06-10 NOTE — LACTATION NOTE
This note was copied from a baby's chart.  CONSULT - LACTATION  Baby Boy (Ilana Rodriguez 1 days male MRN: [id number]    [facility] NURSERY Room / Bed: (N)/(N) Encounter: [id number]    Maternal Information     MOTHER:  Yecenia Rodriguez  Maternal Age: 35 y.o.  OB History: # 1 - Date: 17, Sex: None, Weight: None, GA: None, Type: None, Apgar1: None, Apgar5: None, Living: None, Birth Comments: None    # 2 - Date: 25, Sex: Male, Weight: 3770 g (8 lb 5 oz), GA: 39w6d, Type: , Low Transverse, Apgar1: 2, Apgar5: 8, Living: Living, Birth Comments: None   Previous breast reduction surgery? No    Lactation history:   Has patient previously breast fed: No   How long had patient previously breast fed:     Previous breast feeding complications:       Past Surgical History:   Procedure Laterality Date    DILATION AND CURETTAGE OF UTERUS      ETOP    ENDOSCOPIC ULTRASOUND (UPPER)  a month ago    not sure if it's endoscopic...I had an ultra sound for PCOS    WISDOM TOOTH EXTRACTION         Birth information:  YOB: [birth year]25   Time of birth: 2:10 AM   Sex: male   Delivery type: , Low Transverse   Birth Weight: 3770 g (8 lb 5 oz)   Percent of Weight Change: -2%     Gestational Age: 39w6d   [unfilled]    Reason for Consult:    Reason for Consult: Follow-up assessment (ext) - 20 min, Latch Assess (ext) - 20 min    Risk Factors:    Risk Factors:  (AMA, PCOS)    Breast and nipple assessment: Wide spaced breasts  Breasts/Nipples  Left Breast: Soft (Tubular)  Right Breast: Soft (Tubular)  Left Nipple: Everted, Flat (short)  Right Nipple: Everted, Flat (short)  Intervention: Hand expression  Breastfeeding Progress: Not yet established    Breast Pump:    Breast Pump  Pump: Personal (Has a breast pump through ins.)       Assessment: Slight posterior restriction noted. Not effecting feeding at this time- baby able to sustain latch. Nipple  rounded post feed.    Feeding assessment: feeding well with direction & stimulation. Baby does better with football position than cross cradle at this time.    LATCH:  Latch: Grasps breast, tongue down, lips flanged, rhythmic sucking   Audible Swallowing: A few with stimulation   Type of Nipple: Everted (After stimulation)   Comfort (Breast/Nipple): Soft/non-tender   Hold (Positioning): Partial assist, teach one side, mother does other, staff holds   LATCH Score: 8       Feeding recommendations:  breast feed on demand every 2-3 hours, watching for early feeding cues. At least 8-12 feedings in 24 hours.    Followed up with Bliss to review breastfeeding progress and discuss any questions patient may have.    Milfay reports that baby is progressively starting to get better at the breast. Patient reports it does take the dyad some time to get in position and latch.     Patient requested assistance with feeding for further practice.  Patient requested to try different breastfeeding positions other than football hold. Brought baby to breast in cross cradle hold on the left breast, patient was transitioned to cradle hold after a couple of minutes. Baby was on/off the breast for about 5 minutes. Patient liked the idea of the position but baby was starting to get fussy. Hand on hand guidance was provided with permission.  Transitioned dyad to football position on the right breast. Baby responded well to position change and fed an additional 10 minutes before letting go of the nipple, burping and falling asleep. Attempted to latch baby again, baby was displaying fullness cues. Baby brought skin to skin with patient after feeding.    Family is awaiting baby's circumcision procedure today. Discussed how the procedure can make baby sleepy. Encouraged waking techniques and skin to skin with baby.    Discussed baby's second day.  Reviewed baby's belly size and cluster feeding. Discussed cluster feeding is a normal baby behavior and  helps bring in a good milk supply and helps prevent excessive weight loss.     Feeding plan:  Patient is encouraged to breastfeed on demand, every 2-3 hours or when baby showing early feeding cues. Reviewed to offer both breasts during a feed.  Discussed the importance of ensuring that baby feeds 8-12x in 24 hours and not waiting over 3 hours to feed. Reiterated to watch the baby for hunger and fullness cues.    Patient was encouraged to continue to document baby's feedings and outputs to ensure baby is adequately feeding at the breast.    Discussed community resources for continued breastfeeding support once discharged home. Encouraged to contact with [facility] as needed.    Patient was encouraged to contact lactation for a latch assessment, continued support and/or questions that arise.      NURA Dougherty 6/10/2025 10:35 AM

## 2025-06-10 NOTE — PLAN OF CARE
Problem: POSTPARTUM  Goal: Experiences normal postpartum course  Description: INTERVENTIONS:  - Monitor maternal vital signs  - Assess uterine involution and lochia  Outcome: Progressing  Goal: Appropriate maternal -  bonding  Description: INTERVENTIONS:  - Identify family support  - Assess for appropriate maternal/infant bonding   -Encourage maternal/infant bonding opportunities  - Referral to  or  as needed  Outcome: Progressing  Goal: Establishment of infant feeding pattern  Description: INTERVENTIONS:  - Assess breast/bottle feeding  - Refer to lactation as needed  Outcome: Progressing  Goal: Incision(s), wounds(s) or drain site(s) healing without S/S of infection  Description: INTERVENTIONS  - Assess and document dressing, incision, wound bed, drain sites and surrounding tissue  - Provide patient and family education    Outcome: Progressing     Problem: PAIN - ADULT  Goal: Verbalizes/displays adequate comfort level or baseline comfort level  Description: Interventions:  - Encourage patient to monitor pain and request assistance  - Assess pain using appropriate pain scale  - Administer analgesics as ordered based on type and severity of pain and evaluate response  - Implement non-pharmacological measures as appropriate and evaluate response  - Consider cultural and social influences on pain and pain management  - Notify physician/advanced practitioner if interventions unsuccessful or patient reports new pain  - Educate patient/family on pain management process including their role and importance of  reporting pain   - Provide non-pharmacologic/complimentary pain relief interventions  Outcome: Progressing     Problem: SAFETY ADULT  Goal: Patient will remain free of falls  Description: INTERVENTIONS:  - Keep Call bell within reach  - Keep bed low and locked with side rails adjusted as appropriate  - Keep care items and personal belongings within reach  - Initiate and maintain  comfort rounds  Outcome: Progressing     Problem: Knowledge Deficit  Goal: Patient/family/caregiver demonstrates understanding of disease process, treatment plan, medications, and discharge instructions  Description: Complete learning assessment and assess knowledge base.  Interventions:  - Provide teaching at level of understanding  - Provide teaching via preferred learning methods  Outcome: Progressing     Problem: DISCHARGE PLANNING  Goal: Discharge to home or other facility with appropriate resources  Description: INTERVENTIONS:  - Identify barriers to discharge w/patient and caregiver  - Arrange for needed discharge resources and transportation as appropriate  - Identify discharge learning needs (meds, wound care, etc.)  - Arrange for interpretive services to assist at discharge as needed  - Refer to Case Management Department for coordinating discharge planning if the patient needs post-hospital services based on physician/advanced practitioner order or complex needs related to functional status, cognitive ability, or social support system  Outcome: Progressing     Problem: POSTPARTUM  Goal: Experiences normal postpartum course  Description: INTERVENTIONS:  - Monitor maternal vital signs  - Assess uterine involution and lochia  Outcome: Progressing  Goal: Appropriate maternal -  bonding  Description: INTERVENTIONS:  - Identify family support  - Assess for appropriate maternal/infant bonding   -Encourage maternal/infant bonding opportunities  - Referral to  or  as needed  Outcome: Progressing  Goal: Establishment of infant feeding pattern  Description: INTERVENTIONS:  - Assess breast/bottle feeding  - Refer to lactation as needed  Outcome: Progressing  Goal: Incision(s), wounds(s) or drain site(s) healing without S/S of infection  Description: INTERVENTIONS  - Assess and document dressing, incision, wound bed, drain sites and surrounding tissue  - Provide patient and family  education    Outcome: Progressing     Problem: PAIN - ADULT  Goal: Verbalizes/displays adequate comfort level or baseline comfort level  Description: Interventions:  - Encourage patient to monitor pain and request assistance  - Assess pain using appropriate pain scale  - Administer analgesics as ordered based on type and severity of pain and evaluate response  - Implement non-pharmacological measures as appropriate and evaluate response  - Consider cultural and social influences on pain and pain management  - Notify physician/advanced practitioner if interventions unsuccessful or patient reports new pain  - Educate patient/family on pain management process including their role and importance of  reporting pain   - Provide non-pharmacologic/complimentary pain relief interventions  Outcome: Progressing     Problem: SAFETY ADULT  Goal: Patient will remain free of falls  Description: INTERVENTIONS:  - Keep Call bell within reach  - Keep bed low and locked with side rails adjusted as appropriate  - Keep care items and personal belongings within reach  - Initiate and maintain comfort rounds  Outcome: Progressing     Problem: Knowledge Deficit  Goal: Patient/family/caregiver demonstrates understanding of disease process, treatment plan, medications, and discharge instructions  Description: Complete learning assessment and assess knowledge base.  Interventions:  - Provide teaching at level of understanding  - Provide teaching via preferred learning methods  Outcome: Progressing     Problem: DISCHARGE PLANNING  Goal: Discharge to home or other facility with appropriate resources  Description: INTERVENTIONS:  - Identify barriers to discharge w/patient and caregiver  - Arrange for needed discharge resources and transportation as appropriate  - Identify discharge learning needs (meds, wound care, etc.)  - Arrange for interpretive services to assist at discharge as needed  - Refer to Case Management Department for coordinating  discharge planning if the patient needs post-hospital services based on physician/advanced practitioner order or complex needs related to functional status, cognitive ability, or social support system  Outcome: Progressing

## 2025-06-11 ENCOUNTER — APPOINTMENT (INPATIENT)
Dept: CT IMAGING | Facility: HOSPITAL | Age: 35
End: 2025-06-11
Payer: COMMERCIAL

## 2025-06-11 ENCOUNTER — APPOINTMENT (INPATIENT)
Dept: ULTRASOUND IMAGING | Facility: HOSPITAL | Age: 35
End: 2025-06-11
Payer: COMMERCIAL

## 2025-06-11 LAB
ALBUMIN SERPL BCG-MCNC: 3 G/DL (ref 3.5–5)
ALBUMIN SERPL BCG-MCNC: 3.2 G/DL (ref 3.5–5)
ALP SERPL-CCNC: 90 U/L (ref 34–104)
ALP SERPL-CCNC: 99 U/L (ref 34–104)
ALT SERPL W P-5'-P-CCNC: 17 U/L (ref 7–52)
ALT SERPL W P-5'-P-CCNC: 17 U/L (ref 7–52)
ANION GAP SERPL CALCULATED.3IONS-SCNC: 7 MMOL/L (ref 4–13)
ANION GAP SERPL CALCULATED.3IONS-SCNC: 9 MMOL/L (ref 4–13)
AST SERPL W P-5'-P-CCNC: 18 U/L (ref 13–39)
AST SERPL W P-5'-P-CCNC: 19 U/L (ref 13–39)
BACTERIA UR QL AUTO: ABNORMAL /HPF
BASOPHILS # BLD MANUAL: 0 THOUSAND/UL (ref 0–0.1)
BASOPHILS NFR MAR MANUAL: 0 % (ref 0–1)
BILIRUB SERPL-MCNC: 0.36 MG/DL (ref 0.2–1)
BILIRUB SERPL-MCNC: 0.37 MG/DL (ref 0.2–1)
BILIRUB UR QL STRIP: NEGATIVE
BUN SERPL-MCNC: 11 MG/DL (ref 5–25)
BUN SERPL-MCNC: 16 MG/DL (ref 5–25)
CALCIUM ALBUM COR SERPL-MCNC: 8.6 MG/DL (ref 8.3–10.1)
CALCIUM ALBUM COR SERPL-MCNC: 8.9 MG/DL (ref 8.3–10.1)
CALCIUM SERPL-MCNC: 7.8 MG/DL (ref 8.4–10.2)
CALCIUM SERPL-MCNC: 8.3 MG/DL (ref 8.4–10.2)
CHLORIDE SERPL-SCNC: 105 MMOL/L (ref 96–108)
CHLORIDE SERPL-SCNC: 107 MMOL/L (ref 96–108)
CLARITY UR: CLEAR
CO2 SERPL-SCNC: 18 MMOL/L (ref 21–32)
CO2 SERPL-SCNC: 21 MMOL/L (ref 21–32)
COLOR UR: COLORLESS
CREAT SERPL-MCNC: 0.71 MG/DL (ref 0.6–1.3)
CREAT SERPL-MCNC: 0.84 MG/DL (ref 0.6–1.3)
EOSINOPHIL # BLD MANUAL: 0 THOUSAND/UL (ref 0–0.4)
EOSINOPHIL NFR BLD MANUAL: 0 % (ref 0–6)
ERYTHROCYTE [DISTWIDTH] IN BLOOD BY AUTOMATED COUNT: 12.5 % (ref 11.6–15.1)
ERYTHROCYTE [DISTWIDTH] IN BLOOD BY AUTOMATED COUNT: 12.6 % (ref 11.6–15.1)
GFR SERPL CREATININE-BSD FRML MDRD: 109 ML/MIN/1.73SQ M
GFR SERPL CREATININE-BSD FRML MDRD: 89 ML/MIN/1.73SQ M
GLUCOSE SERPL-MCNC: 106 MG/DL (ref 65–140)
GLUCOSE SERPL-MCNC: 109 MG/DL (ref 65–140)
GLUCOSE UR STRIP-MCNC: NEGATIVE MG/DL
HCT VFR BLD AUTO: 25.3 % (ref 34.8–46.1)
HCT VFR BLD AUTO: 25.9 % (ref 34.8–46.1)
HGB BLD-MCNC: 8.7 G/DL (ref 11.5–15.4)
HGB BLD-MCNC: 8.7 G/DL (ref 11.5–15.4)
HGB UR QL STRIP.AUTO: ABNORMAL
KETONES UR STRIP-MCNC: NEGATIVE MG/DL
LEUKOCYTE ESTERASE UR QL STRIP: ABNORMAL
LYMPHOCYTES # BLD AUTO: 1.04 THOUSAND/UL (ref 0.6–4.47)
LYMPHOCYTES # BLD AUTO: 10 % (ref 14–44)
MCH RBC QN AUTO: 29 PG (ref 26.8–34.3)
MCH RBC QN AUTO: 29.5 PG (ref 26.8–34.3)
MCHC RBC AUTO-ENTMCNC: 33.6 G/DL (ref 31.4–37.4)
MCHC RBC AUTO-ENTMCNC: 34.4 G/DL (ref 31.4–37.4)
MCV RBC AUTO: 86 FL (ref 82–98)
MCV RBC AUTO: 86 FL (ref 82–98)
MONOCYTES # BLD AUTO: 0.1 THOUSAND/UL (ref 0–1.22)
MONOCYTES NFR BLD: 1 % (ref 4–12)
MUCOUS THREADS UR QL AUTO: ABNORMAL
MYELOCYTE ABSOLUTE CT: 0.1 THOUSAND/UL (ref 0–0.1)
MYELOCYTES NFR BLD MANUAL: 1 % (ref 0–1)
NEUTROPHILS # BLD MANUAL: 9.15 THOUSAND/UL (ref 1.85–7.62)
NEUTS BAND NFR BLD MANUAL: 10 % (ref 0–8)
NEUTS SEG NFR BLD AUTO: 78 % (ref 43–75)
NITRITE UR QL STRIP: NEGATIVE
NON-SQ EPI CELLS URNS QL MICRO: ABNORMAL /HPF
PH UR STRIP.AUTO: 6 [PH]
PLATELET # BLD AUTO: 196 THOUSANDS/UL (ref 149–390)
PLATELET # BLD AUTO: 234 THOUSANDS/UL (ref 149–390)
PLATELET BLD QL SMEAR: ADEQUATE
PMV BLD AUTO: 10.4 FL (ref 8.9–12.7)
PMV BLD AUTO: 11.1 FL (ref 8.9–12.7)
POTASSIUM SERPL-SCNC: 3.6 MMOL/L (ref 3.5–5.3)
POTASSIUM SERPL-SCNC: 3.8 MMOL/L (ref 3.5–5.3)
PROT SERPL-MCNC: 5.1 G/DL (ref 6.4–8.4)
PROT SERPL-MCNC: 5.4 G/DL (ref 6.4–8.4)
PROT UR STRIP-MCNC: NEGATIVE MG/DL
RBC # BLD AUTO: 2.95 MILLION/UL (ref 3.81–5.12)
RBC # BLD AUTO: 3 MILLION/UL (ref 3.81–5.12)
RBC #/AREA URNS AUTO: ABNORMAL /HPF
RBC MORPH BLD: NORMAL
SODIUM SERPL-SCNC: 132 MMOL/L (ref 135–147)
SODIUM SERPL-SCNC: 135 MMOL/L (ref 135–147)
SP GR UR STRIP.AUTO: 1.03 (ref 1–1.03)
TRANS CELLS #/AREA URNS HPF: PRESENT /[HPF]
UROBILINOGEN UR STRIP-ACNC: <2 MG/DL
WBC # BLD AUTO: 10.4 THOUSAND/UL (ref 4.31–10.16)
WBC # BLD AUTO: 9.69 THOUSAND/UL (ref 4.31–10.16)
WBC #/AREA URNS AUTO: ABNORMAL /HPF

## 2025-06-11 PROCEDURE — 74177 CT ABD & PELVIS W/CONTRAST: CPT

## 2025-06-11 PROCEDURE — 81001 URINALYSIS AUTO W/SCOPE: CPT | Performed by: CLINICAL NURSE SPECIALIST

## 2025-06-11 PROCEDURE — 85007 BL SMEAR W/DIFF WBC COUNT: CPT | Performed by: OBSTETRICS & GYNECOLOGY

## 2025-06-11 PROCEDURE — 85027 COMPLETE CBC AUTOMATED: CPT

## 2025-06-11 PROCEDURE — 76775 US EXAM ABDO BACK WALL LIM: CPT

## 2025-06-11 PROCEDURE — 99024 POSTOP FOLLOW-UP VISIT: CPT | Performed by: CLINICAL NURSE SPECIALIST

## 2025-06-11 PROCEDURE — 85027 COMPLETE CBC AUTOMATED: CPT | Performed by: OBSTETRICS & GYNECOLOGY

## 2025-06-11 PROCEDURE — 71260 CT THORAX DX C+: CPT

## 2025-06-11 PROCEDURE — 80053 COMPREHEN METABOLIC PANEL: CPT | Performed by: CLINICAL NURSE SPECIALIST

## 2025-06-11 PROCEDURE — 80053 COMPREHEN METABOLIC PANEL: CPT

## 2025-06-11 RX ORDER — ACETAMINOPHEN 10 MG/ML
1000 INJECTION, SOLUTION INTRAVENOUS EVERY 6 HOURS SCHEDULED
Status: DISCONTINUED | OUTPATIENT
Start: 2025-06-11 | End: 2025-06-12

## 2025-06-11 RX ORDER — CYCLOBENZAPRINE HCL 10 MG
10 TABLET ORAL ONCE
Status: DISCONTINUED | OUTPATIENT
Start: 2025-06-11 | End: 2025-06-13 | Stop reason: HOSPADM

## 2025-06-11 RX ORDER — LIDOCAINE 50 MG/G
1 PATCH TOPICAL DAILY
Status: DISCONTINUED | OUTPATIENT
Start: 2025-06-11 | End: 2025-06-13 | Stop reason: HOSPADM

## 2025-06-11 RX ORDER — KETOROLAC TROMETHAMINE 30 MG/ML
30 INJECTION, SOLUTION INTRAMUSCULAR; INTRAVENOUS EVERY 6 HOURS SCHEDULED
Status: DISCONTINUED | OUTPATIENT
Start: 2025-06-11 | End: 2025-06-12

## 2025-06-11 RX ORDER — CYCLOBENZAPRINE HCL 10 MG
10 TABLET ORAL 3 TIMES DAILY PRN
Status: DISCONTINUED | OUTPATIENT
Start: 2025-06-11 | End: 2025-06-13 | Stop reason: HOSPADM

## 2025-06-11 RX ORDER — ACETAMINOPHEN 10 MG/ML
1000 INJECTION, SOLUTION INTRAVENOUS EVERY 6 HOURS SCHEDULED
Status: DISCONTINUED | OUTPATIENT
Start: 2025-06-11 | End: 2025-06-11

## 2025-06-11 RX ORDER — LABETALOL 200 MG/1
200 TABLET, FILM COATED ORAL 2 TIMES DAILY
Status: DISCONTINUED | OUTPATIENT
Start: 2025-06-11 | End: 2025-06-12

## 2025-06-11 RX ADMIN — CYCLOBENZAPRINE 10 MG: 10 TABLET, FILM COATED ORAL at 01:18

## 2025-06-11 RX ADMIN — CYCLOBENZAPRINE 10 MG: 10 TABLET, FILM COATED ORAL at 14:28

## 2025-06-11 RX ADMIN — FAMOTIDINE 20 MG: 10 INJECTION, SOLUTION INTRAVENOUS at 20:04

## 2025-06-11 RX ADMIN — IOHEXOL 100 ML: 350 INJECTION, SOLUTION INTRAVENOUS at 13:22

## 2025-06-11 RX ADMIN — PANTOPRAZOLE SODIUM 20 MG: 20 TABLET, DELAYED RELEASE ORAL at 06:01

## 2025-06-11 RX ADMIN — KETOROLAC TROMETHAMINE 30 MG: 30 INJECTION, SOLUTION INTRAMUSCULAR; INTRAVENOUS at 20:03

## 2025-06-11 RX ADMIN — CYCLOBENZAPRINE 10 MG: 10 TABLET, FILM COATED ORAL at 08:00

## 2025-06-11 RX ADMIN — IBUPROFEN 600 MG: 600 TABLET ORAL at 10:45

## 2025-06-11 RX ADMIN — IBUPROFEN 600 MG: 600 TABLET ORAL at 04:02

## 2025-06-11 RX ADMIN — ACETAMINOPHEN 975 MG: 325 TABLET, FILM COATED ORAL at 00:17

## 2025-06-11 RX ADMIN — ACETAMINOPHEN 1000 MG: 10 INJECTION INTRAVENOUS at 15:40

## 2025-06-11 RX ADMIN — SIMETHICONE 80 MG: 80 TABLET, CHEWABLE ORAL at 04:04

## 2025-06-11 RX ADMIN — POLYETHYLENE GLYCOL 3350 17 G: 17 POWDER, FOR SOLUTION ORAL at 08:00

## 2025-06-11 RX ADMIN — LIDOCAINE 1 PATCH: 700 PATCH TOPICAL at 14:22

## 2025-06-11 RX ADMIN — DOCUSATE SODIUM 100 MG: 100 CAPSULE, LIQUID FILLED ORAL at 18:21

## 2025-06-11 RX ADMIN — DOCUSATE SODIUM 100 MG: 100 CAPSULE, LIQUID FILLED ORAL at 08:00

## 2025-06-11 RX ADMIN — KETOROLAC TROMETHAMINE 30 MG: 30 INJECTION, SOLUTION INTRAMUSCULAR; INTRAVENOUS at 14:22

## 2025-06-11 RX ADMIN — CYCLOBENZAPRINE 10 MG: 10 TABLET, FILM COATED ORAL at 22:38

## 2025-06-11 RX ADMIN — SIMETHICONE 80 MG: 80 TABLET, CHEWABLE ORAL at 08:00

## 2025-06-11 RX ADMIN — SIMETHICONE 80 MG: 80 TABLET, CHEWABLE ORAL at 14:28

## 2025-06-11 RX ADMIN — ACETAMINOPHEN 975 MG: 325 TABLET, FILM COATED ORAL at 08:00

## 2025-06-11 RX ADMIN — LABETALOL HYDROCHLORIDE 100 MG: 100 TABLET, FILM COATED ORAL at 08:00

## 2025-06-11 RX ADMIN — LABETALOL HYDROCHLORIDE 200 MG: 200 TABLET, FILM COATED ORAL at 20:04

## 2025-06-11 RX ADMIN — ENOXAPARIN SODIUM 40 MG: 40 INJECTION SUBCUTANEOUS at 08:00

## 2025-06-11 RX ADMIN — OXYCODONE HYDROCHLORIDE 10 MG: 5 TABLET ORAL at 10:48

## 2025-06-11 RX ADMIN — FAMOTIDINE 20 MG: 10 INJECTION, SOLUTION INTRAVENOUS at 08:00

## 2025-06-11 RX ADMIN — ENOXAPARIN SODIUM 40 MG: 40 INJECTION SUBCUTANEOUS at 20:04

## 2025-06-11 NOTE — ASSESSMENT & PLAN NOTE
POD#2  - continues to have right rib/back pain. Good relief from flexeril but recurred. K-pad and cont flexeril  - Continue routine post partum care  - [id number], HgB 10.4 -> 10.2-->8/7- venofer ordered  - Pain well controlled: tylenol/motrin scheduled, tim prn  - Lochia within normal limits: continue to monitor   - OOB: as able, encourage ambulation  - Passing minimal flatus  - Voiding spontaneously s/p Reis removal  - DVT ppx: SCD, Lovenox 40mg BID  - Breastfeeding  - Baby in: room  - Dispo: anticipate d/c home POD3-4

## 2025-06-11 NOTE — PROGRESS NOTES
Progress Note - OB/GYN   Name: Yecenia Rodrigeuz 35 y.o. female I MRN: [id number]  Unit/Bed#: -01 I Date of Admission: 2025   Date of Service: 2025 I Hospital Day: 4     Assessment & Plan  Status post primary low transverse  section  POD#2  - continues to have right rib/back pain. Good relief from flexeril but recurred. K-pad and cont flexeril  - Continue routine post partum care  - [id number], HgB 10.4 -> 10.2-->8- venofer ordered  - Pain well controlled: tylenol/motrin scheduled, tim prn  - Lochia within normal limits: continue to monitor   - OOB: as able, encourage ambulation  - Passing minimal flatus  - Voiding spontaneously s/p Reis removal  - DVT ppx: SCD, Lovenox 40mg BID  - Breastfeeding  - Baby in: room  - Dispo: anticipate d/c home POD3-4    Class 3 severe obesity with body mass index (BMI) of 40.0 to 44.9 in adult  - BMI 45  - Lovenox ordered  Chronic hypertension affecting pregnancy  - On Labetalol 100 mg BID  - CBC/CMP wnl; urine P:C 0.1  Systolic (24hrs), Av , Min:126 , Max:157   Diastolic (24hrs), Av, Min:80, Max:101    Increased labetalol to 200 mg BID after d/w with Dr Carvalho    Progress Note - OB/GYN  Yecenia Rodriguez 35 y.o. female MRN: [id number]  Unit/Bed#: -01 Encounter: [id number]      Yecenia Rodriguez is a patient of[facility]. She is PPD# 2  Delivery Type: , Low Transverse   Recovering well and is stable     Disposition    - Anticipate discharge home on PPD# 3-4      Subjective/Objective     Chief Complaint: Postpartum State     Subjective:    Yecenia Rodriguez is PPD/POD#2 s/p  primary  section, low transverse incision.   She has no current complaints.  Incisional Pain is well controlled. She continues to c/o RUQ/Right rib/flank pain. No urinary c/o. Responded well to flexeril overnight but recurred.    Breastfeeding:  yes    Tolerating PO: yes  Nausea or Vomiting: no  Voiding: yes  Flatus: yes;  "has had no bowel movement.   Ambulating: yes  Chest pain: no  Shortness of breath: no  Leg pain: no  Lochia: appropriate   Incision: no c/o  Perineum: N/A -intact    Vitals:   /98 (BP Location: Left arm)   Pulse 76   Temp 98.8 °F (37.1 °C) (Oral)   Resp 18   Ht 5' 8\" (1.727 m)   Wt (!) 137 kg (302 lb) Comment: pregnancy weight  LMP 08/20/2024 (Exact Date)   SpO2 95%   Breastfeeding Yes   BMI 45.92 kg/m²       Intake/Output Summary (Last 24 hours) at 6/11/2025 1014  Last data filed at 6/11/2025 0359  Gross per 24 hour   Intake --   Output 1100 ml   Net -1100 ml       Invasive Devices       Peripheral Intravenous Line  Duration             Peripheral IV 06/07/25 Distal;Dorsal (posterior);Right Forearm 3 days                    Physical Exam:   OBGyn Exam  GEN: Yecenia Rodriguez appears well, alert and oriented x 3, pleasant and cooperative   CARDIO: RRR, no murmurs or rubs  RESP:  CTAB, no wheezes or rales  ABDOMEN: soft, + RUQ/rib/flank Discomfort, no distention, fundus @ u, Incision C/D/I w/ mepilex in plac  EXTREMITIES: SCDs on, non tender, no erythema, b/l Joey's sign negative      Labs:  Hemoglobin   Date Value Ref Range Status   06/11/2025 8.7 (L) 11.5 - 15.4 g/dL Final   06/09/2025 10.2 (L) 11.5 - 15.4 g/dL Final     WBC   Date Value Ref Range Status   06/11/2025 9.69 4.31 - 10.16 Thousand/uL Final   06/09/2025 10.58 (H) 4.31 - 10.16 Thousand/uL Final     Platelets   Date Value Ref Range Status   06/11/2025 196 149 - 390 Thousands/uL Final   06/09/2025 172 149 - 390 Thousands/uL Final     Creatinine   Date Value Ref Range Status   06/11/2025 0.84 0.60 - 1.30 mg/dL Final     Comment:     Standardized to IDMS reference method   06/07/2025 0.60 0.60 - 1.30 mg/dL Final     Comment:     Standardized to IDMS reference method   07/26/2018 0.7 0.5 - 1.0 mg/dL Final     Comment:     GFR should be used to assess renal function.  Plasma/Serum creatinine may not   be able to properly reflect renal " function in some cases.     AST   Date Value Ref Range Status   06/11/2025 19 13 - 39 U/L Final   06/07/2025 18 13 - 39 U/L Final   07/26/2018 20 10 - 35 U/L Final     ALT   Date Value Ref Range Status   06/11/2025 17 7 - 52 U/L Final     Comment:     Specimen collection should occur prior to Sulfasalazine administration due to the potential for falsely depressed results.    06/07/2025 24 7 - 52 U/L Final     Comment:     Specimen collection should occur prior to Sulfasalazine administration due to the potential for falsely depressed results.    07/26/2018 22 10 - 35 U/L Final          CRESCENCIO Golden  6/11/2025  10:14 AM

## 2025-06-11 NOTE — PLAN OF CARE
Problem: POSTPARTUM  Goal: Experiences normal postpartum course  Description: INTERVENTIONS:  - Monitor maternal vital signs  - Assess uterine involution and lochia  Outcome: Progressing     Problem: POSTPARTUM  Goal: Establishment of infant feeding pattern  Description: INTERVENTIONS:  - Assess breast/bottle feeding  - Refer to lactation as needed  Outcome: Progressing     Problem: PAIN - ADULT  Goal: Verbalizes/displays adequate comfort level or baseline comfort level  Description: Interventions:  - Encourage patient to monitor pain and request assistance  - Assess pain using appropriate pain scale  - Administer analgesics as ordered based on type and severity of pain and evaluate response  - Implement non-pharmacological measures as appropriate and evaluate response  - Consider cultural and social influences on pain and pain management  - Notify physician/advanced practitioner if interventions unsuccessful or patient reports new pain  - Educate patient/family on pain management process including their role and importance of  reporting pain   - Provide non-pharmacologic/complimentary pain relief interventions  Outcome: Progressing

## 2025-06-11 NOTE — ASSESSMENT & PLAN NOTE
- On Labetalol 100 mg BID  - CBC/CMP wnl; urine P:C 0.1  Systolic (24hrs), Av , Min:126 , Max:157   Diastolic (24hrs), Av, Min:80, Max:101    Increased labetalol to 200 mg BID after d/w with Dr Carvalho

## 2025-06-11 NOTE — QUICK NOTE
"Presented to assess R sided rib pain. Patient has been walking around all evening very uncomfortable due to gas pain. She has not yet passed flatus. She feels a \"stitch\" in her side and sometimes radiates to her right shoulder. She has not had any improvement w/ simethicone. She feels like it makes it painful to take a deep breath.     On exam,   RUQ TTP  Regular rate and rhythm, lungs clear to auscultation.   /95  HR 94   SPO2: 96    Plan:  - Repeat CBC and CMP in setting of HTN  - Try dose of flexeril for pain control.      Mehnaz Rosenthal MD   PGY-2, OBGYN  6/11/2025  6:01 AM    "

## 2025-06-12 PROBLEM — R10.11 RIGHT UPPER QUADRANT PAIN: Status: ACTIVE | Noted: 2025-06-12

## 2025-06-12 PROCEDURE — 99024 POSTOP FOLLOW-UP VISIT: CPT | Performed by: OBSTETRICS & GYNECOLOGY

## 2025-06-12 RX ORDER — FAMOTIDINE 20 MG/1
20 TABLET, FILM COATED ORAL 2 TIMES DAILY
Status: DISCONTINUED | OUTPATIENT
Start: 2025-06-12 | End: 2025-06-13 | Stop reason: HOSPADM

## 2025-06-12 RX ORDER — FUROSEMIDE 20 MG/1
20 TABLET ORAL DAILY
Status: DISCONTINUED | OUTPATIENT
Start: 2025-06-12 | End: 2025-06-13 | Stop reason: HOSPADM

## 2025-06-12 RX ORDER — IBUPROFEN 600 MG/1
600 TABLET, FILM COATED ORAL EVERY 6 HOURS PRN
Status: DISCONTINUED | OUTPATIENT
Start: 2025-06-12 | End: 2025-06-13 | Stop reason: HOSPADM

## 2025-06-12 RX ORDER — ACETAMINOPHEN 325 MG/1
650 TABLET ORAL EVERY 6 HOURS PRN
Status: DISCONTINUED | OUTPATIENT
Start: 2025-06-12 | End: 2025-06-13 | Stop reason: HOSPADM

## 2025-06-12 RX ADMIN — LIDOCAINE 1 PATCH: 700 PATCH TOPICAL at 09:25

## 2025-06-12 RX ADMIN — KETOROLAC TROMETHAMINE 30 MG: 30 INJECTION, SOLUTION INTRAMUSCULAR; INTRAVENOUS at 02:30

## 2025-06-12 RX ADMIN — OXYCODONE HYDROCHLORIDE 10 MG: 5 TABLET ORAL at 05:31

## 2025-06-12 RX ADMIN — CYCLOBENZAPRINE 10 MG: 10 TABLET, FILM COATED ORAL at 17:25

## 2025-06-12 RX ADMIN — ACETAMINOPHEN 1000 MG: 10 INJECTION INTRAVENOUS at 00:20

## 2025-06-12 RX ADMIN — ENOXAPARIN SODIUM 40 MG: 40 INJECTION SUBCUTANEOUS at 21:30

## 2025-06-12 RX ADMIN — FAMOTIDINE 20 MG: 20 TABLET, FILM COATED ORAL at 10:26

## 2025-06-12 RX ADMIN — OXYCODONE HYDROCHLORIDE 10 MG: 5 TABLET ORAL at 09:18

## 2025-06-12 RX ADMIN — DOCUSATE SODIUM 100 MG: 100 CAPSULE, LIQUID FILLED ORAL at 09:10

## 2025-06-12 RX ADMIN — ACETAMINOPHEN 650 MG: 325 TABLET ORAL at 13:35

## 2025-06-12 RX ADMIN — OXYCODONE HYDROCHLORIDE 5 MG: 5 TABLET ORAL at 16:04

## 2025-06-12 RX ADMIN — CYCLOBENZAPRINE 10 MG: 10 TABLET, FILM COATED ORAL at 09:19

## 2025-06-12 RX ADMIN — FUROSEMIDE 20 MG: 20 TABLET ORAL at 17:25

## 2025-06-12 RX ADMIN — FAMOTIDINE 20 MG: 20 TABLET, FILM COATED ORAL at 17:25

## 2025-06-12 RX ADMIN — LABETALOL HYDROCHLORIDE 300 MG: 200 TABLET, FILM COATED ORAL at 21:30

## 2025-06-12 RX ADMIN — LABETALOL HYDROCHLORIDE 300 MG: 200 TABLET, FILM COATED ORAL at 09:10

## 2025-06-12 RX ADMIN — IBUPROFEN 600 MG: 600 TABLET ORAL at 10:26

## 2025-06-12 RX ADMIN — OXYCODONE HYDROCHLORIDE 5 MG: 5 TABLET ORAL at 20:03

## 2025-06-12 RX ADMIN — IBUPROFEN 600 MG: 600 TABLET ORAL at 16:04

## 2025-06-12 RX ADMIN — DOCUSATE SODIUM 100 MG: 100 CAPSULE, LIQUID FILLED ORAL at 17:25

## 2025-06-12 RX ADMIN — ACETAMINOPHEN 650 MG: 325 TABLET ORAL at 19:18

## 2025-06-12 RX ADMIN — ENOXAPARIN SODIUM 40 MG: 40 INJECTION SUBCUTANEOUS at 09:10

## 2025-06-12 RX ADMIN — PANTOPRAZOLE SODIUM 20 MG: 20 TABLET, DELAYED RELEASE ORAL at 06:22

## 2025-06-12 RX ADMIN — ACETAMINOPHEN 1000 MG: 10 INJECTION INTRAVENOUS at 06:22

## 2025-06-12 RX ADMIN — IBUPROFEN 600 MG: 600 TABLET ORAL at 21:30

## 2025-06-12 NOTE — PLAN OF CARE
Problem: POSTPARTUM  Goal: Experiences normal postpartum course  Description: INTERVENTIONS:  - Monitor maternal vital signs  - Assess uterine involution and lochia  2025 by Edita Henao RN  Outcome: Progressing  2025 by Edita Henao RN  Outcome: Progressing  Goal: Appropriate maternal -  bonding  Description: INTERVENTIONS:  - Identify family support  - Assess for appropriate maternal/infant bonding   -Encourage maternal/infant bonding opportunities  - Referral to  or  as needed  2025 by Edita Henao RN  Outcome: Progressing  2025 by Edita Henao RN  Outcome: Progressing  Goal: Establishment of infant feeding pattern  Description: INTERVENTIONS:  - Assess breast/bottle feeding  - Refer to lactation as needed  2025 by Edita Henao RN  Outcome: Progressing  2025 by Edita Henao RN  Outcome: Progressing  Goal: Incision(s), wounds(s) or drain site(s) healing without S/S of infection  Description: INTERVENTIONS  - Assess and document dressing, incision, wound bed, drain sites and surrounding tissue  - Provide patient and family education    2025 by Edita Henao RN  Outcome: Progressing  2025 by Edita Henao RN  Outcome: Progressing     Problem: PAIN - ADULT  Goal: Verbalizes/displays adequate comfort level or baseline comfort level  Description: Interventions:  - Encourage patient to monitor pain and request assistance  - Assess pain using appropriate pain scale  - Administer analgesics as ordered based on type and severity of pain and evaluate response  - Implement non-pharmacological measures as appropriate and evaluate response  - Consider cultural and social influences on pain and pain management  - Notify physician/advanced practitioner if interventions unsuccessful or patient reports new pain  - Educate patient/family on pain management process including their role and importance of   reporting pain   - Provide non-pharmacologic/complimentary pain relief interventions  6/12/2025 1033 by Edita Henao RN  Outcome: Progressing  6/12/2025 1033 by Edita Henao RN  Outcome: Progressing     Problem: SAFETY ADULT  Goal: Patient will remain free of falls  Description: INTERVENTIONS:  - Keep Call bell within reach  - Keep bed low and locked with side rails adjusted as appropriate  - Keep care items and personal belongings within reach  - Initiate and maintain comfort rounds  6/12/2025 1033 by Edita Henao RN  Outcome: Progressing  6/12/2025 1033 by Edita Henao RN  Outcome: Progressing     Problem: Knowledge Deficit  Goal: Patient/family/caregiver demonstrates understanding of disease process, treatment plan, medications, and discharge instructions  Description: Complete learning assessment and assess knowledge base.  Interventions:  - Provide teaching at level of understanding  - Provide teaching via preferred learning methods  6/12/2025 1033 by Edita Henao RN  Outcome: Progressing  6/12/2025 1033 by Edita Henao RN  Outcome: Progressing     Problem: DISCHARGE PLANNING  Goal: Discharge to home or other facility with appropriate resources  Description: INTERVENTIONS:  - Identify barriers to discharge w/patient and caregiver  - Arrange for needed discharge resources and transportation as appropriate  - Identify discharge learning needs (meds, wound care, etc.)  - Arrange for interpretive services to assist at discharge as needed  - Refer to Case Management Department for coordinating discharge planning if the patient needs post-hospital services based on physician/advanced practitioner order or complex needs related to functional status, cognitive ability, or social support system  6/12/2025 1033 by Edita Henao RN  Outcome: Progressing  6/12/2025 1033 by Edita Henao RN  Outcome: Progressing

## 2025-06-12 NOTE — ASSESSMENT & PLAN NOTE
POD#3  - Continue routine post partum care  - [id number], HgB 10.4 -> 10.2-->8/7- venofer ordered  - Pain well controlled: tylenol/motrin scheduled, tim prn  - Lochia within normal limits: continue to monitor   - OOB: as able, encourage ambulation  - Passing minimal flatus  - Voiding spontaneously s/p Reis removal  - DVT ppx: SCD, Lovenox 40mg BID  - Breastfeeding  - Baby in: room  - Dispo: anticipate d/c home POD3-4

## 2025-06-12 NOTE — NURSING NOTE
"Pt visibly tearful on assessment stating, \"I just want to go home, I'm so depressed\". This RN provided emotional support and asked pt to complete EPPDS. Pt scored a 6, categorized as \"medium risk\". This RN will provide PPD resources. MD Rosenthal notified.   "

## 2025-06-12 NOTE — PROGRESS NOTES
Progress Note - OB/GYN   Name: Yecenia Rodriguez 35 y.o. female I MRN: [id number]  Unit/Bed#: -01 I Date of Admission: 2025   Date of Service: 2025 I Hospital Day: 5     Assessment & Plan  Status post primary low transverse  section  POD#3  - Continue routine post partum care  - [id number], HgB 10.4 -> 10.2-->8/7- venofer ordered  - Pain well controlled: tylenol/motrin scheduled, tim prn  - Lochia within normal limits: continue to monitor   - OOB: as able, encourage ambulation  - Passing minimal flatus  - Voiding spontaneously s/p Reis removal  - DVT ppx: SCD, Lovenox 40mg BID  - Breastfeeding  - Baby in: room  - Dispo: anticipate d/c home POD3-4    Class 3 severe obesity with body mass index (BMI) of 40.0 to 44.9 in adult  - BMI 45  - Lovenox ordered  Chronic hypertension affecting pregnancy  - On Labetalol 100 mg BID > 200mg BID  - CBC/CMP wnl; urine P:C 0.1  Systolic (24hrs), Av , Min:129 , Max:160   Diastolic (24hrs), Av, Min:81, Max:121    Right upper quadrant pain  S/p CT, right kidney US  Pain well controlled this AM      Yecenia Rodriguez [facility] a [facility] of[facility]. She is PPD# 3  Delivery Type: , Low Transverse   Recovering well and is stable     Disposition    - Anticipate discharge home on PPD# 3-4      Subjective/Objective     Chief Complaint: Postpartum State     Subjective:    Yecenia Rodriguez is PPD/POD#3 s/p  primary  section, low transverse incision.   She has no current complaints.  Incisional Pain is well controlled. Her right flank pain is much improved.    Breastfeeding:  yes    Tolerating PO: yes  Nausea or Vomiting: no  Voiding: yes  Flatus: yes; has had no bowel movement.   Ambulating: yes  Chest pain: no  Shortness of breath: no  Leg pain: no  Lochia: appropriate   Incision: no c/o  Perineum: N/A -intact    Vitals:   /97 (BP Location: Left arm)   Pulse 81   Temp (!) 97.3 °F (36.3 °C) (Temporal)   Resp 18   " Ht 5' 8\" (1.727 m)   Wt (!) 137 kg (302 lb) Comment: pregnancy weight  LMP 08/20/2024 (Exact Date)   SpO2 98%   Breastfeeding Yes   BMI 45.92 kg/m²     No intake or output data in the 24 hours ending 06/12/25 0609      Invasive Devices       Peripheral Intravenous Line  Duration             Peripheral IV 06/07/25 Distal;Dorsal (posterior);Right Forearm 4 days                    Physical Exam:   Physical Exam  Constitutional:       Appearance: Normal appearance.   HENT:      Head: Atraumatic.     Eyes:      Extraocular Movements: Extraocular movements intact.      Conjunctiva/sclera: Conjunctivae normal.       Cardiovascular:      Rate and Rhythm: Normal rate and regular rhythm.      Pulses: Normal pulses.   Pulmonary:      Effort: Pulmonary effort is normal. No respiratory distress.      Breath sounds: Normal breath sounds.   Abdominal:      Palpations: Abdomen is soft.      Tenderness: There is no abdominal tenderness.     Neurological:      General: No focal deficit present.      Mental Status: She is alert and oriented to person, place, and time.     Skin:     General: Skin is warm and dry.     Psychiatric:         Mood and Affect: Mood normal.         Behavior: Behavior normal.   Vitals reviewed. Exam conducted with a chaperone present.       GEN: Yecenia Rodriguez appears well, alert and oriented x 3, pleasant and cooperative   CARDIO: RRR, no murmurs or rubs  RESP:  CTAB, no wheezes or rales  ABDOMEN: soft, + RUQ/rib/flank Discomfort, no distention, fundus @ u, Incision C/D/I w/ mepilex in plac  EXTREMITIES: SCDs on, non tender, no erythema, b/l Joey's sign negative      Labs:  Hemoglobin   Date Value Ref Range Status   06/11/2025 8.7 (L) 11.5 - 15.4 g/dL Final   06/11/2025 8.7 (L) 11.5 - 15.4 g/dL Final     WBC   Date Value Ref Range Status   06/11/2025 10.40 (H) 4.31 - 10.16 Thousand/uL Final   06/11/2025 9.69 4.31 - 10.16 Thousand/uL Final     Platelets   Date Value Ref Range Status "   06/11/2025 234 149 - 390 Thousands/uL Final   06/11/2025 196 149 - 390 Thousands/uL Final     Creatinine   Date Value Ref Range Status   06/11/2025 0.71 0.60 - 1.30 mg/dL Final     Comment:     Standardized to IDMS reference method   06/11/2025 0.84 0.60 - 1.30 mg/dL Final     Comment:     Standardized to IDMS reference method   07/26/2018 0.7 0.5 - 1.0 mg/dL Final     Comment:     GFR should be used to assess renal function.  Plasma/Serum creatinine may not   be able to properly reflect renal function in some cases.     AST   Date Value Ref Range Status   06/11/2025 18 13 - 39 U/L Final   06/11/2025 19 13 - 39 U/L Final   07/26/2018 20 10 - 35 U/L Final     ALT   Date Value Ref Range Status   06/11/2025 17 7 - 52 U/L Final     Comment:     Specimen collection should occur prior to Sulfasalazine administration due to the potential for falsely depressed results.    06/11/2025 17 7 - 52 U/L Final     Comment:     Specimen collection should occur prior to Sulfasalazine administration due to the potential for falsely depressed results.    07/26/2018 22 10 - 35 U/L Final          Leatha Hampton MD  6/12/2025  6:09 AM

## 2025-06-12 NOTE — LACTATION NOTE
This note was copied from a baby's chart.  CONSULT - LACTATION  Baby Boy Rodriguez (Bliss) 3 days male MRN: [id number]    [facility] NURSERY Room / Bed: (N)/(N) Encounter: [id number]    Maternal Information     MOTHER:  Yecenia Rodriguez  Maternal Age: 35 y.o.  OB History: # 1 - Date: 17, Sex: None, Weight: None, GA: None, Type: None, Apgar1: None, Apgar5: None, Living: None, Birth Comments: None    # 2 - Date: 25, Sex: Male, Weight: 3770 g (8 lb 5 oz), GA: 39w6d, Type: , Low Transverse, Apgar1: 2, Apgar5: 8, Living: Living, Birth Comments: None   Previous breast reduction surgery? No    Lactation history:   Has patient previously breast fed: No   How long had patient previously breast fed:     Previous breast feeding complications:       Past Surgical History:   Procedure Laterality Date    DILATION AND CURETTAGE OF UTERUS      ETOP    ENDOSCOPIC ULTRASOUND (UPPER)  a month ago    not sure if it's endoscopic...I had an ultra sound for PCOS    WISDOM TOOTH EXTRACTION         Birth information:  YOB: 2026   Time of birth: 2:10 AM   Sex: male   Delivery type: , Low Transverse   Birth Weight: 3770 g (8 lb 5 oz)   Percent of Weight Change: -8%     Gestational Age: 39w6d   [unfilled]    Reason for Consult:    Reason for Consult: Follow-up assessment (ext) - 20 min, Latch Assess (ext) - 20 min, Breast/Nipple Pain - 5 min    Risk Factors:    Risk Factors:  (AMA, PCOS)    Breast and nipple assessment:   Breasts/Nipples  Left Breast: Soft, Other (Comment) (small tubular)  Right Breast: Soft, Other (Comment) (small, tubular)  Left Nipple: Flat, Everted (short shank)  Right Nipple: Flat, Everted (short shank)  Intervention: Other (comment), Hand expression (helped with positioning/maintaining deep latch)  Breastfeeding Progress: Not yet established    OB Lactation Tools:         Breast Pump:    Breast Pump  Pump: Personal (has  breast pump from Insurance)  Pump Review/Education: Milk storage      Pinnacle Assessment: normal assessment    Feeding assessment: feeding well    LATCH:  Latch: Grasps breast, tongue down, lips flanged, rhythmic sucking   Audible Swallowing: A few with stimulation   Type of Nipple: Everted (After stimulation)   Comfort (Breast/Nipple): Filling, red/small blisters/bruises, mild/moderate discomfort   Hold (Positioning): Partial assist, teach one side, mother does other, staff holds   LATCH Score: 7       Hazel[place]aker:                   Feeding recommendations:  breast feed on demand         25 1030   Patient Follow-Up   Lactation Consult Status 2   Follow-Up Type Inpatient;Call as needed   Other OB Lactation Documentation    Additional Problem Noted Follow up Visit - NEW Family; helped with positioning/maintaining deeper latch  (Painful Nipples; Checklist Essentials for Positioning & latch; Congratulations on Your Baby! @ bedside)       Mom asking questions about breast & bottle feeding. Discussed risks for early supplementation: over feeding, longer digestion times, engorgement for mom, lower milk supply for mom, and nipple confusion.     Benefits of breast feeding for infant's intestinal tract, less engorgement for mom, protection from multiple disease processes as infant develops, avoidance of over feeding for infant, less nipple confusion, and increased health benefits for mom.     Mom C/O sore nipples.  Information given about sore nipples and how to correct with positioning techniques. Discussed maneuvers to latch infant on properly to avoid nipple pain and promote healing.  Discussed treatments that could be utilized to promote healing. Hydro gel dressings given with instruction and verbalization of understanding of cleaning and duration of use.     Education on positioning and alignment. Mom is encouraged to:     - Bring baby up to the breast (use of pillows to elevate so baby's torso is against mom's  breasts)   - Skin to skin for feedings with top hand exposed to show signs of satiation   - Chin deep into breast tissue (make baby look up to the nipple)   - nose aligned to the nipple   -Wait for wide gape, place chin behind the areola on the breast so nipple is at the nose. Once baby opens their mouth wide, the nipple will be aimed at the upper, back palate  - Cheeks should be touching breast, and baby should have a long neck   - Ear, shoulder, hip will be in alignment   - Deep, snug hold of baby up to the breast   - Every baby knows how to breathe at the breast. The tip of the nose may appear to touch the breast. Babies breathe from the side of the nasal passages. If baby can not breathe due to improper alignment, baby will unlatch    Mom chose to start on right breast using football hold. With permission; Worked on positioning infant up at chest level and starting to feed infant with nose arriving at the nipple. Then, using areolar compression to achieve a deep latch that is comfortable and exchanges optimum amounts of milk.  Guided dyad to deep latch. Using breast compressions & stimulation to keep active suckling till popped off with relaxed tone. Nursing parent  was able to note signs of a good feeding like: less discomfort after latch on tenderness, good rocker suckling with stimulation, breast softening; rounded nipple and relaxed tone after nursing at breast.  Burped. Then to left breast also using football hold. Guided to deep latch with active suckling till asleep at breast with relaxed tone. Dad remains supportive at bedside.     Encouraged nursing parent to call for assistance, questions and concerns.  Extension number for inpatient lactation support provided.       Ara Mcmillan RN 6/12/2025 3:11 PM

## 2025-06-12 NOTE — ASSESSMENT & PLAN NOTE
- On Labetalol 100 mg BID > 200mg BID  - CBC/CMP wnl; urine P:C 0.1  Systolic (24hrs), Av , Min:129 , Max:160   Diastolic (24hrs), Av, Min:81, Max:121

## 2025-06-13 ENCOUNTER — TELEPHONE (OUTPATIENT)
Dept: OTHER | Facility: OTHER | Age: 35
End: 2025-06-13

## 2025-06-13 VITALS
RESPIRATION RATE: 18 BRPM | SYSTOLIC BLOOD PRESSURE: 151 MMHG | HEIGHT: 68 IN | WEIGHT: 293 LBS | OXYGEN SATURATION: 98 % | HEART RATE: 80 BPM | TEMPERATURE: 97.9 F | DIASTOLIC BLOOD PRESSURE: 99 MMHG | BODY MASS INDEX: 44.41 KG/M2

## 2025-06-13 PROCEDURE — 99024 POSTOP FOLLOW-UP VISIT: CPT | Performed by: OBSTETRICS & GYNECOLOGY

## 2025-06-13 PROCEDURE — NC001 PR NO CHARGE: Performed by: OBSTETRICS & GYNECOLOGY

## 2025-06-13 PROCEDURE — 88307 TISSUE EXAM BY PATHOLOGIST: CPT | Performed by: PATHOLOGY

## 2025-06-13 RX ORDER — SODIUM PHOSPHATE,MONO-DIBASIC 19G-7G/118
1 ENEMA (ML) RECTAL ONCE
Status: COMPLETED | OUTPATIENT
Start: 2025-06-13 | End: 2025-06-13

## 2025-06-13 RX ORDER — SENNOSIDES 8.6 MG
2 TABLET ORAL ONCE
Status: COMPLETED | OUTPATIENT
Start: 2025-06-13 | End: 2025-06-13

## 2025-06-13 RX ORDER — ACETAMINOPHEN 325 MG/1
650 TABLET ORAL EVERY 6 HOURS PRN
Start: 2025-06-13

## 2025-06-13 RX ORDER — LABETALOL 200 MG/1
400 TABLET, FILM COATED ORAL 2 TIMES DAILY
Status: DISCONTINUED | OUTPATIENT
Start: 2025-06-13 | End: 2025-06-13 | Stop reason: HOSPADM

## 2025-06-13 RX ORDER — FUROSEMIDE 20 MG/1
20 TABLET ORAL DAILY
Qty: 3 TABLET | Refills: 0 | Status: SHIPPED | OUTPATIENT
Start: 2025-06-14 | End: 2025-06-17

## 2025-06-13 RX ORDER — BENZOCAINE/MENTHOL 6 MG-10 MG
1 LOZENGE MUCOUS MEMBRANE DAILY PRN
Start: 2025-06-13

## 2025-06-13 RX ORDER — SENNOSIDES 8.6 MG
1 TABLET ORAL
Status: DISCONTINUED | OUTPATIENT
Start: 2025-06-13 | End: 2025-06-13

## 2025-06-13 RX ORDER — IBUPROFEN 600 MG/1
600 TABLET, FILM COATED ORAL EVERY 6 HOURS PRN
Start: 2025-06-13

## 2025-06-13 RX ORDER — LABETALOL HYDROCHLORIDE 400 MG/1
400 TABLET, FILM COATED ORAL 2 TIMES DAILY
Qty: 60 TABLET | Refills: 0 | Status: SHIPPED | OUTPATIENT
Start: 2025-06-13

## 2025-06-13 RX ADMIN — LIDOCAINE 1 PATCH: 700 PATCH TOPICAL at 08:29

## 2025-06-13 RX ADMIN — ACETAMINOPHEN 650 MG: 325 TABLET ORAL at 18:11

## 2025-06-13 RX ADMIN — ACETAMINOPHEN 650 MG: 325 TABLET ORAL at 08:37

## 2025-06-13 RX ADMIN — ENOXAPARIN SODIUM 40 MG: 40 INJECTION SUBCUTANEOUS at 08:29

## 2025-06-13 RX ADMIN — IBUPROFEN 600 MG: 600 TABLET ORAL at 14:49

## 2025-06-13 RX ADMIN — FUROSEMIDE 20 MG: 20 TABLET ORAL at 08:37

## 2025-06-13 RX ADMIN — POLYETHYLENE GLYCOL 3350 17 G: 17 POWDER, FOR SOLUTION ORAL at 02:58

## 2025-06-13 RX ADMIN — OXYCODONE HYDROCHLORIDE 5 MG: 5 TABLET ORAL at 16:15

## 2025-06-13 RX ADMIN — LABETALOL HYDROCHLORIDE 400 MG: 200 TABLET, FILM COATED ORAL at 08:28

## 2025-06-13 RX ADMIN — DOCUSATE SODIUM 100 MG: 100 CAPSULE, LIQUID FILLED ORAL at 18:11

## 2025-06-13 RX ADMIN — SODIUM PHOSPHATE, DIBASIC, UNSPECIFIED FORM AND SODIUM PHOSPHATE, MONOBASIC, UNSPECIFIED FORM 1 ENEMA: 7; 19 ENEMA RECTAL at 10:56

## 2025-06-13 RX ADMIN — IBUPROFEN 600 MG: 600 TABLET ORAL at 03:59

## 2025-06-13 RX ADMIN — DOCUSATE SODIUM 100 MG: 100 CAPSULE, LIQUID FILLED ORAL at 08:28

## 2025-06-13 RX ADMIN — OXYCODONE HYDROCHLORIDE 5 MG: 5 TABLET ORAL at 05:20

## 2025-06-13 RX ADMIN — ACETAMINOPHEN 650 MG: 325 TABLET ORAL at 01:59

## 2025-06-13 RX ADMIN — SENNOSIDES 17.2 MG: 8.6 TABLET, FILM COATED ORAL at 08:28

## 2025-06-13 RX ADMIN — CYCLOBENZAPRINE 10 MG: 10 TABLET, FILM COATED ORAL at 10:56

## 2025-06-13 RX ADMIN — CYCLOBENZAPRINE 10 MG: 10 TABLET, FILM COATED ORAL at 01:59

## 2025-06-13 RX ADMIN — FAMOTIDINE 20 MG: 20 TABLET, FILM COATED ORAL at 05:20

## 2025-06-13 NOTE — PROGRESS NOTES
"Progress Note - OB/GYN   Name: Yecenia Rodriguez 35 y.o. female I MRN: [id number]  Unit/Bed#: -01 I Date of Admission: 2025   Date of Service: 2025 I Hospital Day: 6     Assessment & Plan  Status post primary low transverse  section  POD#4  - Continue routine post partum care  - [id number], HgB 10.4 -> 10.2-->- venofer ordered  - Pain well controlled: tylenol/motrin scheduled, tim prn  - Lochia within normal limits: continue to monitor   - OOB: as able, encourage ambulation  - Passing minimal flatus  - Voiding spontaneously s/p Reis removal  - DVT ppx: SCD, Lovenox 40mg BID  - Breastfeeding  - Baby in: room  - Dispo: anticipate d/c home pending blood pressures    Class 3 severe obesity with body mass index (BMI) of 40.0 to 44.9 in adult  - BMI 45  - Lovenox ordered  Chronic hypertension affecting pregnancy  - On Labetalol 100 mg BID > 200mg BID >300 mb BID  - /12: Lasix 5x day course ordered  - CBC/CMP wnl; urine P:C 0.1  Systolic (24hrs), Av , Min:120 , Max:155   Diastolic (24hrs), Av, Min:85, Max:106    Right upper quadrant pain  S/p Abd CT, right kidney US  Pain well controlled this AM    Disposition    - Anticipate discharge home on PPD# 4-5, pending blood pressures      Subjective/Objective     Chief Complaint: Postpartum State     Subjective:    Yecenia Rodriguez is PPD/POD#3 s/p  primary  section, low transverse incision.   She has no current complaints.  Incisional Pain is well controlled. Her right flank pain is much improved.    Breastfeeding:  yes    Tolerating PO: yes  Nausea or Vomiting: no  Voiding: yes  Flatus: yes; has had no bowel movement.   Ambulating: yes  Chest pain: no  Shortness of breath: no  Leg pain: no  Lochia: appropriate   Incision: no c/o  Perineum: N/A -intact    Vitals:   /98 (BP Location: Left arm)   Pulse 84   Temp 97.6 °F (36.4 °C) (Oral)   Resp 18   Ht 5' 8\" (1.727 m)   Wt (!) 137 kg (302 lb) Comment: pregnancy weight "  LMP 08/20/2024 (Exact Date)   SpO2 98%   Breastfeeding Yes   BMI 45.92 kg/m²     No intake or output data in the 24 hours ending 06/13/25 0623      Invasive Devices       None                   Physical Exam:   Physical Exam  Constitutional:       Appearance: Normal appearance.   HENT:      Head: Atraumatic.     Eyes:      Extraocular Movements: Extraocular movements intact.      Conjunctiva/sclera: Conjunctivae normal.       Cardiovascular:      Rate and Rhythm: Normal rate and regular rhythm.      Pulses: Normal pulses.   Pulmonary:      Effort: Pulmonary effort is normal. No respiratory distress.      Breath sounds: Normal breath sounds.   Abdominal:      Palpations: Abdomen is soft.      Tenderness: There is no abdominal tenderness.     Neurological:      General: No focal deficit present.      Mental Status: She is alert and oriented to person, place, and time.     Skin:     General: Skin is warm and dry.     Psychiatric:         Mood and Affect: Mood normal.         Behavior: Behavior normal.   Vitals reviewed. Exam conducted with a chaperone present.       GEN: Yecenia Rodriguez appears well, alert and oriented x 3, pleasant and cooperative   CARDIO: RRR, no murmurs or rubs  RESP:  CTAB, no wheezes or rales  ABDOMEN: soft, + RUQ/rib/flank Discomfort, no distention, fundus @ u, Incision C/D/I w/ mepilex in plac  EXTREMITIES: SCDs on, non tender, no erythema, b/l Joey's sign negative      Labs:  Hemoglobin   Date Value Ref Range Status   06/11/2025 8.7 (L) 11.5 - 15.4 g/dL Final   06/11/2025 8.7 (L) 11.5 - 15.4 g/dL Final     WBC   Date Value Ref Range Status   06/11/2025 10.40 (H) 4.31 - 10.16 Thousand/uL Final   06/11/2025 9.69 4.31 - 10.16 Thousand/uL Final     Platelets   Date Value Ref Range Status   06/11/2025 234 149 - 390 Thousands/uL Final   06/11/2025 196 149 - 390 Thousands/uL Final     Creatinine   Date Value Ref Range Status   06/11/2025 0.71 0.60 - 1.30 mg/dL Final     Comment:      Standardized to IDMS reference method   06/11/2025 0.84 0.60 - 1.30 mg/dL Final     Comment:     Standardized to IDMS reference method   07/26/2018 0.7 0.5 - 1.0 mg/dL Final     Comment:     GFR should be used to assess renal function.  Plasma/Serum creatinine may not   be able to properly reflect renal function in some cases.     AST   Date Value Ref Range Status   06/11/2025 18 13 - 39 U/L Final   06/11/2025 19 13 - 39 U/L Final   07/26/2018 20 10 - 35 U/L Final     ALT   Date Value Ref Range Status   06/11/2025 17 7 - 52 U/L Final     Comment:     Specimen collection should occur prior to Sulfasalazine administration due to the potential for falsely depressed results.    06/11/2025 17 7 - 52 U/L Final     Comment:     Specimen collection should occur prior to Sulfasalazine administration due to the potential for falsely depressed results.    07/26/2018 22 10 - 35 U/L Final          Leatha Hampton MD  6/13/2025  6:23 AM

## 2025-06-13 NOTE — NURSING NOTE
"Discharge teaching and education completed with patient and spouse at the bedside. \"Save A Life\" magnet provided and reviewed. No questions at this time.  "

## 2025-06-13 NOTE — ASSESSMENT & PLAN NOTE
- On Labetalol 100 mg BID > 200mg BID >300 mb BID  - : Lasix 5x day course ordered  - CBC/CMP wnl; urine P:C 0.1  Systolic (24hrs), Av , Min:120 , Max:155   Diastolic (24hrs), Av, Min:85, Max:106

## 2025-06-13 NOTE — DISCHARGE INSTR - AVS FIRST PAGE
"The Basics  Written by the doctors and editors at [facility]  What are discharge instructions?  Discharge instructions are information about how to take care of yourself after getting medical care.  What is  birth?  This is having surgery to give birth to your baby (figure 1). The doctor makes a cut (incision) in your belly and removes the baby from your uterus. It is also called a \".\"  Most people go home from the hospital about 3 days after a  birth. But it can take 6 to 8 weeks to heal completely.  How do I care for myself at home?  Ask the doctor or nurse what you should do when you go home. Make sure you understand exactly what you need to do to care for yourself. Ask questions if there is anything you do not understand.  You should also:  ? Expect to have bleeding from your vagina - You should wear a pad, and not use tampons.  ? The blood will change color with time. At first, it can be red or red-brown and might have small clots. It should get lighter and more watery after a few days, and might look pink or brown. After a few weeks, it will become a yellow-white discharge.  ? The bleeding might get heavier when you:  ? Are more active  ? Stand up after lying down for some time  ? Breastfeed  ? Take all your medicines as instructed:  ? You can take non-prescription medicines to relieve pain, such as acetaminophen (sample brand name: Tylenol), ibuprofen (sample brand names: Advil, Motrin), or naproxen (sample brand name: Aleve).  ? If your doctor prescribed opioid pain medicine, you might get constipated. Take a stool softener to prevent this.  ? Take care of your incision - You might have stitches or skin staples.  ? You can take showers. Pat your incision dry afterward. Do not put your incision underwater, such as in a bath, pool, or lake, for at least 2 weeks. This can slow healing and raise your chance of getting an infection.  ? Always wash your hands before and after you touch your " incision or bandage.  ? Increase your activity slowly:  ? Start with short walks around your home, and walk a little more each day.  ? When you cough, sneeze, or laugh, press a pillow over your incision. This helps support the area and ease pain.  ? Avoid heavy lifting for 6 to 8 weeks. During this time, do not lift anything heavier than your baby. Your doctor or nurse will talk to you about how to increase physical activity gradually. This depends on your healing as well as how active you were before pregnancy and giving birth.  ? Avoid driving while you are taking strong medicines like opioids, or medicines that make you sleepy. Do not drive if it causes pain or discomfort.  ? Avoid sex for at least 2 weeks - After that, you can have sex when you feel ready.  ? Talk to your doctor or nurse if you have questions about birth control. It is possible to get pregnant again, even soon after giving birth.  ? Having a  birth can cause a lot of physical and emotional changes. These can include pain, vaginal dryness, and feeling very tired. Many people do not feel ready to have sex again for 6 weeks or longer after giving birth. But some people feel ready sooner.  ? You might want to physically connect with your partner, even if you do not want to have sex. Some options include holding hands, cuddling, and giving each other massages.  ? If you are breastfeeding, your breasts might leak milk during sex. This is a normal response to the hormones your body releases from sex and orgasm.  ? Drink plenty of water and other fluids to help with constipation. Eat foods that have a lot of fiber, like fruits and vegetables.  ? Wear the abdominal binder as instructed, if your doctor gave you one. This is a stretchy band you wear around your belly. It helps support your muscles as you heal.  ? Get help from your partner, family members, or friends when possible. It is hard to recover from surgery while also caring for a .  Try to rest when you can.  What follow-up care do I need?  Your doctor, nurse, or midwife will want to see you again a few weeks after you give birth. They will tell you when to schedule this visit. They will do a physical exam, check how you are healing, and make sure you can care for yourself and your baby.  If you have stitches, they might absorb on their own. If you have staples, you will need to have them taken out. Your doctor will usually want to do this within 1 week after your  birth.  When should I call the doctor?  Call your doctor or nurse right away if:  ? You have shortness of breath, a headache that is very bad or will not go away, chest pain, or leg pain or swelling.  ? Your vaginal bleeding:  ? Soaks more than 1 pad in an hour  ? Has clots that are bigger than a quarter  ? Gets heavier, and you feel lightheaded  ? You have signs of infection, such as a fever higher than 100.4°F (38°C), chills, fast heartbeat, cold or clammy skin, or dizziness.  ? Your pain gets worse, or you have new severe pain.  ? Your incision gets more sore or red, or is bleeding or leaking fluid.  ? You feel depressed, or are having trouble coping.  Call for advice if you:  ? Have pain when urinating or during sex  ? Are having trouble breastfeeding, or have breast symptoms that worry you  ? Have vaginal discharge that smells bad  All topics are updated as new evidence becomes available and our peer review process is complete.  This topic retrieved from [employer] on: 2025.  Topic 897415 Version 6.0  Release: 33.4.2 - C33.153  2025 UpToDate, Inc. and/or its affiliates. All rights reserved.  figure 1:  birth

## 2025-06-13 NOTE — ASSESSMENT & PLAN NOTE
POD#4  - Continue routine post partum care  - [id number], HgB 10.4 -> 10.2-->8/7- venofer ordered  - Pain well controlled: tylenol/motrin scheduled, tim prn  - Lochia within normal limits: continue to monitor   - OOB: as able, encourage ambulation  - Passing minimal flatus  - Voiding spontaneously s/p Reis removal  - DVT ppx: SCD, Lovenox 40mg BID  - Breastfeeding  - Baby in: room  - Dispo: anticipate d/c home pending blood pressures

## 2025-06-13 NOTE — LACTATION NOTE
This note was copied from a baby's chart.  CONSULT - LACTATION  Baby Boy Rodriguez (Bliss) 4 days male MRN: [id number]    [facility] NURSERY Room / Bed: (N)/(N) Encounter: [id number]    Maternal Information     MOTHER:  Yecenia Rodriguez  Maternal Age: 35 y.o.  OB History: # 1 - Date: 17, Sex: None, Weight: None, GA: None, Type: None, Apgar1: None, Apgar5: None, Living: None, Birth Comments: None    # 2 - Date: 25, Sex: Male, Weight: 3770 g (8 lb 5 oz), GA: 39w6d, Type: , Low Transverse, Apgar1: 2, Apgar5: 8, Living: Living, Birth Comments: None   Previous breast reduction surgery? No    Lactation history:   Has patient previously breast fed: No   How long had patient previously breast fed:     Previous breast feeding complications:       Past Surgical History:   Procedure Laterality Date    DILATION AND CURETTAGE OF UTERUS      ETOP    ENDOSCOPIC ULTRASOUND (UPPER)  a month ago    not sure if it's endoscopic...I had an ultra sound for PCOS    WISDOM TOOTH EXTRACTION         Birth information:  YOB: 2026   Time of birth: 2:10 AM   Sex: male   Delivery type: , Low Transverse   Birth Weight: 3770 g (8 lb 5 oz)   Percent of Weight Change: -11%     Gestational Age: 39w6d   [unfilled]    Reason for Consult:    Reason for Consult: Follow-up assessment (ext) - 20 min, Latch Assess (ext) - 20 min    Risk Factors:    Risk Factors:  (AMA, PCOS)    Breast and nipple assessment:   Breasts/Nipples  Date Pumping Initiated:  (Encouraged pumping after feeds for extra stimulation)  Left Breast: Soft, Other (Comment) (tubular)  Right Breast: Soft, Other (Comment) (tubalar)  Left Nipple: Flat, Everted  Right Nipple: Flat, Everted  Intervention: Other (comment) (helped with positioning/maintaining deep latch)  Breastfeeding Progress: Not yet established    OB Lactation Tools:         Breast Pump:    Breast Pump  Pump: Personal (has a  breast pump from Insurance)  Pump Review/Education: Milk storage       Assessment: normal assessment    Feeding assessment: latch difficulty (Dyad doing well with assistance )    LATCH:  Latch: Grasps breast, tongue down, lips flanged, rhythmic sucking   Audible Swallowing: A few with stimulation   Type of Nipple: Everted (After stimulation)   Comfort (Breast/Nipple): Filling, red/small blisters/bruises, mild/moderate discomfort   Hold (Positioning): No assist from staff, mother able to position/hold infant   LATCH Score: 8       HazelBaker:                   Feeding recommendations:  breast feed on demand; pump after feeds as tolerated to help supply       25 1210   Patient Follow-Up   Lactation Consult Status 2   Follow-Up Type Inpatient;Call as needed   Other OB Lactation Documentation    Additional Problem Noted Follow up Visit with NEW Family; helped with positioning & mantaining deep latch; encouraged trying to fit in pumps after feeding to help breastmilk supply  (Encouraged Lactation support outpatient also)       Mom states staff helped with left breast for a few short latches. Burped & woke baby. Placed at right breast using football hold.  Worked on positioning infant up at chest level and starting to feed infant with nose arriving at the nipple. Then, using areolar compression to achieve a deep latch that is comfortable and exchanges optimum amounts of milk. Guided dyad to deep latch. Taught to use breast compressions & stimulation to keep active suckling for goal of 10 or more minutes till baby is no longer suckling well, then burp and offer 2nd breast. Dad remains supportive at bedside.     Encouraged parents to call for assistance, questions, and concerns about breastfeeding.      Ara Mcmillan RN 2025 5:32 PM

## 2025-06-14 NOTE — TELEPHONE ENCOUNTER
Patient called back, advised her that they did give her both dosages and tomorrow start the rest of the medications.

## 2025-06-14 NOTE — TELEPHONE ENCOUNTER
Patient called to find out if the hospital gave her both dosages of her BP medication.    While patient was on hold for a brief moment, I found out the hospital did give her both dosages found in her chart in [facility]. Patient disconnected the call.     Patient should start tomorrow taking bp medication.

## 2025-06-16 ENCOUNTER — TELEPHONE (OUTPATIENT)
Dept: OBGYN CLINIC | Facility: CLINIC | Age: 35
End: 2025-06-16

## 2025-06-16 ENCOUNTER — HOSPITAL ENCOUNTER (EMERGENCY)
Facility: HOSPITAL | Age: 35
Discharge: HOME | End: 2025-06-16
Attending: EMERGENCY MEDICINE
Payer: COMMERCIAL

## 2025-06-16 VITALS
RESPIRATION RATE: 18 BRPM | WEIGHT: 293 LBS | TEMPERATURE: 98.2 F | DIASTOLIC BLOOD PRESSURE: 89 MMHG | OXYGEN SATURATION: 97 % | BODY MASS INDEX: 45.59 KG/M2 | SYSTOLIC BLOOD PRESSURE: 148 MMHG | HEART RATE: 82 BPM

## 2025-06-16 DIAGNOSIS — Z98.891 STATUS POST CESAREAN DELIVERY: ICD-10-CM

## 2025-06-16 DIAGNOSIS — R10.9 RIGHT FLANK PAIN: Primary | ICD-10-CM

## 2025-06-16 LAB
ALBUMIN SERPL BCG-MCNC: 2.9 G/DL (ref 3.5–5)
ALP SERPL-CCNC: 113 U/L (ref 34–104)
ALT SERPL W P-5'-P-CCNC: 23 U/L (ref 7–52)
ANION GAP SERPL CALCULATED.3IONS-SCNC: 6 MMOL/L (ref 4–13)
AST SERPL W P-5'-P-CCNC: 13 U/L (ref 13–39)
BACTERIA UR QL AUTO: ABNORMAL /HPF
BASOPHILS # BLD MANUAL: 0 THOUSAND/UL (ref 0–0.1)
BASOPHILS NFR MAR MANUAL: 0 % (ref 0–1)
BILIRUB SERPL-MCNC: 0.39 MG/DL (ref 0.2–1)
BILIRUB UR QL STRIP: NEGATIVE
BUN SERPL-MCNC: 13 MG/DL (ref 5–25)
CALCIUM ALBUM COR SERPL-MCNC: 8.9 MG/DL (ref 8.3–10.1)
CALCIUM SERPL-MCNC: 8 MG/DL (ref 8.4–10.2)
CHLORIDE SERPL-SCNC: 106 MMOL/L (ref 96–108)
CLARITY UR: CLEAR
CO2 SERPL-SCNC: 24 MMOL/L (ref 21–32)
COLOR UR: YELLOW
CREAT SERPL-MCNC: 0.61 MG/DL (ref 0.6–1.3)
EOSINOPHIL # BLD MANUAL: 0 THOUSAND/UL (ref 0–0.4)
EOSINOPHIL NFR BLD MANUAL: 0 % (ref 0–6)
ERYTHROCYTE [DISTWIDTH] IN BLOOD BY AUTOMATED COUNT: 13 % (ref 11.6–15.1)
GFR SERPL CREATININE-BSD FRML MDRD: 117 ML/MIN/1.73SQ M
GLUCOSE SERPL-MCNC: 100 MG/DL (ref 65–140)
GLUCOSE UR STRIP-MCNC: NEGATIVE MG/DL
HCT VFR BLD AUTO: 25.5 % (ref 34.8–46.1)
HGB BLD-MCNC: 8.7 G/DL (ref 11.5–15.4)
HGB UR QL STRIP.AUTO: ABNORMAL
KETONES UR STRIP-MCNC: NEGATIVE MG/DL
LEUKOCYTE ESTERASE UR QL STRIP: ABNORMAL
LG PLATELETS BLD QL SMEAR: PRESENT
LIPASE SERPL-CCNC: 18 U/L (ref 11–82)
LYMPHOCYTES # BLD AUTO: 1.41 THOUSAND/UL (ref 0.6–4.47)
LYMPHOCYTES # BLD AUTO: 11 % (ref 14–44)
MCH RBC QN AUTO: 29.4 PG (ref 26.8–34.3)
MCHC RBC AUTO-ENTMCNC: 34.1 G/DL (ref 31.4–37.4)
MCV RBC AUTO: 86 FL (ref 82–98)
MONOCYTES # BLD AUTO: 1.03 THOUSAND/UL (ref 0–1.22)
MONOCYTES NFR BLD: 8 % (ref 4–12)
MUCOUS THREADS UR QL AUTO: ABNORMAL
NEUTROPHILS # BLD MANUAL: 10.4 THOUSAND/UL (ref 1.85–7.62)
NEUTS BAND NFR BLD MANUAL: 3 % (ref 0–8)
NEUTS SEG NFR BLD AUTO: 78 % (ref 43–75)
NITRITE UR QL STRIP: NEGATIVE
NON-SQ EPI CELLS URNS QL MICRO: ABNORMAL /HPF
OVALOCYTES BLD QL SMEAR: PRESENT
PH UR STRIP.AUTO: 6 [PH]
PLATELET # BLD AUTO: 348 THOUSANDS/UL (ref 149–390)
PLATELET BLD QL SMEAR: ADEQUATE
PMV BLD AUTO: 9.9 FL (ref 8.9–12.7)
POLYCHROMASIA BLD QL SMEAR: PRESENT
POTASSIUM SERPL-SCNC: 3.6 MMOL/L (ref 3.5–5.3)
PROT SERPL-MCNC: 5.4 G/DL (ref 6.4–8.4)
PROT UR STRIP-MCNC: NEGATIVE MG/DL
RBC # BLD AUTO: 2.96 MILLION/UL (ref 3.81–5.12)
RBC #/AREA URNS AUTO: ABNORMAL /HPF
RBC MORPH BLD: PRESENT
SMUDGE CELLS BLD QL SMEAR: PRESENT
SODIUM SERPL-SCNC: 136 MMOL/L (ref 135–147)
SP GR UR STRIP.AUTO: 1.02 (ref 1–1.03)
UROBILINOGEN UR STRIP-ACNC: 2 MG/DL
WBC # BLD AUTO: 12.84 THOUSAND/UL (ref 4.31–10.16)
WBC #/AREA URNS AUTO: ABNORMAL /HPF

## 2025-06-16 PROCEDURE — 99284 EMERGENCY DEPT VISIT MOD MDM: CPT

## 2025-06-16 PROCEDURE — 80053 COMPREHEN METABOLIC PANEL: CPT

## 2025-06-16 PROCEDURE — 85027 COMPLETE CBC AUTOMATED: CPT

## 2025-06-16 PROCEDURE — 81001 URINALYSIS AUTO W/SCOPE: CPT

## 2025-06-16 PROCEDURE — 36415 COLL VENOUS BLD VENIPUNCTURE: CPT

## 2025-06-16 PROCEDURE — 96374 THER/PROPH/DIAG INJ IV PUSH: CPT

## 2025-06-16 PROCEDURE — 83690 ASSAY OF LIPASE: CPT

## 2025-06-16 PROCEDURE — 99283 EMERGENCY DEPT VISIT LOW MDM: CPT

## 2025-06-16 PROCEDURE — 85007 BL SMEAR W/DIFF WBC COUNT: CPT

## 2025-06-16 RX ORDER — CYCLOBENZAPRINE HCL 10 MG
10 TABLET ORAL ONCE
Status: COMPLETED | OUTPATIENT
Start: 2025-06-16 | End: 2025-06-16

## 2025-06-16 RX ORDER — LIDOCAINE 50 MG/G
1 PATCH TOPICAL ONCE
Status: DISCONTINUED | OUTPATIENT
Start: 2025-06-16 | End: 2025-06-16 | Stop reason: HOSPADM

## 2025-06-16 RX ORDER — CYCLOBENZAPRINE HCL 10 MG
10 TABLET ORAL 2 TIMES DAILY PRN
Qty: 20 TABLET | Refills: 0 | Status: SHIPPED | OUTPATIENT
Start: 2025-06-16 | End: 2025-06-24 | Stop reason: SDUPTHER

## 2025-06-16 RX ORDER — KETOROLAC TROMETHAMINE 30 MG/ML
15 INJECTION, SOLUTION INTRAMUSCULAR; INTRAVENOUS ONCE
Status: COMPLETED | OUTPATIENT
Start: 2025-06-16 | End: 2025-06-16

## 2025-06-16 RX ADMIN — LIDOCAINE 1 PATCH: 50 PATCH CUTANEOUS at 05:31

## 2025-06-16 RX ADMIN — KETOROLAC TROMETHAMINE 15 MG: 30 INJECTION, SOLUTION INTRAMUSCULAR; INTRAVENOUS at 05:38

## 2025-06-16 RX ADMIN — CYCLOBENZAPRINE 10 MG: 10 TABLET, FILM COATED ORAL at 05:30

## 2025-06-16 NOTE — DISCHARGE INSTRUCTIONS
You may take Flexeril twice daily as needed for pain.  May continue taking Tylenol and ibuprofen as well.  Follow up with your PCP for re-imaging of the right kidney lesion.     If pain does not resolve in the next few days, pain worsens, or you develop new symptoms such as fevers, chills, nausea, vomiting, headache, or blurred vision, please return to the ED.

## 2025-06-16 NOTE — TELEPHONE ENCOUNTER
POSTPARTUM PHONE CALL ASSESSMENT    Date of Delivery: 2025  Delivering Provider: Dr. Chakraborty  Mode:   Delivery Notes/Complications: No complications    Do you still have bleeding?  Yes, light bleeding  Are you experiencing pain? Yes, complaining of right sided pain.  Was seen at ED today and given Flexeril.  Reports improvement.   If yes, managing pain with: Flexeril  Regular BMs/Urination? Yes  Breastfeeding/Formula/Both? Breast and bottle feeding  How are you doing emotionally? Patient states she is doing well.     Do you have any other questions or concerns for us or your provider? No     Have you scheduled the pediatrician appointment with pediatrician? Yes    Postpartum Appointment  Appointment Date: 2025   Provider:  Dr. Chakraborty

## 2025-06-16 NOTE — UTILIZATION REVIEW
Discharge Summary - OB/GYN   Name: Yecenia Rodriguez 35 y.o. female I MRN: [id number]  Unit/Bed#: -01 I Date of Admission: 2025   Date of Service: 2025 I Hospital Day: 6     ADMISSION  Patient of: [facility] (Dr. Corrales, Dr. Chakraborty, Dr. Antony)  Admission Date: 2025   Admitting Attending: Dr. Steen   Admitting Diagnoses: [Problem List]    [Problem List]  Patient Active Problem List      Diagnosis    Atypical squamous cell changes of undetermined significance (ASCUS) on cervical cytology with negative high risk human papilloma virus (HPV) test result    Dyslipidemia, goal LDL below 160    Food insecurity    Major depressive disorder, recurrent episode, moderate (HCC)    Class 3 severe obesity with body mass index (BMI) of 40.0 to 44.9 in adult    PCOS (polycystic ovarian syndrome)    Post-traumatic osteoarthritis of right foot    Esophageal spasm    Anxiety disorder due to known physiological condition    Cyst of right ovary    History of PCOS    Anxiety and depression    Benign essential HTN    GERD (gastroesophageal reflux disease)    Prediabetes    Chronic hypertension affecting pregnancy    Status post primary low transverse  section    High-risk pregnancy, primigravida of advanced maternal age    Obesity, Class III, BMI 40-49.9 (morbid obesity)    Obesity affecting pregnancy in third trimester    Other specified diabetes mellitus without complications (HCC)    Elevated glucose tolerance test    Uterine fibroid in pregnancy    Macrosomia affecting management of mother in third trimester    Encounter for supervision of high risk pregnancy in third trimester, antepartum    Right upper quadrant pain        DELIVERY  Delivery Method: , Low Transverse   Delivery Date and Time: 2025 2:10 AM  Delivery Attending: Khris Chakraborty     DISCHARGE  Discharge Date: 25   Discharge Attending: Dr. Khris Chakraborty MD  Discharge Diagnosis:   Same, Delivered     Clinical  course: Admission to Delivery  Yecenia Rodriguez is a 35 y.o.  at 39w6d admitted for IOL in setting of CHTN. Initial SVE was 1/50/-3, and FB was placed for cervical ripening. Accidental AROM occurred at the time of FB placement for clear fluid. She received an oral cytotec for ripening. FB was expelled and pitocin was started. She progressed to  5/80/-2 and FHT began to have prolonged decelerations requiring repositioning, turning off pitocin, and terbutaline. Due to the prolonged decelerations, pitocin was unable to be titrated and  section was recommended. Yecenia agreed and anesthesia was notified. She was taken to the OR     Delivery  Route of Delivery: , Low Transverse  Reason for  delivery: Category II FHR Tracing       Anesthesia: Epidural,   QBL:  543mL     Delivery: , Low Transverse at 2025 2:10 AM     Baby's Weight: 3770 g (8 lb 5 oz); 132.98    Apgar scores: 2  and 8  at 1 and 5 minutes, respectively     Clinical Course: Post-Delivery:  The post delivery course was remarkable for right flank pain. She required multiple medications for pain control. A CT C/A scan showed a small 3 cm intermediate density mass in the right kidney. A follow-up US noted exopyhitic hypoechoic lesion in R kidney. Recommended renal mass protocol for imaging when clinically feasible. Her postpartum course was also complicated by difficult to control blood pressures. She required titration of her medication. She was discharged home once her blood pressures were better controlled. She was discharged home with Labetalol 400mg BID and Lasix protocol.     On the day of discharge, the patient was ambulating, voiding spontaneously, tolerating oral intake, and hemodynamically stable. She was able to reasonably perform all ADLs. She had appropriate bowel function. Pain was well-controlled. She was discharged home on postpartum/postop day #4 without complications. Patient was instructed to  follow up with her OB as an outpatient and was given appropriate warnings to call her provider with problems or concerns.     Pertinent lab findings included:   Blood type A+.      Last three Hgb values:        Lab Results   Component Value Date     HGB 8.7 (L) 06/11/2025     HGB 8.7 (L) 06/11/2025     HGB 10.2 (L) 06/09/2025      Discharge med list:  Contraception: undecided     Medication List      START taking these medications     acetaminophen 325 mg tablet; Commonly known as: TYLENOL; Take 2 tablets   (650 mg total) by mouth every 6 (six) hours as needed for mild pain   benzocaine-menthol-lanolin-aloe 20-0.5 % topical spray; Commonly known   as: DERMOPLAST; Apply 1 Application topically every 6 (six) hours as   needed for irritation   furosemide 20 mg tablet; Commonly known as: LASIX; Take 1 tablet (20 mg   total) by mouth daily for 3 doses; Start taking on: June 14, 2025   hydrocortisone 1 % cream; Apply 1 Application topically daily as needed   for irritation   ibuprofen 600 mg tablet; Commonly known as: MOTRIN; Take 1 tablet (600   mg total) by mouth every 6 (six) hours as needed for mild pain   witch hazel-glycerin topical pad; Commonly known as: TUCKS; Apply 1 Pad   topically every 4 (four) hours as needed for irritation     CHANGE how you take these medications     Labetalol HCl 400 MG Tabs; Take 400 mg by mouth 2 (two) times a day;   What changed: medication strength, how much to take     CONTINUE taking these medications     Adult Blood Pressure Cuff Lg Kit; Use in the morning   buPROPion 150 mg 12 hr tablet; Commonly known as: WELLBUTRIN SR; Take 1   tablet (150 mg total) by mouth 2 (two) times a day   calcium carbonate 500 mg chewable tablet; Commonly known as: TUMS   metFORMIN 1000 MG tablet; Commonly known as: GLUCOPHAGE; Take 1 tablet   (1,000 mg total) by mouth 2 (two) times a day with meals   pantoprazole 20 mg tablet; Commonly known as: PROTONIX; Take 1 tablet   (20 mg total) by mouth daily    PRENATAL 1 PO     STOP taking these medications     aluminum chloride 20 % external solution; Commonly known as: DRYSOL        Condition at discharge:   good      Disposition:   Home     Planned Readmission:   Mimi Hampton  PGY-1 OB/GYN  06/13/25  4:49 PM

## 2025-06-16 NOTE — ED PROVIDER NOTES
"Time reflects when diagnosis was documented in both MDM as applicable and the Disposition within this note       Time User Action Codes Description Comment    2025  6:37 AM Fabiola Montoya Add [R10.9] Right flank pain     2025  6:37 AM Montoya, Fabiola Add [Z98.891] Status post  delivery           ED Disposition       ED Disposition   Discharge    Condition   Stable    Date/Time     6:37 AM    Comment   Yecenia Rodriguez discharge to home/self care.                   Assessment & Plan       Medical Decision Making  35-year-old female presenting for evaluation of right-sided flank pain.  Recent  on , developed right-sided flank pain while hospitalized.  Was improved while hospitalized on Flexeril but was discharged without prescription.      On chart review, patient underwent both CT and ultrasound showing: \"Exophytic hypoechoic lesion in the lower pole of the right kidney is redemonstrated, this measures approximately 3 cm in maximal dimension, renal mass remains the diagnosis of exclusion.\"     Differential diagnosis to include but not limited to: Musculoskeletal pain, renal mass, UTI.  Exam less concerning for preeclampsia as pain is unchanged from hospitalization.  Will check basic labs, pain management and reassess.  Labs with mild leukocytosis otherwise largely unremarkable.  Pain improved with Flexeril and Toradol.  Suspect pain is largely musculoskeletal, I encourage patient to follow-up with repeat renal imaging in the future.  Encouraged OB/GYN as well as PCP follow-up.  Provided short course of Flexeril prescription.  Return precautions provided.  Patient discharged home to self-care.    Amount and/or Complexity of Data Reviewed  Labs: ordered.    Risk  Prescription drug management.             Medications   cyclobenzaprine (FLEXERIL) tablet 10 mg (10 mg Oral Given 25 0327)   ketorolac (TORADOL) injection 15 mg (15 mg Intravenous Given 25 3583) "       ED Risk Strat Scores                    No data recorded        SBIRT 22yo+      Flowsheet Row Most Recent Value   Initial Alcohol Screen: US AUDIT-C     1. How often do you have a drink containing alcohol? 0 Filed at: 2025   2. How many drinks containing alcohol do you have on a typical day you are drinking?  0 Filed at: 2025   3b. FEMALE Any Age, or MALE 65+: How often do you have 4 or more drinks on one occassion? 0 Filed at: 2025   Audit-C Score 0 Filed at: 2025   NANCY: How many times in the past year have you...    Used an illegal drug or used a prescription medication for non-medical reasons? Never Filed at: 2025                            History of Present Illness       Chief Complaint   Patient presents with    Flank Pain     Pt report right flank pain for 1 week.  Reports recent birth .  Pt was told she was told she has a growth on her kidney 5 days ago       Past Medical History[1]   Past Surgical History[2]   Family History[3]   Social History[4]   E-Cigarette/Vaping    E-Cigarette Use Never User       E-Cigarette/Vaping Substances    Nicotine No     THC No     CBD No     Flavoring No     Other No     Unknown No       I have reviewed and agree with the history as documented.     Patient is a 35-year-old female presenting for evaluation of right flank pain for the past week.  Recent  on , postop course was complicated by development of right-sided flank pain.  Patient underwent CT and right upper quadrant ultrasound while admitted which revealed right kidney mass.  States pain was improved during hospitalization with Flexeril but she was discharged home without medication, now having worsening pain.  Feels similar to pain while she was in the hospital.  Denies any fevers, chills, chest pain, shortness of breath, nausea, vomiting.      Flank Pain  Associated symptoms: no chest pain, no chills, no cough, no dysuria, no fever, no  hematuria, no shortness of breath, no sore throat and no vomiting        Review of Systems   Constitutional:  Negative for chills and fever.   HENT:  Negative for ear pain and sore throat.    Eyes:  Negative for pain and visual disturbance.   Respiratory:  Negative for cough and shortness of breath.    Cardiovascular:  Negative for chest pain and palpitations.   Gastrointestinal:  Negative for abdominal pain and vomiting.   Genitourinary:  Positive for flank pain. Negative for dysuria and hematuria.   Musculoskeletal:  Negative for arthralgias and back pain.   Skin:  Negative for color change and rash.   Neurological:  Negative for seizures and syncope.   All other systems reviewed and are negative.          Objective       ED Triage Vitals   Temperature Pulse Blood Pressure Respirations SpO2 Patient Position - Orthostatic VS   06/16/25 0452 06/16/25 0452 06/16/25 0452 06/16/25 0452 06/16/25 0452 06/16/25 0558   98.2 °F (36.8 °C) 84 150/95 18 97 % Sitting      Temp Source Heart Rate Source BP Location FiO2 (%) Pain Score    06/16/25 0452 06/16/25 0558 06/16/25 0558 -- 06/16/25 0452    Oral Monitor Right arm  8      Vitals      Date and Time Temp Pulse SpO2 Resp BP Pain Score FACES Pain Rating User   06/16/25 0640 -- -- -- -- 148/89 -- -- KB   06/16/25 0630 -- 82 97 % 18 148/89 -- -- MA   06/16/25 0600 -- 77 96 % 18 147/95 -- -- MA   06/16/25 0558 -- 81 97 % 18 162/94 -- -- MA   06/16/25 0538 -- -- -- -- -- 10 - Worst Possible Pain -- MA   06/16/25 0452 98.2 °F (36.8 °C) 84 97 % 18 150/95 8 -- BRB            Physical Exam  Vitals and nursing note reviewed.   Constitutional:       General: She is not in acute distress.     Appearance: She is well-developed.   HENT:      Head: Normocephalic and atraumatic.     Eyes:      Conjunctiva/sclera: Conjunctivae normal.       Cardiovascular:      Rate and Rhythm: Normal rate and regular rhythm.      Heart sounds: No murmur heard.  Pulmonary:      Effort: Pulmonary effort is  normal. No respiratory distress.      Breath sounds: Normal breath sounds.   Abdominal:      Palpations: Abdomen is soft.      Tenderness: There is no abdominal tenderness. There is right CVA tenderness.     Musculoskeletal:         General: No swelling.      Cervical back: Neck supple.     Skin:     General: Skin is warm and dry.      Capillary Refill: Capillary refill takes less than 2 seconds.     Neurological:      Mental Status: She is alert.     Psychiatric:         Mood and Affect: Mood normal.         Results Reviewed       Procedure Component Value Units Date/Time    Manual Differential(PHLEBS Do Not Order) [[id number]]  (Abnormal) Collected: 06/16/25 0540    Lab Status: Final result Specimen: Blood from Arm, Left Updated: 06/16/25 0754     Segmented % 78 %      Bands % 3 %      Lymphocytes % 11 %      Monocytes % 8 %      Eosinophils % 0 %      Basophils % 0 %      Absolute Neutrophils 10.40 Thousand/uL      Absolute Lymphocytes 1.41 Thousand/uL      Absolute Monocytes 1.03 Thousand/uL      Absolute Eosinophils 0.00 Thousand/uL      Absolute Basophils 0.00 Thousand/uL      Total Counted --     Smudge Cells Present     RBC Morphology Present     Platelet Estimate Adequate     Large Platelet Present     Ovalocytes Present     Polychromasia Present    Lipase [[id number]]  (Normal) Collected: 06/16/25 0540    Lab Status: Final result Specimen: Blood from Arm, Left Updated: 06/16/25 0611     Lipase 18 u/L     Comprehensive metabolic panel [[id number]]  (Abnormal) Collected: 06/16/25 0540    Lab Status: Final result Specimen: Blood from Arm, Left Updated: 06/16/25 0611     Sodium 136 mmol/L      Potassium 3.6 mmol/L      Chloride 106 mmol/L      CO2 24 mmol/L      ANION GAP 6 mmol/L      BUN 13 mg/dL      Creatinine 0.61 mg/dL      Glucose 100 mg/dL      Calcium 8.0 mg/dL      Corrected Calcium 8.9 mg/dL      AST 13 U/L      ALT 23 U/L      Alkaline Phosphatase 113 U/L      Total Protein 5.4 g/dL      Albumin 2.9  g/dL      Total Bilirubin 0.39 mg/dL      eGFR 117 ml/min/1.73sq m     Narrative:      National Kid[employer] guidelines for Chronic Kidney Disease (CKD):     Stage 1 with normal or high GFR (GFR > 90 mL/min/1.73 square meters)    Stage 2 Mild CKD (GFR = 60-89 mL/min/1.73 square meters)    Stage 3A Moderate CKD (GFR = 45-59 mL/min/1.73 square meters)    Stage 3B Moderate CKD (GFR = 30-44 mL/min/1.73 square meters)    Stage 4 Severe CKD (GFR = 15-29 mL/min/1.73 square meters)    Stage 5 End Stage CKD (GFR <15 mL/min/1.73 square meters)  Note: GFR calculation is accurate only with a steady state creatinine    Urine Microscopic [[id number]]  (Abnormal) Collected: 06/16/25 0553    Lab Status: Final result Specimen: Urine, Clean Catch Updated: 06/16/25 0611     RBC, UA None Seen /hpf      WBC, UA 2-4 /hpf      Epithelial Cells Occasional /hpf      Bacteria, UA Occasional /hpf      MUCUS THREADS Occasional    UA w Reflex to Microscopic w Reflex to Culture [[id number]]  (Abnormal) Collected: 06/16/25 0553    Lab Status: Final result Specimen: Urine, Clean Catch Updated: 06/16/25 0609     Color, UA Yellow     Clarity, UA Clear     Specific Gravity, UA 1.017     pH, UA 6.0     Leukocytes, UA Small     Nitrite, UA Negative     Protein, UA Negative mg/dl      Glucose, UA Negative mg/dl      Ketones, UA Negative mg/dl      Urobilinogen, UA 2.0 mg/dl      Bilirubin, UA Negative     Occult Blood, UA Small    CBC and differential [[id number]]  (Abnormal) Collected: 06/16/25 0540    Lab Status: Final result Specimen: Blood from Arm, Left Updated: 06/16/25 0557     WBC 12.84 Thousand/uL      RBC 2.96 Million/uL      Hemoglobin 8.7 g/dL      Hematocrit 25.5 %      MCV 86 fL      MCH 29.4 pg      MCHC 34.1 g/dL      RDW 13.0 %      MPV 9.9 fL      Platelets 348 Thousands/uL             No orders to display       Procedures    ED Medication and Procedure Management   Prior to Admission Medications   Prescriptions Last Dose  Informant Patient Reported? Taking?   Blood Pressure Monitoring (Adult Blood Pressure Cuff Lg) KIT Past Month  No Yes   Sig: Use in the morning   Prenatal MV-Min-Fe Fum-FA-DHA (PRENATAL 1 PO) Past Month  Yes Yes   Sig: Take 1 each by mouth in the morning One pill once daily ([employer] brand)   acetaminophen (TYLENOL) 325 mg tablet 6/16/2025 Morning  No Yes   Sig: Take 2 tablets (650 mg total) by mouth every 6 (six) hours as needed for mild pain   benzocaine-menthol-lanolin-aloe (DERMOPLAST) 20-0.5 % topical spray 6/15/2025  No Yes   Sig: Apply 1 Application topically every 6 (six) hours as needed for irritation   buPROPion (WELLBUTRIN SR) 150 mg 12 hr tablet Past Month  No Yes   Sig: Take 1 tablet (150 mg total) by mouth 2 (two) times a day   Patient not taking: Reported on 6/17/2025   calcium carbonate (TUMS) 500 mg chewable tablet Past Month Self Yes Yes   Sig: Chew 2 tablets if needed   furosemide (LASIX) 20 mg tablet 6/15/2025  No Yes   Sig: Take 1 tablet (20 mg total) by mouth daily for 3 doses   Patient not taking: Reported on 6/17/2025   hydrocortisone 1 % cream   No No   Sig: Apply 1 Application topically daily as needed for irritation   ibuprofen (MOTRIN) 600 mg tablet 6/15/2025  No Yes   Sig: Take 1 tablet (600 mg total) by mouth every 6 (six) hours as needed for mild pain   labetalol 400 MG TABS 6/15/2025  No Yes   Sig: Take 400 mg by mouth 2 (two) times a day   metFORMIN (GLUCOPHAGE) 1000 MG tablet Past Month  No Yes   Sig: Take 1 tablet (1,000 mg total) by mouth 2 (two) times a day with meals   Patient not taking: Reported on 6/17/2025   pantoprazole (PROTONIX) 20 mg tablet Past Month  No Yes   Sig: Take 1 tablet (20 mg total) by mouth daily   witch hazel-glycerin (TUCKS) topical pad 6/15/2025  No Yes   Sig: Apply 1 Pad topically every 4 (four) hours as needed for irritation      Facility-Administered Medications: None     Discharge Medication List as of 6/16/2025  6:40 AM        START taking these  medications    Details   cyclobenzaprine (FLEXERIL) 10 mg tablet Take 1 tablet (10 mg total) by mouth 2 (two) times a day as needed for muscle spasms, Starting Mon 6/16/2025, Normal           CONTINUE these medications which have NOT CHANGED    Details   acetaminophen (TYLENOL) 325 mg tablet Take 2 tablets (650 mg total) by mouth every 6 (six) hours as needed for mild pain, Starting Fri 6/13/2025, No Print      benzocaine-menthol-lanolin-aloe (DERMOPLAST) 20-0.5 % topical spray Apply 1 Application topically every 6 (six) hours as needed for irritation, Starting Fri 6/13/2025, No Print      Blood Pressure Monitoring (Adult Blood Pressure Cuff Lg) KIT Use in the morning, Starting Mon 4/7/2025, Normal      buPROPion (WELLBUTRIN SR) 150 mg 12 hr tablet Take 1 tablet (150 mg total) by mouth 2 (two) times a day, Starting Wed 3/26/2025, Normal      calcium carbonate (TUMS) 500 mg chewable tablet Chew 2 tablets if needed, Historical Med      furosemide (LASIX) 20 mg tablet Take 1 tablet (20 mg total) by mouth daily for 3 doses, Starting Sat 6/14/2025, Until Tue 6/17/2025, Normal      ibuprofen (MOTRIN) 600 mg tablet Take 1 tablet (600 mg total) by mouth every 6 (six) hours as needed for mild pain, Starting Fri 6/13/2025, No Print      labetalol 400 MG TABS Take 400 mg by mouth 2 (two) times a day, Starting Fri 6/13/2025, Normal      metFORMIN (GLUCOPHAGE) 1000 MG tablet Take 1 tablet (1,000 mg total) by mouth 2 (two) times a day with meals, Starting Mon 2/3/2025, Normal      pantoprazole (PROTONIX) 20 mg tablet Take 1 tablet (20 mg total) by mouth daily, Starting Tue 4/1/2025, Normal      Prenatal MV-Min-Fe Fum-FA-DHA (PRENATAL 1 PO) Take 1 each by mouth in the morning One pill once daily ([employer] brand), Historical Med      witch hazel-glycerin (TUCKS) topical pad Apply 1 Pad topically every 4 (four) hours as needed for irritation, Starting Fri 6/13/2025, No Print      hydrocortisone 1 % cream Apply 1 Application  topically daily as needed for irritation, Starting 2025, No Print           No discharge procedures on file.  ED SEPSIS DOCUMENTATION   Time reflects when diagnosis was documented in both MDM as applicable and the Disposition within this note       Time User Action Codes Description Comment    2025  6:37 AM Fabiola Montoya Add [R10.9] Right flank pain     2025  6:37 AM Fabiola Montoya Add [Z98.891] Status post  delivery                    Fabiola Montoya PA-C  25 0231         [1]   Past Medical History:  Diagnosis Date    Arthritis     Atypical squamous cell changes of undetermined significance (ASCUS) on cervical cytology with negative high risk human papilloma virus (HPV) test result     2024-ASCUS +HRHPV; 2024-wnl    Genetic screening     2024-SMA carrier negative    Hypertension     Ovarian cyst 2022    bilateral    PCOS (polycystic ovarian syndrome)     Tobacco use disorder 2014    Uterine fibroid in pregnancy 2025    Varicella    [2]   Past Surgical History:  Procedure Laterality Date    DILATION AND CURETTAGE OF UTERUS      ETOP    ENDOSCOPIC ULTRASOUND (UPPER)  a month ago    not sure if it's endoscopic...I had an ultra sound for PCOS    NE  DELIVERY ONLY N/A 2025    Procedure:  SECTION ();  Surgeon: Khris Chakraborty MD;  Location: [facility];  Service: Obstetrics    WISDOM TOOTH EXTRACTION     [3]   Family History  Problem Relation Name Age of Onset    Anxiety disorder Mother Ginger [place]     Depression Mother Ginger Jennifer     Hypertension Mother Ginger [place]     Heart disease Mother Ginger [place]         High Blood Pressure    Lung disease Mother Ginger Jennifer     Osteoporosis Mother Ginger Jennifer     Cancer Mother Ginger [place]     Skin cancer Father Joselo Roberta     Heart Valve Disease Father Joselo Roberta         leaky valve    Heart disease Father Joselo Roberta         Leaky Heart Valve    Heart attack  Paternal Aunt Michelle     Stroke Paternal Aunt Michelle     Drug abuse Maternal Uncle Americo Rodriguez     Diabetes Maternal Uncle Americo Rodriguez     No Known Problems Maternal Grandfather      Diabetes Maternal Grandmother Ramya Rodriguez     Kidney failure Maternal Grandmother Ramya Rodriguez     Arthritis Maternal Grandmother Ramya Rodriguez     Hearing loss Maternal Grandmother Ramya Rodriguez     Lung cancer Paternal Grandfather Link Roberta     Diabetes Paternal Grandfather Link Roberta     Cancer Paternal Grandfather Link Roberta         Lung Cancer    Ovarian cancer Paternal Grandmother Dad's Mom     Cancer Paternal Grandmother Dad's Mom         Ovarian Cancer ( in her 30's)    Breast cancer Neg Hx      Colon cancer Neg Hx     [4]   Social History  Tobacco Use    Smoking status: Former     Current packs/day: 0.00     Average packs/day: 0.3 packs/day for 20.4 years (5.1 ttl pk-yrs)     Types: Cigarettes     Start date:      Quit date: 2024     Years since quittin.0    Smokeless tobacco: Never   Vaping Use    Vaping status: Never Used   Substance Use Topics    Alcohol use: Not Currently     Alcohol/week: 3.0 standard drinks of alcohol     Types: 3 Standard drinks or equivalent per week     Comment: stopped with + UPT    Drug use: Not Currently        Fabiola Montoya PA-C  25 0233

## 2025-06-17 ENCOUNTER — POSTPARTUM VISIT (OUTPATIENT)
Dept: OBGYN CLINIC | Facility: CLINIC | Age: 35
End: 2025-06-17

## 2025-06-17 VITALS
WEIGHT: 293 LBS | BODY MASS INDEX: 44.41 KG/M2 | HEIGHT: 68 IN | DIASTOLIC BLOOD PRESSURE: 82 MMHG | SYSTOLIC BLOOD PRESSURE: 128 MMHG

## 2025-06-17 DIAGNOSIS — Z01.30 BLOOD PRESSURE CHECK: ICD-10-CM

## 2025-06-17 DIAGNOSIS — T81.89XA PROBLEM INVOLVING SURGICAL INCISION: Primary | ICD-10-CM

## 2025-06-17 PROCEDURE — 87070 CULTURE OTHR SPECIMN AEROBIC: CPT | Performed by: OBSTETRICS & GYNECOLOGY

## 2025-06-17 PROCEDURE — 87077 CULTURE AEROBIC IDENTIFY: CPT | Performed by: OBSTETRICS & GYNECOLOGY

## 2025-06-17 PROCEDURE — 87205 SMEAR GRAM STAIN: CPT | Performed by: OBSTETRICS & GYNECOLOGY

## 2025-06-17 PROCEDURE — 87186 SC STD MICRODIL/AGAR DIL: CPT | Performed by: OBSTETRICS & GYNECOLOGY

## 2025-06-17 PROCEDURE — 99024 POSTOP FOLLOW-UP VISIT: CPT | Performed by: OBSTETRICS & GYNECOLOGY

## 2025-06-17 NOTE — PROGRESS NOTES
Patient is a 35 y.o.  with Patient's last menstrual period was 2024 (exact date). who presents requesting evaluation of her incision and for blood pressure check.   Pt is s/p Primary LTCS on 2025. She has CHTN that required uptitration of her medications. She is currently taking 400 mg labetalol BID and completed 5 days of lasix.  She denies any h/a, scotomata, RUQ pain. She does note her incision started draining fluid today that was blood colored. It started at 0930. She denies any fevers/chills, purulent drainage.   She notes that she has been anxious because she has had significant right flank pain and has not been able to sleep. She has started a muscle relaxant with significant improvement. She would like to monitor her symptoms now that she is starting to feel better. PPDE is 12 today.    Past Medical History[1]    Past Surgical History[2]    OB History    Para Term  AB Living   2 1 1  1 1   SAB IAB Ectopic Multiple Live Births    1  0 1      # Outcome Date GA Lbr Stanley/2nd Weight Sex Type Anes PTL Lv   2 Term 25 39w6d  3770 g (8 lb 5 oz) M CS-LTranv EPI  DANUTA      Complications: Fetal Intolerance   1 IAB 17              Obstetric Comments   Menarche: 13         Menses: unsure of birth control         Current Medications[3]    Allergies[4]    Social History[5]    Family History[6]    Review of Systems   Constitutional:  Positive for fatigue. Negative for chills, fever and unexpected weight change.   HENT:  Negative for congestion, mouth sores and sore throat.    Respiratory:  Negative for cough, chest tightness, shortness of breath and wheezing.    Cardiovascular:  Negative for chest pain and palpitations.   Gastrointestinal:  Positive for abdominal pain (postop and flank as per HPI). Negative for abdominal distention, constipation, diarrhea, nausea and vomiting.   Endocrine: Negative for cold intolerance and heat intolerance.   Genitourinary:  Positive for vaginal  "bleeding (scant). Negative for dyspareunia, dysuria, genital sores, menstrual problem, pelvic pain, vaginal discharge and vaginal pain.   Musculoskeletal:  Negative for arthralgias.   Skin:  Negative for color change and rash.   Neurological:  Negative for dizziness, light-headedness and headaches.   Hematological:  Negative for adenopathy.   Psychiatric/Behavioral:  The patient is nervous/anxious.        Blood pressure 128/82, height 5' 8\" (1.727 m), weight 134 kg (296 lb), last menstrual period 2024, currently breastfeeding. and Body mass index is 45.01 kg/m².    Physical Exam  Constitutional:       General: She is not in acute distress.     Appearance: Normal appearance. She is obese. She is not ill-appearing.   HENT:      Head: Normocephalic and atraumatic.     Eyes:      Extraocular Movements: Extraocular movements intact.      Conjunctiva/sclera: Conjunctivae normal.     Pulmonary:      Effort: Pulmonary effort is normal.   Abdominal:      General: There is no distension.      Palpations: Abdomen is soft. There is no mass.      Tenderness: There is abdominal tenderness (mild at incision). There is no guarding or rebound.      Hernia: No hernia is present.      Comments: Incision is clean and intact, but is draining serosanguinous fluid from the right side and left side without defect. Gentle pressure results in further drainage. Fluid cultured due to odor.     Musculoskeletal:         General: Normal range of motion.      Cervical back: Normal range of motion.      Right lower leg: Edema present.      Left lower leg: Edema present.     Skin:     General: Skin is warm.      Findings: No erythema, lesion or rash.     Neurological:      Mental Status: She is alert.     Psychiatric:         Mood and Affect: Mood normal.         Behavior: Behavior normal.         Thought Content: Thought content normal.         Judgment: Judgment normal.             A/P:  Pt is a 35 y.o.  with      Diagnoses and all " orders for this visit:    Problem involving surgical incision  -     Wound culture and Gram stain  -incision is intact. Appears to be a seroma. Will await culture.    Blood pressure check  -bp stable on 400 mg po BID labetalol. Will continue and monitor. Decrease pending bp monitoring                                [1]   Past Medical History:  Diagnosis Date    Arthritis     Atypical squamous cell changes of undetermined significance (ASCUS) on cervical cytology with negative high risk human papilloma virus (HPV) test result     2024-ASCUS +HRHPV; 2024-wnl    Genetic screening     2024-SMA carrier negative    Hypertension     Ovarian cyst 2022    bilateral    PCOS (polycystic ovarian syndrome)     Tobacco use disorder 2014    Uterine fibroid in pregnancy 2025    Varicella    [2]   Past Surgical History:  Procedure Laterality Date    DILATION AND CURETTAGE OF UTERUS      ETOP    ENDOSCOPIC ULTRASOUND (UPPER)  a month ago    not sure if it's endoscopic...I had an ultra sound for PCOS    [facility]    Procedure:  SECTION ();  Surgeon: Khris Chakraborty MD;  Location: [facility];  Service: Obstetrics    WISDOM TOOTH EXTRACTION     [3]   Current Outpatient Medications:     acetaminophen (TYLENOL) 325 mg tablet, Take 2 tablets (650 mg total) by mouth every 6 (six) hours as needed for mild pain, Disp: , Rfl:     benzocaine-menthol-lanolin-aloe (DERMOPLAST) 20-0.5 % topical spray, Apply 1 Application topically every 6 (six) hours as needed for irritation, Disp: , Rfl:     Blood Pressure Monitoring (Adult Blood Pressure Cuff Lg) KIT, Use in the morning, Disp: 1 kit, Rfl: 0    calcium carbonate (TUMS) 500 mg chewable tablet, Chew 2 tablets if needed, Disp: , Rfl:     cyclobenzaprine (FLEXERIL) 10 mg tablet, Take 1 tablet (10 mg total) by mouth 2 (two) times a day as needed for muscle spasms, Disp: 20 tablet, Rfl: 0    hydrocortisone 1 % cream, Apply 1  "Application topically daily as needed for irritation, Disp: , Rfl:     ibuprofen (MOTRIN) 600 mg tablet, Take 1 tablet (600 mg total) by mouth every 6 (six) hours as needed for mild pain, Disp: , Rfl:     labetalol 400 MG TABS, Take 400 mg by mouth 2 (two) times a day, Disp: 60 tablet, Rfl: 0    pantoprazole (PROTONIX) 20 mg tablet, Take 1 tablet (20 mg total) by mouth daily, Disp: 30 tablet, Rfl: 3    Prenatal MV-Min-Fe Fum-FA-DHA (PRENATAL 1 PO), Take 1 each by mouth in the morning One pill once daily ([place] brand), Disp: , Rfl:     witch hazel-glycerin (TUCKS) topical pad, Apply 1 Pad topically every 4 (four) hours as needed for irritation, Disp: , Rfl:     buPROPion (WELLBUTRIN SR) 150 mg 12 hr tablet, Take 1 tablet (150 mg total) by mouth 2 (two) times a day (Patient not taking: Reported on 2025), Disp: 60 tablet, Rfl: 5    furosemide (LASIX) 20 mg tablet, Take 1 tablet (20 mg total) by mouth daily for 3 doses (Patient not taking: Reported on 2025), Disp: 3 tablet, Rfl: 0    metFORMIN (GLUCOPHAGE) 1000 MG tablet, Take 1 tablet (1,000 mg total) by mouth 2 (two) times a day with meals (Patient not taking: Reported on 2025), Disp: 120 tablet, Rfl: 3  [4]   Allergies  Allergen Reactions    Bee Venom Swelling     Large local reaction    Nicotine Other (See Comments)     Pt had severe \"night terrors\" when using nicotine patch    Other      Nicotene patches  Fleas    Clarithromycin Rash    Clindamycin Rash   [5]   Social History  Socioeconomic History    Marital status: /Civil Union     Spouse name: Luis Daniel    Number of children: 0    Highest education level: Bachelor's degree (e.g., BA, AB, BS)   Occupational History    Occupation: Teacher   Tobacco Use    Smoking status: Former     Current packs/day: 0.00     Average packs/day: 0.3 packs/day for 20.4 years (5.1 ttl pk-yrs)     Types: Cigarettes     Start date:      Quit date: 2024     Years since quittin.0    Smokeless tobacco: " Never   Vaping Use    Vaping status: Never Used   Substance and Sexual Activity    Alcohol use: Not Currently     Alcohol/week: 3.0 standard drinks of alcohol     Types: 3 Standard drinks or equivalent per week     Comment: stopped with + UPT    Drug use: Not Currently    Sexual activity: Yes     Partners: Male     Comment: Birth Control Pill   Social History Narrative    Evangelical: no preference    Accepts blood products         Social Drivers of Health     Food Insecurity: No Food Insecurity (6/11/2025)    Nursing - Inadequate Food Risk Classification     Worried About Running Out of Food in the Last Year: Never true     Ran Out of Food in the Last Year: Never true     Ran Out of Food in the Last Year: Never true   Transportation Needs: No Transportation Needs (6/11/2025)    Nursing - Transportation Risk Classification     Lack of Transportation: No    Received from [employer]   Intimate Partner Violence: Unknown (6/11/2025)    Nursing IPS     Physically Hurt by Someone: No     Hurt or Threatened by Someone: No   Housing Stability: Unknown (6/11/2025)    Nursing: Inadequate Housing Risk Classification     Unable to Pay for Housing in the Last Year: No     Has Housing: No   [6]   Family History  Problem Relation Name Age of Onset    Anxiety disorder Mother Ginger Rodriguez     Depression Mother Ginger Rodriguez     Hypertension Mother Ginger Rodriguez     Heart disease Mother Ginger Rodriguez         High Blood Pressure    Lung disease Mother Ginger Rodriguez     Osteoporosis Mother Ginger Rodriguez     Cancer Mother Ginger Rodriguez     Skin cancer Father Joselo Roberta     Heart Valve Disease Father Joselo Roberta         leaky valve    Heart disease Father Joselo Roberta         Leaky Heart Valve    Heart attack Paternal Aunt Michelle     Stroke Paternal Aunt Michelle     Drug abuse Maternal Uncle Americo Rodriguez     Diabetes Maternal Uncle Americo Rodriguez     No Known Problems Maternal Grandfather      Diabetes  Maternal Grandmother Ramya Rodriguez     Kidney failure Maternal Grandmother Ramya Rodriguez     Arthritis Maternal Grandmother Ramya Rodriguez     Hearing loss Maternal Grandmother Ramya Harvey [place]     Lung cancer Paternal Grandfather Link Roberta     Diabetes Paternal Grandfather Link Roberta     Cancer Paternal Grandfather Link Roberta         Lung Cancer    Ovarian cancer Paternal Grandmother Dad's Mom     Cancer Paternal Grandmother Dad's Mom         Ovarian Cancer ( in her 30's)    Breast cancer Neg Hx      Colon cancer Neg Hx

## 2025-06-19 ENCOUNTER — NURSE TRIAGE (OUTPATIENT)
Dept: OTHER | Facility: OTHER | Age: 35
End: 2025-06-19

## 2025-06-19 ENCOUNTER — NURSE TRIAGE (OUTPATIENT)
Age: 35
End: 2025-06-19

## 2025-06-19 DIAGNOSIS — L08.9 WOUND INFECTION: Primary | ICD-10-CM

## 2025-06-19 DIAGNOSIS — T14.8XXA WOUND INFECTION: Primary | ICD-10-CM

## 2025-06-19 NOTE — TELEPHONE ENCOUNTER
"Regarding: 10 days post  / Fever / Headache / Body cramps  ----- Message from Marco Antonio WHITT sent at 2025  6:08 PM EDT -----  \"I had a  10 days ago. About 2 hrs ago, I started to experience a low-grade fever 99.3, throbbing headache, and body cramps.\"    "

## 2025-06-19 NOTE — TELEPHONE ENCOUNTER
"REASON FOR CONVERSATION: Post-op    SYMPTOMS: ESC sent to on call provider-  had a  . Reports today temp of 99.3 along with a throbbing headache and 6/10 abdominal cramping. Reports earlier after taking a shower her incisions were draining moderate amounts of light pink drainage. Denies any odor, redness or swelling around the incisions     Of note BP is 145/96. Tylenol has helped the abdominal cramping but not the headache    OTHER HEALTH INFORMATION:  on     PROTOCOL DISPOSITION: Call PCP Now    CARE ADVICE PROVIDED: Per on call-  Can try ibuprofen and flexeril for the HA, but if does not resolve needs to come in for further testing. May do so now if prefers this. Would also rec abx started tonight,     PRACTICE FOLLOW-UP: Please follow up with patient regarding culture results and symptoms update          Reason for Disposition   [1] Caller has URGENT question AND [2] triager unable to answer question    Answer Assessment - Initial Assessment Questions  1. SYMPTOM: \"What's the main symptom you're concerned about?\" (e.g., pain, fever, vomiting)      Temp of 99.3, throbbing headache, abdominal cramping, drainage from incision    2. ONSET: \"When did symptoms  start?\"      This afternoon    3. SURGERY: \"What surgery did you have?\"          4. DATE of SURGERY: \"When was the surgery?\"           7. PAIN: \"Is there any pain?\" If Yes, ask: \"How bad is it?\"  (Scale 1-10; or mild, moderate, severe)      6/10    8. FEVER: \"Do you have a fever?\" If Yes, ask: \"What is your temperature, how was it measured, and when did it start?\"      99.3    9. VOMITING: \"Is there any vomiting?\" If Yes, ask: \"How many times?\"      Denies    Protocols used: Post-Op Symptoms and Questions-Adult-    "

## 2025-06-19 NOTE — TELEPHONE ENCOUNTER
"REASON FOR CONVERSATION: Drainage from Incision    SYMPTOMS: drainage from incision    OTHER HEALTH INFORMATION: Patient post op  25 with drainage from incision. Has saturated 4 pads today with pink tinged clear fluid from incision. Denies pain, opening of incision, redness/warmth to area, odor to fluid, fevers. Drainage cultured at office visit .     PROTOCOL DISPOSITION: Call PCP Now    CARE ADVICE PROVIDED: Keep incision clean and dry. Change pads PRN to help with this. Will review with provider for additional recommendations.     ESC Dr Corrales  -- awaiting final culture, will reach out with recommendations pending final result. Continue to monitor as long as she remains afebrile.       Attempted return call to patient, left non detailed voicemail for patient to return call. [employer] message also sent.     PRACTICE FOLLOW-UP: none          Reason for Disposition   Dressing soaked with blood or body fluid (e.g., drainage)    Answer Assessment - Initial Assessment Questions  1. SYMPTOM: \"What's the main symptom you're concerned about?\" (e.g., drainage, incision opened up, pain, redness)      Drainage from incision  2. ONSET: \"When did drainage  start?\"      25  3. DATE of : \"When was the  performed?\"       25  4. REDNESS: \"Is there any redness at the incision site?\" If Yes, ask: \"How wide across is the redness?\" (inches, centimeters)       denies  5. PAIN: \"Is there any pain?\" If Yes, ask: \"How bad is it?\"  (Scale 1-10; or mild, moderate, severe)      denies  6. BLEEDING: \"Is there any bleeding?\" If Yes, ask: \"How much?\" and \"Where?\" (e.g., incision site, vaginal)       Drainage is pink ; small amount of vaginal bleeding   7. DRAINAGE: \"Is there any drainage from the incision site?\" If Yes, ask: \"What color and how much?\" (e.g., red, cloudy, pus; drops, teaspoon)      Saturated 4 pads this morning, light pink and clear fluid   8. FEVER: \"Do you have a fever?\" If Yes, ask: " "\"What is your temperature, how was it measured, and when did it start?\"      Denies   9. OTHER SYMPTOMS: \"Do you have any other symptoms?\" (e.g., rash elsewhere on body, shaking chills, weakness)      Denies    Protocols used: Postpartum -  Incision Symptoms-Adult-AH    "

## 2025-06-20 ENCOUNTER — RESULTS FOLLOW-UP (OUTPATIENT)
Dept: LABOR AND DELIVERY | Facility: HOSPITAL | Age: 35
End: 2025-06-20

## 2025-06-20 LAB
BACTERIA WND AEROBE CULT: ABNORMAL
GRAM STN SPEC: ABNORMAL
GRAM STN SPEC: ABNORMAL

## 2025-06-20 RX ORDER — SULFAMETHOXAZOLE AND TRIMETHOPRIM 800; 160 MG/1; MG/1
1 TABLET ORAL EVERY 12 HOURS SCHEDULED
Qty: 20 TABLET | Refills: 0 | Status: SHIPPED | OUTPATIENT
Start: 2025-06-20 | End: 2025-06-30

## 2025-06-20 RX ORDER — AMPICILLIN 500 MG/1
500 CAPSULE ORAL 4 TIMES DAILY
Qty: 40 CAPSULE | Refills: 0 | Status: SHIPPED | OUTPATIENT
Start: 2025-06-20 | End: 2025-06-30

## 2025-06-20 NOTE — TELEPHONE ENCOUNTER
I called the patient to review her culture results    We reviewed that the fluid from her incision that was draining tested positive for 2 different infections and unfortunately they do not respond to the same antibiotics and one is difficult to find in an oral formulation.   I advised her I was able to find it, but it will not be available until tomorrow afternoon. She will start both of her medications tomorrow.     She denies any fevers, but does note abdominal pain. She has follow up in the office early next week. Pt advised if pain does not improve with antibiotics to call and be seen in the hospital.

## 2025-06-20 NOTE — TELEPHONE ENCOUNTER
Spoke with patient and encouraged her to take antibiotics. Will plan to move her follow up appt to next week so we can assess her incision.    Khris Chakraborty  06/20/25

## 2025-06-20 NOTE — TELEPHONE ENCOUNTER
Called and spoke with patient to follow up from symptoms she reported yesterday. Patient stated she did take ibuprofen and flexeril yesterday and her headache subsided this morning. Fever also has subsided. Patient did not start taking antibiotics as she was unsure if it conflicted with flexeril and would like Dr. Corrales's opinion. Patient also wanted to know the results of the culture before taking antibiotics.

## 2025-06-24 ENCOUNTER — POSTPARTUM VISIT (OUTPATIENT)
Age: 35
End: 2025-06-24
Payer: COMMERCIAL

## 2025-06-24 VITALS
HEIGHT: 68 IN | OXYGEN SATURATION: 99 % | HEART RATE: 112 BPM | DIASTOLIC BLOOD PRESSURE: 68 MMHG | SYSTOLIC BLOOD PRESSURE: 112 MMHG | WEIGHT: 273 LBS | BODY MASS INDEX: 41.37 KG/M2

## 2025-06-24 DIAGNOSIS — R10.9 RIGHT FLANK PAIN: ICD-10-CM

## 2025-06-24 PROCEDURE — 99213 OFFICE O/P EST LOW 20 MIN: CPT | Performed by: OBSTETRICS & GYNECOLOGY

## 2025-06-24 RX ORDER — CYCLOBENZAPRINE HCL 10 MG
10 TABLET ORAL 2 TIMES DAILY PRN
Qty: 30 TABLET | Refills: 0 | Status: SHIPPED | OUTPATIENT
Start: 2025-06-24

## 2025-06-24 NOTE — PROGRESS NOTES
"Subjective:     Yecenia Rodriguez is a 35 y.o.  female who presents for follow up regarding incision check. She was diagnosed with a likely seroma and wound infection. She is on day 3 of antibiotics. She states her pain is well controlled with tylenol and motrin. She states there is a lot of moisture around her incision and its still draining mostly clear fluid. She denies fevers/chills.    Objective:    Vitals: Blood pressure 112/68, pulse (!) 112, height 5' 8\" (1.727 m), weight 124 kg (273 lb), last menstrual period 2024, SpO2 99%, not currently breastfeeding.Body mass index is 41.51 kg/m².    Physical Exam  Constitutional:       Appearance: She is well-developed.     Cardiovascular:      Rate and Rhythm: Normal rate and regular rhythm.      Heart sounds: Normal heart sounds. No murmur heard.     No friction rub. No gallop.   Pulmonary:      Effort: Pulmonary effort is normal. No respiratory distress.      Breath sounds: No wheezing.   Abdominal:      Palpations: Abdomen is soft.      Tenderness: There is no abdominal tenderness.     Musculoskeletal:         General: No tenderness.     Neurological:      Mental Status: She is alert and oriented to person, place, and time.   Vitals reviewed.          Assessment/Plan:    Problem List Items Addressed This Visit    None  Visit Diagnoses         Wound infection following  section, postpartum    -  Primary    Continue antibiotics      Right flank pain        Relevant Medications    cyclobenzaprine (FLEXERIL) 10 mg tablet              Khris Chakraborty MD  2025  3:36 PM        "

## 2025-07-01 ENCOUNTER — PATIENT MESSAGE (OUTPATIENT)
Dept: OBGYN CLINIC | Facility: CLINIC | Age: 35
End: 2025-07-01

## 2025-07-01 NOTE — PATIENT COMMUNICATION
Patient returned call to follow up. Advised awaiting provider recs, but would advise taking rx as prescribed. Will notify patient if provider has different recommendation

## 2025-07-01 NOTE — PATIENT COMMUNICATION
Patient prescribed ampicillin  for wound infection following . Patient has been taking medication differently than prescribed accidentally. Patient has only been taking 2 tablets a day, not 4 as prescribed.     Patient states that she is in less pain and incision is healing well.     Routing to on-call provider on how to proceed. Patient still has medication left.

## 2025-07-10 ENCOUNTER — POSTPARTUM VISIT (OUTPATIENT)
Dept: OBGYN CLINIC | Facility: CLINIC | Age: 35
End: 2025-07-10

## 2025-07-10 VITALS
HEART RATE: 77 BPM | OXYGEN SATURATION: 98 % | DIASTOLIC BLOOD PRESSURE: 78 MMHG | WEIGHT: 268.4 LBS | BODY MASS INDEX: 40.68 KG/M2 | HEIGHT: 68 IN | SYSTOLIC BLOOD PRESSURE: 124 MMHG

## 2025-07-10 DIAGNOSIS — O10.919 CHRONIC HYPERTENSION AFFECTING PREGNANCY: ICD-10-CM

## 2025-07-10 DIAGNOSIS — Z98.891 STATUS POST PRIMARY LOW TRANSVERSE CESAREAN SECTION: ICD-10-CM

## 2025-07-10 PROBLEM — O09.93 ENCOUNTER FOR SUPERVISION OF HIGH RISK PREGNANCY IN THIRD TRIMESTER, ANTEPARTUM: Status: RESOLVED | Noted: 2025-05-27 | Resolved: 2025-07-10

## 2025-07-10 PROBLEM — O36.63X0 MACROSOMIA AFFECTING MANAGEMENT OF MOTHER IN THIRD TRIMESTER: Status: RESOLVED | Noted: 2025-05-06 | Resolved: 2025-07-10

## 2025-07-10 PROBLEM — O99.213 OBESITY AFFECTING PREGNANCY IN THIRD TRIMESTER: Status: RESOLVED | Noted: 2024-12-04 | Resolved: 2025-07-10

## 2025-07-10 PROBLEM — O09.519 HIGH-RISK PREGNANCY, PRIMIGRAVIDA OF ADVANCED MATERNAL AGE: Status: RESOLVED | Noted: 2024-12-04 | Resolved: 2025-07-10

## 2025-07-10 PROBLEM — D25.9 UTERINE FIBROID IN PREGNANCY: Status: RESOLVED | Noted: 2025-03-19 | Resolved: 2025-07-10

## 2025-07-10 PROBLEM — O34.10 UTERINE FIBROID IN PREGNANCY: Status: RESOLVED | Noted: 2025-03-19 | Resolved: 2025-07-10

## 2025-07-10 PROCEDURE — 99024 POSTOP FOLLOW-UP VISIT: CPT | Performed by: OBSTETRICS & GYNECOLOGY

## 2025-07-10 NOTE — ASSESSMENT & PLAN NOTE
Plan to drop labetalol from 400mg BID to 200mg BID. She will then follow up with her PCP for further adjustments.

## 2025-07-17 ENCOUNTER — OFFICE VISIT (OUTPATIENT)
Dept: FAMILY MEDICINE CLINIC | Facility: CLINIC | Age: 35
End: 2025-07-17
Payer: COMMERCIAL

## 2025-07-17 VITALS
HEART RATE: 75 BPM | DIASTOLIC BLOOD PRESSURE: 78 MMHG | SYSTOLIC BLOOD PRESSURE: 122 MMHG | BODY MASS INDEX: 40.81 KG/M2 | OXYGEN SATURATION: 97 % | WEIGHT: 268.4 LBS

## 2025-07-17 DIAGNOSIS — E28.2 PCOS (POLYCYSTIC OVARIAN SYNDROME): Primary | ICD-10-CM

## 2025-07-17 DIAGNOSIS — R93.429 ABNORMAL CT SCAN, KIDNEY: ICD-10-CM

## 2025-07-17 DIAGNOSIS — I10 BENIGN ESSENTIAL HTN: ICD-10-CM

## 2025-07-17 PROCEDURE — 99214 OFFICE O/P EST MOD 30 MIN: CPT | Performed by: FAMILY MEDICINE

## 2025-07-17 NOTE — PROGRESS NOTES
Name: Yecenia Rodriguez      : 1990      MRN: [id number]  Encounter Provider: Tod Loera MD  Encounter Date: 2025   Encounter department: [facility]  :  Assessment & Plan  PCOS (polycystic ovarian syndrome)    Restart metformin and check labs.    Orders:    metFORMIN (GLUCOPHAGE) 1000 MG tablet; Take 1 tablet (1,000 mg total) by mouth 2 (two) times a day with meals    Hemoglobin A1C; Future    Comprehensive metabolic panel; Future    Lipid panel    Benign essential HTN    Discussed diet and exercise, monitor weaning of labetol with gyn, will send in home BP to review       Abnormal CT scan, kidney    Noted in hostpial incidental finding, recommended for MRI of kidney by radiologist, will obtain testing and treat based on results    Orders:    MRI abdomen w wo contrast; Future           History of Present Illness   HPI    35 year old female with pmh of PCOS, presenting to [facility] care and review imaging.    She had a CT due to flank pain after giving birth, a kidney lesion was seen, radiology recommended MRI or CT scan kidney protocol for further evaluation    Review of Systems   Constitutional:  Negative for chills and fever.   HENT:  Negative for ear pain and sore throat.    Eyes:  Negative for pain and visual disturbance.   Respiratory:  Negative for cough and shortness of breath.    Cardiovascular:  Negative for chest pain and palpitations.   Gastrointestinal:  Negative for abdominal pain and vomiting.   Genitourinary:  Negative for dysuria and hematuria.   Musculoskeletal:  Negative for arthralgias and back pain.   Skin:  Negative for color change and rash.   Neurological:  Negative for seizures and syncope.   All other systems reviewed and are negative.      Objective   /78   Pulse 75   Wt 122 kg (268 lb 6.4 oz)   SpO2 97%   BMI 40.81 kg/m²      Physical Exam  Constitutional:       Appearance: Normal appearance.     Cardiovascular:       Rate and Rhythm: Normal rate and regular rhythm.   Pulmonary:      Effort: Pulmonary effort is normal.   Abdominal:      General: Abdomen is flat.     Musculoskeletal:         General: Normal range of motion.     Neurological:      General: No focal deficit present.      Mental Status: She is alert and oriented to person, place, and time.

## 2025-07-17 NOTE — ASSESSMENT & PLAN NOTE
Discussed diet and exercise, monitor weaning of labetol with gyn, will send in home BP to review

## 2025-07-17 NOTE — ASSESSMENT & PLAN NOTE
Restart metformin and check labs.    Orders:    metFORMIN (GLUCOPHAGE) 1000 MG tablet; Take 1 tablet (1,000 mg total) by mouth 2 (two) times a day with meals    Hemoglobin A1C; Future    Comprehensive metabolic panel; Future    Lipid panel

## 2025-08-04 ENCOUNTER — TELEMEDICINE (OUTPATIENT)
Dept: PSYCHIATRY | Facility: CLINIC | Age: 35
End: 2025-08-04
Payer: COMMERCIAL

## 2025-08-04 DIAGNOSIS — F33.42 RECURRENT MAJOR DEPRESSIVE DISORDER, IN FULL REMISSION (HCC): Primary | ICD-10-CM

## 2025-08-04 PROCEDURE — 99212 OFFICE O/P EST SF 10 MIN: CPT | Performed by: PSYCHIATRY & NEUROLOGY

## 2025-08-05 ENCOUNTER — TELEMEDICINE (OUTPATIENT)
Dept: ENDOCRINOLOGY | Facility: CLINIC | Age: 35
End: 2025-08-05
Payer: COMMERCIAL

## 2025-08-05 ENCOUNTER — TELEPHONE (OUTPATIENT)
Dept: OTHER | Facility: OTHER | Age: 35
End: 2025-08-05

## 2025-08-05 DIAGNOSIS — E28.2 PCOS (POLYCYSTIC OVARIAN SYNDROME): Primary | ICD-10-CM

## 2025-08-05 PROCEDURE — 99214 OFFICE O/P EST MOD 30 MIN: CPT

## (undated) DEVICE — SUT MONOCRYL 4-0 PS-2 27 IN Y426H

## (undated) DEVICE — ABG MICROSTICKS SAFETY

## (undated) DEVICE — KENDALL SCD SEQUENTIAL COMPRESSION COMFORT SLEEVES, KNEE LENGTH, MEDIUM: Brand: KENDALL SCD

## (undated) DEVICE — CHLORAPREP HI-LITE 26ML ORANGE

## (undated) DEVICE — SUT PLAIN 3-0 CT-1 27 IN 842H

## (undated) DEVICE — GLOVE INDICATOR PI UNDERGLOVE SZ 6.5 BLUE

## (undated) DEVICE — SUT VICRYL 0 CTX 36 IN J978H

## (undated) DEVICE — GLOVE SRG BIOGEL ECLIPSE 6.5

## (undated) DEVICE — PACK C-SECTION PBDS

## (undated) DEVICE — SUT VICRYL 0 CT-1 36 IN J946H

## (undated) DEVICE — MICRO HVTSA, 0.5G AND HVTSA SOURCEMARK PRODUCT CODE M1206 AND M1206-01: Brand: EXOFIN MICRO HVTSA, 0.5G